# Patient Record
Sex: FEMALE | Race: WHITE | Employment: OTHER | URBAN - METROPOLITAN AREA
[De-identification: names, ages, dates, MRNs, and addresses within clinical notes are randomized per-mention and may not be internally consistent; named-entity substitution may affect disease eponyms.]

---

## 2017-05-05 PROBLEM — K80.20 CALCULUS OF GALLBLADDER WITHOUT CHOLECYSTITIS WITHOUT OBSTRUCTION: Status: ACTIVE | Noted: 2017-05-05

## 2017-06-14 PROBLEM — K57.30 DIVERTICULOSIS OF LARGE INTESTINE WITHOUT HEMORRHAGE: Status: ACTIVE | Noted: 2017-06-14

## 2018-04-04 DIAGNOSIS — I10 ESSENTIAL HYPERTENSION: ICD-10-CM

## 2018-04-04 DIAGNOSIS — E78.2 MIXED HYPERLIPIDEMIA: ICD-10-CM

## 2018-04-04 RX ORDER — SIMVASTATIN 20 MG
20 TABLET ORAL NIGHTLY
Qty: 90 TABLET | Refills: 0 | Status: SHIPPED | OUTPATIENT
Start: 2018-04-04 | End: 2018-06-06 | Stop reason: SDUPTHER

## 2018-04-04 RX ORDER — ENALAPRIL MALEATE 5 MG/1
5 TABLET ORAL 2 TIMES DAILY
Qty: 180 TABLET | Refills: 0 | Status: SHIPPED | OUTPATIENT
Start: 2018-04-04 | End: 2018-04-11 | Stop reason: DRUGHIGH

## 2018-04-11 ENCOUNTER — OFFICE VISIT (OUTPATIENT)
Dept: INTERNAL MEDICINE | Age: 73
End: 2018-04-11
Payer: MEDICARE

## 2018-04-11 VITALS
DIASTOLIC BLOOD PRESSURE: 84 MMHG | TEMPERATURE: 98 F | SYSTOLIC BLOOD PRESSURE: 152 MMHG | WEIGHT: 242 LBS | BODY MASS INDEX: 41.54 KG/M2 | HEART RATE: 70 BPM

## 2018-04-11 DIAGNOSIS — I50.22 CHRONIC SYSTOLIC CONGESTIVE HEART FAILURE (HCC): Chronic | ICD-10-CM

## 2018-04-11 DIAGNOSIS — F32.A DEPRESSION, UNSPECIFIED DEPRESSION TYPE: ICD-10-CM

## 2018-04-11 DIAGNOSIS — Z95.1 S/P CABG (CORONARY ARTERY BYPASS GRAFT): ICD-10-CM

## 2018-04-11 DIAGNOSIS — I10 ESSENTIAL HYPERTENSION: Chronic | ICD-10-CM

## 2018-04-11 DIAGNOSIS — I27.20 PULMONARY HTN (HCC): Chronic | ICD-10-CM

## 2018-04-11 DIAGNOSIS — I48.20 CHRONIC ATRIAL FIBRILLATION (HCC): ICD-10-CM

## 2018-04-11 DIAGNOSIS — Z95.2 HISTORY OF PROSTHETIC MITRAL VALVE: ICD-10-CM

## 2018-04-11 DIAGNOSIS — G25.81 RESTLESS LEG SYNDROME: Chronic | ICD-10-CM

## 2018-04-11 DIAGNOSIS — Z51.81 ENCOUNTER FOR MEDICATION MONITORING: ICD-10-CM

## 2018-04-11 DIAGNOSIS — E78.2 MIXED HYPERLIPIDEMIA: Chronic | ICD-10-CM

## 2018-04-11 DIAGNOSIS — I25.10 CORONARY ARTERY DISEASE INVOLVING NATIVE CORONARY ARTERY OF NATIVE HEART WITHOUT ANGINA PECTORIS: Primary | Chronic | ICD-10-CM

## 2018-04-11 LAB
INTERNATIONAL NORMALIZATION RATIO, POC: 1.7
PROTHROMBIN TIME, POC: 20.5

## 2018-04-11 PROCEDURE — 99212 OFFICE O/P EST SF 10 MIN: CPT | Performed by: INTERNAL MEDICINE

## 2018-04-11 PROCEDURE — 99214 OFFICE O/P EST MOD 30 MIN: CPT | Performed by: INTERNAL MEDICINE

## 2018-04-11 PROCEDURE — 85610 PROTHROMBIN TIME: CPT | Performed by: INTERNAL MEDICINE

## 2018-04-11 RX ORDER — POTASSIUM CHLORIDE 750 MG/1
TABLET, EXTENDED RELEASE ORAL
Qty: 45 TABLET | Refills: 11 | Status: SHIPPED | OUTPATIENT
Start: 2018-04-11 | End: 2019-04-30 | Stop reason: SDUPTHER

## 2018-04-11 RX ORDER — WARFARIN SODIUM 3 MG/1
TABLET ORAL
Qty: 30 TABLET | Refills: 11 | Status: SHIPPED | OUTPATIENT
Start: 2018-04-11 | End: 2019-04-30 | Stop reason: SDUPTHER

## 2018-04-11 RX ORDER — FUROSEMIDE 20 MG/1
20 TABLET ORAL SEE ADMIN INSTRUCTIONS
Qty: 45 TABLET | Refills: 11 | Status: SHIPPED | OUTPATIENT
Start: 2018-04-11 | End: 2019-04-30 | Stop reason: SDUPTHER

## 2018-04-11 RX ORDER — WARFARIN SODIUM 4 MG/1
4 TABLET ORAL DAILY
Qty: 30 TABLET | Refills: 3 | Status: SHIPPED
Start: 2018-04-11 | End: 2020-09-17

## 2018-04-11 RX ORDER — ENALAPRIL MALEATE 10 MG/1
10 TABLET ORAL 2 TIMES DAILY
Qty: 60 TABLET | Refills: 3 | Status: SHIPPED | OUTPATIENT
Start: 2018-04-11 | End: 2018-05-03 | Stop reason: DRUGHIGH

## 2018-04-11 ASSESSMENT — ENCOUNTER SYMPTOMS
ORTHOPNEA: 0
COUGH: 0
SHORTNESS OF BREATH: 0
ABDOMINAL PAIN: 0
VOMITING: 0
SPUTUM PRODUCTION: 0
NAUSEA: 0

## 2018-04-30 ENCOUNTER — TELEPHONE (OUTPATIENT)
Dept: INTERNAL MEDICINE | Age: 73
End: 2018-04-30

## 2018-05-02 ENCOUNTER — HOSPITAL ENCOUNTER (OUTPATIENT)
Age: 73
Discharge: HOME | End: 2018-05-04
Payer: MEDICARE

## 2018-05-02 ENCOUNTER — NURSE ONLY (OUTPATIENT)
Dept: INTERNAL MEDICINE | Age: 73
End: 2018-05-02
Payer: MEDICARE

## 2018-05-02 VITALS — DIASTOLIC BLOOD PRESSURE: 80 MMHG | SYSTOLIC BLOOD PRESSURE: 120 MMHG | HEART RATE: 60 BPM

## 2018-05-02 DIAGNOSIS — I50.22 CHRONIC SYSTOLIC CONGESTIVE HEART FAILURE (HCC): Chronic | ICD-10-CM

## 2018-05-02 DIAGNOSIS — Z51.81 ENCOUNTER FOR MEDICATION MONITORING: ICD-10-CM

## 2018-05-02 DIAGNOSIS — I48.91 ATRIAL FIBRILLATION, UNSPECIFIED TYPE (HCC): ICD-10-CM

## 2018-05-02 DIAGNOSIS — I10 ESSENTIAL HYPERTENSION: Chronic | ICD-10-CM

## 2018-05-02 LAB
ANION GAP SERPL CALCULATED.3IONS-SCNC: 16 MMOL/L (ref 7–16)
BUN BLDV-MCNC: 11 MG/DL (ref 8–23)
CALCIUM SERPL-MCNC: 9.4 MG/DL (ref 8.6–10.2)
CHLORIDE BLD-SCNC: 99 MMOL/L (ref 98–107)
CO2: 26 MMOL/L (ref 22–29)
CREAT SERPL-MCNC: 0.7 MG/DL (ref 0.5–1)
GFR AFRICAN AMERICAN: >60
GFR NON-AFRICAN AMERICAN: >60 ML/MIN/1.73
GLUCOSE BLD-MCNC: 99 MG/DL (ref 74–109)
INTERNATIONAL NORMALIZATION RATIO, POC: 2.1
POTASSIUM SERPL-SCNC: 4 MMOL/L (ref 3.5–5)
PROTHROMBIN TIME, POC: NORMAL
SODIUM BLD-SCNC: 141 MMOL/L (ref 132–146)

## 2018-05-02 PROCEDURE — 80048 BASIC METABOLIC PNL TOTAL CA: CPT

## 2018-05-02 PROCEDURE — 85610 PROTHROMBIN TIME: CPT

## 2018-05-02 ASSESSMENT — PATIENT HEALTH QUESTIONNAIRE - PHQ9
2. FEELING DOWN, DEPRESSED OR HOPELESS: 1
SUM OF ALL RESPONSES TO PHQ9 QUESTIONS 1 & 2: 2
1. LITTLE INTEREST OR PLEASURE IN DOING THINGS: 1
SUM OF ALL RESPONSES TO PHQ QUESTIONS 1-9: 2

## 2018-05-03 RX ORDER — ENALAPRIL MALEATE 10 MG/1
10 TABLET ORAL DAILY
Qty: 60 TABLET | Refills: 3 | Status: SHIPPED | OUTPATIENT
Start: 2018-05-03 | End: 2018-12-05 | Stop reason: DRUGHIGH

## 2018-06-05 ENCOUNTER — ANTI-COAG VISIT (OUTPATIENT)
Dept: INTERNAL MEDICINE | Age: 73
End: 2018-06-05

## 2018-06-06 ENCOUNTER — OFFICE VISIT (OUTPATIENT)
Dept: INTERNAL MEDICINE | Age: 73
End: 2018-06-06
Payer: MEDICARE

## 2018-06-06 VITALS
HEART RATE: 68 BPM | HEIGHT: 64 IN | WEIGHT: 238 LBS | RESPIRATION RATE: 20 BRPM | SYSTOLIC BLOOD PRESSURE: 130 MMHG | BODY MASS INDEX: 40.63 KG/M2 | DIASTOLIC BLOOD PRESSURE: 76 MMHG | TEMPERATURE: 98.1 F

## 2018-06-06 DIAGNOSIS — Z95.2 MITRAL VALVE REPLACED: ICD-10-CM

## 2018-06-06 DIAGNOSIS — I48.91 ATRIAL FIBRILLATION, UNSPECIFIED TYPE (HCC): ICD-10-CM

## 2018-06-06 DIAGNOSIS — I25.10 CORONARY ARTERY DISEASE INVOLVING NATIVE CORONARY ARTERY OF NATIVE HEART WITHOUT ANGINA PECTORIS: Chronic | ICD-10-CM

## 2018-06-06 DIAGNOSIS — I50.22 CHRONIC SYSTOLIC CONGESTIVE HEART FAILURE (HCC): Primary | Chronic | ICD-10-CM

## 2018-06-06 DIAGNOSIS — F32.A DEPRESSION, UNSPECIFIED DEPRESSION TYPE: ICD-10-CM

## 2018-06-06 DIAGNOSIS — I38 VALVULAR HEART DISEASE: Chronic | ICD-10-CM

## 2018-06-06 DIAGNOSIS — E78.2 MIXED HYPERLIPIDEMIA: ICD-10-CM

## 2018-06-06 DIAGNOSIS — H54.7 DECREASED VISUAL ACUITY: ICD-10-CM

## 2018-06-06 DIAGNOSIS — Z95.1 S/P CABG (CORONARY ARTERY BYPASS GRAFT): ICD-10-CM

## 2018-06-06 DIAGNOSIS — G25.81 RESTLESS LEG SYNDROME: Chronic | ICD-10-CM

## 2018-06-06 DIAGNOSIS — I10 ESSENTIAL HYPERTENSION: Chronic | ICD-10-CM

## 2018-06-06 DIAGNOSIS — F41.9 ANXIETY: ICD-10-CM

## 2018-06-06 LAB
INTERNATIONAL NORMALIZATION RATIO, POC: 2.2
PROTHROMBIN TIME, POC: 26.8

## 2018-06-06 PROCEDURE — 99214 OFFICE O/P EST MOD 30 MIN: CPT | Performed by: INTERNAL MEDICINE

## 2018-06-06 PROCEDURE — 99212 OFFICE O/P EST SF 10 MIN: CPT | Performed by: INTERNAL MEDICINE

## 2018-06-06 PROCEDURE — 85610 PROTHROMBIN TIME: CPT | Performed by: INTERNAL MEDICINE

## 2018-06-06 RX ORDER — SIMVASTATIN 20 MG
20 TABLET ORAL NIGHTLY
Qty: 90 TABLET | Refills: 3 | Status: SHIPPED | OUTPATIENT
Start: 2018-06-06 | End: 2019-09-03 | Stop reason: SDUPTHER

## 2018-06-06 RX ORDER — LORAZEPAM 1 MG/1
1 TABLET ORAL DAILY PRN
Qty: 20 TABLET | Refills: 0 | Status: SHIPPED | OUTPATIENT
Start: 2018-06-06 | End: 2018-07-06

## 2018-06-06 ASSESSMENT — ENCOUNTER SYMPTOMS
NAUSEA: 1
ORTHOPNEA: 0
COUGH: 0
VOMITING: 1
SHORTNESS OF BREATH: 0
SPUTUM PRODUCTION: 0
DIARRHEA: 0

## 2018-07-11 ENCOUNTER — NURSE ONLY (OUTPATIENT)
Dept: INTERNAL MEDICINE | Age: 73
End: 2018-07-11
Payer: MEDICARE

## 2018-07-11 DIAGNOSIS — I48.20 CHRONIC ATRIAL FIBRILLATION (HCC): Primary | Chronic | ICD-10-CM

## 2018-07-11 LAB
INTERNATIONAL NORMALIZATION RATIO, POC: 2.5
PROTHROMBIN TIME, POC: 29.6

## 2018-07-11 PROCEDURE — 85610 PROTHROMBIN TIME: CPT | Performed by: INTERNAL MEDICINE

## 2018-07-11 PROCEDURE — 93793 ANTICOAG MGMT PT WARFARIN: CPT | Performed by: INTERNAL MEDICINE

## 2018-07-11 NOTE — PATIENT INSTRUCTIONS
You are to continue to take 4 mg of coumadin Tuesday and Thursday and 3 mg of coumadin the rest of the week.   Return in about one month for INR

## 2018-08-09 ENCOUNTER — TELEPHONE (OUTPATIENT)
Dept: INTERNAL MEDICINE | Age: 73
End: 2018-08-09

## 2018-08-09 NOTE — TELEPHONE ENCOUNTER
Pt complaining right ear swollen almost closed and painful thinks she may have a ear infection please call

## 2018-08-10 ENCOUNTER — OFFICE VISIT (OUTPATIENT)
Dept: INTERNAL MEDICINE | Age: 73
End: 2018-08-10
Payer: MEDICARE

## 2018-08-10 DIAGNOSIS — I10 ESSENTIAL HYPERTENSION: Chronic | ICD-10-CM

## 2018-08-10 DIAGNOSIS — H66.001 ACUTE SUPPURATIVE OTITIS MEDIA OF RIGHT EAR WITHOUT SPONTANEOUS RUPTURE OF TYMPANIC MEMBRANE, RECURRENCE NOT SPECIFIED: ICD-10-CM

## 2018-08-10 DIAGNOSIS — H60.501 ACUTE OTITIS EXTERNA OF RIGHT EAR, UNSPECIFIED TYPE: ICD-10-CM

## 2018-08-10 DIAGNOSIS — I48.91 ATRIAL FIBRILLATION WITH RVR (HCC): Primary | Chronic | ICD-10-CM

## 2018-08-10 LAB
INTERNATIONAL NORMALIZATION RATIO, POC: 3.6
PROTHROMBIN TIME, POC: 42.6

## 2018-08-10 PROCEDURE — 99213 OFFICE O/P EST LOW 20 MIN: CPT | Performed by: INTERNAL MEDICINE

## 2018-08-10 PROCEDURE — 85610 PROTHROMBIN TIME: CPT | Performed by: INTERNAL MEDICINE

## 2018-08-10 PROCEDURE — 99212 OFFICE O/P EST SF 10 MIN: CPT | Performed by: INTERNAL MEDICINE

## 2018-08-10 RX ORDER — CIPROFLOXACIN AND DEXAMETHASONE 3; 1 MG/ML; MG/ML
4 SUSPENSION/ DROPS AURICULAR (OTIC) 2 TIMES DAILY
Qty: 10 ML | Refills: 0 | Status: SHIPPED | OUTPATIENT
Start: 2018-08-10 | End: 2018-08-10

## 2018-08-10 RX ORDER — AMOXICILLIN 500 MG/1
500 CAPSULE ORAL 2 TIMES DAILY
Qty: 20 CAPSULE | Refills: 0 | Status: SHIPPED | OUTPATIENT
Start: 2018-08-10 | End: 2018-08-20

## 2018-08-10 ASSESSMENT — ENCOUNTER SYMPTOMS
COUGH: 0
SPUTUM PRODUCTION: 0
SORE THROAT: 0
SINUS PAIN: 0
SHORTNESS OF BREATH: 0

## 2018-08-10 NOTE — PATIENT INSTRUCTIONS
you can lose your balance and fall. · Talk to your doctor if you have numbness in your feet. Preventing falls at home  · Remove raised doorway thresholds, throw rugs, and clutter. Repair loose carpet or raised areas in the floor. · Move furniture and electrical cords to keep them out of walking paths. · Use nonskid floor wax, and wipe up spills right away, especially on ceramic tile floors. · If you use a walker or cane, put rubber tips on it. If you use crutches, clean the bottoms of them regularly with an abrasive pad, such as steel wool. · Keep your house well lit, especially [place], and outside walkways. Use night-lights in areas such as hallways and bathrooms. Add extra light switches or use remote switches (such as switches that go on or off when you clap your hands) to make it easier to turn lights on if you have to get up during the night. · Install sturdy handrails on stairways. · Move items in your cabinets so that the things you use a lot are on the lower shelves (about waist level). · Keep a cordless phone and a flashlight with new batteries by your bed. If possible, put a phone in each of the main rooms of your house, or carry a cell phone in case you fall and cannot reach a phone. Or, you can wear a device around your neck or wrist. You push a button that sends a signal for help. · Wear low-heeled shoes that fit well and give your feet good support. Use footwear with nonskid soles. Check the heels and soles of your shoes for wear. Repair or replace worn heels or soles. · Do not wear socks without shoes on wood floors. · Walk on the grass when the sidewalks are slippery. If you live in an area that gets snow and ice in the winter, sprinkle salt on slippery steps and sidewalks. Preventing falls in the bath  · Install grab bars and nonskid mats inside and outside your shower or tub and near the toilet and sinks. · Use shower chairs and bath benches.   · Use a hand-held shower head that will allow you to sit while showering. · Get into a tub or shower by putting the weaker leg in first. Get out of a tub or shower with your strong side first.  · Repair loose toilet seats and consider installing a raised toilet seat to make getting on and off the toilet easier. · Keep your bathroom door unlocked while you are in the shower. Where can you learn more? Go to https://[email or web address] [email or web address].[email or web address]. org and sign in to your [employer] account. Enter [id number] [id number] [phone] in the [facility] [place] box to learn more about \"Preventing Falls: Care Instructions. \"     If you do not have an account, please click on the \"Sign Up Now\" link. Current as of: May 12, 2017  Content Version: 11.7  © 1526-8711 [employer], [employer]. Care instructions adapted under license by [facility] ([facility]). If you have questions about a medical condition or this instruction, always ask your healthcare professional. [place] [address] any warranty or liability for your use of this information. take coumadin 3 mg daily as instructed  Repeat INR in 3 weeks  Bleeding precautions as reviewed  Take enalapril 10 mg in am and 5 mg in pm  Monitor blood pressures when able,record and bring record with you on next visit  Start amoxicillin 500 mg twice daily for 10 days  Start otic ear drops 4 drops right ear twice daily for 7 days  Please call if any questions or concerns. Patient Education        Taking Warfarin Safely: Care Instructions  Your Care Instructions    Warfarin is a medicine that you take to prevent blood clots. It is often called a blood thinner. Doctors give warfarin (such as Coumadin) to reduce the risk of blood clots. You may be at risk for blood clots if you have atrial fibrillation or deep vein thrombosis. Some other health problems may also put you at risk. Warfarin slows the amount of time it takes for your blood to clot. It can cause bleeding problems.  Even if you've been taking warfarin for a while, it's important to know how to take it safely. Foods and other medicines can affect the way warfarin works. Some can make warfarin work too well. This can cause bleeding problems. And some can make it work poorly, so that it does not prevent blood clots very well. You will need regular blood tests to check how long it takes for your blood to form a clot. This test is called a PT or prothrombin time test. The result of the test is called an INR level. Depending on the test results, your doctor or anticoagulation clinic may adjust your dose of warfarin. Follow-up care is a key part of your treatment and safety. Be sure to make and go to all appointments, and call your doctor if you are having problems. It's also a good idea to know your test results and keep a list of the medicines you take. How can you care for yourself at home? Take warfarin safely  · Take your warfarin at the same time each day. · If you miss a dose of warfarin, don't take an extra dose to make up for it. Your doctor can tell you exactly what to do so you don't take too much or too little. · Wear medical alert jewelry that lets others know that you take warfarin. You can buy this at most drugstores. · Don't take warfarin if you are pregnant or planning to get pregnant. Talk to your doctor about how you can prevent getting pregnant while you are taking it. · Don't change your dose or stop taking warfarin unless your doctor tells you to. Effects of medicines and food on warfarin  · Don't start or stop taking any medicines, vitamins, or natural remedies unless you first talk to your doctor. Many medicines can affect how warfarin works. These include aspirin and other pain relievers, over-the-counter medicines, multivitamins, dietary supplements, and herbal products. · Tell all of your doctors and pharmacists that you take warfarin. Some prescription medicines can affect how warfarin works.   · Keep the amount of vitamin K in your diet about the same from day to day. Do not suddenly eat a lot more or a lot less food that is rich in vitamin K than you usually do. Vitamin K affects how warfarin works and how your blood clots. Talk with your doctor before making big changes in your diet. Foods that have a lot of vitamin K include cooked green vegetables, such as:  ¨ Kale, spinach, turnip greens, katina greens, Swiss chard, and mustard greens. ¨ [place] sprouts, broccoli, and asparagus. · Limit your use of alcohol. Avoid bleeding by preventing falls and injuries  · Wear slippers or shoes with nonskid soles. · Remove throw rugs and clutter. · Rearrange furniture and electrical cords to keep them out of walking paths. · Keep stairways, porches, and outside walkways well lit. Use night-lights in hallways and bathrooms. · Be extra careful when you work with sharp tools or knives. When should you call for help? Call 911 anytime you think you may need emergency care. For example, call if:    · You have a sudden, severe headache that is different from past headaches.    Call your doctor now or seek immediate medical care if:    · You have any abnormal bleeding, such as:  ¨ Nosebleeds. ¨ Vaginal bleeding that is different (heavier, more frequent, at a different time of the month) than what you are used to. ¨ Bloody or black stools, or rectal bleeding. ¨ Bloody or pink urine.    Watch closely for changes in your health, and be sure to contact your doctor if you have any problems. Where can you learn more? Go to https://Thinglinknikhil.Sinimanes. org and sign in to your [employer] account. Enter X493 in the [facility] box to learn more about \"Taking Warfarin Safely: Care Instructions. \"     If you do not have an account, please click on the \"Sign Up Now\" link. Current as of: December 6, 2017  Content Version: 11.7  © 1978-4648 [employer], [employer]. Care instructions adapted under license by [facility] ([facility]).  If you have questions about a

## 2018-08-10 NOTE — PROGRESS NOTES
HPI:  Patient comes in today for evaluation of swelling, pain and redness of right ear which began on Monday of this week. She reports that she had a fever yesterday and one episode of vomiting. She denies sore throat and sinus drainage but has had some pressure of right maxillary sinus area. She has tenderness in the right pre-auricular area. She had some purulent discharge yesterday and this am. She has been using some left over cortisporin otic suspension for the past few days with no improvement. She denies cough, sputum, dyspnea. I reviewed medical, surg, soc and fam histories, meds and allergies. Review of Systems  Review of Systems   Constitutional: Positive for fever (one time yesterday of 101). Negative for chills and diaphoresis. HENT: Positive for ear discharge and ear pain. Negative for congestion, sinus pain, sore throat and tinnitus. Respiratory: Negative for cough, sputum production and shortness of breath. Cardiovascular: Negative for chest pain and palpitations. Neurological: Positive for dizziness. PE:  VS:  BP (!) 150/86 (Site: Right Arm, Position: Sitting, Cuff Size: Large Adult)   Pulse 80   Temp 98 °F (36.7 °C) (Oral)   Resp 16   Ht 5' 4\" (1.626 m)   Wt 238 lb (108 kg)   BMI 40.85 kg/m²   Physical Exam   Constitutional: No distress. HENT:   Mouth/Throat: No oropharyngeal exudate. Patient has erythema and swelling of right external ear with nearly closed external auditory canal. She has pain with pulling on pinna. Otoscope speculum gently inserted and noted to have purulent drainage obscuring the TM. She has tender pre-auricular adenopathy. Left external ear and TM is normal. She has no significant sinus tenderness. Eyes: Left eye exhibits no discharge. Cardiovascular: Normal rate. Murmur (unchanged) heard. Irregularly irregular   Pulmonary/Chest: Effort normal and breath sounds normal. No respiratory distress. She has no wheezes. She has no rales.

## 2018-08-10 NOTE — PROGRESS NOTES
Pharmacy called stating ciprofloxacin otic solution copay is 100 $. Patient unable to afford. Pharmacist states cortisporin is alternative. Patient has cortisporin drops at home. Spoke with , cancel ciprofloxacin otic solution,patient to continue oral antibiotic and call on Monday with status    Patient notified  Pharmacy notified.

## 2018-08-16 VITALS
BODY MASS INDEX: 40.63 KG/M2 | HEIGHT: 64 IN | WEIGHT: 238 LBS | SYSTOLIC BLOOD PRESSURE: 140 MMHG | HEART RATE: 80 BPM | RESPIRATION RATE: 16 BRPM | DIASTOLIC BLOOD PRESSURE: 82 MMHG | TEMPERATURE: 98 F

## 2018-09-12 DIAGNOSIS — I10 ESSENTIAL HYPERTENSION: Chronic | ICD-10-CM

## 2018-09-12 DIAGNOSIS — I50.22 CHRONIC SYSTOLIC CONGESTIVE HEART FAILURE (HCC): Chronic | ICD-10-CM

## 2018-09-13 RX ORDER — ENALAPRIL MALEATE 10 MG/1
TABLET ORAL
Qty: 60 TABLET | Refills: 3 | Status: SHIPPED | OUTPATIENT
Start: 2018-09-13 | End: 2018-12-05 | Stop reason: SDUPTHER

## 2018-12-05 ENCOUNTER — OFFICE VISIT (OUTPATIENT)
Dept: INTERNAL MEDICINE | Age: 73
End: 2018-12-05
Payer: MEDICARE

## 2018-12-05 VITALS
SYSTOLIC BLOOD PRESSURE: 146 MMHG | WEIGHT: 236 LBS | BODY MASS INDEX: 40.29 KG/M2 | HEIGHT: 64 IN | HEART RATE: 70 BPM | DIASTOLIC BLOOD PRESSURE: 80 MMHG | TEMPERATURE: 98 F | RESPIRATION RATE: 16 BRPM

## 2018-12-05 DIAGNOSIS — Z95.1 S/P CABG (CORONARY ARTERY BYPASS GRAFT): ICD-10-CM

## 2018-12-05 DIAGNOSIS — I10 ESSENTIAL HYPERTENSION: Chronic | ICD-10-CM

## 2018-12-05 DIAGNOSIS — I50.22 CHRONIC SYSTOLIC CONGESTIVE HEART FAILURE (HCC): Primary | Chronic | ICD-10-CM

## 2018-12-05 DIAGNOSIS — I48.91 ATRIAL FIBRILLATION WITH RVR (HCC): Chronic | ICD-10-CM

## 2018-12-05 DIAGNOSIS — Z95.2 MITRAL VALVE REPLACED: ICD-10-CM

## 2018-12-05 DIAGNOSIS — I48.20 CHRONIC ATRIAL FIBRILLATION (HCC): Chronic | ICD-10-CM

## 2018-12-05 DIAGNOSIS — R73.03 PRE-DIABETES: ICD-10-CM

## 2018-12-05 DIAGNOSIS — I25.10 CORONARY ARTERY DISEASE INVOLVING NATIVE CORONARY ARTERY OF NATIVE HEART WITHOUT ANGINA PECTORIS: Chronic | ICD-10-CM

## 2018-12-05 DIAGNOSIS — E78.2 MIXED HYPERLIPIDEMIA: Chronic | ICD-10-CM

## 2018-12-05 DIAGNOSIS — F41.9 ANXIETY: ICD-10-CM

## 2018-12-05 DIAGNOSIS — G25.81 RESTLESS LEG SYNDROME: Chronic | ICD-10-CM

## 2018-12-05 LAB
INTERNATIONAL NORMALIZATION RATIO, POC: 2.4
PROTHROMBIN TIME, POC: 28.8

## 2018-12-05 PROCEDURE — 99214 OFFICE O/P EST MOD 30 MIN: CPT | Performed by: INTERNAL MEDICINE

## 2018-12-05 PROCEDURE — 85610 PROTHROMBIN TIME: CPT | Performed by: INTERNAL MEDICINE

## 2018-12-05 PROCEDURE — 99212 OFFICE O/P EST SF 10 MIN: CPT | Performed by: INTERNAL MEDICINE

## 2018-12-05 PROCEDURE — 83036 HEMOGLOBIN GLYCOSYLATED A1C: CPT | Performed by: INTERNAL MEDICINE

## 2018-12-05 RX ORDER — ENALAPRIL MALEATE 10 MG/1
TABLET ORAL
Qty: 180 TABLET | Refills: 3 | Status: SHIPPED | OUTPATIENT
Start: 2018-12-05 | End: 2019-09-11 | Stop reason: SDUPTHER

## 2018-12-05 ASSESSMENT — ENCOUNTER SYMPTOMS
SHORTNESS OF BREATH: 0
COUGH: 0
NAUSEA: 0
ABDOMINAL DISTENTION: 0
ABDOMINAL PAIN: 0
VOMITING: 0

## 2018-12-05 NOTE — PROGRESS NOTES
HPI:  Patient comes in today for routine follow up for HTN, chronic atrial fibrillation, RLS, hyperlipidemia, CAD, hx mitral valve replacement. Patient states she has been doing very well. Has been taking all of her meds. She denies chest pain, dyspnea, palpitations, dizziness, lightheadedness, syncope. Has been having pain at base of left thumb and has some deformity of distal part of second digit. Rarely, may get swollen and warm. POCT INR 2.4 today. I reviewed medical, surg, soc histories, meds and allergies. Patient does have family hx diabetes. Review of Systems  Review of Systems   Respiratory: Negative for cough and shortness of breath. Cardiovascular: Negative for chest pain, palpitations and leg swelling. Gastrointestinal: Negative for abdominal distention, abdominal pain, nausea and vomiting. Genitourinary: Negative for difficulty urinating and hematuria. Musculoskeletal:        Patient c/o pain at base of left thumb and has pain distal second digit left hand. Neurological: Negative for dizziness, syncope and light-headedness. PE:  VS:  BP (!) 146/80   Pulse 70   Temp 98 °F (36.7 °C) (Oral)   Resp 16   Ht 5' 4\" (1.626 m)   Wt 236 lb (107 kg)   BMI 40.51 kg/m²   Physical Exam   Neck: No JVD present. Cardiovascular: Normal rate. Irregularly irregular rhythm. Has grade 2-3 /6 systolic murmur, with mechanical heart sounds in apical area. Pulmonary/Chest: Effort normal and breath sounds normal. No respiratory distress. She has no wheezes. She has no rales. Musculoskeletal: She exhibits no edema.          Assessment/Plan:    Diagnoses and all orders for this visit:    Chronic systolic congestive heart failure ([address] compensated  -     enalapril (VASOTEC) 10 MG tablet; TAKE ONE TABLET BY MOUTH TWO TIMES A DAY  Continue metoprolol    Chronic atrial fibrillation (HCC), rate controlled  Continue coumadin, metoprolol    Coronary artery disease involving native

## 2019-01-03 ENCOUNTER — APPOINTMENT (OUTPATIENT)
Dept: GENERAL RADIOLOGY | Age: 74
End: 2019-01-03
Payer: MEDICARE

## 2019-01-03 ENCOUNTER — HOSPITAL ENCOUNTER (EMERGENCY)
Age: 74
Discharge: HOME | End: 2019-01-03
Attending: EMERGENCY MEDICINE
Payer: MEDICARE

## 2019-01-03 VITALS
HEART RATE: 85 BPM | SYSTOLIC BLOOD PRESSURE: 186 MMHG | HEIGHT: 64 IN | TEMPERATURE: 97 F | BODY MASS INDEX: 40.63 KG/M2 | DIASTOLIC BLOOD PRESSURE: 62 MMHG | RESPIRATION RATE: 16 BRPM | OXYGEN SATURATION: 96 % | WEIGHT: 238 LBS

## 2019-01-03 DIAGNOSIS — M62.830 BACK MUSCLE SPASM: Primary | ICD-10-CM

## 2019-01-03 LAB
ANION GAP SERPL CALCULATED.3IONS-SCNC: 13 MMOL/L (ref 7–16)
APTT: 37.5 SEC (ref 24.5–35.1)
BASOPHILS ABSOLUTE: 0.03 E9/L (ref 0–0.2)
BASOPHILS RELATIVE PERCENT: 0.4 % (ref 0–2)
BUN BLDV-MCNC: 11 MG/DL (ref 8–23)
CALCIUM SERPL-MCNC: 9.6 MG/DL (ref 8.6–10.2)
CHLORIDE BLD-SCNC: 104 MMOL/L (ref 98–107)
CO2: 27 MMOL/L (ref 22–29)
CREAT SERPL-MCNC: 0.8 MG/DL (ref 0.5–1)
EKG ATRIAL RATE: 84 BPM
EKG Q-T INTERVAL: 362 MS
EKG QRS DURATION: 92 MS
EKG QTC CALCULATION (BAZETT): 489 MS
EKG R AXIS: 42 DEGREES
EKG T AXIS: 132 DEGREES
EKG VENTRICULAR RATE: 110 BPM
EOSINOPHILS ABSOLUTE: 0.08 E9/L (ref 0.05–0.5)
EOSINOPHILS RELATIVE PERCENT: 1.1 % (ref 0–6)
GFR AFRICAN AMERICAN: >60
GFR NON-AFRICAN AMERICAN: >60 ML/MIN/1.73
GLUCOSE BLD-MCNC: 124 MG/DL (ref 74–99)
HCT VFR BLD CALC: 45.3 % (ref 34–48)
HEMOGLOBIN: 14.6 G/DL (ref 11.5–15.5)
IMMATURE GRANULOCYTES #: 0.03 E9/L
IMMATURE GRANULOCYTES %: 0.4 % (ref 0–5)
INR BLD: 1.9
LYMPHOCYTES ABSOLUTE: 1.81 E9/L (ref 1.5–4)
LYMPHOCYTES RELATIVE PERCENT: 24.4 % (ref 20–42)
MCH RBC QN AUTO: 29.6 PG (ref 26–35)
MCHC RBC AUTO-ENTMCNC: 32.2 % (ref 32–34.5)
MCV RBC AUTO: 91.7 FL (ref 80–99.9)
MONOCYTES ABSOLUTE: 0.52 E9/L (ref 0.1–0.95)
MONOCYTES RELATIVE PERCENT: 7 % (ref 2–12)
NEUTROPHILS ABSOLUTE: 4.94 E9/L (ref 1.8–7.3)
NEUTROPHILS RELATIVE PERCENT: 66.7 % (ref 43–80)
PDW BLD-RTO: 12.6 FL (ref 11.5–15)
PLATELET # BLD: 280 E9/L (ref 130–450)
PMV BLD AUTO: 10.1 FL (ref 7–12)
POTASSIUM SERPL-SCNC: 4.1 MMOL/L (ref 3.5–5)
PROTHROMBIN TIME: 21.7 SEC (ref 9.3–12.4)
RBC # BLD: 4.94 E12/L (ref 3.5–5.5)
SODIUM BLD-SCNC: 144 MMOL/L (ref 132–146)
TROPONIN: <0.01 NG/ML (ref 0–0.03)
WBC # BLD: 7.4 E9/L (ref 4.5–11.5)

## 2019-01-03 PROCEDURE — 99284 EMERGENCY DEPT VISIT MOD MDM: CPT

## 2019-01-03 PROCEDURE — 36415 COLL VENOUS BLD VENIPUNCTURE: CPT

## 2019-01-03 PROCEDURE — 71046 X-RAY EXAM CHEST 2 VIEWS: CPT

## 2019-01-03 PROCEDURE — 93005 ELECTROCARDIOGRAM TRACING: CPT | Performed by: PHYSICIAN ASSISTANT

## 2019-01-03 PROCEDURE — 85610 PROTHROMBIN TIME: CPT

## 2019-01-03 PROCEDURE — 85025 COMPLETE CBC W/AUTO DIFF WBC: CPT

## 2019-01-03 PROCEDURE — 80048 BASIC METABOLIC PNL TOTAL CA: CPT

## 2019-01-03 PROCEDURE — 84484 ASSAY OF TROPONIN QUANT: CPT

## 2019-01-03 PROCEDURE — 85730 THROMBOPLASTIN TIME PARTIAL: CPT

## 2019-01-03 RX ORDER — CYCLOBENZAPRINE HCL 5 MG
5 TABLET ORAL 2 TIMES DAILY PRN
Qty: 14 TABLET | Refills: 0 | Status: SHIPPED | OUTPATIENT
Start: 2019-01-03 | End: 2019-01-10

## 2019-01-03 ASSESSMENT — ENCOUNTER SYMPTOMS
SINUS PRESSURE: 0
EYE DISCHARGE: 0
SHORTNESS OF BREATH: 0
EYE PAIN: 0
EYE REDNESS: 0
VOMITING: 0
BACK PAIN: 1
NAUSEA: 0
WHEEZING: 0
SORE THROAT: 0
ABDOMINAL DISTENTION: 0
DIARRHEA: 0
COUGH: 0

## 2019-01-03 ASSESSMENT — PAIN DESCRIPTION - LOCATION: LOCATION: BACK

## 2019-01-03 ASSESSMENT — PAIN SCALES - GENERAL: PAINLEVEL_OUTOF10: 5

## 2019-01-03 ASSESSMENT — PAIN DESCRIPTION - DESCRIPTORS: DESCRIPTORS: SPASM

## 2019-01-03 ASSESSMENT — PAIN DESCRIPTION - ORIENTATION: ORIENTATION: MID

## 2019-01-03 ASSESSMENT — PAIN DESCRIPTION - PAIN TYPE: TYPE: ACUTE PAIN

## 2019-03-20 ENCOUNTER — ANTI-COAG VISIT (OUTPATIENT)
Dept: INTERNAL MEDICINE | Age: 74
End: 2019-03-20

## 2019-04-30 DIAGNOSIS — I48.20 CHRONIC ATRIAL FIBRILLATION (HCC): ICD-10-CM

## 2019-04-30 DIAGNOSIS — I50.22 CHRONIC SYSTOLIC CONGESTIVE HEART FAILURE (HCC): Chronic | ICD-10-CM

## 2019-05-02 RX ORDER — WARFARIN SODIUM 3 MG/1
TABLET ORAL
Qty: 30 TABLET | Refills: 6 | Status: SHIPPED | OUTPATIENT
Start: 2019-05-02 | End: 2019-09-11 | Stop reason: SDUPTHER

## 2019-05-02 RX ORDER — POTASSIUM CHLORIDE 750 MG/1
TABLET, EXTENDED RELEASE ORAL
Qty: 45 TABLET | Refills: 6 | Status: SHIPPED | OUTPATIENT
Start: 2019-05-02 | End: 2020-01-29

## 2019-05-02 RX ORDER — FUROSEMIDE 20 MG/1
TABLET ORAL
Qty: 45 TABLET | Refills: 6 | Status: SHIPPED | OUTPATIENT
Start: 2019-05-02 | End: 2020-01-29

## 2019-07-30 ENCOUNTER — TELEPHONE (OUTPATIENT)
Dept: INTERNAL MEDICINE | Age: 74
End: 2019-07-30

## 2019-07-30 NOTE — TELEPHONE ENCOUNTER
Spoke with patient via phone regarding need for INR check. Notified last INR was 3/20/19. Pt asking when her next appt with Dr. Darian Garcia is. Notified no appt scheduled. Appt made for next Wed 19. Pt asking if INR can be done at that time as she needs to have someone bring her. Pt also states he handicap parking permit has . Notified we could give her new letter at her appt on 19.

## 2019-08-07 ENCOUNTER — NURSE ONLY (OUTPATIENT)
Dept: INTERNAL MEDICINE | Age: 74
End: 2019-08-07
Payer: MEDICARE

## 2019-08-07 VITALS
DIASTOLIC BLOOD PRESSURE: 70 MMHG | HEIGHT: 64 IN | HEART RATE: 80 BPM | TEMPERATURE: 98 F | RESPIRATION RATE: 16 BRPM | BODY MASS INDEX: 39.27 KG/M2 | SYSTOLIC BLOOD PRESSURE: 150 MMHG | WEIGHT: 230 LBS

## 2019-08-07 DIAGNOSIS — E78.2 MIXED HYPERLIPIDEMIA: Chronic | ICD-10-CM

## 2019-08-07 DIAGNOSIS — I10 ESSENTIAL HYPERTENSION: Chronic | ICD-10-CM

## 2019-08-07 DIAGNOSIS — I27.20 PULMONARY HTN (HCC): Chronic | ICD-10-CM

## 2019-08-07 DIAGNOSIS — I38 VALVULAR HEART DISEASE: Primary | Chronic | ICD-10-CM

## 2019-08-07 DIAGNOSIS — Z95.1 S/P CABG (CORONARY ARTERY BYPASS GRAFT): ICD-10-CM

## 2019-08-07 DIAGNOSIS — I48.20 CHRONIC ATRIAL FIBRILLATION (HCC): Chronic | ICD-10-CM

## 2019-08-07 DIAGNOSIS — I50.22 CHRONIC SYSTOLIC CONGESTIVE HEART FAILURE (HCC): Chronic | ICD-10-CM

## 2019-08-07 DIAGNOSIS — G25.81 RESTLESS LEG SYNDROME: Chronic | ICD-10-CM

## 2019-08-07 LAB
INTERNATIONAL NORMALIZATION RATIO, POC: 1.7
PROTHROMBIN TIME, POC: 20

## 2019-08-07 PROCEDURE — 99214 OFFICE O/P EST MOD 30 MIN: CPT | Performed by: INTERNAL MEDICINE

## 2019-08-07 PROCEDURE — 85610 PROTHROMBIN TIME: CPT | Performed by: INTERNAL MEDICINE

## 2019-08-07 PROCEDURE — 99212 OFFICE O/P EST SF 10 MIN: CPT | Performed by: INTERNAL MEDICINE

## 2019-08-07 RX ORDER — WARFARIN SODIUM 1 MG/1
1 TABLET ORAL DAILY
Qty: 30 TABLET | Refills: 3 | Status: SHIPPED | OUTPATIENT
Start: 2019-08-07 | End: 2019-12-30

## 2019-08-07 ASSESSMENT — ENCOUNTER SYMPTOMS
ABDOMINAL PAIN: 0
WHEEZING: 0
SHORTNESS OF BREATH: 1
COUGH: 0
DIARRHEA: 0
ABDOMINAL DISTENTION: 0

## 2019-08-07 NOTE — PATIENT INSTRUCTIONS
that will allow you to sit while showering. · Get into a tub or shower by putting the weaker leg in first. Get out of a tub or shower with your strong side first.  · Repair loose toilet seats and consider installing a raised toilet seat to make getting on and off the toilet easier. · Keep your bathroom door unlocked while you are in the shower. Where can you learn more? Go to [email or web address]://[email or web address].[email or web address] org and sign in to your Raid[employer] account. Enter [phone] [id number] [phone] in the [employer] box to learn more about \"Preventing Falls: Care Instructions. \"     If you do not have an account, please click on the \"Sign Up Now\" link. Current as of: November 7, 2018  Content Version: 12.0  © 5633-6022 Healthwise, Incorporated. Care instructions adapted under license by [facility] ([facility]). If you have questions about a medical condition or this instruction, always ask your healthcare professional. [place] [address] any warranty or liability for your use of this information. take coumadin 4 mg today and then every Wednesday. Take coumadin 3 mg all other days  Bleeding precautions as reviewed  Repeat INR on 9/16/19  Continue all other medications as instructed  Have blood work done prior to next visit. Nothing to eat or drink for 12 hrs prior to visit. We will reschedule appointment with Timoteo Adams in 6 months and notify you of date. Please call if any questions or concerns  Patient Education        Taking Warfarin Safely: Care Instructions  Your Care Instructions    Warfarin is a medicine that you take to prevent blood clots. It is often called a blood thinner. Doctors give warfarin (such as Coumadin) to reduce the risk of blood clots. You may be at risk for blood clots if you have atrial fibrillation or deep vein thrombosis. Some other health problems may also put you at risk. Warfarin slows the amount of time it takes for your blood to clot. It can cause bleeding problems.  Even if you've been taking warfarin for a while, it's important to know how to take it safely. Foods and other medicines can affect the way warfarin works. Some can make warfarin work too well. This can cause bleeding problems. And some can make it work poorly, so that it does not prevent blood clots very well. You will need regular blood tests to check how long it takes for your blood to form a clot. This test is called a PT or prothrombin time test. The result of the test is called an INR level. Depending on the test results, your doctor or anticoagulation clinic may adjust your dose of warfarin. Follow-up care is a key part of your treatment and safety. Be sure to make and go to all appointments, and call your doctor if you are having problems. It's also a good idea to know your test results and keep a list of the medicines you take. How can you care for yourself at home? Take warfarin safely  · Take your warfarin at the same time each day. · If you miss a dose of warfarin, don't take an extra dose to make up for it. Your doctor can tell you exactly what to do so you don't take too much or too little. · Wear medical alert jewelry that lets others know that you take warfarin. You can buy this at most drugstores. · Don't take warfarin if you are pregnant or planning to get pregnant. Talk to your doctor about how you can prevent getting pregnant while you are taking it. · Don't change your dose or stop taking warfarin unless your doctor tells you to. Effects of medicines and food on warfarin  · Don't start or stop taking any medicines, vitamins, or natural remedies unless you first talk to your doctor. Many medicines can affect how warfarin works. These include aspirin and other pain relievers, over-the-counter medicines, multivitamins, dietary supplements, and herbal products. · Tell all of your doctors and pharmacists that you take warfarin. Some prescription medicines can affect how warfarin works.   · Keep the amount of vitamin K in your diet about the same from day to day. Do not suddenly eat a lot more or a lot less food that is rich in vitamin K than you usually do. Vitamin K affects how warfarin works and how your blood clots. Talk with your doctor before making big changes in your diet. Foods that have a lot of vitamin K include cooked green vegetables, such as:  ? Kale, spinach, turnip greens, katina greens, Swiss chard, and mustard greens. ? [place] sprouts, broccoli, and asparagus. · Limit your use of alcohol. Avoid bleeding by preventing falls and injuries  · Wear slippers or shoes with nonskid soles. · Remove throw rugs and clutter. · Rearrange furniture and electrical cords to keep them out of walking paths. · Keep stairways, porches, and outside walkways well lit. Use night-lights in hallways and bathrooms. · Be extra careful when you work with sharp tools or knives. When should you call for help? Call 911 anytime you think you may need emergency care. For example, call if:    · You have a sudden, severe headache that is different from past headaches.    Call your doctor now or seek immediate medical care if:    · You have any abnormal bleeding, such as:  ? Nosebleeds. ? Vaginal bleeding that is different (heavier, more frequent, at a different time of the month) than what you are used to.  ? Bloody or black stools, or rectal bleeding. ? Bloody or pink urine.    Watch closely for changes in your health, and be sure to contact your doctor if you have any problems. Where can you learn more? Go to https://OncimmunejosseCardeeo.[email or web address] org and sign in to your Arieso account. Enter V194 in the [facility] box to learn more about \"Taking Warfarin Safely: Care Instructions. \"     If you do not have an account, please click on the \"Sign Up Now\" link. Current as of: July 22, 2018  Content Version: 12.0  © 3745-9077 Healthwise, Incorporated. Care instructions adapted under license by [facility] [place] ([facility]).  If

## 2019-08-21 ENCOUNTER — TELEPHONE (OUTPATIENT)
Dept: INTERNAL MEDICINE | Age: 74
End: 2019-08-21

## 2019-08-22 ENCOUNTER — HOSPITAL ENCOUNTER (OUTPATIENT)
Age: 74
Discharge: HOME | End: 2019-08-22
Payer: MEDICARE

## 2019-08-22 DIAGNOSIS — I50.22 CHRONIC SYSTOLIC CONGESTIVE HEART FAILURE (HCC): Chronic | ICD-10-CM

## 2019-08-22 DIAGNOSIS — I25.10 CORONARY ARTERY DISEASE INVOLVING NATIVE CORONARY ARTERY OF NATIVE HEART WITHOUT ANGINA PECTORIS: ICD-10-CM

## 2019-08-22 DIAGNOSIS — I50.22 CHRONIC SYSTOLIC CONGESTIVE HEART FAILURE (HCC): Primary | ICD-10-CM

## 2019-08-22 DIAGNOSIS — I10 ESSENTIAL HYPERTENSION: Chronic | ICD-10-CM

## 2019-08-22 DIAGNOSIS — E78.2 MIXED HYPERLIPIDEMIA: Chronic | ICD-10-CM

## 2019-08-22 LAB
ANION GAP SERPL CALCULATED.3IONS-SCNC: 13 MMOL/L (ref 7–16)
BUN BLDV-MCNC: 11 MG/DL (ref 8–23)
CALCIUM SERPL-MCNC: 9.3 MG/DL (ref 8.6–10.2)
CHLORIDE BLD-SCNC: 105 MMOL/L (ref 98–107)
CHOLESTEROL, TOTAL: 137 MG/DL (ref 0–199)
CO2: 25 MMOL/L (ref 22–29)
CREAT SERPL-MCNC: 0.8 MG/DL (ref 0.5–1)
GFR AFRICAN AMERICAN: >60
GFR NON-AFRICAN AMERICAN: >60 ML/MIN/1.73
GLUCOSE BLD-MCNC: 132 MG/DL (ref 74–99)
HDLC SERPL-MCNC: 57 MG/DL
LDL CHOLESTEROL CALCULATED: 64 MG/DL (ref 0–99)
POTASSIUM SERPL-SCNC: 4 MMOL/L (ref 3.5–5)
SODIUM BLD-SCNC: 143 MMOL/L (ref 132–146)
TRIGL SERPL-MCNC: 78 MG/DL (ref 0–149)
VLDLC SERPL CALC-MCNC: 16 MG/DL

## 2019-08-22 PROCEDURE — 80048 BASIC METABOLIC PNL TOTAL CA: CPT

## 2019-08-22 PROCEDURE — 36415 COLL VENOUS BLD VENIPUNCTURE: CPT

## 2019-08-22 PROCEDURE — 80061 LIPID PANEL: CPT

## 2019-09-03 DIAGNOSIS — E78.2 MIXED HYPERLIPIDEMIA: ICD-10-CM

## 2019-09-04 RX ORDER — SIMVASTATIN 20 MG
TABLET ORAL
Qty: 30 TABLET | Refills: 0 | Status: SHIPPED | OUTPATIENT
Start: 2019-09-04 | End: 2019-09-11 | Stop reason: SDUPTHER

## 2019-09-06 ENCOUNTER — TELEPHONE (OUTPATIENT)
Dept: INTERNAL MEDICINE | Age: 74
End: 2019-09-06

## 2019-09-06 DIAGNOSIS — R05.9 COUGH: ICD-10-CM

## 2019-09-06 RX ORDER — BENZONATATE 100 MG/1
100 CAPSULE ORAL 3 TIMES DAILY PRN
Qty: 30 CAPSULE | Refills: 0 | Status: SHIPPED
Start: 2019-09-06 | End: 2021-07-20 | Stop reason: SDUPTHER

## 2019-09-10 ENCOUNTER — TELEPHONE (OUTPATIENT)
Dept: INTERNAL MEDICINE | Age: 74
End: 2019-09-10

## 2019-09-11 ENCOUNTER — OFFICE VISIT (OUTPATIENT)
Dept: INTERNAL MEDICINE | Age: 74
End: 2019-09-11
Payer: MEDICARE

## 2019-09-11 VITALS
SYSTOLIC BLOOD PRESSURE: 138 MMHG | HEIGHT: 65 IN | WEIGHT: 235 LBS | RESPIRATION RATE: 14 BRPM | DIASTOLIC BLOOD PRESSURE: 72 MMHG | BODY MASS INDEX: 39.15 KG/M2 | TEMPERATURE: 98.1 F | HEART RATE: 70 BPM

## 2019-09-11 DIAGNOSIS — I38 VALVULAR HEART DISEASE: Primary | Chronic | ICD-10-CM

## 2019-09-11 DIAGNOSIS — I10 ESSENTIAL HYPERTENSION: Chronic | ICD-10-CM

## 2019-09-11 DIAGNOSIS — R05.9 COUGH: ICD-10-CM

## 2019-09-11 DIAGNOSIS — Z95.1 S/P CABG (CORONARY ARTERY BYPASS GRAFT): ICD-10-CM

## 2019-09-11 DIAGNOSIS — I50.22 CHRONIC SYSTOLIC CONGESTIVE HEART FAILURE (HCC): Chronic | ICD-10-CM

## 2019-09-11 DIAGNOSIS — K21.9 GASTROESOPHAGEAL REFLUX DISEASE WITHOUT ESOPHAGITIS: ICD-10-CM

## 2019-09-11 DIAGNOSIS — I48.20 CHRONIC ATRIAL FIBRILLATION (HCC): ICD-10-CM

## 2019-09-11 DIAGNOSIS — E78.2 MIXED HYPERLIPIDEMIA: ICD-10-CM

## 2019-09-11 LAB
INTERNATIONAL NORMALIZATION RATIO, POC: 2.1
PROTHROMBIN TIME, POC: 25.1

## 2019-09-11 PROCEDURE — 99212 OFFICE O/P EST SF 10 MIN: CPT | Performed by: INTERNAL MEDICINE

## 2019-09-11 PROCEDURE — 99214 OFFICE O/P EST MOD 30 MIN: CPT | Performed by: INTERNAL MEDICINE

## 2019-09-11 RX ORDER — WARFARIN SODIUM 3 MG/1
TABLET ORAL
Qty: 90 TABLET | Refills: 2 | Status: SHIPPED
Start: 2019-09-11 | End: 2020-09-17 | Stop reason: SDUPTHER

## 2019-09-11 RX ORDER — SIMVASTATIN 20 MG
TABLET ORAL
Qty: 90 TABLET | Refills: 0 | Status: SHIPPED | OUTPATIENT
Start: 2019-09-11 | End: 2019-12-30

## 2019-09-11 RX ORDER — GUAIFENESIN 600 MG/1
600 TABLET, EXTENDED RELEASE ORAL 2 TIMES DAILY
Qty: 30 TABLET | Refills: 0 | Status: SHIPPED | OUTPATIENT
Start: 2019-09-11 | End: 2019-09-26

## 2019-09-11 RX ORDER — ENALAPRIL MALEATE 10 MG/1
TABLET ORAL
Qty: 180 TABLET | Refills: 3 | Status: SHIPPED
Start: 2019-09-11 | End: 2020-10-23 | Stop reason: SDUPTHER

## 2019-09-11 RX ORDER — PANTOPRAZOLE SODIUM 40 MG/1
40 TABLET, DELAYED RELEASE ORAL DAILY
Qty: 30 TABLET | Refills: 2 | Status: SHIPPED
Start: 2019-09-11 | End: 2020-03-11 | Stop reason: SDUPTHER

## 2019-09-11 ASSESSMENT — ENCOUNTER SYMPTOMS
SHORTNESS OF BREATH: 0
SINUS PRESSURE: 0
DIARRHEA: 0
ABDOMINAL PAIN: 0
SORE THROAT: 0
COUGH: 1
WHEEZING: 0
RHINORRHEA: 0

## 2019-09-11 NOTE — PROGRESS NOTES
Discharge instructions reviewed with patient by Dr. Rafaela Lancaster.  AVS mailed and message left on pt voice mail

## 2019-09-12 PROBLEM — R05.9 COUGH: Status: ACTIVE | Noted: 2019-09-12

## 2019-10-12 PROBLEM — R05.9 COUGH: Status: RESOLVED | Noted: 2019-09-12 | Resolved: 2019-10-12

## 2019-11-20 ENCOUNTER — TELEPHONE (OUTPATIENT)
Dept: INTERNAL MEDICINE | Age: 74
End: 2019-11-20

## 2019-11-21 ENCOUNTER — OFFICE VISIT (OUTPATIENT)
Dept: INTERNAL MEDICINE | Age: 74
End: 2019-11-21
Payer: MEDICARE

## 2019-11-21 VITALS
HEIGHT: 64 IN | BODY MASS INDEX: 40.12 KG/M2 | RESPIRATION RATE: 16 BRPM | HEART RATE: 76 BPM | DIASTOLIC BLOOD PRESSURE: 84 MMHG | SYSTOLIC BLOOD PRESSURE: 118 MMHG | WEIGHT: 235 LBS | TEMPERATURE: 97.4 F

## 2019-11-21 DIAGNOSIS — F41.9 ANXIETY: ICD-10-CM

## 2019-11-21 DIAGNOSIS — I27.20 PULMONARY HTN (HCC): Chronic | ICD-10-CM

## 2019-11-21 DIAGNOSIS — I38 VALVULAR HEART DISEASE: Chronic | ICD-10-CM

## 2019-11-21 DIAGNOSIS — I10 ESSENTIAL HYPERTENSION: Chronic | ICD-10-CM

## 2019-11-21 DIAGNOSIS — R05.3 COUGH, PERSISTENT: ICD-10-CM

## 2019-11-21 DIAGNOSIS — G25.81 RESTLESS LEG SYNDROME: Chronic | ICD-10-CM

## 2019-11-21 DIAGNOSIS — Z95.1 S/P CABG (CORONARY ARTERY BYPASS GRAFT): ICD-10-CM

## 2019-11-21 DIAGNOSIS — E78.2 MIXED HYPERLIPIDEMIA: ICD-10-CM

## 2019-11-21 DIAGNOSIS — I50.22 CHRONIC SYSTOLIC CONGESTIVE HEART FAILURE (HCC): Chronic | ICD-10-CM

## 2019-11-21 DIAGNOSIS — I48.20 CHRONIC ATRIAL FIBRILLATION (HCC): Primary | ICD-10-CM

## 2019-11-21 LAB
INTERNATIONAL NORMALIZATION RATIO, POC: 2.1
PROTHROMBIN TIME, POC: 25.6

## 2019-11-21 PROCEDURE — 99212 OFFICE O/P EST SF 10 MIN: CPT | Performed by: INTERNAL MEDICINE

## 2019-11-21 PROCEDURE — 99214 OFFICE O/P EST MOD 30 MIN: CPT | Performed by: INTERNAL MEDICINE

## 2019-11-21 RX ORDER — BENZONATATE 100 MG/1
100 CAPSULE ORAL 3 TIMES DAILY PRN
Qty: 30 CAPSULE | Refills: 1 | Status: SHIPPED | OUTPATIENT
Start: 2019-11-21 | End: 2019-12-01

## 2019-11-21 RX ORDER — LORAZEPAM 1 MG/1
1 TABLET ORAL DAILY PRN
Qty: 20 TABLET | Refills: 0 | Status: SHIPPED | OUTPATIENT
Start: 2019-11-21 | End: 2020-12-17 | Stop reason: SDUPTHER

## 2019-11-21 ASSESSMENT — ENCOUNTER SYMPTOMS
SHORTNESS OF BREATH: 0
ABDOMINAL DISTENTION: 0
ABDOMINAL PAIN: 0
SINUS PRESSURE: 1
DIARRHEA: 0
VOMITING: 1
COUGH: 1
SORE THROAT: 0
TROUBLE SWALLOWING: 0
WHEEZING: 0

## 2019-11-21 ASSESSMENT — PATIENT HEALTH QUESTIONNAIRE - PHQ9
SUM OF ALL RESPONSES TO PHQ QUESTIONS 1-9: 1
SUM OF ALL RESPONSES TO PHQ9 QUESTIONS 1 & 2: 1
1. LITTLE INTEREST OR PLEASURE IN DOING THINGS: 1
SUM OF ALL RESPONSES TO PHQ QUESTIONS 1-9: 1
2. FEELING DOWN, DEPRESSED OR HOPELESS: 0

## 2019-11-22 ENCOUNTER — HOSPITAL ENCOUNTER (EMERGENCY)
Age: 74
Discharge: HOME | End: 2019-11-22
Attending: EMERGENCY MEDICINE
Payer: MEDICARE

## 2019-11-22 VITALS
BODY MASS INDEX: 40.12 KG/M2 | TEMPERATURE: 98 F | SYSTOLIC BLOOD PRESSURE: 157 MMHG | WEIGHT: 235 LBS | HEART RATE: 98 BPM | DIASTOLIC BLOOD PRESSURE: 99 MMHG | RESPIRATION RATE: 17 BRPM | OXYGEN SATURATION: 97 % | HEIGHT: 64 IN

## 2019-11-22 DIAGNOSIS — R04.0 EPISTAXIS: Primary | ICD-10-CM

## 2019-11-22 LAB
INR BLD: 2
PROTHROMBIN TIME: 23 SEC (ref 9.3–12.4)

## 2019-11-22 PROCEDURE — 99283 EMERGENCY DEPT VISIT LOW MDM: CPT

## 2019-11-22 PROCEDURE — 85610 PROTHROMBIN TIME: CPT

## 2019-11-22 PROCEDURE — 36415 COLL VENOUS BLD VENIPUNCTURE: CPT

## 2019-11-22 RX ORDER — COCAINE HYDROCHLORIDE 40 MG/ML
SOLUTION NASAL ONCE
Status: DISCONTINUED | OUTPATIENT
Start: 2019-11-22 | End: 2019-11-22 | Stop reason: HOSPADM

## 2019-11-28 ENCOUNTER — HOSPITAL ENCOUNTER (EMERGENCY)
Age: 74
Discharge: HOME | End: 2019-11-28
Attending: EMERGENCY MEDICINE
Payer: MEDICARE

## 2019-11-28 ENCOUNTER — APPOINTMENT (OUTPATIENT)
Dept: GENERAL RADIOLOGY | Age: 74
End: 2019-11-28
Payer: MEDICARE

## 2019-11-28 VITALS
HEIGHT: 67 IN | HEART RATE: 76 BPM | WEIGHT: 235 LBS | RESPIRATION RATE: 20 BRPM | SYSTOLIC BLOOD PRESSURE: 162 MMHG | DIASTOLIC BLOOD PRESSURE: 99 MMHG | TEMPERATURE: 97.6 F | OXYGEN SATURATION: 98 % | BODY MASS INDEX: 36.88 KG/M2

## 2019-11-28 DIAGNOSIS — M79.672 LEFT FOOT PAIN: ICD-10-CM

## 2019-11-28 DIAGNOSIS — R53.83 OTHER FATIGUE: Primary | ICD-10-CM

## 2019-11-28 DIAGNOSIS — E86.0 DEHYDRATION: ICD-10-CM

## 2019-11-28 LAB
ANION GAP SERPL CALCULATED.3IONS-SCNC: 10 MMOL/L (ref 7–16)
BASOPHILS ABSOLUTE: 0.03 E9/L (ref 0–0.2)
BASOPHILS RELATIVE PERCENT: 0.5 % (ref 0–2)
BILIRUBIN URINE: NEGATIVE
BLOOD, URINE: NEGATIVE
BUN BLDV-MCNC: 10 MG/DL (ref 8–23)
CALCIUM SERPL-MCNC: 9.6 MG/DL (ref 8.6–10.2)
CHLORIDE BLD-SCNC: 102 MMOL/L (ref 98–107)
CLARITY: CLEAR
CO2: 26 MMOL/L (ref 22–29)
COLOR: YELLOW
CREAT SERPL-MCNC: 0.7 MG/DL (ref 0.5–1)
EOSINOPHILS ABSOLUTE: 0.08 E9/L (ref 0.05–0.5)
EOSINOPHILS RELATIVE PERCENT: 1.3 % (ref 0–6)
GFR AFRICAN AMERICAN: >60
GFR NON-AFRICAN AMERICAN: >60 ML/MIN/1.73
GLUCOSE BLD-MCNC: 169 MG/DL (ref 74–99)
GLUCOSE URINE: NEGATIVE MG/DL
HCT VFR BLD CALC: 45.2 % (ref 34–48)
HEMOGLOBIN: 14.4 G/DL (ref 11.5–15.5)
IMMATURE GRANULOCYTES #: 0.02 E9/L
IMMATURE GRANULOCYTES %: 0.3 % (ref 0–5)
INR BLD: 2.4
KETONES, URINE: NEGATIVE MG/DL
LEUKOCYTE ESTERASE, URINE: NEGATIVE
LYMPHOCYTES ABSOLUTE: 1.86 E9/L (ref 1.5–4)
LYMPHOCYTES RELATIVE PERCENT: 29.5 % (ref 20–42)
MCH RBC QN AUTO: 29.1 PG (ref 26–35)
MCHC RBC AUTO-ENTMCNC: 31.9 % (ref 32–34.5)
MCV RBC AUTO: 91.5 FL (ref 80–99.9)
MONOCYTES ABSOLUTE: 0.37 E9/L (ref 0.1–0.95)
MONOCYTES RELATIVE PERCENT: 5.9 % (ref 2–12)
NEUTROPHILS ABSOLUTE: 3.95 E9/L (ref 1.8–7.3)
NEUTROPHILS RELATIVE PERCENT: 62.5 % (ref 43–80)
NITRITE, URINE: NEGATIVE
PDW BLD-RTO: 13.1 FL (ref 11.5–15)
PH UA: 6 (ref 5–9)
PLATELET # BLD: 268 E9/L (ref 130–450)
PMV BLD AUTO: 10.4 FL (ref 7–12)
POTASSIUM REFLEX MAGNESIUM: 4.6 MMOL/L (ref 3.5–5)
PRO-BNP: 764 PG/ML (ref 0–125)
PROTEIN UA: NEGATIVE MG/DL
PROTHROMBIN TIME: 27.6 SEC (ref 9.3–12.4)
RBC # BLD: 4.94 E12/L (ref 3.5–5.5)
SODIUM BLD-SCNC: 138 MMOL/L (ref 132–146)
SPECIFIC GRAVITY UA: <=1.005 (ref 1–1.03)
TSH SERPL DL<=0.05 MIU/L-ACNC: 3.95 UIU/ML (ref 0.27–4.2)
UROBILINOGEN, URINE: 0.2 E.U./DL
WBC # BLD: 6.3 E9/L (ref 4.5–11.5)

## 2019-11-28 PROCEDURE — 71045 X-RAY EXAM CHEST 1 VIEW: CPT

## 2019-11-28 PROCEDURE — 83880 ASSAY OF NATRIURETIC PEPTIDE: CPT

## 2019-11-28 PROCEDURE — 85610 PROTHROMBIN TIME: CPT

## 2019-11-28 PROCEDURE — 84443 ASSAY THYROID STIM HORMONE: CPT

## 2019-11-28 PROCEDURE — 36415 COLL VENOUS BLD VENIPUNCTURE: CPT

## 2019-11-28 PROCEDURE — 85025 COMPLETE CBC W/AUTO DIFF WBC: CPT

## 2019-11-28 PROCEDURE — 2580000003 HC RX 258: Performed by: EMERGENCY MEDICINE

## 2019-11-28 PROCEDURE — 80048 BASIC METABOLIC PNL TOTAL CA: CPT

## 2019-11-28 PROCEDURE — 73630 X-RAY EXAM OF FOOT: CPT

## 2019-11-28 PROCEDURE — 6360000002 HC RX W HCPCS: Performed by: EMERGENCY MEDICINE

## 2019-11-28 PROCEDURE — 96374 THER/PROPH/DIAG INJ IV PUSH: CPT

## 2019-11-28 PROCEDURE — 99284 EMERGENCY DEPT VISIT MOD MDM: CPT

## 2019-11-28 PROCEDURE — 73610 X-RAY EXAM OF ANKLE: CPT

## 2019-11-28 PROCEDURE — 81003 URINALYSIS AUTO W/O SCOPE: CPT

## 2019-11-28 PROCEDURE — 93005 ELECTROCARDIOGRAM TRACING: CPT | Performed by: EMERGENCY MEDICINE

## 2019-11-28 RX ORDER — FUROSEMIDE 10 MG/ML
20 INJECTION INTRAMUSCULAR; INTRAVENOUS ONCE
Status: COMPLETED | OUTPATIENT
Start: 2019-11-28 | End: 2019-11-28

## 2019-11-28 RX ORDER — FUROSEMIDE 10 MG/ML
40 INJECTION INTRAMUSCULAR; INTRAVENOUS ONCE
Status: DISCONTINUED | OUTPATIENT
Start: 2019-11-28 | End: 2019-11-28

## 2019-11-28 RX ORDER — 0.9 % SODIUM CHLORIDE 0.9 %
500 INTRAVENOUS SOLUTION INTRAVENOUS ONCE
Status: COMPLETED | OUTPATIENT
Start: 2019-11-28 | End: 2019-11-28

## 2019-11-28 RX ADMIN — SODIUM CHLORIDE 500 ML: 9 INJECTION, SOLUTION INTRAVENOUS at 09:27

## 2019-11-28 RX ADMIN — FUROSEMIDE 20 MG: 10 INJECTION, SOLUTION INTRAVENOUS at 11:23

## 2019-11-28 ASSESSMENT — PAIN SCALES - GENERAL: PAINLEVEL_OUTOF10: 2

## 2019-11-28 ASSESSMENT — PAIN DESCRIPTION - LOCATION: LOCATION: FOOT

## 2019-11-29 LAB
EKG ATRIAL RATE: 94 BPM
EKG Q-T INTERVAL: 370 MS
EKG QRS DURATION: 88 MS
EKG QTC CALCULATION (BAZETT): 462 MS
EKG R AXIS: 38 DEGREES
EKG T AXIS: 147 DEGREES
EKG VENTRICULAR RATE: 94 BPM

## 2019-11-29 PROCEDURE — 93010 ELECTROCARDIOGRAM REPORT: CPT | Performed by: INTERNAL MEDICINE

## 2019-12-27 DIAGNOSIS — I48.20 CHRONIC ATRIAL FIBRILLATION (HCC): Chronic | ICD-10-CM

## 2019-12-27 DIAGNOSIS — E78.2 MIXED HYPERLIPIDEMIA: ICD-10-CM

## 2019-12-30 RX ORDER — SIMVASTATIN 20 MG
TABLET ORAL
Qty: 90 TABLET | Refills: 0 | Status: SHIPPED
Start: 2019-12-30 | End: 2020-03-11 | Stop reason: SDUPTHER

## 2019-12-30 RX ORDER — WARFARIN SODIUM 1 MG/1
TABLET ORAL
Qty: 30 TABLET | Refills: 0 | Status: SHIPPED
Start: 2019-12-30 | End: 2020-03-11 | Stop reason: SDUPTHER

## 2020-01-29 RX ORDER — FUROSEMIDE 20 MG/1
TABLET ORAL
Qty: 45 TABLET | Refills: 0 | Status: SHIPPED
Start: 2020-01-29 | End: 2020-03-11 | Stop reason: SDUPTHER

## 2020-01-29 RX ORDER — POTASSIUM CHLORIDE 750 MG/1
TABLET, EXTENDED RELEASE ORAL
Qty: 45 TABLET | Refills: 0 | Status: SHIPPED
Start: 2020-01-29 | End: 2020-03-11 | Stop reason: SDUPTHER

## 2020-03-11 ENCOUNTER — OFFICE VISIT (OUTPATIENT)
Dept: INTERNAL MEDICINE | Age: 75
End: 2020-03-11
Payer: MEDICARE

## 2020-03-11 VITALS
TEMPERATURE: 97.9 F | HEIGHT: 67 IN | RESPIRATION RATE: 18 BRPM | BODY MASS INDEX: 37.51 KG/M2 | SYSTOLIC BLOOD PRESSURE: 130 MMHG | DIASTOLIC BLOOD PRESSURE: 80 MMHG | WEIGHT: 239 LBS | HEART RATE: 80 BPM

## 2020-03-11 LAB
INTERNATIONAL NORMALIZATION RATIO, POC: 1.9
PROTHROMBIN TIME, POC: ABNORMAL

## 2020-03-11 PROCEDURE — 99212 OFFICE O/P EST SF 10 MIN: CPT | Performed by: INTERNAL MEDICINE

## 2020-03-11 PROCEDURE — G0438 PPPS, INITIAL VISIT: HCPCS | Performed by: INTERNAL MEDICINE

## 2020-03-11 RX ORDER — PANTOPRAZOLE SODIUM 40 MG/1
40 TABLET, DELAYED RELEASE ORAL DAILY
Qty: 90 TABLET | Refills: 3 | Status: SHIPPED
Start: 2020-03-11 | End: 2021-04-28 | Stop reason: SDUPTHER

## 2020-03-11 RX ORDER — POTASSIUM CHLORIDE 750 MG/1
TABLET, EXTENDED RELEASE ORAL
Qty: 135 TABLET | Refills: 3 | Status: SHIPPED
Start: 2020-03-11 | End: 2021-04-28 | Stop reason: SDUPTHER

## 2020-03-11 RX ORDER — SIMVASTATIN 20 MG
TABLET ORAL
Qty: 90 TABLET | Refills: 3 | Status: SHIPPED
Start: 2020-03-11 | End: 2021-04-20

## 2020-03-11 RX ORDER — WARFARIN SODIUM 1 MG/1
TABLET ORAL
Qty: 30 TABLET | Refills: 6 | Status: SHIPPED
Start: 2020-03-11 | End: 2020-09-17 | Stop reason: SDUPTHER

## 2020-03-11 RX ORDER — FUROSEMIDE 20 MG/1
TABLET ORAL
Qty: 135 TABLET | Refills: 3 | Status: SHIPPED
Start: 2020-03-11 | End: 2021-04-28 | Stop reason: SDUPTHER

## 2020-03-11 ASSESSMENT — PATIENT HEALTH QUESTIONNAIRE - PHQ9
SUM OF ALL RESPONSES TO PHQ QUESTIONS 1-9: 0
SUM OF ALL RESPONSES TO PHQ QUESTIONS 1-9: 0

## 2020-03-11 ASSESSMENT — LIFESTYLE VARIABLES: HOW OFTEN DO YOU HAVE A DRINK CONTAINING ALCOHOL: 0

## 2020-03-11 NOTE — PROGRESS NOTES
Patient instructed to continue same dose of coumadin 4 mg on wed and 3 mg all other days  Repeat INR 4/1/20  Bleeding precautions reviewed  AVS mailed

## 2020-03-11 NOTE — PROGRESS NOTES
% ophthalmic solution   Historical Provider, MD   latanoprost (XALATAN) 0.005 % ophthalmic solution   Historical Provider, MD   prednisoLONE acetate (PRED FORTE) 1 % ophthalmic suspension   Historical Provider, MD   timolol (TIMOPTIC) 0.5 % ophthalmic solution   Historical Provider, MD       Past Medical History:   Diagnosis Date    [facility] 11/22/2010    CAD (coronary artery disease) 11/22/2010    NO CARDIOLOGIST, F/U W/ pcp STABLE    CHF (congestive heart failure) ([facility] 11/22/2010    Diverticular disease 11/22/2010    Hyperlipidemia     Hypertension     Restless leg syndrome 11/22/2010    Valvular heart disease 11/22/2010       Past Surgical History:   Procedure Laterality Date    CARDIAC CATHETERIZATION  2007    CARDIAC VALVE REPLACEMENT      CATARACT REMOVAL WITH IMPLANT Right 08/24/2016    trabulectomy    CORONARY ARTERY BYPASS GRAFT      TONSILLECTOMY      TUBAL LIGATION         No family history on file. CareTeam (Including outside providers/suppliers regularly involved in providing care):   Patient Care Team:  Masood Aragon DO as PCP - [facility] Tom Simon DO as PCP - [facility] Empaneled Provider    Wt Readings from Last 3 Encounters:   03/11/20 239 lb (108.4 kg)   11/28/19 235 lb (106.6 kg)   11/22/19 235 lb (106.6 kg)     Vitals:    03/11/20 1540   BP: 130/80   Site: Right Upper Arm   Position: Sitting   Cuff Size: Large Adult   Pulse: 80   Resp: 18   Temp: 97.9 °F (36.6 °C)   TempSrc: Oral   Weight: 239 lb (108.4 kg)   Height: 5' 7\" (1.702 m)     Body mass index is 37.43 kg/m². Based upon direct observation of the patient, evaluation of cognition reveals recent and remote memory intact. Mini-COG normal    Patient's complete Health Risk Assessment and screening values have been reviewed and are found in Flowsheets. The following problems were reviewed today and where indicated follow up appointments were made and/or referrals ordered.     Positive Risk Factor Maintenance   Topic Date Due    Hepatitis C screen  1945    DTaP/Tdap/Td vaccine (1 - Tdap) 08/10/1965    Breast cancer screen  08/10/1996    Shingles Vaccine (1 of 2) 08/10/1996    Colon cancer screen colonoscopy  08/10/1996    DEXA (modify frequency per FRAX score)  08/10/2011    A1C test (Diabetic or Prediabetic)  04/21/2018    Annual Wellness Visit (AWV)  06/20/2019    Flu vaccine (1) 09/01/2019    Lipid screen  08/22/2020    Potassium monitoring  11/28/2020    Creatinine monitoring  11/28/2020    Pneumococcal 65+ years Vaccine  Completed    Hepatitis A vaccine  Aged Out    Hepatitis B vaccine  Aged Out    Hib vaccine  Aged Out    Meningococcal (ACWY) vaccine  Aged Out     Recommendations for [email or web address] Due: see orders and patient instructions/AVS.  . Recommended screening schedule for the next 5-10 years is provided to the patient in written form: see Patient Frank Montez was seen today for medicare awv. Diagnoses and all orders for this visit:    Valvular heart disease, stable  Mitral valve replaced    S/P CABG (coronary artery bypass graft)    Pulmonary HTN (HCC)    Mixed hyperlipidemia  -     simvastatin (ZOCOR) 20 MG tablet; Take on tablet daily    Essential hypertension, controlled    Diverticular disease    Chronic atrial fibrillation, rate controlled  -     POCT INR  -     warfarin (COUMADIN) 1 MG tablet; TAKE ONE TABLET BY MOUTH DAILY  INR 1.9, will continue same dose and repeat INR ~ 2 weeks and make adjustments as needed. Chronic systolic congestive heart failure (HCC), compensated  -     furosemide (LASIX) 20 MG tablet; TAKE 1 & 1/2 TABLETS BY MOUTH DAILY  -     potassium chloride (KLOR-CON M) 10 MEQ extended release tablet; Take 1 and 1/2 tablet daily    Anxiety, chronic, mild stable    Gastroesophageal reflux disease without esophagitis, controlled  -     pantoprazole (PROTONIX) 40 MG tablet;  Take 1 tablet by mouth daily        I discussed once again colon and breast cancer screening and patient states that she does not want any screening, states, \"let nature do what it has to do\". I'm old and I'm done with all that. I emphasized the importance of cancer screening. Advance Care Planning   Advanced Care Planning: Discussed the patients choices for care and treatment in case of a health event that adversely affects decision-making abilities. Also discussed the patients long-term treatment options. Reviewed with the patient the [address] [place] [address] & [facility] Declaration forms  Reviewed the process of designating a competent adult as an Agent (or -in-fact) that could take make health care decisions for the patient if incompetent. Patient was asked to complete the declaration forms, either acknowledge the forms by a public notary or an eligible witness and provide a signed copy to the practice office.   Time spent (minutes): 5 minutes

## 2020-09-16 ENCOUNTER — HOSPITAL ENCOUNTER (OUTPATIENT)
Age: 75
Discharge: HOME | End: 2020-09-18
Payer: MEDICARE

## 2020-09-16 ENCOUNTER — ANTI-COAG VISIT (OUTPATIENT)
Dept: INTERNAL MEDICINE | Age: 75
End: 2020-09-16

## 2020-09-16 ENCOUNTER — NURSE ONLY (OUTPATIENT)
Dept: INTERNAL MEDICINE | Age: 75
End: 2020-09-16
Payer: MEDICARE

## 2020-09-16 LAB
INR BLD: 3.3
INTERNATIONAL NORMALIZATION RATIO, POC: 3.2
INTERNATIONAL NORMALIZATION RATIO, POC: 5.3
PROTHROMBIN TIME, POC: 38.4
PROTHROMBIN TIME, POC: 63.3
PROTHROMBIN TIME: 37.5 SEC (ref 9.3–12.4)

## 2020-09-16 PROCEDURE — 85610 PROTHROMBIN TIME: CPT

## 2020-09-16 PROCEDURE — 93793 ANTICOAG MGMT PT WARFARIN: CPT | Performed by: INTERNAL MEDICINE

## 2020-09-16 PROCEDURE — 36415 COLL VENOUS BLD VENIPUNCTURE: CPT

## 2020-09-16 PROCEDURE — 85610 PROTHROMBIN TIME: CPT | Performed by: INTERNAL MEDICINE

## 2020-09-17 RX ORDER — WARFARIN SODIUM 3 MG/1
TABLET ORAL
Qty: 30 TABLET | Refills: 1 | Status: SHIPPED
Start: 2020-09-17 | End: 2020-11-25 | Stop reason: SDUPTHER

## 2020-09-17 RX ORDER — WARFARIN SODIUM 1 MG/1
TABLET ORAL
Qty: 30 TABLET | Refills: 0 | Status: SHIPPED
Start: 2020-09-17 | End: 2020-11-30 | Stop reason: SDUPTHER

## 2020-09-17 NOTE — PATIENT INSTRUCTIONS
Continue same dose of coumadin, 4 mg wed only and 3 mg all other days  Repeat INR in 2 weeks  Bleeding precautions as reviewed  Please call if any questions or concerns    Patient Education        Taking Warfarin Safely: Care Instructions  Your Care Instructions     Warfarin is a medicine that you take to prevent blood clots. It is often called a blood thinner. Doctors give warfarin (such as Coumadin) to reduce the risk of blood clots. You may be at risk for blood clots if you have atrial fibrillation or deep vein thrombosis. Some other health problems may also put you at risk. Warfarin slows the amount of time it takes for your blood to clot. It can cause bleeding problems. Even if you've been taking warfarin for a while, it's important to know how to take it safely. Foods and other medicines can affect the way warfarin works. Some can make warfarin work too well. This can cause bleeding problems. And some can make it work poorly, so that it does not prevent blood clots very well. You will need regular blood tests to check how long it takes for your blood to form a clot. This test is called a PT or prothrombin time test. The result of the test is called an INR level. Depending on the test results, your doctor or anticoagulation clinic may adjust your dose of warfarin. Follow-up care is a key part of your treatment and safety. Be sure to make and go to all appointments, and call your doctor if you are having problems. It's also a good idea to know your test results and keep a list of the medicines you take. How can you care for yourself at home? Take warfarin safely  · Take your warfarin at the same time each day. · If you miss a dose of warfarin, don't take an extra dose to make up for it. Your doctor can tell you exactly what to do so you don't take too much or too little. · Wear medical alert jewelry that lets others know that you take warfarin. You can buy this at most drugstores.   · Don't take warfarin frequent, at a different time of the month) than what you are used to.  ? Bloody or black stools, or rectal bleeding. ? Bloody or pink urine. Watch closely for changes in your health, and be sure to contact your doctor if you have any problems. Where can you learn more? Go to https://[email or web address].[email or web address]. org and sign in to your [employer] account. Enter T361 in the [employer] box to learn more about \"Taking Warfarin Safely: Care Instructions. \"     If you do not have an account, please click on the \"Sign Up Now\" link. Current as of: December 16, 2019               Content Version: 12.5  © 1927-4166 Healthwise, Incorporated. Care instructions adapted under license by [facility] ([facility]). If you have questions about a medical condition or this instruction, always ask your healthcare professional. [address] any warranty or liability for your use of this information.

## 2020-09-17 NOTE — PROGRESS NOTES
Patient notified to continue same dose of, coumadin 4 mg on wed and 3 mg all other days.   Repeat INR in 2 weeks [sept 30]  Bleeding precautions reviewed  Refill for coumadin 3 mg and coumadin 1 mg tablets per   Prescriptions sent to [facility]  AVS mailed

## 2020-09-23 ENCOUNTER — ANTI-COAG VISIT (OUTPATIENT)
Dept: INTERNAL MEDICINE | Age: 75
End: 2020-09-23

## 2020-09-23 NOTE — PROGRESS NOTES
Patient notified to take coumadin 3 mg daily and repeat INR in 1 month  Bleeding precautions reviewed  AVS mailed

## 2020-09-23 NOTE — PATIENT INSTRUCTIONS
pregnant. Talk to your doctor about how you can prevent getting pregnant while you are taking it. · Don't change your dose or stop taking warfarin unless your doctor tells you to. Effects of medicines and food on warfarin  · Don't start or stop taking any medicines, vitamins, or natural remedies unless you first talk to your doctor. Many medicines can affect how warfarin works. These include aspirin and other pain relievers, over-the-counter medicines, multivitamins, dietary supplements, and herbal products. · Tell all of your doctors and pharmacists that you take warfarin. Some prescription medicines can affect how warfarin works. · Keep the amount of vitamin K in your diet about the same from day to day. Do not suddenly eat a lot more or a lot less food that is rich in vitamin K than you usually do. Vitamin K affects how warfarin works and how your blood clots. Talk with your doctor before making big changes in your diet. Foods that have a lot of vitamin K include cooked green vegetables, such as:  ? Kale, spinach, turnip greens, katina greens, Swiss chard, and mustard greens. ? [place] sprouts, broccoli, and cabbage. · Limit your use of alcohol. Avoid bleeding by preventing falls and injuries  · Wear slippers or shoes with nonskid soles. · Remove throw rugs and clutter. · Rearrange furniture and electrical cords to keep them out of walking paths. · Keep stairways, porches, and outside walkways well lit. Use night-lights in hallways and bathrooms. · Be extra careful when you work with sharp tools or knives. When should you call for help? [id number] anytime you think you may need emergency care. For example, call if:  · You have a sudden, severe headache that is different from past headaches. Call your doctor now or seek immediate medical care if:  · You have any abnormal bleeding, such as:  ? Nosebleeds.   ? Vaginal bleeding that is different (heavier, more frequent, at a different time of the month)

## 2020-10-26 ENCOUNTER — NURSE ONLY (OUTPATIENT)
Dept: INTERNAL MEDICINE | Age: 75
End: 2020-10-26
Payer: MEDICARE

## 2020-10-26 VITALS — TEMPERATURE: 97.7 F

## 2020-10-26 LAB
INTERNATIONAL NORMALIZATION RATIO, POC: 2.2
PROTHROMBIN TIME, POC: 26.8

## 2020-10-26 PROCEDURE — 93793 ANTICOAG MGMT PT WARFARIN: CPT | Performed by: INTERNAL MEDICINE

## 2020-10-26 RX ORDER — ENALAPRIL MALEATE 10 MG/1
TABLET ORAL
Qty: 180 TABLET | Refills: 3 | Status: SHIPPED
Start: 2020-10-26 | End: 2021-10-13 | Stop reason: SDUPTHER

## 2020-10-26 SDOH — ECONOMIC STABILITY: TRANSPORTATION INSECURITY
IN THE PAST 12 MONTHS, HAS THE LACK OF TRANSPORTATION KEPT YOU FROM MEDICAL APPOINTMENTS OR FROM GETTING MEDICATIONS?: YES

## 2020-10-26 SDOH — ECONOMIC STABILITY: FOOD INSECURITY: WITHIN THE PAST 12 MONTHS, YOU WORRIED THAT YOUR FOOD WOULD RUN OUT BEFORE YOU GOT MONEY TO BUY MORE.: NEVER TRUE

## 2020-10-26 SDOH — ECONOMIC STABILITY: TRANSPORTATION INSECURITY
IN THE PAST 12 MONTHS, HAS LACK OF TRANSPORTATION KEPT YOU FROM MEETINGS, WORK, OR FROM GETTING THINGS NEEDED FOR DAILY LIVING?: NO

## 2020-10-26 SDOH — ECONOMIC STABILITY: INCOME INSECURITY: HOW HARD IS IT FOR YOU TO PAY FOR THE VERY BASICS LIKE FOOD, HOUSING, MEDICAL CARE, AND HEATING?: NOT VERY HARD

## 2020-10-26 SDOH — ECONOMIC STABILITY: FOOD INSECURITY: WITHIN THE PAST 12 MONTHS, THE FOOD YOU BOUGHT JUST DIDN'T LAST AND YOU DIDN'T HAVE MONEY TO GET MORE.: NEVER TRUE

## 2020-10-26 NOTE — PROGRESS NOTES
Patient instructed to continue same dose of coumadin ,3 mg daily and repeat INR in 1 month  Bleeding precautions reviewed  AVS mailed

## 2020-11-25 RX ORDER — WARFARIN SODIUM 3 MG/1
TABLET ORAL
Qty: 30 TABLET | Refills: 2 | Status: SHIPPED
Start: 2020-11-25 | End: 2020-12-17 | Stop reason: SDUPTHER

## 2020-11-30 ENCOUNTER — NURSE ONLY (OUTPATIENT)
Dept: INTERNAL MEDICINE | Age: 75
End: 2020-11-30
Payer: MEDICARE

## 2020-11-30 VITALS — TEMPERATURE: 98 F

## 2020-11-30 LAB — INTERNATIONAL NORMALIZATION RATIO, POC: 1.5

## 2020-11-30 PROCEDURE — 93793 ANTICOAG MGMT PT WARFARIN: CPT | Performed by: INTERNAL MEDICINE

## 2020-11-30 RX ORDER — WARFARIN SODIUM 1 MG/1
TABLET ORAL
Qty: 30 TABLET | Refills: 0 | Status: SHIPPED
Start: 2020-11-30 | End: 2021-08-11 | Stop reason: SDUPTHER

## 2020-11-30 NOTE — TELEPHONE ENCOUNTER
Last Appointment:  11-30-20  Future Appointments   Date Time Provider Linh Colon   12/17/2020  9:15 AM SCHEDULE, SE [facility] IM [facility]

## 2020-11-30 NOTE — PROGRESS NOTES
Patient in office for anti coag management, poct inr obtained, anti coag encounter created and routed to physician. Repeat INR in 2-4 weeks. Patient advised, verbalized understanding.

## 2020-12-17 ENCOUNTER — OFFICE VISIT (OUTPATIENT)
Dept: INTERNAL MEDICINE | Age: 75
End: 2020-12-17
Payer: MEDICARE

## 2020-12-17 VITALS
SYSTOLIC BLOOD PRESSURE: 134 MMHG | DIASTOLIC BLOOD PRESSURE: 88 MMHG | WEIGHT: 236.2 LBS | TEMPERATURE: 96.9 F | RESPIRATION RATE: 16 BRPM | OXYGEN SATURATION: 98 % | BODY MASS INDEX: 40.33 KG/M2 | HEIGHT: 64 IN | HEART RATE: 98 BPM

## 2020-12-17 PROBLEM — Z79.01 CHRONIC ANTICOAGULATION: Status: ACTIVE | Noted: 2020-12-17

## 2020-12-17 LAB
INTERNATIONAL NORMALIZATION RATIO, POC: 2.4
PROTHROMBIN TIME, POC: 29.2

## 2020-12-17 PROCEDURE — 99214 OFFICE O/P EST MOD 30 MIN: CPT | Performed by: INTERNAL MEDICINE

## 2020-12-17 RX ORDER — WARFARIN SODIUM 3 MG/1
TABLET ORAL
Qty: 30 TABLET | Refills: 6 | Status: SHIPPED
Start: 2020-12-17 | End: 2021-07-22

## 2020-12-17 RX ORDER — LORAZEPAM 1 MG/1
1 TABLET ORAL DAILY PRN
Qty: 20 TABLET | Refills: 0 | Status: SHIPPED | OUTPATIENT
Start: 2020-12-17 | End: 2021-01-16

## 2020-12-17 ASSESSMENT — ENCOUNTER SYMPTOMS
COUGH: 0
NAUSEA: 0
SHORTNESS OF BREATH: 0
CHEST TIGHTNESS: 0
ABDOMINAL PAIN: 0
VOMITING: 0

## 2020-12-17 NOTE — PROGRESS NOTES
Pt left prior to receiving discharge instructions. AVS & lab scripts mailed to patient. called pt and left instructions on her voice mail

## 2020-12-17 NOTE — PROGRESS NOTES
HPI:  Patient comes in today for routine follow up. 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 She states she has not had any cardiovascular or respiratory symptoms. She says she has been experiencing severe debilitating anxiety due to COVID. She is fearful of doing anything or going anywhere and has been very isolated, states she is in her PJs by 6-7 pm and just stays in her room. Her daughter wants her to at least go for a drive to look at [employer] lights but she states she is too anxious. She states that she is paralyzed with fear. She is requesting a small amount of ativan (as she has had in past to help her to at least do a few things) to make her feel better and less anxious. I have reviewed medical, surg, soc historeis, meds and allergies. Review of Systems  Review of Systems   Constitutional: Negative for chills and fever. Respiratory: Negative for cough, chest tightness and shortness of breath. Cardiovascular: Positive for leg swelling (still has intermittent left ankle swelling which is chronic). Negative for chest pain and palpitations. Gastrointestinal: Negative for abdominal pain, nausea and vomiting. Neurological: Negative for dizziness and light-headedness. PE:  VS:  /88 (Site: Right Upper Arm, Position: Sitting, Cuff Size: Medium Adult)   Pulse 98   Temp 96.9 °F (36.1 °C) (Oral)   Resp 16   Ht 5' 4\" (1.626 m)   Wt 236 lb 3.2 oz (107.1 kg)   SpO2 98% Comment: ra  BMI 40.54 kg/m²   Physical Exam  Neck:      Comments: There is no jugular venous distension. Cardiovascular:      Rate and Rhythm: Normal rate. Rhythm irregular. Heart sounds: Murmur present. Pulmonary:      Effort: Pulmonary effort is normal. No respiratory distress. Breath sounds: Normal breath sounds. No wheezing or rales.

## 2020-12-17 NOTE — PATIENT INSTRUCTIONS
Do not eat or drink anything except water for 8hours before having your labwork done  Continue the same dosing of coumadin and recheck inr in one month  Your appointment has been scheduled for you    take all medications as ordered   Your 6 month follow-up appointment has been scheduled for you call sooner if needed

## 2021-03-01 DIAGNOSIS — I48.20 CHRONIC ATRIAL FIBRILLATION (HCC): ICD-10-CM

## 2021-03-08 ENCOUNTER — APPOINTMENT (OUTPATIENT)
Dept: CT IMAGING | Age: 76
DRG: 292 | End: 2021-03-08
Payer: MEDICARE

## 2021-03-08 ENCOUNTER — HOSPITAL ENCOUNTER (INPATIENT)
Age: 76
LOS: 1 days | Discharge: HOME | DRG: 292 | End: 2021-03-09
Attending: EMERGENCY MEDICINE | Admitting: INTERNAL MEDICINE
Payer: MEDICARE

## 2021-03-08 ENCOUNTER — APPOINTMENT (OUTPATIENT)
Dept: GENERAL RADIOLOGY | Age: 76
DRG: 292 | End: 2021-03-08
Payer: MEDICARE

## 2021-03-08 DIAGNOSIS — E87.6 HYPOKALEMIA: ICD-10-CM

## 2021-03-08 DIAGNOSIS — I50.43 ACUTE ON CHRONIC COMBINED SYSTOLIC AND DIASTOLIC CHF (CONGESTIVE HEART FAILURE) (HCC): Primary | ICD-10-CM

## 2021-03-08 DIAGNOSIS — I50.22 CHRONIC SYSTOLIC CONGESTIVE HEART FAILURE (HCC): Chronic | ICD-10-CM

## 2021-03-08 DIAGNOSIS — I48.91 ATRIAL FIBRILLATION WITH RAPID VENTRICULAR RESPONSE (HCC): ICD-10-CM

## 2021-03-08 LAB
ADENOVIRUS BY PCR: NOT DETECTED
ALBUMIN SERPL-MCNC: 3.6 G/DL (ref 3.5–5.2)
ALP BLD-CCNC: 92 U/L (ref 35–104)
ALT SERPL-CCNC: 45 U/L (ref 0–32)
ANION GAP SERPL CALCULATED.3IONS-SCNC: 13 MMOL/L (ref 7–16)
APTT: 30.9 SEC (ref 24.5–35.1)
AST SERPL-CCNC: 87 U/L (ref 0–31)
BACTERIA: ABNORMAL /HPF
BASOPHILS ABSOLUTE: 0 E9/L (ref 0–0.2)
BASOPHILS RELATIVE PERCENT: 0.3 % (ref 0–2)
BILIRUB SERPL-MCNC: 1.1 MG/DL (ref 0–1.2)
BILIRUBIN URINE: NEGATIVE
BLOOD, URINE: ABNORMAL
BORDETELLA PARAPERTUSSIS BY PCR: NOT DETECTED
BORDETELLA PERTUSSIS BY PCR: NOT DETECTED
BUN BLDV-MCNC: 17 MG/DL (ref 8–23)
CALCIUM SERPL-MCNC: 9 MG/DL (ref 8.6–10.2)
CHLAMYDOPHILIA PNEUMONIAE BY PCR: NOT DETECTED
CHLORIDE BLD-SCNC: 103 MMOL/L (ref 98–107)
CHOLESTEROL, TOTAL: 129 MG/DL (ref 0–199)
CLARITY: CLEAR
CO2: 23 MMOL/L (ref 22–29)
COLOR: YELLOW
CORONAVIRUS 229E BY PCR: NOT DETECTED
CORONAVIRUS HKU1 BY PCR: NOT DETECTED
CORONAVIRUS NL63 BY PCR: NOT DETECTED
CORONAVIRUS OC43 BY PCR: NOT DETECTED
CREAT SERPL-MCNC: 0.7 MG/DL (ref 0.5–1)
EKG ATRIAL RATE: 120 BPM
EKG Q-T INTERVAL: 312 MS
EKG QRS DURATION: 140 MS
EKG QTC CALCULATION (BAZETT): 438 MS
EKG R AXIS: 71 DEGREES
EKG T AXIS: -48 DEGREES
EKG VENTRICULAR RATE: 119 BPM
EOSINOPHILS ABSOLUTE: 0 E9/L (ref 0.05–0.5)
EOSINOPHILS RELATIVE PERCENT: 0.2 % (ref 0–6)
GFR AFRICAN AMERICAN: >60
GFR NON-AFRICAN AMERICAN: >60 ML/MIN/1.73
GLUCOSE BLD-MCNC: 147 MG/DL (ref 74–99)
GLUCOSE URINE: NEGATIVE MG/DL
HBA1C MFR BLD: 6.7 % (ref 4–5.6)
HCT VFR BLD CALC: 43.6 % (ref 34–48)
HDLC SERPL-MCNC: 53 MG/DL
HEMOGLOBIN: 14 G/DL (ref 11.5–15.5)
HUMAN METAPNEUMOVIRUS BY PCR: NOT DETECTED
HUMAN RHINOVIRUS/ENTEROVIRUS BY PCR: NOT DETECTED
INFLUENZA A BY PCR: NOT DETECTED
INFLUENZA B BY PCR: NOT DETECTED
INR BLD: 2.2
KETONES, URINE: NEGATIVE MG/DL
LACTIC ACID: 3.1 MMOL/L (ref 0.5–2.2)
LACTIC ACID: 3.9 MMOL/L (ref 0.5–2.2)
LDL CHOLESTEROL CALCULATED: 65 MG/DL (ref 0–99)
LEUKOCYTE ESTERASE, URINE: NEGATIVE
LYMPHOCYTES ABSOLUTE: 0.63 E9/L (ref 1.5–4)
LYMPHOCYTES RELATIVE PERCENT: 6 % (ref 20–42)
MAGNESIUM: 1.7 MG/DL (ref 1.6–2.6)
MCH RBC QN AUTO: 29.5 PG (ref 26–35)
MCHC RBC AUTO-ENTMCNC: 32.1 % (ref 32–34.5)
MCV RBC AUTO: 91.8 FL (ref 80–99.9)
MONOCYTES ABSOLUTE: 0.32 E9/L (ref 0.1–0.95)
MONOCYTES RELATIVE PERCENT: 3.4 % (ref 2–12)
MYCOPLASMA PNEUMONIAE BY PCR: NOT DETECTED
NEUTROPHILS ABSOLUTE: 9.56 E9/L (ref 1.8–7.3)
NEUTROPHILS RELATIVE PERCENT: 90.5 % (ref 43–80)
NITRITE, URINE: NEGATIVE
NUCLEATED RED BLOOD CELLS: 0.9 /100 WBC
PARAINFLUENZA VIRUS 1 BY PCR: NOT DETECTED
PARAINFLUENZA VIRUS 2 BY PCR: NOT DETECTED
PARAINFLUENZA VIRUS 3 BY PCR: NOT DETECTED
PARAINFLUENZA VIRUS 4 BY PCR: NOT DETECTED
PDW BLD-RTO: 13 FL (ref 11.5–15)
PH UA: 5.5 (ref 5–9)
PLATELET # BLD: 227 E9/L (ref 130–450)
PMV BLD AUTO: 10.1 FL (ref 7–12)
POTASSIUM REFLEX MAGNESIUM: 3.3 MMOL/L (ref 3.5–5)
PRO-BNP: 1253 PG/ML (ref 0–450)
PROCALCITONIN: 8.8 NG/ML (ref 0–0.08)
PROTEIN UA: NEGATIVE MG/DL
PROTHROMBIN TIME: 23.6 SEC (ref 9.3–12.4)
RBC # BLD: 4.75 E12/L (ref 3.5–5.5)
RBC UA: ABNORMAL /HPF (ref 0–2)
RESPIRATORY SYNCYTIAL VIRUS BY PCR: NOT DETECTED
SARS-COV-2, PCR: NOT DETECTED
SODIUM BLD-SCNC: 139 MMOL/L (ref 132–146)
SPECIFIC GRAVITY UA: 1.01 (ref 1–1.03)
TOTAL PROTEIN: 6.6 G/DL (ref 6.4–8.3)
TRIGL SERPL-MCNC: 57 MG/DL (ref 0–149)
TROPONIN: <0.01 NG/ML (ref 0–0.03)
TROPONIN: <0.01 NG/ML (ref 0–0.03)
TSH SERPL DL<=0.05 MIU/L-ACNC: 2.35 UIU/ML (ref 0.27–4.2)
UROBILINOGEN, URINE: 0.2 E.U./DL
VLDLC SERPL CALC-MCNC: 11 MG/DL
WBC # BLD: 10.5 E9/L (ref 4.5–11.5)
WBC UA: ABNORMAL /HPF (ref 0–5)

## 2021-03-08 PROCEDURE — 97162 PT EVAL MOD COMPLEX 30 MIN: CPT

## 2021-03-08 PROCEDURE — 6370000000 HC RX 637 (ALT 250 FOR IP): Performed by: STUDENT IN AN ORGANIZED HEALTH CARE EDUCATION/TRAINING PROGRAM

## 2021-03-08 PROCEDURE — 96366 THER/PROPH/DIAG IV INF ADDON: CPT

## 2021-03-08 PROCEDURE — 6360000002 HC RX W HCPCS: Performed by: INTERNAL MEDICINE

## 2021-03-08 PROCEDURE — 6370000000 HC RX 637 (ALT 250 FOR IP): Performed by: INTERNAL MEDICINE

## 2021-03-08 PROCEDURE — G0378 HOSPITAL OBSERVATION PER HR: HCPCS

## 2021-03-08 PROCEDURE — 71260 CT THORAX DX C+: CPT

## 2021-03-08 PROCEDURE — 97530 THERAPEUTIC ACTIVITIES: CPT

## 2021-03-08 PROCEDURE — 83880 ASSAY OF NATRIURETIC PEPTIDE: CPT

## 2021-03-08 PROCEDURE — 6360000004 HC RX CONTRAST MEDICATION: Performed by: RADIOLOGY

## 2021-03-08 PROCEDURE — 96375 TX/PRO/DX INJ NEW DRUG ADDON: CPT

## 2021-03-08 PROCEDURE — 80061 LIPID PANEL: CPT

## 2021-03-08 PROCEDURE — 83735 ASSAY OF MAGNESIUM: CPT

## 2021-03-08 PROCEDURE — 84484 ASSAY OF TROPONIN QUANT: CPT

## 2021-03-08 PROCEDURE — 81001 URINALYSIS AUTO W/SCOPE: CPT

## 2021-03-08 PROCEDURE — 0202U NFCT DS 22 TRGT SARS-COV-2: CPT

## 2021-03-08 PROCEDURE — 85025 COMPLETE CBC W/AUTO DIFF WBC: CPT

## 2021-03-08 PROCEDURE — 96365 THER/PROPH/DIAG IV INF INIT: CPT

## 2021-03-08 PROCEDURE — 96376 TX/PRO/DX INJ SAME DRUG ADON: CPT

## 2021-03-08 PROCEDURE — 85610 PROTHROMBIN TIME: CPT

## 2021-03-08 PROCEDURE — 6360000002 HC RX W HCPCS: Performed by: STUDENT IN AN ORGANIZED HEALTH CARE EDUCATION/TRAINING PROGRAM

## 2021-03-08 PROCEDURE — 36415 COLL VENOUS BLD VENIPUNCTURE: CPT

## 2021-03-08 PROCEDURE — 99283 EMERGENCY DEPT VISIT LOW MDM: CPT

## 2021-03-08 PROCEDURE — 97535 SELF CARE MNGMENT TRAINING: CPT

## 2021-03-08 PROCEDURE — 80053 COMPREHEN METABOLIC PANEL: CPT

## 2021-03-08 PROCEDURE — 99231 SBSQ HOSP IP/OBS SF/LOW 25: CPT | Performed by: INTERNAL MEDICINE

## 2021-03-08 PROCEDURE — 85730 THROMBOPLASTIN TIME PARTIAL: CPT

## 2021-03-08 PROCEDURE — 97165 OT EVAL LOW COMPLEX 30 MIN: CPT

## 2021-03-08 PROCEDURE — 93010 ELECTROCARDIOGRAM REPORT: CPT | Performed by: INTERNAL MEDICINE

## 2021-03-08 PROCEDURE — 84145 PROCALCITONIN (PCT): CPT

## 2021-03-08 PROCEDURE — 2140000000 HC CCU INTERMEDIATE R&B

## 2021-03-08 PROCEDURE — 83036 HEMOGLOBIN GLYCOSYLATED A1C: CPT

## 2021-03-08 PROCEDURE — 87040 BLOOD CULTURE FOR BACTERIA: CPT

## 2021-03-08 PROCEDURE — 71045 X-RAY EXAM CHEST 1 VIEW: CPT

## 2021-03-08 PROCEDURE — 83605 ASSAY OF LACTIC ACID: CPT

## 2021-03-08 PROCEDURE — 93005 ELECTROCARDIOGRAM TRACING: CPT | Performed by: STUDENT IN AN ORGANIZED HEALTH CARE EDUCATION/TRAINING PROGRAM

## 2021-03-08 PROCEDURE — 84443 ASSAY THYROID STIM HORMONE: CPT

## 2021-03-08 PROCEDURE — 2580000003 HC RX 258: Performed by: INTERNAL MEDICINE

## 2021-03-08 RX ORDER — METOPROLOL TARTRATE 50 MG/1
50 TABLET, FILM COATED ORAL EVERY MORNING
Status: DISCONTINUED | OUTPATIENT
Start: 2021-03-09 | End: 2021-03-09 | Stop reason: HOSPADM

## 2021-03-08 RX ORDER — POTASSIUM CHLORIDE 7.45 MG/ML
10 INJECTION INTRAVENOUS
Status: DISPENSED | OUTPATIENT
Start: 2021-03-08 | End: 2021-03-08

## 2021-03-08 RX ORDER — FUROSEMIDE 10 MG/ML
40 INJECTION INTRAMUSCULAR; INTRAVENOUS ONCE
Status: COMPLETED | OUTPATIENT
Start: 2021-03-08 | End: 2021-03-08

## 2021-03-08 RX ORDER — PANTOPRAZOLE SODIUM 40 MG/1
40 TABLET, DELAYED RELEASE ORAL DAILY
Status: DISCONTINUED | OUTPATIENT
Start: 2021-03-08 | End: 2021-03-09 | Stop reason: HOSPADM

## 2021-03-08 RX ORDER — PROMETHAZINE HYDROCHLORIDE 25 MG/1
12.5 TABLET ORAL EVERY 6 HOURS PRN
Status: DISCONTINUED | OUTPATIENT
Start: 2021-03-08 | End: 2021-03-09 | Stop reason: HOSPADM

## 2021-03-08 RX ORDER — SODIUM CHLORIDE 0.9 % (FLUSH) 0.9 %
10 SYRINGE (ML) INJECTION
Status: ACTIVE | OUTPATIENT
Start: 2021-03-08 | End: 2021-03-08

## 2021-03-08 RX ORDER — METOPROLOL TARTRATE 50 MG/1
50 TABLET, FILM COATED ORAL ONCE
Status: COMPLETED | OUTPATIENT
Start: 2021-03-08 | End: 2021-03-08

## 2021-03-08 RX ORDER — ACETAMINOPHEN 650 MG/1
650 SUPPOSITORY RECTAL EVERY 6 HOURS PRN
Status: DISCONTINUED | OUTPATIENT
Start: 2021-03-08 | End: 2021-03-09 | Stop reason: HOSPADM

## 2021-03-08 RX ORDER — POTASSIUM CHLORIDE 20 MEQ/1
20 TABLET, EXTENDED RELEASE ORAL DAILY
Status: DISCONTINUED | OUTPATIENT
Start: 2021-03-08 | End: 2021-03-08

## 2021-03-08 RX ORDER — ONDANSETRON 2 MG/ML
4 INJECTION INTRAMUSCULAR; INTRAVENOUS EVERY 6 HOURS PRN
Status: DISCONTINUED | OUTPATIENT
Start: 2021-03-08 | End: 2021-03-09 | Stop reason: HOSPADM

## 2021-03-08 RX ORDER — POLYETHYLENE GLYCOL 3350 17 G/17G
17 POWDER, FOR SOLUTION ORAL DAILY PRN
Status: DISCONTINUED | OUTPATIENT
Start: 2021-03-08 | End: 2021-03-09 | Stop reason: HOSPADM

## 2021-03-08 RX ORDER — SODIUM CHLORIDE 0.9 % (FLUSH) 0.9 %
10 SYRINGE (ML) INJECTION EVERY 12 HOURS SCHEDULED
Status: DISCONTINUED | OUTPATIENT
Start: 2021-03-08 | End: 2021-03-09 | Stop reason: HOSPADM

## 2021-03-08 RX ORDER — ACETAMINOPHEN 325 MG/1
650 TABLET ORAL EVERY 6 HOURS PRN
Status: DISCONTINUED | OUTPATIENT
Start: 2021-03-08 | End: 2021-03-09 | Stop reason: HOSPADM

## 2021-03-08 RX ORDER — ATORVASTATIN CALCIUM 10 MG/1
10 TABLET, FILM COATED ORAL DAILY
Status: DISCONTINUED | OUTPATIENT
Start: 2021-03-08 | End: 2021-03-09 | Stop reason: HOSPADM

## 2021-03-08 RX ORDER — SODIUM CHLORIDE 0.9 % (FLUSH) 0.9 %
10 SYRINGE (ML) INJECTION PRN
Status: DISCONTINUED | OUTPATIENT
Start: 2021-03-08 | End: 2021-03-09 | Stop reason: HOSPADM

## 2021-03-08 RX ORDER — WARFARIN SODIUM 3 MG/1
3 TABLET ORAL
Status: COMPLETED | OUTPATIENT
Start: 2021-03-08 | End: 2021-03-08

## 2021-03-08 RX ORDER — POTASSIUM CHLORIDE 20 MEQ/1
40 TABLET, EXTENDED RELEASE ORAL DAILY
Status: DISCONTINUED | OUTPATIENT
Start: 2021-03-09 | End: 2021-03-09 | Stop reason: HOSPADM

## 2021-03-08 RX ORDER — FUROSEMIDE 10 MG/ML
40 INJECTION INTRAMUSCULAR; INTRAVENOUS 2 TIMES DAILY
Status: DISCONTINUED | OUTPATIENT
Start: 2021-03-08 | End: 2021-03-09 | Stop reason: HOSPADM

## 2021-03-08 RX ORDER — MAGNESIUM SULFATE IN WATER 40 MG/ML
2000 INJECTION, SOLUTION INTRAVENOUS ONCE
Status: COMPLETED | OUTPATIENT
Start: 2021-03-08 | End: 2021-03-08

## 2021-03-08 RX ORDER — ENALAPRIL MALEATE 10 MG/1
10 TABLET ORAL 2 TIMES DAILY
Status: DISCONTINUED | OUTPATIENT
Start: 2021-03-08 | End: 2021-03-09 | Stop reason: HOSPADM

## 2021-03-08 RX ADMIN — ATORVASTATIN CALCIUM 10 MG: 10 TABLET, FILM COATED ORAL at 09:30

## 2021-03-08 RX ADMIN — MAGNESIUM SULFATE HEPTAHYDRATE 2000 MG: 40 INJECTION, SOLUTION INTRAVENOUS at 05:45

## 2021-03-08 RX ADMIN — FUROSEMIDE 40 MG: 10 INJECTION, SOLUTION INTRAMUSCULAR; INTRAVENOUS at 05:45

## 2021-03-08 RX ADMIN — METOPROLOL TARTRATE 25 MG: 25 TABLET, FILM COATED ORAL at 22:00

## 2021-03-08 RX ADMIN — PANTOPRAZOLE SODIUM 40 MG: 40 TABLET, DELAYED RELEASE ORAL at 09:30

## 2021-03-08 RX ADMIN — METOPROLOL TARTRATE 50 MG: 50 TABLET, FILM COATED ORAL at 05:45

## 2021-03-08 RX ADMIN — IOPAMIDOL 90 ML: 755 INJECTION, SOLUTION INTRAVENOUS at 09:08

## 2021-03-08 RX ADMIN — WARFARIN SODIUM 3 MG: 3 TABLET ORAL at 12:51

## 2021-03-08 RX ADMIN — ENALAPRIL MALEATE 10 MG: 10 TABLET ORAL at 22:00

## 2021-03-08 RX ADMIN — FUROSEMIDE 40 MG: 10 INJECTION, SOLUTION INTRAVENOUS at 22:01

## 2021-03-08 RX ADMIN — Medication 10 ML: at 09:30

## 2021-03-08 RX ADMIN — ENALAPRIL MALEATE 10 MG: 10 TABLET ORAL at 09:30

## 2021-03-08 RX ADMIN — POTASSIUM CHLORIDE 20 MEQ: 1500 TABLET, EXTENDED RELEASE ORAL at 09:29

## 2021-03-08 RX ADMIN — Medication 10 ML: at 21:00

## 2021-03-08 ASSESSMENT — PAIN SCALES - GENERAL
PAINLEVEL_OUTOF10: 0
PAINLEVEL_OUTOF10: 0

## 2021-03-08 NOTE — PROGRESS NOTES
[facility]  Internal Medicine Residency Program  Progress Note - House Team 1    Patient:  Burnette Holstein 77 y.o. female MRN: [id number]     Date of Service: 3/8/2021     CC: Generalized weakness and SOB    Days since admission: 1    Subjective     Overnight events: Patient presented to ED with granddaughter in Afib RVR after granddaughter noticed pt could not get up out of bed due to weakness and shortness of breath. Pt states this began last night when she felt \"weak and shaky\" and described having \"chills. \" She took tylenol with no improvement, simvastatin and metoprolol. At midnight she took 1 mg Ativan for anxiety over her weakness and SOB. In the ED she was given metoprolol and her Afib resolved. Her SpO2 was below 90% and she was started on 2L NC which improved her sats. Pt was admitted for monitoring and workup. Patient was sitting in bed during interview. She remains short of breath. She required assistance to sit forward for examination. She states she is feeling better and is able to breathe better but she is still weak. Patient was able to get from bed to chair and from chair to walker with assistance. Patient is ambulating under supervision of staff. Objective     Physical Exam:  Vitals: /65   Pulse 80   Temp 97.7 °F (36.5 °C) (Tympanic)   Resp 22   Ht 5' 4\" (1.626 m)   Wt 241 lb 8 oz (109.5 kg)   SpO2 98%   BMI 41.45 kg/m²     I & O - 24hr: No intake or output data in the 24 hours ending 03/08/21 0919   General Appearance: alert, appears stated age and cooperative   HEENT:  Neck / Thyroid: Supple, no masses, nodes, nodules or enlargement.   Neck: no adenopathy, supple, symmetrical, trachea midline and thyroid not enlarged, symmetric, no tenderness/mass/nodules  Lung: wheezes bilaterally  Heart: regular rate and rhythm and Mumur present  Abdomen: soft, non-tender; bowel sounds normal; no masses,  no organomegaly  Extremities:  edema 1+  Musculokeletal: No joint swelling, no muscle tenderness. ROM normal in all joints of extremities. Neurologic: Mental status: Alert, oriented, thought content appropriate  Subject  Pertinent Information & Imaging Studies, Consults   nuvia  Labs:   Lactic Acid - 3.1  Glucose - 147  Pro BNP - 1253  ALT - 45  AST - 87  Neutrophils % - 90.5  Lymphocyte % - 6  PT - 23.6  PTT - 49.8  INR - 2.2    IMAGING:   CXR: Xr Chest Portable     Result Date: 3/8/2021  EXAMINATION: ONE XRAY VIEW OF THE CHEST 3/8/2021 4:43 am COMPARISON: 11/28/2019 HISTORY: ORDERING SYSTEM PROVIDED HISTORY: Shortness of breath TECHNOLOGIST PROVIDED HISTORY: Reason for exam:->Shortness of breath What reading provider will be dictating this exam?->CRC FINDINGS: There is moderate cardiomegaly. There is pulmonary vascular congestion and bilateral patchy infiltrates which may be pulmonary edema. There is likely a small right pleural effusion.      Cardiomegaly with bilateral infiltrates and small right pleural effusion. Findings suggestive of CHF. Pneumonia not excluded. OXYGENATION: 2L NC      Resident's Assessment and Plan     Buena Riedel is a 77 y.o. female with  has a past medical history of Atrial fibrillation ([address] CAD (coronary artery disease), CHF (congestive heart failure) ([address] Diverticular disease, Hyperlipidemia, Hypertension, Restless leg syndrome, and Valvular heart disease. came here with CC   Chief Complaint   Patient presents with    Extremity Weakness     pt states she tried to get up and was shaking and weak. SUMMARY OF HOSPITAL STAY: Patient is a 78 yo F presenting with shortness of breath and weakness with chills. Patient presented in Afib RVR in ED which resolved with metoprolol. Patient is on 2L NC O2 secondary to low saturation on admission. Patient states she is feeling better and breathing better since admission but still weak.      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  Follow-up echo  Continue warfarin    CHF  CXR showing bilateral infiltrates with right sided effusion  Initial Afib RVR, controlled with metoprolol  CT chest  Procalcitonin  TSH - normal 2.35  Continue Lasix     HTN   Resume metoprolol and enalapril    Anxiety  Patient states she takes 1 mg ativan at home for anxiety  Patient not on ativan during hospital stay at this time  Consider changing to PRN hydroxyzine for acute anxiety management      Infection   - rule out infectious pneumonia, blood cultures  - viral panel negative      DVT prophylaxis/ GI prophylaxis: warfarin/ protonix  Discharge plan:  To be determined    Final Thoughts:     Rule out infection - blood cultures  Echocardiogram  Continue ambulation      Verle Bosworth, MS-3  Attending physician: Dr. Bernardo Jackson

## 2021-03-08 NOTE — ED PROVIDER NOTES
Department of Emergency Medicine   ED  Provider Note  Admit Date/RoomTime: 3/8/2021  3:48 AM  ED Room: 17A/17A-17          History of Present Illness:  3/8/21, Time: 6:41 AM EST  Chief Complaint   Patient presents with    Extremity Weakness     pt states she tried to get up and was shaking and weak. Mayank Zayas is a 77 y.o. female presenting to the ED for weakness, beginning earlier today. The complaint has been constant, moderate in severity, and worsened by nothing. Patient is a 78-year-old female with a history of atrial fibrillation with rapid ventricular response on metoprolol and Coumadin, CHF, CAD, hypertension, hyperlipidemia who presents to the emergency department complaining of weakness. She states that when she tried to get up she was shaky and weak. The patient does complain of some shortness of breath as well. She states that she has been taking her medications as prescribed. The patient states that her symptoms started suddenly earlier today. Nothing makes it better. She states it is worse when she tries to move or walk around. She did not take anything for symptoms prior to arrival.  She states that her symptoms are constant and progressively worsening. She denies any fever, chills, chest pain, nausea, vomiting, diarrhea, abdominal pain, lightheadedness, dizziness, syncope, recent hospitalization, recent illness, falls, or other acute symptoms or concerns. She does not wear nasal cannula oxygen at home.     Review of Systems:   Pertinent positives and negatives are stated within HPI, all other systems reviewed and are negative.        --------------------------------------------- PAST HISTORY ---------------------------------------------  Past Medical History:  has a past medical history of Atrial fibrillation ([facility] [address] CAD (coronary artery disease), [facility] Diverticular disease, Hyperlipidemia, Hypertension, Restless leg syndrome, and Valvular heart disease. Past Surgical History:  has a past surgical history that includes Tubal ligation; Tonsillectomy; Coronary artery bypass graft; Cardiac catheterization (2007); Cardiac valve replacement; and Cataract removal with implant (Right, 08/24/2016). Social History:  reports that she has never smoked. She has never used smokeless tobacco. She reports that she does not drink alcohol or use drugs. Family History: family history is not on file. . Unless otherwise noted, family history is non contributory    The patients home medications have been reviewed. Allergies: Lactose intolerance (gi) and Phenobarbital    I have reviewed the past medical history, past surgical history, social history, and family history    ---------------------------------------------------PHYSICAL EXAM--------------------------------------    Constitutional/General: Alert and oriented x3  Head: Normocephalic and atraumatic  Eyes:  EOMI, sclera non icteric  Mouth: Oropharynx clear, handling secretions, no trismus, no asymmetry of the posterior oropharynx or uvular edema  Neck: Supple, full ROM, no stridor, no meningeal signs  Respiratory: Increased work of breathing, slight tachypnea, diminished at the bilateral bases, rales throughout. Cardiovascular:  Tachycardic rate, irregular rhythm. No murmurs, no aortic murmurs, no gallops, or rubs  Chest: No chest wall tenderness  Gastrointestinal:  Abdomen Soft, Non tender, Non distended. No rebound, guarding, or rigidity. No pulsatile masses. Musculoskeletal: Moves all extremities x 4. Warm and well perfused, no clubbing, no cyanosis, no edema. Capillary refill <3 seconds  Skin: skin warm and dry. No rashes.    Neurologic: GCS 15, no focal deficits, symmetric strength 5/5 in the upper and lower extremities bilaterally  Psychiatric: Normal Affect    -------------------------------------------------- RESULTS -------------------------------------------------  I have personally reviewed Result Value Ref Range    Lactic Acid 3.1 (H) 0.5 - 2.2 mmol/L   Magnesium   Result Value Ref Range    Magnesium 1.7 1.6 - 2.6 mg/dL   EKG 12 Lead   Result Value Ref Range    Ventricular Rate 119 BPM    Atrial Rate 120 BPM    QRS Duration 140 ms    Q-T Interval 312 ms    QTc Calculation (Bazett) 438 ms    R Axis 71 degrees    T Axis -48 degrees   ,       RADIOLOGY:  Interpreted by Radiologist unless otherwise specified  XR CHEST PORTABLE   Final Result   Cardiomegaly with bilateral infiltrates and small right pleural effusion. Findings suggestive of CHF. Pneumonia not excluded. EKG Interpretation  Interpreted by Ashwin Gentile    Date of EKG: 3/8/21  Time: 0410    Rhythm: atrial fibrillation - rapid  Rate: 110-120  Axis: normal  Conduction: right bundle branch block (complete)  ST Segments: nonspecific changes  T Waves: non specific changes    Clinical Impression: Atrial fibrillation with rapid ventricular response, right bundle branch block, normal axis, nonspecific ST changes in the anterior lateral leads, intervals within normal limits, QTC is 438  Comparison to prior EKG: changes compared to previous EKG      ------------------------- NURSING NOTES AND VITALS REVIEWED ---------------------------   The nursing notes within the ED encounter and vital signs as below have been reviewed by myself  /61   Pulse 112   Temp 97.8 °F (36.6 °C)   Resp 20   Ht 5' 4\" (1.626 m)   Wt 236 lb (107 kg)   SpO2 95%   BMI 40.51 kg/m²     Oxygen Saturation Interpretation: 95 % on room air. Normal    The patients available past medical records and past encounters were reviewed.         ------------------------------ ED COURSE/MEDICAL DECISION MAKING----------------------  Medications   potassium chloride 10 mEq/100 mL IVPB (Peripheral Line) (has no administration in time range)   magnesium sulfate 2000 mg in 50 mL IVPB premix (2,000 mg Intravenous New Bag 3/8/21 4854)   furosemide (LASIX) injection 40 mg (40 mg Intravenous Given 3/8/21 0545)   metoprolol tartrate (LOPRESSOR) tablet 50 mg (50 mg Oral Given 3/8/21 0545)           The cardiac monitor revealed AFIB with a heart rate in the 100s as interpreted by me. The cardiac monitor was ordered secondary to the patient's shortness of breath and to monitor the patient for dysrhythmia. CPT 77852           Medical Decision Making:     The patient was seen and evaluated by the Attending Emergency Medicine Physician Dr. Lynnette Rain      Patient is a 28-year-old female presents to the emergency department complaining of weakness. She is found to be in atrial fibrillation with rapid ventricular response on arrival with her heart rate in the 120s, her blood pressure is stable at 117/61. She was 90% on room air initially was placed on 2 L nasal cannula. She did improve to 95% on the nasal cannula oxygen. She did have edema and the BNP is elevated at 1253, hypokalemia with potassium of 3.3, and mag was 1.7. Troponin negative. INR is therapeutic at 2.2. Chest x-ray evident of vascular congestion along with cardiomegaly and small pleural effusions. Treated patient with Lasix, metoprolol, and repleted electrolytes. Patient was otherwise hemodynamically stable, and her heart rate improved to 100-110. Consulted with family medicine group who accepted patient for admission. Discussed results and plan of care with patient and family at bedside they verbalized understanding agreement to treatment plan and admission. Re-Evaluations:  ED Course as of Mar 08 0647   Mon Mar 08, 2021   4094 Consulted with Dr. Colby Melton, hospitalist, who accepted the patient for admission.      [KG]      ED Course User Index  [KG] Deborah Cassidy DO             This patient's ED course included: a personal history and physicial examination, re-evaluation prior to disposition, multiple bedside re-evaluations, IV medications, cardiac monitoring, continuous pulse oximetry and complex medical decision making and emergency management    This patient has remained hemodynamically stable during their ED course. Consultations:  Consulted with Dr. Juana Truong, hospitalist, who accepted for admission. Counseling: The emergency provider has spoken with the patient and discussed todays results, in addition to providing specific details for the plan of care and counseling regarding the diagnosis and prognosis. Questions are answered at this time and they are agreeable with the plan.       --------------------------------- IMPRESSION AND DISPOSITION ---------------------------------    IMPRESSION  1. Acute on chronic combined systolic and diastolic CHF (congestive heart failure) ([address]    2. Hypokalemia    3. Atrial fibrillation with rapid ventricular response (HCC)        DISPOSITION  Disposition: Admit to telemetry  Patient condition is stable        NOTE: This report was transcribed using voice recognition software.  Every effort was made to ensure accuracy; however, inadvertent computerized transcription errors may be present        James Alegre DO  Resident  03/08/21 8474

## 2021-03-08 NOTE — PROGRESS NOTES
[address]   Internal Medicine Residency / [address]    Attending Physician Statement  I have discussed the case, including pertinent history and exam findings with the resident and the team.  I have seen and examined the patient and the key elements of the encounter have been performed by me. I agree with the assessment, plan and orders as documented by the resident. Up in chair   Breathing improved  No further chills  No temp  Hx of Prosthetic MV on warfarin  INR 2.32  AFib rate improving  Presently off Ativan    Remainder of medical problems as per resident note.       Martha Fernandez MD FRCP([facility])  Internal Medicine Residency Faculty

## 2021-03-08 NOTE — CARE COORDINATION
Met with pt at the bedside. Role of  and transition of care discussed. Pt lives with her granddaughter in a 1 story house with 2 steps to enter. Pt uses a cane and grab bars in bathroom. No other DME, no hx of HHC/ REECE. Per pt a walker will not be covered under her insurance as she just used it for the cane. Discussed refurbished option. She will check at [facility] for walker that has been refurbished. Pt plans to return home at discharge. She refused HHC. PCP Arun. Pharmacy [facility] [address]

## 2021-03-08 NOTE — PROGRESS NOTES
Physical Therapy  Physical Therapy Initial Assessment     Name: Zonia Bolton  :   MRN: [id number]    Referring Provider: Mary Sousa MD    Date of Service: 3/8/2021    Evaluating PT: Tre Smith PT, DPT, [id number]    Room #: 4974/2963-O  Diagnosis: Acute on chronic combined systolic and diastolic CHF  PMHx/PSHx: CHF, valvular heart disease, Afib, restless legs syndrome, HTN, HLD, CAD  Precautions: Fall risk  Equipment Needs: [employer]    SUBJECTIVE:    Pt lives with granddaughter in a single story house with 2 stair(s) and 1 rail(s) to enter. Bed is on first floor and bath is on first floor. Pt ambulated with SPC prior to admission. OBJECTIVE:   Initial Evaluation  Date: 3/8/21 Treatment Date: Short Term/ Long Term   Goals   AM-PAC 6 Clicks 00     Was pt agreeable to Eval/treatment? Yes     Does pt have pain? No current complaints of pain     Bed Mobility  Rolling: NT  Supine to sit: SBA  Sit to supine: NT  Scooting: SBA to EOB  Rolling: Independent   Supine to sit: Independent   Sit to supine: Independent   Scooting: Independent    Transfers Sit to stand: SBA  Stand to sit: SBA  Stand pivot: SBA with [employer]  Sit to stand: Independent   Stand to sit: Independent   Stand pivot: Mod Independent with [employer] vs SPC   Ambulation   75 feet with [employer] with SBA  200 feet with [employer] vs SPC Mod Independent    Stair negotiation: ascended and descended NT  2 step(s) with 1 rail(s) Mod Independent    ROM BUE: Refer to OT note  BLE: WFL     Strength BUE: Refer to OT note  BLE: WFL     Balance Sitting EOB: Supervision  Dynamic Standing: SBA with [employer]  Sitting EOB: Independent   Dynamic Standing: Mod Independent with [employer] vs SPC     Pt is A & O x: 4 to person, place, month/year, and situation. Sensation: Pt denies numbness and tingling of extremities. Edema: Unremarkable. Patient education  Pt educated on PT role in acute care setting.     Patient response to education:   Pt verbalized understanding Pt demonstrated skill Pt requires further education in this area   Yes NA No     ASSESSMENT:    Comments:   Pt was in bed upon room entry, agreeable to PT evaluation. Pt ambulated short distance down hallway with [employer]. Gait is very slow with small steps but pt has good steadiness and technique with [employer]. Pt ambulated back to room and was seated in chair. Pt was mildly SOB but O2 sat was 96% on RA. Pt reports she sometimes has difficulty ambulating with SPC at home and expressed interest in being discharged home with [employer]. Pt reports she may know someone who could lend her [employer]. All questions and concerns were addressed. Pt was left seated in chair with all needs met at conclusion of session. Treatment:  Patient practiced and was instructed in the following treatment:     Therapeutic activities: Pt completed all therapeutic activities noted above. Pt was cued for technique during supine to sit transfer. Pt was cued for hand placement during sit <> stand transfers. Pt completed multiple transfers from surfaces of varying heights (EOB x1, chair x1). Pt ambulated with [employer] as standing balance and endurance were challenged. Vitals and symptoms were monitored closely. Pt's/family goals:   1. To return home. Patient and or family understand(s) diagnosis, prognosis, and plan of care. Yes. PLAN:    Current Treatment Recommendations   [x] Strengthening     [] ROM   [x] Balance Training   [x] Endurance Training   [x] Transfer Training   [x] Gait Training   [x] Stair Training   [] Positioning   [] Safety and Education Training   [] Patient/Caregiver Education   [] HEP  [] Other     PT care will be provided in accordance with the objectives noted above. Exercises and functional mobility practice will be used as well as appropriate assistive devices or modalities to obtain goals. Patient and family education will also be administered as needed. Frequency of treatments: 2-5x/week x 2-3 days.     Time in: 1055  Time out: 1115    Total Treatment Time

## 2021-03-08 NOTE — PROGRESS NOTES
Occupational Therapy  OCCUPATIONAL THERAPY INITIAL EVALUATION      Date:3/8/2021  Patient Name: Rustam Fulton  MRN: [id number]  : 1945  Room: [address] [place], [place] [address]A    Referring Provider:  Zan Mackenzie MD    Evaluating OT: Nicolette Moore OTR/L #[id number]     AM-PAC Daily Activity Raw Score: 19/24    Recommended Adaptive Equipment:  W/w, shower chair    Diagnosis: CHF   Patient presented to the hospital with weakness, edema and inability to ambulate     Pertinent Medical History:    Past Medical History:   Diagnosis Date    [facility]    CAD (coronary artery disease) 2010    NO CARDIOLOGIST, F/U W/ pcp STABLE    CHF (congestive heart failure) ([facility] 2010    Diverticular disease 2010    Hyperlipidemia     Hypertension     Restless leg syndrome 2010    Valvular heart disease 2010      Precautions:  Falls     Home Living: Pt lives with granddaughter in a 1 story home with 2 BULMARO and 1 hand rail. Bathroom setup: tub/shower combo with grab bar and handheld shower head   Equipment owned: spc    Prior Level of Function: mod I with ADLs , mod I with IADLs; ambulated with spc  Driving: no  Occupation: na    Pain Level: Pt reports 2/10 generalized pain;  Therapist facilitated repositioning to address pain      Cognition: A&O: 4/4; Follows 2 step directions   Memory:  good   Sequencing:  good   Problem solving:  good   Judgement/safety:  fair     Functional Assessment:   Initial Eval Status  Date: 3/8/21 Treatment Status  Date: Short Term Goals = Long Term Goals  Treatment frequency: 1-4x/wk; PRN   Feeding Independent       Grooming Stand by Assist     standing  Modified [place]    UB Dressing Supervision     terri  Modified [place]    LB Dressing Stand by Assist     Figure 4 technique   Modified [place]    Bathing Stand by Assist  Modified [place]    Toileting Stand by Assist   Modified [place]    Bed Mobility  Supine to sit: Stand by Assist   Sit to supine: NT  Supine to sit: Modified [place]   Sit to supine: Modified [place]    Functional Transfers Stand by Assist   Modified [place]    Functional Mobility Stand by Assist    Ambulated in room and hallway with w/w   Modified [place]    Balance Sitting:     Static:  indep    Dynamic:sup  Standing: SBA     Activity Tolerance F  G   Visual/  Perceptual Glasses: yes    Grossly wfl    Pt reports R eye worse than L eye                  Vitals: On RA: 92-97% throughout session - nursing notified    Hand dominance: right     Strength ROM Additional Info:    RUE   4/5 wfl good  and wfl FMC/dexterity noted during ADL tasks     LUE 4/5 wfl good  and wfl FMC/dexterity noted during ADL tasks     Hearing: wfl  Sensation:wfl  Tone: wfl  Edema:none noted                             Comments: Upon arrival, patient supine in bed and agreeable to OT Session. Pt demonstrating fair+ understanding of education/techniques, requiring additional training / education. At end of session, patient seated in chair with call light and phone within reach, all lines and tubes intact. Pt would benefit from continued skilled OT to increase safety,  independence and quality of life. Treatment:  OT services provided: Facilitation of bed mobility, unsupported sitting balance at EOB (impacting ADLs; addressing posture, weight shifting, dynamic reaching), functional transfers (various surfaces), standing tolerance tasks (addressing posture, balance and activity tolerance while incorporating light functional reaching; impacting ADLs and functional activity) and functional ambulation tasks with w/w ( to/from bathroom ; cuing on posture, w/w management and safety) - skilled cuing on hand placement, posture, body mechanics, energy conservation techniques and safety.   Therapist facilitated self-care retraining: UB/LB self-care tasks (robe, socks) and grooming tasks while educating pt on modified techniques, posture, safety and energy conservation techniques. Skilled monitoring of HR, O2 sats and pts response to treatment (greater than 92% on RA)       Assessment of current deficits    Functional mobility [x]  ADLs [x] Strength [x]  Cognition []  Functional transfers  [x] IADLs [x] Safety Awareness [x]  Endurance [x]  Fine Motor Coordination [] Balance [x] Vision/perception [] Sensation []   Gross Motor Coordination [] ROM [] Delirium []                  Motor Control []      Plan of Care:  1-4 days/wk for 1-2 weeks PRN   Instruction/training on adapted ADL techniques and AE recommendations to increase functional independence within precautions  Training on energy conservation strategies/techniques to improve independence/tolerance for self-care routine  Functional transfer/mobility training/DME recommendations for increased independence, safety, and fall prevention  Patient/Family education to increase follow through with safety techniques and functional independence  Recommendation of environmental modifications for increased safety with functional transfers/mobility and ADLs  Therapeutic exercise to improve motor endurance, ROM, and functional strength for ADLs/functional transfers  Therapeutic activities to facilitate/challenge dynamic balance, stand tolerance, fine motor dexterity/in-hand manipulation for increased independence with ADLs      Rehab Potential: Good for established goals     Patient / Family Goal: return home      Patient and/or family were instructed on functional diagnosis, prognosis/goals and OT plan of care. Demonstrated fair understanding.       AM-PAC Daily Activity Inpatient   How much help for putting on and taking off regular lower body clothing?: A Little  How much help for Bathing?: A Little  How much help for Toileting?: A Little  How much help for putting on and taking off regular upper body clothing?: A Little  How much help for taking care of personal grooming?: A Little  How much help for eating meals?: None  AM-PAC Inpatient Daily Activity Raw Score: 19  AM-PAC Inpatient ADL T-Scale Score : 40.22  ADL Inpatient CMS 0-100% Score: 42.8  ADL Inpatient CMS G-Code Modifier : CK         Eval Complexity: Low    Time In: 935  Time Out: 1008  Total Treatment Time: 23 minutes    Min Units   OT Eval Low 97165  x  1   OT Eval Medium 49361     OT Eval High 52463       OT Re-Eval [id number]       Therapeutic Ex 93089       Therapeutic Activities 58281 15  1   ADL/Self Care 92651  8  1   Orthotic Management 85458       Neuro Re-Ed 19562       Non-Billable Time          Evaluation Time includes thorough review of current medical information, gathering information on past medical history/social history and prior level of function, completion of standardized testing/informal observation of tasks, assessment of data and education on plan of care and goals.            Flynn Neely OTR/L #[id number]

## 2021-03-09 ENCOUNTER — TELEPHONE (OUTPATIENT)
Dept: INTERNAL MEDICINE CLINIC | Age: 76
End: 2021-03-09

## 2021-03-09 VITALS
WEIGHT: 246 LBS | SYSTOLIC BLOOD PRESSURE: 135 MMHG | OXYGEN SATURATION: 93 % | BODY MASS INDEX: 42 KG/M2 | HEART RATE: 64 BPM | HEIGHT: 64 IN | TEMPERATURE: 99 F | DIASTOLIC BLOOD PRESSURE: 66 MMHG | RESPIRATION RATE: 18 BRPM

## 2021-03-09 DIAGNOSIS — I35.0 AORTIC VALVE STENOSIS, ETIOLOGY OF CARDIAC VALVE DISEASE UNSPECIFIED: Primary | ICD-10-CM

## 2021-03-09 PROBLEM — I50.43 ACUTE ON CHRONIC COMBINED SYSTOLIC (CONGESTIVE) AND DIASTOLIC (CONGESTIVE) HEART FAILURE (HCC): Status: RESOLVED | Noted: 2021-03-08 | Resolved: 2021-03-09

## 2021-03-09 LAB
ANION GAP SERPL CALCULATED.3IONS-SCNC: 9 MMOL/L (ref 7–16)
BUN BLDV-MCNC: 19 MG/DL (ref 8–23)
C-REACTIVE PROTEIN: 5.6 MG/DL (ref 0–0.4)
CALCIUM SERPL-MCNC: 8.9 MG/DL (ref 8.6–10.2)
CHLORIDE BLD-SCNC: 101 MMOL/L (ref 98–107)
CO2: 29 MMOL/L (ref 22–29)
CREAT SERPL-MCNC: 0.9 MG/DL (ref 0.5–1)
GFR AFRICAN AMERICAN: >60
GFR NON-AFRICAN AMERICAN: >60 ML/MIN/1.73
GLUCOSE BLD-MCNC: 125 MG/DL (ref 74–99)
HCT VFR BLD CALC: 40.9 % (ref 34–48)
HEMOGLOBIN: 13.4 G/DL (ref 11.5–15.5)
INR BLD: 2
LACTIC ACID: 1.7 MMOL/L (ref 0.5–2.2)
LV EF: 58 %
LVEF MODALITY: NORMAL
MCH RBC QN AUTO: 29.9 PG (ref 26–35)
MCHC RBC AUTO-ENTMCNC: 32.8 % (ref 32–34.5)
MCV RBC AUTO: 91.3 FL (ref 80–99.9)
PDW BLD-RTO: 13.3 FL (ref 11.5–15)
PLATELET # BLD: 212 E9/L (ref 130–450)
PMV BLD AUTO: 10.3 FL (ref 7–12)
POTASSIUM REFLEX MAGNESIUM: 3.8 MMOL/L (ref 3.5–5)
PROCALCITONIN: 8.83 NG/ML (ref 0–0.08)
PROTHROMBIN TIME: 22.1 SEC (ref 9.3–12.4)
RBC # BLD: 4.48 E12/L (ref 3.5–5.5)
SEDIMENTATION RATE, ERYTHROCYTE: 20 MM/HR (ref 0–20)
SODIUM BLD-SCNC: 139 MMOL/L (ref 132–146)
WBC # BLD: 7.9 E9/L (ref 4.5–11.5)

## 2021-03-09 PROCEDURE — 99231 SBSQ HOSP IP/OBS SF/LOW 25: CPT | Performed by: INTERNAL MEDICINE

## 2021-03-09 PROCEDURE — 85651 RBC SED RATE NONAUTOMATED: CPT

## 2021-03-09 PROCEDURE — 84145 PROCALCITONIN (PCT): CPT

## 2021-03-09 PROCEDURE — 80048 BASIC METABOLIC PNL TOTAL CA: CPT

## 2021-03-09 PROCEDURE — 93306 TTE W/DOPPLER COMPLETE: CPT

## 2021-03-09 PROCEDURE — 6370000000 HC RX 637 (ALT 250 FOR IP): Performed by: INTERNAL MEDICINE

## 2021-03-09 PROCEDURE — 6370000000 HC RX 637 (ALT 250 FOR IP)

## 2021-03-09 PROCEDURE — 83605 ASSAY OF LACTIC ACID: CPT

## 2021-03-09 PROCEDURE — G0378 HOSPITAL OBSERVATION PER HR: HCPCS

## 2021-03-09 PROCEDURE — 6360000002 HC RX W HCPCS: Performed by: INTERNAL MEDICINE

## 2021-03-09 PROCEDURE — 86140 C-REACTIVE PROTEIN: CPT

## 2021-03-09 PROCEDURE — 96376 TX/PRO/DX INJ SAME DRUG ADON: CPT

## 2021-03-09 PROCEDURE — 85027 COMPLETE CBC AUTOMATED: CPT

## 2021-03-09 PROCEDURE — 36415 COLL VENOUS BLD VENIPUNCTURE: CPT

## 2021-03-09 PROCEDURE — 85610 PROTHROMBIN TIME: CPT

## 2021-03-09 PROCEDURE — 80074 ACUTE HEPATITIS PANEL: CPT

## 2021-03-09 RX ORDER — POTASSIUM CHLORIDE 750 MG/1
20 TABLET, EXTENDED RELEASE ORAL DAILY
Qty: 135 TABLET | Refills: 3 | Status: CANCELLED | OUTPATIENT
Start: 2021-03-09

## 2021-03-09 RX ORDER — FUROSEMIDE 20 MG/1
40 TABLET ORAL DAILY
Qty: 135 TABLET | Refills: 3 | Status: CANCELLED | OUTPATIENT
Start: 2021-03-09

## 2021-03-09 RX ORDER — WARFARIN SODIUM 3 MG/1
3 TABLET ORAL
Status: COMPLETED | OUTPATIENT
Start: 2021-03-09 | End: 2021-03-09

## 2021-03-09 RX ADMIN — ATORVASTATIN CALCIUM 10 MG: 10 TABLET, FILM COATED ORAL at 08:14

## 2021-03-09 RX ADMIN — ENALAPRIL MALEATE 10 MG: 10 TABLET ORAL at 08:14

## 2021-03-09 RX ADMIN — FUROSEMIDE 40 MG: 10 INJECTION, SOLUTION INTRAVENOUS at 08:14

## 2021-03-09 RX ADMIN — WARFARIN SODIUM 3 MG: 3 TABLET ORAL at 10:16

## 2021-03-09 RX ADMIN — PANTOPRAZOLE SODIUM 40 MG: 40 TABLET, DELAYED RELEASE ORAL at 08:14

## 2021-03-09 RX ADMIN — POTASSIUM CHLORIDE 40 MEQ: 1500 TABLET, EXTENDED RELEASE ORAL at 08:14

## 2021-03-09 RX ADMIN — METOPROLOL TARTRATE 50 MG: 50 TABLET, FILM COATED ORAL at 08:14

## 2021-03-09 NOTE — PROGRESS NOTES
Pharmacy Consultation Note  (Anticoagulant Dosing and Monitoring)    Initial consult date: 3-8-2021  [facility]    Allergies:  Lactose intolerance (gi) and Phenobarbital    77 y.o. female; 162.6 cm, 109.5 kg  Warfarin Indication Target   INR Range Home Dose  (if applicable) Diet/Feeding Tube   Chronic AF and MVR 2 - 3 3 mg daily except 4 mg Wed only 3/8: cardiac, low Na     Warfarin drug-drug interactions  Start  Stop Home Med? Comments                   Hepatic Function Panel:                            Lab Results   Component Value Date    ALKPHOS 92 03/08/2021    ALT 45 03/08/2021    AST 87 03/08/2021    PROT 6.6 03/08/2021    BILITOT 1.1 03/08/2021    BILIDIR <0.2 08/15/2015    IBILI 0.6 08/15/2015    LABALBU 3.6 03/08/2021    LABALBU 4.2 06/06/2012     Date Warfarin Dose INR Heparin or LMWH HBG/HCT PLT Comment   3/8 3 mg (1251) 2.2 X 14/43.6 227    3/9 3 mg (1000) 2 X 13.4/40.9 212                                 Assessment:  · 76 yo/F admitted for weakness and shakiness. Found to be in AF on ECG (HR = 119). · PMH: chronic AF on warfarin 3 mg daily except for 4 mg on Wed only. INR = 2.2  · 3/9: INR = 2; H/H stable. Plan:  · Give warfarin 3 mg x1 again today. · Daily PT/INR until the INR is stable within the therapeutic range. · Pharmacist will follow and monitor/adjust dosing as necessary.     Venus Resendez PharmD  3/9/2021  9:26 AM  Pager: [phone]

## 2021-03-09 NOTE — DISCHARGE SUMMARY
[address]  Discharge Summary    PCP: Tigist Lyon, [facility]  Date:3/8/2021  Discharge Date: 3/9/2021    Admission Diagnosis:   1. Acute CHF exacerbation 2/2 afib with RVR vs ?PNA  2. Afib with RVR  3. Lactic acidosis  4. Hx Valvular disease s/p mitral replacement  5. Hx of HTN  6. Hx of HLD  7. Hx of anxiety    Discharge Diagnosis:  1. Acute CHF exacerbation likely 2/2 afib with RVR, resolved  2. Afib, rate controlled  3. Lactic acidosis, resolved  4. Hx Valvular disease s/p mitral replacement  5. Hx of HTN  6. Hx of HLD  7. Hx of anxiety    Hospital Course: The patient is a 77 y.o. female with PMHx Afib on Coumadin, CAD s/p CABG, valvular disease s/p mitral mechanical valve replacement, HFrEF, HTN, HLD, anxiety who presents with weakness.     Pt reports she has been weak since yesterday, unable to get herself up and ambulate, associated with coughing more often although that is a chronic issue for her, it's a dry cough. Also SOB, has been needing 2L of oxygen since arriving in the ED. ( initial oxygen saturation in the low 90s and below on room air)    She noticed chills but no fever. She reports some increased general swelling, possibly weight gain, but patient is not sure.  Pt denies any fever, nausea, vomiting, CP, diarrhea, constipation, abd pain. Pt reports good compliance with meds.     Her ED Course includes labs, K3.3, LA 3.1, proBNP 1253, ALT 45, AST 87, INR 2.2. EKG showing Afib with rate 119 and RBB. Pt was given Lopressor 25 mg, and her heart rate went down to 80, controlled,  Patient was started with Lasix 40 mg, and electrolytes replaced as indicated. On inpatient floor, pt was continued with diuresis, lasix 40 mg IV BID, with good urine output and response, she was weaned to room air the next morning, and LE edema much improved.      Pt is chronically on warfarin d/t a fib, pharmacy is consulted, and INR is at 2.2-2 during hospitalization. With regards her lactic acidosis on initial presentation, and concerning of infectious process ? PNA, with WBC 10.5, and elevated pro-calcitonin, 8.8 on initial presentation, pt was not started on any abx on clinical grounds (afebrile, and no toxic appearance), and closely monitored. Over the course, lactic acidosis resolved, WBC trend down to 7.9 on the day of discharge. pro calcitonin still elevated, 8.83, pt is however, clinical improving, to baseline, stable, afebrile, non toxic appearance. F/u with ESR and CRP lab and will monitor as outpatient. On discharge, pt is hemodynamically stable, orthostatic BP is wnl, no complaints. If pro-sylvie and other inflammatory markers remains elevated as outpatient f/u, consider work up for rheumatological/inflammatory disorders and medullary carcinoma of thyroid. Significant findings (history and exam, laboratory, radiological, pathology, other tests):   · General Appearance: alert, appears stated age and cooperative  · HEENT:  Head: Normocephalic, no lesions, without obvious abnormality. · Neck: no adenopathy, no carotid bruit, no JVD, supple, symmetrical, trachea midline and thyroid not enlarged, symmetric, no tenderness/mass/nodules  · Lung: clear to auscultation bilaterally  · Heart: regular rate and rhythm, S1, S2 normal, no murmur, click, rub or gallop  · Abdomen: soft, non-tender; bowel sounds normal; no masses,  no organomegaly  · Extremities:  extremities normal, atraumatic, no cyanosis or minimal LE edema  · Musculokeletal: No joint swelling, no muscle tenderness. ROM normal in all joints of extremities. · Neurologic: Mental status: Alert, oriented, thought content appropriate      Pending test results:   ESR and CPR, and ECHO    Consults:  1.  Pharmacy to dose warfarin    Procedures:  none    Condition at discharge: Stable    Disposition: home    Discharge Medications:  Current Discharge Medication List      CONTINUE these medications which have NOT CHANGED    Details   metoprolol tartrate (LOPRESSOR) 25 MG tablet TAKE TWO TABLETS BY MOUTH IN THE MORNING AND 1 TABLET IN THE EVENING  Qty: 270 tablet, Refills: 0    Associated Diagnoses: Chronic atrial fibrillation (HCC)      ! ! warfarin (COUMADIN) 3 MG tablet Take one tablet daily  Qty: 30 tablet, Refills: 6    Associated Diagnoses: Chronic atrial fibrillation ([facility] [address]      ! ! warfarin (COUMADIN) 1 MG tablet Take 1 mg every wed with coumadin 3 mg to equal 4 mg wed only  Qty: 30 tablet, Refills: 0    Associated Diagnoses: Chronic atrial fibrillation (HCC)      enalapril (VASOTEC) 10 MG tablet TAKE ONE TABLET BY MOUTH TWO TIMES A DAY  Qty: 180 tablet, Refills: 3    Associated Diagnoses: Chronic systolic congestive heart failure ([facility] Essential hypertension      furosemide (LASIX) 20 MG tablet TAKE 1 & 1/2 TABLETS BY MOUTH DAILY  Qty: 135 tablet, Refills: 3    Associated Diagnoses: Chronic systolic congestive heart failure (HCC)      potassium chloride (KLOR-CON M) 10 MEQ extended release tablet Take 1 and 1/2 tablet daily  Qty: 135 tablet, Refills: 3    Associated Diagnoses: Chronic systolic congestive heart failure (HCC)      simvastatin (ZOCOR) 20 MG tablet Take on tablet daily  Qty: 90 tablet, Refills: 3    Associated Diagnoses: Mixed hyperlipidemia      pantoprazole (PROTONIX) 40 MG tablet Take 1 tablet by mouth daily  Qty: 90 tablet, Refills: 3    Associated Diagnoses: Gastroesophageal reflux disease without esophagitis       ! ! - Potential duplicate medications found. Please discuss with provider. STOP taking these medications       Misc. Devices MISC Comments:   Reason for Stopping:         Misc.  Devices MISC Comments:   Reason for Stopping:         dorzolamide-timolol (COSOPT) 22.3-6.8 MG/ML ophthalmic solution Comments:   Reason for Stopping:         brimonidine (ALPHAGAN) 0.2 % ophthalmic solution Comments:   Reason for Stopping:         latanoprost (XALATAN) 0.005 % ophthalmic solution Comments:   Reason for Stopping:         prednisoLONE acetate (PRED FORTE) 1 % ophthalmic suspension Comments:   Reason for Stopping:         timolol (TIMOPTIC) 0.5 % ophthalmic solution Comments:   Reason for Stopping:               Activity: activity as tolerated  Diet: cardiac diet    Your appointment with our  clinic is 3/16/2021, 1pm, it is a virtual visit. Our clinic nurse will contact you that day 30-60 minutes before the appointment to start the visit    Also, please measure your daily weight and keep a log as record. HEART FAILURE  / CONGESTIVE HEART FAILURE  DISCHARGE INSTRUCTIONS:  GUIDELINES TO FOLLOW AT HOME    Self- Managed Care:     MEDICATIONS:  · Take your medication as directed. If you are experiencing any side effects, inform your doctor, Do not stop taking any of your medications without letting your doctor know. · Check with your doctor before taking any over-the-counter medications / herbal / or dietary supplements. They may interfere with your other medications. · Do not take ibuprofen (Advil or Motrin) and naproxen (Aleve) without talking to your doctor first. They could make your heart failure worse. WEIGHT MONITORING:  ·  Weigh yourself everyday (with the same scale) around the same time of the day and write it down. (you can chart them on a calendar or keep track of them on paper. ·  Notify your doctor of a weight gain of 3 pounds or more in 1 day   OR a total of 5 pounds or more in 1 week    · Take your weight record to your doctor visits  · Also, the same goes if you loose more than 3# in one day, let your heart doctor know. DIET:   Cardiac heart healthy diet- Low saturated / low trans fat, no added salt, caffeine restricted, Low sodium diet-   No more than 2,000mg (2 grams) of salt / sodium per day (which equals to a little less than  a teaspoon of salt)  · If your doctor wants you on a fluid restriction. ..it is usually recommended a fluid limit of 2,000cc -  Fluid restriction- 2,000 ml (milliliters) = 64 ounces = you can have 8 glasses of fluid per day (each glass 8 ounces)    · Follow a low salt diet - avoid using salt at the table, avoid / limit use of canned soups, processed / packaged foods, salted snacks, olives and pickles. Do not use a salt substitute without checking with your doctor, they may contain a high amount of potassioum. (Mrs. Louis Quarles is safe to use). · Limit the use of alcohol       CALL YOUR DOCTOR THE FIRST DAY YOU NOTICE ANY OF THESE   SYMPTOMS:  · You have a weight gain of 3 pounds or more in 1 day         OR 5 pounds or more in one week  · More shortness of breath  · More swelling of your stomach, legs, ankles or feet  · Feeling more tired, No energy  · Dry hacky cough  · Dizziness  · More chest pain / discomfort       (CALL 911 IF ANY OF THE FOLLOWING OCCURS  · Chest pain (not relieved with nitroglycerine, if you have been prescribed this medication)  · Severe shortness of breath  · Faint / Pass out  · Confusion / cannot think clearly  · If symptoms get worse           SMOKING - TOBACCO USE:  * IF YOU SMOKE - STOP! Kick the habit. [address] Program is offered at [facility] [address] and [address] [facility]. Call [phone] extension [phone] for more information. ACTIVITY:   (Ask your doctor when you will be able to return to work and before starting any exercise program.  Do not drive unless unless your doctor has given you permission to do so). · Start light exercise. Even if you can only do a small amount, exercise will help you get stronger, have more energy, help manage your weight and decrease  stress. Walking is an easy way to get exercise.  Start out slowly and  increase the amount you walk as tolerated  · If you become short of breath, dizzy or have chest pain; stop, sit down, and rest.  · If you feel \"wiped out\" the day after you exercise, walk at a slower pace or for a shorter distance. You can gradually increase the pace or amount of time. (Do not exercise right after a meal or in extreme temperatures, such as above 85 degrees, if the air is really humid, or wind chill is less than 20 degrees)                                             ADDITIONAL INFORMATION:  · Avoid getting sick from colds and the flu. Stay away from friends or family that you know may have a contagious illness  · Get plenty of rest   · Get a flu shot each year. · Get a pneumococcal vaccine shot. If you have had one before, ask your doctor whether you need another dose. My Goal for Self-management of Heart Failure Includes 5 steps :    1. Notice a change in symptoms ( weight gain, short of breath, leg swelling, decreased activity level, bloating. ...)    2. Evaluate the change: (use the Heart Failure Zones )     3. Decide to take action: decide what your options are, such as: (call your doctor for an extra visit, take a prescribed medication, such as your water pill if your doctor has given you directions to do so, [address])    4. Come up with a strategy:  (now you call the doctor for advice / appointment. This is where you take action!!! Do not wait, catch the symptom early and treat it before it worsens. 5. Evaluate the response: The next day, check your Heart Failure Zones: are you in the GREEN ZONE (safe zone)? Worsening symptoms of YELLOW ZONE? Or have you moved to the RED ZONE and need to call 911 or go to the Emergency Room for evaluation? Call your doctor's office to update them on your symptoms of heart failure.          Kym Beauchamp MD  PGY 1   2:01 PM 3/9/2021

## 2021-03-09 NOTE — PROGRESS NOTES
[address] [facility]  Internal Medicine Residency / [address]    Attending Physician Statement  I have discussed the case, including pertinent history and exam findings with the resident and the team.  I have seen and examined the patient and the key elements of the encounter have been performed by me. I agree with the assessment, plan and orders as documented by the resident. Much more comfortable  May have had chills without fever last night   And she feels it is anxiety related to [employer] well last night  Able to lay at baseline 30 degrees before getting sick  VS stable   Edema improving   Inaccurate weights  Hx of MV replacement and warfarin for rheumatic heart  Worsening CHF and anxiety from A Fib  Plan: Continue same            Check mobility with pulse OX  Remainder of medical problems as per resident note.       Pito Montoya MD FRCP([facility])  Internal Medicine Residency Faculty

## 2021-03-09 NOTE — CARE COORDINATION
3/9/2021 - admitted for CHF. IV lasix bid. Does not anticipate any needs at discharge. Plan is to discharge home when medically table. SW/CM will follow.

## 2021-03-10 ENCOUNTER — TELEPHONE (OUTPATIENT)
Dept: INTERNAL MEDICINE | Age: 76
End: 2021-03-10

## 2021-03-10 DIAGNOSIS — I35.0 NONRHEUMATIC AORTIC VALVE STENOSIS: Primary | ICD-10-CM

## 2021-03-10 NOTE — TELEPHONE ENCOUNTER
Called patient regarding echocardiography result. She was not available. Left a message. Echocardiography revealing severe aortic stenosis and the bioprosthetic mitral valve also had significant stenosis. Referral to cardiology with Dr. Terry Peña ordered. She has been advised to call back at the clinic number.

## 2021-03-10 NOTE — TELEPHONE ENCOUNTER
[facility] Initial Follow Up Call    Outreach made within 2 business days of discharge: Yes    Patient: Buena Riedel Patient : 1945   MRN: [id number]  Reason for Admission: There are no discharge diagnoses documented for the most recent discharge. Discharge Date: 3/9/21       Spoke with: Blanquita Richey     Discharge department/facility: Home     TCM Interactive Patient Contact:  Was patient able to fill all prescriptions: No: Nothing New  Was patient instructed to bring all medications to the follow-up visit: Yes  Is patient taking all medications as directed in the discharge summary?  Yes  Does patient understand their discharge instructions: Yes  Does patient have questions or concerns that need addressed prior to 7-14 day follow up office visit: no    appointment already Scheduled with PCP within 7-14 days    Follow Up  Future Appointments   Date Time Provider Linh Colon   3/17/2021 12:30 PM Ken Worthington DO [facility]   2021  1:00 PM Ken Worthington DO [facility]       Edelmira Florian

## 2021-03-10 NOTE — PROGRESS NOTES
I discussed results of echo with patient and noted cardiology recommended f/u. Patient is agreeable to seeing Dr. Hernandez Bliss. Consult has been ordered.

## 2021-03-12 ENCOUNTER — TELEPHONE (OUTPATIENT)
Dept: ADMINISTRATIVE | Age: 76
End: 2021-03-12

## 2021-03-12 LAB
HAV IGM SER IA-ACNC: NORMAL
HEPATITIS B CORE IGM ANTIBODY: NORMAL
HEPATITIS B SURFACE ANTIGEN INTERPRETATION: NORMAL
HEPATITIS C ANTIBODY INTERPRETATION: NORMAL

## 2021-03-13 LAB
BLOOD CULTURE, ROUTINE: NORMAL
CULTURE, BLOOD 2: NORMAL

## 2021-03-17 ENCOUNTER — OFFICE VISIT (OUTPATIENT)
Dept: INTERNAL MEDICINE | Age: 76
End: 2021-03-17
Payer: MEDICARE

## 2021-03-17 VITALS
HEIGHT: 64 IN | BODY MASS INDEX: 41.15 KG/M2 | DIASTOLIC BLOOD PRESSURE: 72 MMHG | TEMPERATURE: 97.1 F | WEIGHT: 241 LBS | HEART RATE: 78 BPM | RESPIRATION RATE: 12 BRPM | SYSTOLIC BLOOD PRESSURE: 136 MMHG

## 2021-03-17 DIAGNOSIS — K21.9 GASTROESOPHAGEAL REFLUX DISEASE WITHOUT ESOPHAGITIS: ICD-10-CM

## 2021-03-17 DIAGNOSIS — Z95.1 S/P CABG (CORONARY ARTERY BYPASS GRAFT): ICD-10-CM

## 2021-03-17 DIAGNOSIS — I48.20 CHRONIC ATRIAL FIBRILLATION (HCC): Chronic | ICD-10-CM

## 2021-03-17 DIAGNOSIS — I50.22 CHRONIC SYSTOLIC CONGESTIVE HEART FAILURE (HCC): Chronic | ICD-10-CM

## 2021-03-17 DIAGNOSIS — I38 VALVULAR HEART DISEASE: Primary | Chronic | ICD-10-CM

## 2021-03-17 DIAGNOSIS — Z79.01 CHRONIC ANTICOAGULATION: ICD-10-CM

## 2021-03-17 DIAGNOSIS — F41.9 ANXIETY: ICD-10-CM

## 2021-03-17 DIAGNOSIS — E78.2 MIXED HYPERLIPIDEMIA: ICD-10-CM

## 2021-03-17 DIAGNOSIS — I10 ESSENTIAL HYPERTENSION: Chronic | ICD-10-CM

## 2021-03-17 DIAGNOSIS — I27.20 PULMONARY HTN (HCC): Chronic | ICD-10-CM

## 2021-03-17 DIAGNOSIS — Z95.2 MITRAL VALVE REPLACED: ICD-10-CM

## 2021-03-17 DIAGNOSIS — F32.A DEPRESSION, UNSPECIFIED DEPRESSION TYPE: ICD-10-CM

## 2021-03-17 PROCEDURE — 99495 TRANSJ CARE MGMT MOD F2F 14D: CPT | Performed by: INTERNAL MEDICINE

## 2021-03-17 PROCEDURE — 99212 OFFICE O/P EST SF 10 MIN: CPT | Performed by: INTERNAL MEDICINE

## 2021-03-17 NOTE — PROGRESS NOTES
dyspnea but states that she really is not that active. She has had no orthopnea or PND and no lower extremity edema (although her left foot swells from time to time but this chronic over many years). Patient states that she noted a decrease in urine output and then cut back on her lasix, she wants to take 1 1/2 tabs every other day instead of every day and alternate with one tab a day. She has also been taking vasotec 10 mg in am and 5 mg in pm and would prefer to stay at that dose. She has not had any dizziness or lightheadedness and states that she has only had a transient episode of low back pain a few days ago (this also prompted her to cut back on her lasix). She has not had any LUTS, fever or chills. Review of Systems:  A comprehensive review of systems was negative. Physical Exam:  Patient is awake and alert and in no distress. Her lungs are clear bilaterally. She has no lower extremity edema. Has multiple varicose veins which are chronic. Heart rhythm is irregular and grade 3/6 CYNDY (with radiation into carotids) is unchanged. Initial post-discharge communication occurred between nurse care coordinator and patient on 3/10/21- see documentation in chart: telephone encounter.     Assessment/Plan:  Urban Denniston was seen today for congestive heart failure and follow-up from hospital.    Diagnoses and all orders for this visit:    Hospital follow up for acute exacerbation of Chronic systolic congestive heart failure ([address] continue current rx    Valvular heart disease, severe aortic stenosis, has referral to see cardiology next Thursday    Chronic atrial fibrillation, Chronic anticoagulation, on coumadin     S/P CABG (coronary artery bypass graft), stable    Pulmonary HTN (HCC)    Mixed hyperlipidemia, continue simvastatin    Mitral valve replaced    Essential hypertension, continue metoprolol and vasotec    Depression, unspecified depression type    Anxiety    Chronic GERD, continue PPI Diagnostic test results reviewed: echocardiogram    Patient risk of morbidity and mortality: moderate    Medical Decision Making: moderate complexity

## 2021-03-25 ENCOUNTER — OFFICE VISIT (OUTPATIENT)
Dept: CARDIOLOGY CLINIC | Age: 76
End: 2021-03-25
Payer: MEDICARE

## 2021-03-25 VITALS
HEART RATE: 114 BPM | SYSTOLIC BLOOD PRESSURE: 130 MMHG | BODY MASS INDEX: 41.15 KG/M2 | RESPIRATION RATE: 18 BRPM | WEIGHT: 241 LBS | DIASTOLIC BLOOD PRESSURE: 84 MMHG | HEIGHT: 64 IN

## 2021-03-25 DIAGNOSIS — I48.20 CHRONIC ATRIAL FIBRILLATION (HCC): ICD-10-CM

## 2021-03-25 DIAGNOSIS — I50.22 CHRONIC SYSTOLIC CONGESTIVE HEART FAILURE (HCC): Primary | Chronic | ICD-10-CM

## 2021-03-25 DIAGNOSIS — I35.0 AORTIC VALVE STENOSIS, ETIOLOGY OF CARDIAC VALVE DISEASE UNSPECIFIED: ICD-10-CM

## 2021-03-25 PROCEDURE — 93000 ELECTROCARDIOGRAM COMPLETE: CPT | Performed by: INTERNAL MEDICINE

## 2021-03-25 PROCEDURE — 99204 OFFICE O/P NEW MOD 45 MIN: CPT | Performed by: INTERNAL MEDICINE

## 2021-03-25 RX ORDER — METOPROLOL TARTRATE 50 MG/1
TABLET, FILM COATED ORAL
Qty: 60 TABLET | Refills: 3 | Status: SHIPPED
Start: 2021-03-25 | End: 2021-08-30

## 2021-03-25 NOTE — PROGRESS NOTES
OUTPATIENT CARDIOLOGY FOLLOW-UP    Name: Mauro Keith    Age: 77 y.o. Primary Care Physician: Deepthi Sharp DO    Date of Service: 3/25/2021    Chief Complaint:   Chief Complaint   Patient presents with    Shortness of Breath    Swelling    Follow-Up from Hospital        Interim History:   59-year-old female who has been lost to cardiac follow-up, formally known to Dr. Julio C Davis, last seen by Dr. Althea Encarnacion in 2015 inpatient, who has history of bioprosthetic mitral valve replacement  along with possible CABG, patient not sure in details not available, and atrial fibrillation. She was recently admitted to the internal medicine service for congestive heart failure, and an echocardiogram demonstrated evidence of prosthetic mitral valve stenosis and severe native aortic valve stenosis. She presents today for follow-up and to reestablish cardiac care. Continues to have shortness of breath with exertion, seems to minimize symptoms and attributed partially to her mask. Reports decreased exercise capacity. Denies chest pain or lower extremity edema. Review of Systems:   Negative except as described above    Past Medical History:  Past Medical History:   Diagnosis Date    Atrial fibrillation ([address] 2010    CAD (coronary artery disease) 2010    NO CARDIOLOGIST, F/U W/ pcp STABLE    CHF (congestive heart failure) ([address] 2010    Diverticular disease 2010    Hyperlipidemia     Hypertension     Restless leg syndrome 2010    Valvular heart disease 2010       Past Surgical History:  Past Surgical History:   Procedure Laterality Date    CARDIAC CATHETERIZATION      CARDIAC VALVE REPLACEMENT      CATARACT REMOVAL WITH IMPLANT Right 2016    trabulectomy    CORONARY ARTERY BYPASS GRAFT      TONSILLECTOMY      TUBAL LIGATION         Family History:  History reviewed. No pertinent family history.    Parents     Social History:  Social History Tobacco Use    Smoking status: Never Smoker    Smokeless tobacco: Never Used   Substance Use Topics    Alcohol use: No    Drug use: No        Allergies: Allergies   Allergen Reactions    Lactose Intolerance (Gi) Diarrhea and Nausea And Vomiting    Phenobarbital Rash       Current Medications:    Current Outpatient Medications:     metoprolol tartrate (LOPRESSOR) 50 MG tablet, One tab twice daily, Disp: 60 tablet, Rfl: 3    warfarin (COUMADIN) 3 MG tablet, Take one tablet daily, Disp: 30 tablet, Rfl: 6    warfarin (COUMADIN) 1 MG tablet, Take 1 mg every wed with coumadin 3 mg to equal 4 mg wed only, Disp: 30 tablet, Rfl: 0    enalapril (VASOTEC) 10 MG tablet, TAKE ONE TABLET BY MOUTH TWO TIMES A DAY (Patient taking differently: Takes 1 in AM and 1/2 tab in PM), Disp: 180 tablet, Rfl: 3    furosemide (LASIX) 20 MG tablet, TAKE 1 & 1/2 TABLETS BY MOUTH DAILY (Patient taking differently: 20 mg daily Take 20mg daily and 30mg every other day), Disp: 135 tablet, Rfl: 3    potassium chloride (KLOR-CON M) 10 MEQ extended release tablet, Take 1 and 1/2 tablet daily, Disp: 135 tablet, Rfl: 3    simvastatin (ZOCOR) 20 MG tablet, Take on tablet daily, Disp: 90 tablet, Rfl: 3    pantoprazole (PROTONIX) 40 MG tablet, Take 1 tablet by mouth daily, Disp: 90 tablet, Rfl: 3    Physical Exam:  /84   Pulse 114   Resp 18   Ht 5' 4\" (1.626 m)   Wt 241 lb (109.3 kg)   BMI 41.37 kg/m²   Wt Readings from Last 3 Encounters:   03/25/21 241 lb (109.3 kg)   03/17/21 241 lb (109.3 kg)   03/09/21 246 lb (111.6 kg)     Appearance: Awake, alert and oriented x 3, no acute respiratory distress  Skin: Intact, no rash  Head: Normocephalic, atraumatic  Eyes: EOMI, no conjunctival erythema  ENMT: No pharyngeal erythema, MMM, no rhinorrhea  Neck: Supple, no elevated JVP, no carotid bruits  Lungs: Clear to auscultation bilaterally. No wheezes, rales, or rhonchi.   Cardiac: Irregular rhythm with a borderline tachycardic rate, found for: CHOLHDLRATIO  No results for input(s): PROBNP in the last 72 hours. Cardiac Tests:  ECG: Atrial fibrillation 114 bpm.  Normal axis. Right bundle branch block, QRS duration 152 ms. Echocardiogram:   Transthoracic echo 3/2021   Summary   Normal left ventricular size and systolic function. Ejection fraction is visually estimated at 55-60%. Indeterminate diastolic function. No regional wall motion abnormalities seen. Mild left ventricular concentric hypertrophy noted. Abnormal LV septal motion consistent with post-operative state. Normal right ventricular size and function. Massive biatrial dilation. Bioprosthetic valve in the mitral position, #27 Medtronic Mosaic implanted   2007. Doppler parameters suggestive of significant prosthetic stenosis. Mean gradient 10 mmHg at HR 85 bpm.   Peak E velocity 2.2 m/s.  ms. EOA 1.0 cm2. VTI ratio 3.3   Severe aortic stenosis is present. Peak velocity 4 m/s. Mean gradient 40 mHg. MEÑO 0.7 cm2.   DI 0. 19. Moderate tricuspid regurgitation. Stress test:      Cardiac catheterization:     Orders Placed This Encounter   Procedures    EKG 12 lead        Requested Prescriptions     Signed Prescriptions Disp Refills    metoprolol tartrate (LOPRESSOR) 50 MG tablet 60 tablet 3     Sig: One tab twice daily        ASSESSMENT / PLAN:  1. Chronic HFpEF, fairly well compensated  2. MR/MS s/p #27 Medtronic Mosaic bioprosthetic MV 2007, currently with significant bioprosthetic mitral stenosis mean gradient 10 mmHg  3. Severe native valve aortic stenosis  4. Permanent atrial fibrillation s/p modified maze, AC with warfarin. 5. ?  CABG, patient not clear, op note not available  6. Hypertension  7. Hyperlipidemia  8. Depression/anxiety  9. GERD  10.  Obesity, BMI 41.4 kg/m²    Recommendations:  Explained in detail to patient and family member, that she is to severe underlying valve conditions that will lead to recurrent congestive heart failure if uncorrected. · Increase metoprolol tartrate to 50 mg bid, as lowering heart rates will help decrease gradient across the mitral valve  · Referred to valve clinic for evaluation of TAVR  · Continue current dose of furosemide  · Continue warfarin, followed by Dr. Diane Fuchs  · Notify me if worsening symptoms, weight gain  · Aggressive risk factor modification  · Follow-up with me in 2 months    The patient's current medication list, allergies, problem list and results of all previously ordered testing were reviewed at today's visit.     Abbey Cleaning MD, [facility] Cardiology

## 2021-04-15 ENCOUNTER — OFFICE VISIT (OUTPATIENT)
Dept: CARDIOTHORACIC SURGERY | Age: 76
End: 2021-04-15
Payer: MEDICARE

## 2021-04-15 ENCOUNTER — OFFICE VISIT (OUTPATIENT)
Dept: CARDIOLOGY CLINIC | Age: 76
End: 2021-04-15
Payer: MEDICARE

## 2021-04-15 VITALS
RESPIRATION RATE: 24 BRPM | WEIGHT: 240 LBS | SYSTOLIC BLOOD PRESSURE: 184 MMHG | HEIGHT: 64 IN | HEART RATE: 69 BPM | DIASTOLIC BLOOD PRESSURE: 93 MMHG | BODY MASS INDEX: 40.97 KG/M2

## 2021-04-15 DIAGNOSIS — I35.0 SEVERE AORTIC STENOSIS: Primary | ICD-10-CM

## 2021-04-15 DIAGNOSIS — T82.857A STENOSIS OF PROSTHETIC MITRAL VALVE, INITIAL ENCOUNTER: ICD-10-CM

## 2021-04-15 PROCEDURE — 99215 OFFICE O/P EST HI 40 MIN: CPT | Performed by: INTERNAL MEDICINE

## 2021-04-15 PROCEDURE — 99204 OFFICE O/P NEW MOD 45 MIN: CPT | Performed by: THORACIC SURGERY (CARDIOTHORACIC VASCULAR SURGERY)

## 2021-04-15 NOTE — PROGRESS NOTES
[facility] Structural Heart Disease [facility]        Patient name: Jose Salazar    Reason for consult: AS/prosthetic MS    Referring Physician: Dr. Modesta Green     Primary Care Physician: Karsten Treviño DO    Date of service: 4/15/2021    Chief Complaint: DEL CID    HPI:   This is a pleasant 67-year-old  female who presents accompanied by her Sister Anders Mesa. Over the past year she has had progressive dyspnea on exertion. She cannot climb the stairs because of shortness of breath. She cannot do grocery shopping because she is short of breath. Last month she was admitted because of weakness and was diagnosed with congestive heart failure with chest x-ray showing bilateral pleural effusions and marked pulmonary venous hypertension. She underwent a TTE which was interpreted as below. She denies syncope and presyncope. She denies palpitations and chest pain. She is very compliant with her medications. A focused history review includes:  1. Open heart surgery in 2007: MVR with #25 Mosaic with modified Maze and BRINA excision. ID [id number], serial number [id number]  2. Severe aortic stenosis  3. BMI 41  4. Permanent atrial fibrillation on warfarin  5. Hypertension  6. CAD  7. CHF  8. Hyperlipidemia      Allergies:    Allergies   Allergen Reactions    Lactose Intolerance (Gi) Diarrhea and Nausea And Vomiting    Phenobarbital Rash       Home medications:    Current Outpatient Medications   Medication Sig Dispense Refill    metoprolol tartrate (LOPRESSOR) 50 MG tablet One tab twice daily 60 tablet 3    warfarin (COUMADIN) 3 MG tablet Take one tablet daily 30 tablet 6    warfarin (COUMADIN) 1 MG tablet Take 1 mg every wed with coumadin 3 mg to equal 4 mg wed only 30 tablet 0    enalapril (VASOTEC) 10 MG tablet TAKE ONE TABLET BY MOUTH TWO TIMES A DAY (Patient taking differently: Takes 1 in AM and 1/2 tab in PM) 180 tablet 3    furosemide (LASIX) 20 MG tablet TAKE 1 & 1/2 TABLETS BY MOUTH DAILY (Patient taking differently: 20 mg daily Take 20mg daily and 30mg every other day) 135 tablet 3    potassium chloride (KLOR-CON M) 10 MEQ extended release tablet Take 1 and 1/2 tablet daily (Patient taking differently: Indications: pt. takes 1 1/2 every other day Take 1 and 1/2 tablet daily) 135 tablet 3    simvastatin (ZOCOR) 20 MG tablet Take on tablet daily 90 tablet 3    pantoprazole (PROTONIX) 40 MG tablet Take 1 tablet by mouth daily 90 tablet 3     No current facility-administered medications for this visit. Past Medical History:  Past Medical History:   Diagnosis Date    Atrial fibrillation ([facility] 11/22/2010    CAD (coronary artery disease) 11/22/2010    NO CARDIOLOGIST, F/U W/ pcp STABLE    CHF (congestive heart failure) ([facility] 11/22/2010    Diverticular disease 11/22/2010    Hyperlipidemia     Hypertension     Restless leg syndrome 11/22/2010    Valvular heart disease 11/22/2010       Past Surgical History:  Past Surgical History:   Procedure Laterality Date    CARDIAC CATHETERIZATION  2007    CARDIAC VALVE REPLACEMENT      CATARACT REMOVAL WITH IMPLANT Right 08/24/2016    trabulectomy    CORONARY ARTERY BYPASS GRAFT      TONSILLECTOMY      TUBAL LIGATION         Social History:  Social History     Socioeconomic History    Marital status:       Spouse name: Not on file    Number of children: Not on file    Years of education: Not on file    Highest education level: Not on file   Occupational History    Not on file   Social Needs    Financial resource strain: Not very hard    Food insecurity     Worry: Never true     Inability: Never true    Transportation needs     Medical: Yes     Non-medical: No   Tobacco Use    Smoking status: Never Smoker    Smokeless tobacco: Never Used   Substance and Sexual Activity    Alcohol use: No    Drug use: No    Sexual activity: Never   Lifestyle    Physical activity     Days per week: Not on file     Minutes per session: Not on file clearly indicates an element of patient prosthesis mismatch at baseline. Indeed when we performed a KORTNEY will be able to evaluate mitral leaflet mobility    Plan:   · KORTNEY, TAVR protocol CTAs, left and right heart cath  · Will be discussed at the heart team meeting perfect for redo sternotomy versus TAVR +/- valve in valve TMVR. Seen and discussed in association with Dr. Eri Caraballo MD of cardiothoracic surgery.     Claude Gillespie MD, [facility]  Interventional Cardiology/Structural Heart Disease    Electronically signed by Claude Gillespie MD on 4/15/2021 at 1:54 PM

## 2021-04-15 NOTE — PATIENT INSTRUCTIONS
Abdulaziz Gonzalez from [facility] will be in touch to schedule your heart catheterization and transesophageal echocardiogram. Author Macdonald will be in touch to schedule your CT scans. We will do our best to schedule all tests the same day.

## 2021-04-15 NOTE — PROGRESS NOTES
[facility]  Visit Note      Patient name: Romi Tian    Reason for consult: MV stenosis, severe AS        Primary Care Physician: Ct Cox DO    Date of service: 4/15/2021    Chief Complaint: SOB     HPI: 52-year-old female who prior to recently  had been lost to cardiac follow-up, last seen by Dr. Stacia Saravia in 2015 inpatient, who has history of bioprosthetic mitral valve replacement (25 mm Medtronic Mosaic) in 2007 along with possible CABG by Dr Simona Bhardwaj here at [facility]. She was recently admittedfor congestive heart failure, and an echocardiogram during admission  demonstrated evidence of prosthetic mitral valve stenosis and severe native aortic valve stenosis. She continues to have shortness of breath with exertion,   Reports decreased exercise capacity. Denies chest pain or lower extremity edema, syncope, lightheadedness or orthopnea     Allergies:    Allergies   Allergen Reactions    Lactose Intolerance (Gi) Diarrhea and Nausea And Vomiting    Phenobarbital Rash       Home medications:    Current Outpatient Medications   Medication Sig Dispense Refill    metoprolol tartrate (LOPRESSOR) 50 MG tablet One tab twice daily 60 tablet 3    warfarin (COUMADIN) 3 MG tablet Take one tablet daily 30 tablet 6    warfarin (COUMADIN) 1 MG tablet Take 1 mg every wed with coumadin 3 mg to equal 4 mg wed only 30 tablet 0    enalapril (VASOTEC) 10 MG tablet TAKE ONE TABLET BY MOUTH TWO TIMES A DAY (Patient taking differently: Takes 1 in AM and 1/2 tab in PM) 180 tablet 3    furosemide (LASIX) 20 MG tablet TAKE 1 & 1/2 TABLETS BY MOUTH DAILY (Patient taking differently: 20 mg daily Take 20mg daily and 30mg every other day) 135 tablet 3    potassium chloride (KLOR-CON M) 10 MEQ extended release tablet Take 1 and 1/2 tablet daily (Patient taking differently: Indications: pt. takes 1 1/2 every other day Take 1 and 1/2 tablet daily) 135 tablet 3    simvastatin (ZOCOR) 20 MG tablet Take on tablet daily 90 tablet 3    pantoprazole (PROTONIX) 40 MG tablet Take 1 tablet by mouth daily 90 tablet 3     No current facility-administered medications for this visit. Past Medical History:  Past Medical History:   Diagnosis Date    Atrial fibrillation ([facility] 11/22/2010    CAD (coronary artery disease) 11/22/2010    NO CARDIOLOGIST, F/U W/ pcp STABLE    CHF (congestive heart failure) ([facility] 11/22/2010    Diverticular disease 11/22/2010    Hyperlipidemia     Hypertension     Restless leg syndrome 11/22/2010    Valvular heart disease 11/22/2010       Past Surgical History:  Past Surgical History:   Procedure Laterality Date    CARDIAC CATHETERIZATION  2007    CARDIAC VALVE REPLACEMENT      CATARACT REMOVAL WITH IMPLANT Right 08/24/2016    trabulectomy    CORONARY ARTERY BYPASS GRAFT      TONSILLECTOMY      TUBAL LIGATION         Social History:  Social History     Socioeconomic History    Marital status:       Spouse name: Not on file    Number of children: Not on file    Years of education: Not on file    Highest education level: Not on file   Occupational History    Not on file   Social Needs    Financial resource strain: Not very hard    Food insecurity     Worry: Never true     Inability: Never true    Transportation needs     Medical: Yes     Non-medical: No   Tobacco Use    Smoking status: Never Smoker    Smokeless tobacco: Never Used   Substance and Sexual Activity    Alcohol use: No    Drug use: No    Sexual activity: Never   Lifestyle    Physical activity     Days per week: Not on file     Minutes per session: Not on file    Stress: Not on file   Relationships    Social connections     Talks on phone: Not on file     Gets together: Not on file     Attends [employer] service: Not on file     Active member of club or organization: Not on file     Attends meetings of clubs or organizations: Not on file     Relationship status: Not on file    Intimate partner violence     Fear of current or ex partner: Not on file     Emotionally abused: Not on file     Physically abused: Not on file     Forced sexual activity: Not on file   Other Topics Concern    Not on file   Social History Narrative    Not on file       Family History:  No family history on file. Review of Systems:  Constitutional: Denies fevers, chills, or weight loss. HEENT: Denies visual changes or hearing loss. Heart: As per HPI. Lungs: Denies shortness of breath, cough, or wheezing. Gastrointestinal: Denies nausea, vomiting, constipation, or diarrhea. Genitourinary: dysuria or hematuria. Psychiatric: Patient denies anxiety or depression. Neurologic: Patient denies weakness of the extremities, dizziness, or headaches. All other ROS checked and found to be negative. Objective:  Vitals There were no vitals taken for this visit. General Appearance: Pleasant 77y.o. year old female who appears stated age. Communicates well, no acute distress. HEENT: Head is normocephalic, atraumatic. EOMs intact, PERRL. Trachea midline. Lungs: Normal respiratory rate and normal effort. She is not in respiratory distress. Breath sounds clear to auscultation. No wheezes. Heart: Normal rate. Regular rhythm. S1 normal and S2 normal. Positive for murmur. Chest: Symmetric chest wall expansion. Extremities: Normal range of motion. Neurological: Patient is alert and oriented to person, place and time. Patient has normal reflexes. Skin: Warm and dry. Abdomen: Abdomen is soft and non-distended. Bowel sounds are normal. There is no abdominal tenderness tenderness. There is no guarding. There is no mass. Pulses: Distal pulses are intact. Skin: Warm and dry without lesions. Findings      Left Ventricle   Normal left ventricular size and systolic function. Ejection fraction is visually estimated at 55-60%. Indeterminate diastolic function. No regional wall motion abnormalities seen. suggestive of significant prosthetic stenosis. Severe aortic stenosis is present. Mean gradient 40 mHg, aortic valve area 0.7 cm2. Moderate tricuspid regurgitation. Assessment:   76 yo female s/p MVR CABG now with severe MS and AS       Plan:    Given her redo status, advanced age, obesity, and limited mobility, she does appear to be at elevated risk for redo sternotomy and AVR/MVR. I would favor TAVR if her anatomy is amenable followed by re-evaluation of her symptoms and possible TMVR.

## 2021-04-20 DIAGNOSIS — E78.2 MIXED HYPERLIPIDEMIA: ICD-10-CM

## 2021-04-20 RX ORDER — SIMVASTATIN 20 MG
TABLET ORAL
Qty: 90 TABLET | Refills: 0 | Status: SHIPPED
Start: 2021-04-20 | End: 2021-08-09

## 2021-04-20 NOTE — TELEPHONE ENCOUNTER
Last Appointment:  3/17/2021  Future Appointments   Date Time Provider Linh Darlene   4/28/2021  1:30 PM Ralph Bowie DO [facility]   5/25/2021  1:30 PM Casey Arango MD [address] [facility]   6/23/2021  1:00 PM Ralph Bowie DO [facility]

## 2021-04-21 DIAGNOSIS — Z01.810 PREOPERATIVE CARDIOVASCULAR EXAMINATION: Primary | ICD-10-CM

## 2021-04-21 DIAGNOSIS — I35.0 SEVERE AORTIC STENOSIS: ICD-10-CM

## 2021-04-28 ENCOUNTER — OFFICE VISIT (OUTPATIENT)
Dept: INTERNAL MEDICINE | Age: 76
End: 2021-04-28
Payer: MEDICARE

## 2021-04-28 VITALS
SYSTOLIC BLOOD PRESSURE: 124 MMHG | HEIGHT: 64 IN | WEIGHT: 242 LBS | DIASTOLIC BLOOD PRESSURE: 76 MMHG | OXYGEN SATURATION: 99 % | TEMPERATURE: 97.9 F | HEART RATE: 77 BPM | BODY MASS INDEX: 41.32 KG/M2

## 2021-04-28 DIAGNOSIS — I50.32 CHRONIC HEART FAILURE WITH PRESERVED EJECTION FRACTION (HCC): ICD-10-CM

## 2021-04-28 DIAGNOSIS — E78.2 MIXED HYPERLIPIDEMIA: ICD-10-CM

## 2021-04-28 DIAGNOSIS — T82.857S STENOSIS OF PROSTHETIC MITRAL VALVE, SEQUELA: ICD-10-CM

## 2021-04-28 DIAGNOSIS — I48.20 CHRONIC ATRIAL FIBRILLATION (HCC): Chronic | ICD-10-CM

## 2021-04-28 DIAGNOSIS — K21.9 GASTROESOPHAGEAL REFLUX DISEASE WITHOUT ESOPHAGITIS: ICD-10-CM

## 2021-04-28 DIAGNOSIS — Z95.2 HISTORY OF PROSTHETIC MITRAL VALVE: ICD-10-CM

## 2021-04-28 DIAGNOSIS — Z95.1 S/P CABG (CORONARY ARTERY BYPASS GRAFT): Primary | ICD-10-CM

## 2021-04-28 DIAGNOSIS — I10 ESSENTIAL HYPERTENSION: Chronic | ICD-10-CM

## 2021-04-28 DIAGNOSIS — Z79.01 CHRONIC ANTICOAGULATION: ICD-10-CM

## 2021-04-28 DIAGNOSIS — I50.22 CHRONIC SYSTOLIC CONGESTIVE HEART FAILURE (HCC): Chronic | ICD-10-CM

## 2021-04-28 LAB
INTERNATIONAL NORMALIZATION RATIO, POC: 2.7
PROTHROMBIN TIME, POC: 31.8

## 2021-04-28 PROCEDURE — 85610 PROTHROMBIN TIME: CPT | Performed by: INTERNAL MEDICINE

## 2021-04-28 PROCEDURE — 99212 OFFICE O/P EST SF 10 MIN: CPT | Performed by: INTERNAL MEDICINE

## 2021-04-28 PROCEDURE — 99214 OFFICE O/P EST MOD 30 MIN: CPT | Performed by: INTERNAL MEDICINE

## 2021-04-28 RX ORDER — BRIMONIDINE TARTRATE 2 MG/ML
SOLUTION/ DROPS OPHTHALMIC
COMMUNITY
Start: 2021-04-15

## 2021-04-28 RX ORDER — FUROSEMIDE 20 MG/1
TABLET ORAL
Qty: 135 TABLET | Refills: 3 | Status: SHIPPED
Start: 2021-04-28 | End: 2021-09-14

## 2021-04-28 RX ORDER — PANTOPRAZOLE SODIUM 40 MG/1
40 TABLET, DELAYED RELEASE ORAL DAILY
Qty: 90 TABLET | Refills: 3 | Status: SHIPPED
Start: 2021-04-28 | End: 2022-03-16 | Stop reason: SDUPTHER

## 2021-04-28 RX ORDER — LATANOPROST 50 UG/ML
SOLUTION/ DROPS OPHTHALMIC
COMMUNITY
Start: 2021-04-15

## 2021-04-28 RX ORDER — POTASSIUM CHLORIDE 750 MG/1
TABLET, EXTENDED RELEASE ORAL
Qty: 135 TABLET | Refills: 3 | Status: SHIPPED
Start: 2021-04-28 | End: 2021-09-14

## 2021-04-28 ASSESSMENT — PATIENT HEALTH QUESTIONNAIRE - PHQ9
SUM OF ALL RESPONSES TO PHQ QUESTIONS 1-9: 0
SUM OF ALL RESPONSES TO PHQ QUESTIONS 1-9: 0
2. FEELING DOWN, DEPRESSED OR HOPELESS: 0
SUM OF ALL RESPONSES TO PHQ9 QUESTIONS 1 & 2: 0
SUM OF ALL RESPONSES TO PHQ QUESTIONS 1-9: 0

## 2021-04-28 ASSESSMENT — ENCOUNTER SYMPTOMS
CHEST TIGHTNESS: 0
ABDOMINAL DISTENTION: 0
SHORTNESS OF BREATH: 1
ABDOMINAL PAIN: 0
WHEEZING: 0

## 2021-04-28 NOTE — PROGRESS NOTES
HPI:  Patient comes in today for routine follow up for chronic medical problems, HTN, hyperlipidemia, chronic atrial fibrillation, chronic anticoagulation on coumadin, and GERD. She is in process of being evaluated by cardiology and CT surgery for possible TAVR and TMVR. She has been doing Ok, states her feet are a little more swollen today because of the heat and she has been on her feet a bit more. I reviewed cardiology notes (David Disla and Chata). Reviewed echo report. Patient states she was instructed to hold coumadin starting next Tuesday, plan for KORTNEY and cardiac cath next Friday. POCT INR 2.7. She is also c/o a dry skin rash just below left knee. It does not itch and she has been putting moisturizing cream on it. She has noticed it for a few weeks. She did state that when she sleeps she presses her right leg against her left leg on that area. Review of Systems  Review of Systems   Constitutional: Negative for chills and fever. Respiratory: Positive for shortness of breath (with moderate activity). Negative for chest tightness and wheezing. Cardiovascular: Positive for leg swelling (intermittent usually worse on days when it's hot or she is on her feet alot or when she only takes one tab furosemide). Negative for chest pain and palpitations. Gastrointestinal: Negative for abdominal distention and abdominal pain. Genitourinary: Negative for difficulty urinating. PE:  VS:  /76   Pulse 77   Temp 97.9 °F (36.6 °C) (Oral)   Ht 5' 4\" (1.626 m)   Wt 242 lb (109.8 kg)   SpO2 99% Comment: at rest on room air  BMI 41.54 kg/m²   Physical Exam  Cardiovascular:      Rate and Rhythm: Normal rate. Rhythm irregular. Heart sounds: Murmur (CYNDY at LSB grade 2-3/6 ) present. No friction rub. No gallop. Pulmonary:      Effort: Pulmonary effort is normal. No respiratory distress. Breath sounds: Normal breath sounds. No wheezing or rales.    Musculoskeletal: General: Swelling (patient has some edema of both feet and distal lower extremities ) present. Skin:     Comments: Patient has very minimal area of non-discript erythema on cristina-medial aspect left lower extremity just below knee. There is no warmth, tenderness, nodule, etc.            Assessment/Plan:  Elias Alexander was seen today for follow-up and medication refill. Diagnoses and all orders for this visit:        History of prosthetic porcine mitral valve, Stenosis of prosthetic mitral valve, sequela and Severe aortic stenosis, patient is currently being evaluated by cardiology/CT surgery and is scheduled to have KORTNEY and cath end of next week. HFpEF, with recent exacerbation, now compensated, continue current med rx. S/P CABG (coronary artery bypass graft)    Mixed hyperlipidemia, continue simvastatin    Chronic anticoagulation, coumadin, therapeutic INR    Essential hypertension, controlled, continue current med rx    Chronic atrial fibrillation ([address] rate controlled, continue current med rx  -     POCT INR      Gastroesophageal reflux disease without esophagitis, controlled, continue protonix      Patient instructed to use topical hydrocortisone 1% OTC bid for dry skin area for next 1-2 weeks. [facility]

## 2021-05-01 NOTE — PATIENT INSTRUCTIONS
Repeat INR in 1 month if not hospitlazied. If admitted to hospital, please schedule INR as directed in hospital or within 1 week of discharge in not otherwise instructed. Follow up in 2 months in office. Please call with any questions or concerns.    Gustavo Mendieta RN Patient

## 2021-05-03 ENCOUNTER — HOSPITAL ENCOUNTER (OUTPATIENT)
Age: 76
Discharge: HOME | End: 2021-05-03
Payer: MEDICARE

## 2021-05-03 DIAGNOSIS — I35.0 SEVERE AORTIC STENOSIS: ICD-10-CM

## 2021-05-03 DIAGNOSIS — Z01.810 PREOPERATIVE CARDIOVASCULAR EXAMINATION: ICD-10-CM

## 2021-05-03 DIAGNOSIS — I35.0 NODULAR CALCIFIC AORTIC VALVE STENOSIS: Primary | ICD-10-CM

## 2021-05-03 LAB
ALBUMIN SERPL-MCNC: 4 G/DL (ref 3.5–5.2)
ALP BLD-CCNC: 88 U/L (ref 35–104)
ALT SERPL-CCNC: 14 U/L (ref 0–32)
ANION GAP SERPL CALCULATED.3IONS-SCNC: 14 MMOL/L (ref 7–16)
AST SERPL-CCNC: 15 U/L (ref 0–31)
BILIRUB SERPL-MCNC: 0.6 MG/DL (ref 0–1.2)
BUN BLDV-MCNC: 10 MG/DL (ref 6–23)
CALCIUM SERPL-MCNC: 9.7 MG/DL (ref 8.6–10.2)
CHLORIDE BLD-SCNC: 103 MMOL/L (ref 98–107)
CO2: 28 MMOL/L (ref 22–29)
CREAT SERPL-MCNC: 0.8 MG/DL (ref 0.5–1)
GFR AFRICAN AMERICAN: >60
GFR NON-AFRICAN AMERICAN: >60 ML/MIN/1.73
GLUCOSE BLD-MCNC: 125 MG/DL (ref 74–99)
HCT VFR BLD CALC: 42.5 % (ref 34–48)
HEMOGLOBIN: 13.7 G/DL (ref 11.5–15.5)
INR BLD: 2.2
MCH RBC QN AUTO: 30.3 PG (ref 26–35)
MCHC RBC AUTO-ENTMCNC: 32.2 % (ref 32–34.5)
MCV RBC AUTO: 94 FL (ref 80–99.9)
PDW BLD-RTO: 12.9 FL (ref 11.5–15)
PLATELET # BLD: 285 E9/L (ref 130–450)
PMV BLD AUTO: 10.4 FL (ref 7–12)
POTASSIUM SERPL-SCNC: 4.6 MMOL/L (ref 3.5–5)
PROTHROMBIN TIME: 24.4 SEC (ref 9.3–12.4)
RBC # BLD: 4.52 E12/L (ref 3.5–5.5)
SODIUM BLD-SCNC: 145 MMOL/L (ref 132–146)
TOTAL PROTEIN: 6.9 G/DL (ref 6.4–8.3)
WBC # BLD: 6 E9/L (ref 4.5–11.5)

## 2021-05-03 PROCEDURE — 85027 COMPLETE CBC AUTOMATED: CPT

## 2021-05-03 PROCEDURE — 80053 COMPREHEN METABOLIC PANEL: CPT

## 2021-05-03 PROCEDURE — 36415 COLL VENOUS BLD VENIPUNCTURE: CPT

## 2021-05-03 PROCEDURE — 85610 PROTHROMBIN TIME: CPT

## 2021-05-06 ENCOUNTER — TELEPHONE (OUTPATIENT)
Dept: NON INVASIVE DIAGNOSTICS | Age: 76
End: 2021-05-06

## 2021-05-07 ENCOUNTER — HOSPITAL ENCOUNTER (OUTPATIENT)
Dept: CARDIAC CATH/INVASIVE PROCEDURES | Age: 76
Discharge: HOME | End: 2021-05-07
Payer: MEDICARE

## 2021-05-07 ENCOUNTER — HOSPITAL ENCOUNTER (OUTPATIENT)
Dept: CT IMAGING | Age: 76
Discharge: HOME | End: 2021-05-09
Payer: MEDICARE

## 2021-05-07 ENCOUNTER — ANESTHESIA EVENT (OUTPATIENT)
Dept: CARDIAC CATH/INVASIVE PROCEDURES | Age: 76
End: 2021-05-07

## 2021-05-07 ENCOUNTER — ANESTHESIA (OUTPATIENT)
Dept: CARDIAC CATH/INVASIVE PROCEDURES | Age: 76
End: 2021-05-07

## 2021-05-07 ENCOUNTER — APPOINTMENT (OUTPATIENT)
Dept: CT IMAGING | Age: 76
End: 2021-05-07
Payer: MEDICARE

## 2021-05-07 VITALS
HEIGHT: 64 IN | SYSTOLIC BLOOD PRESSURE: 155 MMHG | HEART RATE: 96 BPM | RESPIRATION RATE: 18 BRPM | DIASTOLIC BLOOD PRESSURE: 94 MMHG | WEIGHT: 242 LBS | BODY MASS INDEX: 41.32 KG/M2 | OXYGEN SATURATION: 97 % | TEMPERATURE: 97 F

## 2021-05-07 VITALS
RESPIRATION RATE: 22 BRPM | DIASTOLIC BLOOD PRESSURE: 72 MMHG | OXYGEN SATURATION: 100 % | SYSTOLIC BLOOD PRESSURE: 120 MMHG

## 2021-05-07 DIAGNOSIS — I35.0 NODULAR CALCIFIC AORTIC VALVE STENOSIS: ICD-10-CM

## 2021-05-07 LAB
ABO/RH: NORMAL
ANTIBODY SCREEN: NORMAL
INR BLD: 1.5
LV EF: 50 %
LVEF MODALITY: NORMAL
PROTHROMBIN TIME: 16.8 SEC (ref 9.3–12.4)

## 2021-05-07 PROCEDURE — 3700000001 HC ADD 15 MINUTES (ANESTHESIA)

## 2021-05-07 PROCEDURE — 93456 R HRT CORONARY ARTERY ANGIO: CPT | Performed by: INTERNAL MEDICINE

## 2021-05-07 PROCEDURE — C1769 GUIDE WIRE: HCPCS

## 2021-05-07 PROCEDURE — 93456 R HRT CORONARY ARTERY ANGIO: CPT

## 2021-05-07 PROCEDURE — 86900 BLOOD TYPING SEROLOGIC ABO: CPT

## 2021-05-07 PROCEDURE — 6360000002 HC RX W HCPCS: Performed by: NURSE ANESTHETIST, CERTIFIED REGISTERED

## 2021-05-07 PROCEDURE — 85610 PROTHROMBIN TIME: CPT

## 2021-05-07 PROCEDURE — 93320 DOPPLER ECHO COMPLETE: CPT | Performed by: INTERNAL MEDICINE

## 2021-05-07 PROCEDURE — 93325 DOPPLER ECHO COLOR FLOW MAPG: CPT

## 2021-05-07 PROCEDURE — 6360000002 HC RX W HCPCS

## 2021-05-07 PROCEDURE — 2580000003 HC RX 258: Performed by: NURSE ANESTHETIST, CERTIFIED REGISTERED

## 2021-05-07 PROCEDURE — 36415 COLL VENOUS BLD VENIPUNCTURE: CPT

## 2021-05-07 PROCEDURE — 86850 RBC ANTIBODY SCREEN: CPT

## 2021-05-07 PROCEDURE — C1894 INTRO/SHEATH, NON-LASER: HCPCS

## 2021-05-07 PROCEDURE — 93312 ECHO TRANSESOPHAGEAL: CPT

## 2021-05-07 PROCEDURE — 93312 ECHO TRANSESOPHAGEAL: CPT | Performed by: INTERNAL MEDICINE

## 2021-05-07 PROCEDURE — C1760 CLOSURE DEV, VASC: HCPCS

## 2021-05-07 PROCEDURE — 93321 DOPPLER ECHO F-UP/LMTD STD: CPT

## 2021-05-07 PROCEDURE — 6360000004 HC RX CONTRAST MEDICATION: Performed by: RADIOLOGY

## 2021-05-07 PROCEDURE — 2709999900 HC NON-CHARGEABLE SUPPLY

## 2021-05-07 PROCEDURE — 93005 ELECTROCARDIOGRAM TRACING: CPT | Performed by: INTERNAL MEDICINE

## 2021-05-07 PROCEDURE — 74174 CTA ABD&PLVS W/CONTRAST: CPT

## 2021-05-07 PROCEDURE — C1751 CATH, INF, PER/CENT/MIDLINE: HCPCS

## 2021-05-07 PROCEDURE — 93325 DOPPLER ECHO COLOR FLOW MAPG: CPT | Performed by: INTERNAL MEDICINE

## 2021-05-07 PROCEDURE — 2580000003 HC RX 258: Performed by: INTERNAL MEDICINE

## 2021-05-07 PROCEDURE — 3700000000 HC ANESTHESIA ATTENDED CARE

## 2021-05-07 PROCEDURE — 93453 R&L HRT CATH W/VENTRICLGRPHY: CPT

## 2021-05-07 PROCEDURE — 86901 BLOOD TYPING SEROLOGIC RH(D): CPT

## 2021-05-07 PROCEDURE — 2500000003 HC RX 250 WO HCPCS

## 2021-05-07 PROCEDURE — 75572 CT HRT W/3D IMAGE: CPT

## 2021-05-07 PROCEDURE — 71275 CT ANGIOGRAPHY CHEST: CPT

## 2021-05-07 RX ORDER — SODIUM CHLORIDE 9 MG/ML
INJECTION, SOLUTION INTRAVENOUS ONCE
Status: COMPLETED | OUTPATIENT
Start: 2021-05-07 | End: 2021-05-07

## 2021-05-07 RX ORDER — PROPOFOL 10 MG/ML
INJECTION, EMULSION INTRAVENOUS PRN
Status: DISCONTINUED | OUTPATIENT
Start: 2021-05-07 | End: 2021-05-07 | Stop reason: SDUPTHER

## 2021-05-07 RX ORDER — SODIUM CHLORIDE 9 MG/ML
INJECTION, SOLUTION INTRAVENOUS CONTINUOUS PRN
Status: DISCONTINUED | OUTPATIENT
Start: 2021-05-07 | End: 2021-05-07 | Stop reason: SDUPTHER

## 2021-05-07 RX ADMIN — SODIUM CHLORIDE: 9 INJECTION, SOLUTION INTRAVENOUS at 10:00

## 2021-05-07 RX ADMIN — SODIUM CHLORIDE: 9 INJECTION, SOLUTION INTRAVENOUS at 10:59

## 2021-05-07 RX ADMIN — IOPAMIDOL 100 ML: 755 INJECTION, SOLUTION INTRAVENOUS at 07:33

## 2021-05-07 RX ADMIN — SODIUM CHLORIDE: 9 INJECTION, SOLUTION INTRAVENOUS at 10:01

## 2021-05-07 RX ADMIN — PROPOFOL 160 MG: 10 INJECTION, EMULSION INTRAVENOUS at 11:04

## 2021-05-07 NOTE — ANESTHESIA POSTPROCEDURE EVALUATION
Department of Anesthesiology  Postprocedure Note    Patient: Joseph Salas  MRN: [id number]  YOB: 1945  Date of evaluation: 5/7/2021  Time:  1:30 PM     Procedure Summary     Date: 05/07/21 Room / Location: [facility]; [facility]    Anesthesia Start: 1059 Anesthesia Stop: 0414    Procedure: SEY KORTNEY CARDIAC CATH W/ ANES Diagnosis: Nonrheumatic aortic (valve) stenosis    Scheduled Providers: ALBERT Mathews - UNIQUE; Roc Medellin MD Responsible Provider: Roc Medellin MD    Anesthesia Type: MAC ASA Status: 4          Anesthesia Type: MAC    Maribel Phase I:      Maribel Phase II:      Last vitals: Reviewed and per EMR flowsheets.        Anesthesia Post Evaluation    Patient location during evaluation: bedside  Patient participation: complete - patient participated  Level of consciousness: awake and alert  Airway patency: patent  Nausea & Vomiting: no nausea and no vomiting  Complications: no  Cardiovascular status: blood pressure returned to baseline and hemodynamically stable  Respiratory status: acceptable and spontaneous ventilation  Hydration status: euvolemic

## 2021-05-07 NOTE — PROCEDURES
John was performed by Dr Annalisa Grace with [employer] assisting
PRELIMINARY TRANSESOPHAGEAL ECHOCARDIOGRAPHY REPORT    Cardiologist: Dr. Estela Alvarez    Date of Procedure: 5/7/2021    Indications for study:  Mitral stenosis, aortic stenosis    Study performed using (Sedation): Per anesthesia    Complications: None    Findings:   Ejection fraction is visually estimated at > 50%. Dilated right ventricle with normal right ventricular function. Severely dilated left atrium and right atrium. Severe spontaneous echo contrast in the left atrium. Moderate tricuspid regurgitation. RV-RA gradient is estimated at 29 mmHg. Bioprosthetic mitral valve (#25 Medtronic Mosaic) with normal leaflet motion. Prosthesis-patient mismatch. No evidence of hemodynamically significant mitral regurgitation. Average MV mean gradient 8.2 mmHg (HR 85 bpm). Aortic valve with severe fibrocalcific changes. The aortic valve is trileaflet. No evidence of hemodynamically significant aortic regurgitation. Paradoxical low-flow low-gradient severe aortic stenosis.     Alessandra Parekh MD  [facility]) Cardiology
154/76 with a mean of 113    PA: 51/22 with a mean of 32  PCW: Mean of 22  CO/CI (Mis): 4.3/2.0      Hemostasis:  Angioseal was used for arterial hemostasis. and Manual pressure was used for Venous hemostasis. Complications: None  Estimated blood loss: 20 mL  Contrast used: 50 mL  Air kerma: 404 mGy    Conclusions:  1. Angiographically normal coronary arteries; left dominant circulation  2. 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 Normal cardiac output. Elevated left filling pressures. PLAN:  1. Proceed to TAVR  2.  If she remains symptomatic after TAVR then may have to consider redo MVR versus Mary Kay TMVR with bioprosthetic valve fracture    Mireya Iqbal MD, [facility]  Interventional Cardiology/Structural Heart Disease

## 2021-05-07 NOTE — ANESTHESIA PRE PROCEDURE
Department of Anesthesiology  Preprocedure Note       Name:  Kenya Lay   Age:  77 y.o.  :  1945                                          MRN:  [id number]         Date:  2021      Surgeon: KORTNEY  Procedure:     Medications prior to admission:   Prior to Admission medications    Medication Sig Start Date End Date Taking? Authorizing Provider   brimonidine (ALPHAGAN) 0.2 % ophthalmic solution INSTILL 1 DROP EVERY DAY INTO LEFT EYE AT 8 AM. 4/15/21  Yes Historical Provider, MD   latanoprost (XALATAN) 0.005 % ophthalmic solution INSTILL 1 DROP IN THE LEFT EYE EVERY DAY AT BEDTIME 4/15/21  Yes Historical Provider, MD   furosemide (LASIX) 20 MG tablet Take 20mg alternating with 30mg every other day (one tablet alternating with 1 and 1/2 tabs every other day). 21  Yes Francisco Dias DO   potassium chloride (KLOR-CON M) 10 MEQ extended release tablet Take 1 tab alternating with 1 and 1/2 tablet every other day (10 cecy alternating with 15 cecy every other day) 21  Yes Chris Dias DO   pantoprazole (PROTONIX) 40 MG tablet Take 1 tablet by mouth daily 21  Yes Francisco Dias DO   simvastatin (ZOCOR) 20 MG tablet TAKE ONE TABLET BY MOUTH DAILY 21  Yes Shanae Jacques MD   metoprolol tartrate (LOPRESSOR) 50 MG tablet One tab twice daily 3/25/21  Yes Tri Dawson MD   enalapril (VASOTEC) 10 MG tablet TAKE ONE TABLET BY MOUTH TWO TIMES A DAY  Patient taking differently: Takes 1 in AM and 1/2 tab in PM 10/26/20  Yes Shannan Mirza DO   warfarin (COUMADIN) 3 MG tablet Take one tablet daily 20   Shannan Mirza DO   warfarin (COUMADIN) 1 MG tablet Take 1 mg every wed with coumadin 3 mg to equal 4 mg wed only  Patient taking differently: 1 mg Take 1 mg every Mon with coumadin 3 mg to equal 4 mg.  Monday only 20   Shannan Mirza DO       Current medications:    Current Outpatient Medications   Medication Sig Dispense Refill    brimonidine (ALPHAGAN) 0.2 % BP Readings from Last 3 Encounters:   05/07/21 (!) 155/94   04/28/21 124/76   04/15/21 (!) 184/93       NPO Status:  8                                                                               BMI:   Wt Readings from Last 3 Encounters:   05/07/21 242 lb (109.8 kg)   04/28/21 242 lb (109.8 kg)   04/15/21 240 lb (108.9 kg)     Body mass index is 41.54 kg/m². CBC:   Lab Results   Component Value Date    WBC 6.0 05/03/2021    RBC 4.52 05/03/2021    HGB 13.7 05/03/2021    HCT 42.5 05/03/2021    MCV 94.0 05/03/2021    RDW 12.9 05/03/2021     05/03/2021       CMP:   Lab Results   Component Value Date     05/03/2021    K 4.6 05/03/2021    K 3.8 03/09/2021     05/03/2021    CO2 28 05/03/2021    BUN 10 05/03/2021    CREATININE 0.8 05/03/2021    GFRAA >60 05/03/2021    LABGLOM >60 05/03/2021    GLUCOSE 125 05/03/2021    GLUCOSE 90 06/06/2012    PROT 6.9 05/03/2021    CALCIUM 9.7 05/03/2021    BILITOT 0.6 05/03/2021    ALKPHOS 88 05/03/2021    AST 15 05/03/2021    ALT 14 05/03/2021       POC Tests: No results for input(s): POCGLU, POCNA, POCK, POCCL, POCBUN, POCHEMO, POCHCT in the last 72 hours.     Coags:   Lab Results   Component Value Date    PROTIME 16.8 05/07/2021    PROTIME 31.8 04/28/2021    INR 1.5 05/07/2021    APTT 30.9 03/08/2021       HCG (If Applicable): No results found for: PREGTESTUR, PREGSERUM, HCG, HCGQUANT     ABGs: No results found for: PHART, PO2ART, KRE2NPO, JWW3OBP, BEART, D5CXGMEK     Type & Screen (If Applicable):  Lab Results   Component Value Date    LABABO O 02/22/2012    [address] POS 02/22/2012       Drug/Infectious Status (If Applicable):  No results found for: HIV, HEPCAB    COVID-19 Screening (If Applicable):   Lab Results   Component Value Date    COVID19 Not Detected 03/08/2021           Anesthesia Evaluation  Patient summary reviewed and Nursing notes reviewed no history of anesthetic complications:   Airway: Mallampati: II  TM distance: >3 FB   Neck ROM: full  Mouth opening: > = 3 FB Dental:    (+) upper dentures and lower dentures      Pulmonary:Negative Pulmonary ROS breath sounds clear to auscultation                             Cardiovascular:  Exercise tolerance: good (>4 METS),   (+) hypertension:, valvular problems/murmurs:, CAD: obstructive, CABG/stent:, CHF:,         Rhythm: regular  Rate: normal                 ROS comment: S/p MV, CABG replacement in in 2007  CHF in March with admission. Neuro/Psych:   (+) depression/anxiety             GI/Hepatic/Renal:   (+) GERD: well controlled,           Endo/Other: Negative Endo/Other ROS                    Abdominal:           Vascular: negative vascular ROS. Anesthesia Plan      MAC     ASA 4       Induction: intravenous. Anesthetic plan and risks discussed with patient and child/children. Plan discussed with CRNA. DOS STAFF ADDENDUM:    Patient seen and chart reviewed. Physical exam and history updated as indicated. NPO status confirmed. Anesthesia options and plan discussed including risks benefits with patient/legal guardian and family as available. Concerns and questions addressed. Consent verbalized to proceed.   Anesthesia plan, options and intraoperative/postoperative concerns discussed with care team.    Ritchie Dow MD  5/7/2021  10:16 AM          Ritchie Dow MD   5/7/2021

## 2021-05-08 LAB
EKG ATRIAL RATE: 375 BPM
EKG Q-T INTERVAL: 442 MS
EKG QRS DURATION: 144 MS
EKG QTC CALCULATION (BAZETT): 486 MS
EKG R AXIS: 46 DEGREES
EKG T AXIS: 7 DEGREES
EKG VENTRICULAR RATE: 73 BPM

## 2021-05-08 PROCEDURE — 93010 ELECTROCARDIOGRAM REPORT: CPT | Performed by: INTERNAL MEDICINE

## 2021-05-18 DIAGNOSIS — R09.89 SYMPTOMS INVOLVING CARDIOVASCULAR SYSTEM: Primary | ICD-10-CM

## 2021-05-18 DIAGNOSIS — I35.0 NODULAR CALCIFIC AORTIC VALVE STENOSIS: ICD-10-CM

## 2021-05-19 ENCOUNTER — TELEPHONE (OUTPATIENT)
Dept: CARDIOLOGY | Age: 76
End: 2021-05-19

## 2021-05-20 NOTE — PROGRESS NOTES
[address] PRE-ADMISSION TESTING GENERAL INSTRUCTIONS- [facility]-phone number:[phone]    GENERAL INSTRUCTIONS  [x] Antibacterial Soap shower Night before and/or AM of Surgery  [x]Hibiclens shower  the night before and the morning of surgery. Do not use Hibiclens on your face or head. [x] Nothing by mouth after midnight, including gum, candy, mints, or water. [x] You may brush your teeth, gargle, but do NOT swallow water. [x]No alcohol within 24 hours of your surgery. [x] Jewelry, valuables or body piercing's should not be brought to the hospital. All body and/or tongue piercing's must be removed prior to arriving to hospital.  ALL hair pins must be removed. [x] Do not wear makeup, lotions, powders, deodorant. Nail polish as directed by the nurse. [x] Bring insurance card and photo ID. [x] Transfusion Bracelet: Please bring with you to hospital, day of surgery     PARKING INSTRUCTIONS:   [x] Arrival Time: 10:30. ONE PERSON MAY COME IN WITH YOU. WEAR MASKS. · [x] Parking lot '\"I\"  is located on [facility] (the corner of [facility] and [facility]). To enter, press the button and the gate will lift. A free token will be provided to exit the lot. Follow the sidewalk to the [place] entrance. The door will be locked and you will be let inside. EDUCATION INSTRUCTIONS:       [x] Pre-admission Testing educational folder given  [x] Incentive Spirometry,coughing & deep breathing exercises reviewed. [x]Medication information sheet(s)   [x]Fluoroscopy-Xray used in surgery reviewed with patient. Educational pamphlet placed in chart. [x]Pain: Post-op pain is normal and to be expected. You will be asked to rate your pain from 0-10(a zero is not acceptable-education is needed). Your post-op pain goal is:4  [x] Ask your nurse for your pain medication. [x] Other:  Wear soft, comfortable bottoms to wear home.     MEDICATION INSTRUCTIONS:   [x]Bring a complete list of your medications, please write the last time you took the medicine, give this list to the nurse. [x] Take the following medications the morning of surgery with 1-2 ounces of water: pantoprazole. Use eye drop. Bring eye drops with you for surgery. Do not use eye drops without the nurse knowing.  [] Stop herbal supplements and vitamins 5 days before your surgery. [x] Follow physician instructions regarding any blood thinners you may be taking. COUMADIN instructions from [facility]. WHAT TO EXPECT:  [x] The day of surgery you will be greeted and checked in by the [facility] [employer].  In addition, you will be registered in the [place] by a Patient Access Representative. Please bring your photo ID and insurance card. A nurse will greet you in accordance to the time you are needed in the pre-op area to prepare you for surgery. Please do not be discouraged if you are not greeted in the order you arrive as there are many variables that are involved in patient preparation. Your patience is greatly appreciated as you wait for your nurse. Please bring in items such as: books, magazines, newspapers, electronics, or any other items  to occupy your time in the waiting area. [x]  Delays may occur with surgery and staff will make a sincere effort to keep you informed of delays. If any delays occur with your procedure, we apologize ahead of time for your inconvenience as we recognize the value of your time.

## 2021-05-24 ENCOUNTER — TELEPHONE (OUTPATIENT)
Dept: INTERNAL MEDICINE | Age: 76
End: 2021-05-24

## 2021-05-24 DIAGNOSIS — Z79.01 CHRONIC ANTICOAGULATION: Primary | ICD-10-CM

## 2021-05-25 ENCOUNTER — OFFICE VISIT (OUTPATIENT)
Dept: CARDIOLOGY CLINIC | Age: 76
End: 2021-05-25
Payer: MEDICARE

## 2021-05-25 ENCOUNTER — PREP FOR PROCEDURE (OUTPATIENT)
Dept: CARDIOLOGY | Age: 76
End: 2021-05-25

## 2021-05-25 ENCOUNTER — TELEPHONE (OUTPATIENT)
Dept: CARDIOLOGY | Age: 76
End: 2021-05-25

## 2021-05-25 VITALS
BODY MASS INDEX: 41.67 KG/M2 | HEIGHT: 64 IN | WEIGHT: 244.1 LBS | SYSTOLIC BLOOD PRESSURE: 132 MMHG | HEART RATE: 71 BPM | DIASTOLIC BLOOD PRESSURE: 60 MMHG | RESPIRATION RATE: 18 BRPM

## 2021-05-25 DIAGNOSIS — I50.22 CHRONIC SYSTOLIC CONGESTIVE HEART FAILURE (HCC): Primary | ICD-10-CM

## 2021-05-25 DIAGNOSIS — I35.0 NODULAR CALCIFIC AORTIC VALVE STENOSIS: ICD-10-CM

## 2021-05-25 DIAGNOSIS — R06.00 DYSPNEA, UNSPECIFIED TYPE: Primary | ICD-10-CM

## 2021-05-25 PROCEDURE — 99214 OFFICE O/P EST MOD 30 MIN: CPT | Performed by: INTERNAL MEDICINE

## 2021-05-25 PROCEDURE — 93000 ELECTROCARDIOGRAM COMPLETE: CPT | Performed by: INTERNAL MEDICINE

## 2021-05-25 RX ORDER — SODIUM CHLORIDE 0.9 % (FLUSH) 0.9 %
10 SYRINGE (ML) INJECTION EVERY 12 HOURS SCHEDULED
Status: CANCELLED | OUTPATIENT
Start: 2021-05-25

## 2021-05-25 RX ORDER — SODIUM CHLORIDE 0.9 % (FLUSH) 0.9 %
10 SYRINGE (ML) INJECTION PRN
Status: CANCELLED | OUTPATIENT
Start: 2021-05-25

## 2021-05-25 RX ORDER — CHLORHEXIDINE GLUCONATE 4 G/100ML
SOLUTION TOPICAL ONCE
Status: CANCELLED | OUTPATIENT
Start: 2021-05-25 | End: 2021-05-25

## 2021-05-25 RX ORDER — SODIUM CHLORIDE 9 MG/ML
25 INJECTION, SOLUTION INTRAVENOUS PRN
Status: CANCELLED | OUTPATIENT
Start: 2021-05-25

## 2021-05-25 RX ORDER — SODIUM CHLORIDE 9 MG/ML
INJECTION, SOLUTION INTRAVENOUS CONTINUOUS
Status: CANCELLED | OUTPATIENT
Start: 2021-05-25

## 2021-05-25 RX ORDER — CHLORHEXIDINE GLUCONATE 0.12 MG/ML
15 RINSE ORAL ONCE
Status: CANCELLED | OUTPATIENT
Start: 2021-05-25 | End: 2021-05-25

## 2021-05-25 NOTE — TELEPHONE ENCOUNTER
Patient is scheduled for INR on 5/27/21  Patient is going to preadmission testing on 5/26/21, asking if she can have INR drawn there. I asked patient about rescheduling appointment scheduled on 6/23/21 and she states her surgery is scheduled for 6/2/21and is not sure when she will be able to come in to see you after surgery. she said she will be at hospital until about 1 pm on 5/26/21 for testing ,if you want to see her then.    Your schedule is full until 330pm

## 2021-05-25 NOTE — PROGRESS NOTES
OUTPATIENT CARDIOLOGY FOLLOW-UP    Name: Kenya Lay    Age: 77 y.o. Primary Care Physician: Shannan Mirza DO    Date of Service: 5/25/2021    Chief Complaint:   Chief Complaint   Patient presents with    Congestive Heart Failure     2m-Patient complains of swelling of the left leg. Interim History:   Here for follow-up. I saw her initial consultation 3/25/2021 after she had been essentially lost to cardiac follow-up since 2015. She has history of bioprosthetic mitral valve in 2007 along with a modified maze. She is maintained on warfarin. Had a recent admission for CHF at which time an echocardiogram showed severe native aortic stenosis and prosthetic valve dysfunction suggestive of stenosis or patient prosthesis mismatch. I referred her to the valve clinic and she is set up for transcatheter aortic valve replacement next week. She continues to have severe dyspnea with minimal exertion. This is chronic and is unchanged. She denies lower extremity edema, chest pain, syncope. Review of Systems:   Negative except as described above    Past Medical History:  Past Medical History:   Diagnosis Date    Atrial fibrillation ([address] 11/22/2010    CAD (coronary artery disease) 11/22/2010    NO CARDIOLOGIST, F/U W/ pcp STABLE    CHF (congestive heart failure) ([address] 11/22/2010    Diverticular disease 11/22/2010    Edentulous     does not wear dentures    History of blood transfusion     once with CABG, once with a severe epistaxis.     Hyperlipidemia     Hypertension     Restless leg syndrome 11/22/2010    Valvular heart disease 11/22/2010       Past Surgical History:  Past Surgical History:   Procedure Laterality Date    CARDIAC CATHETERIZATION  2007    CARDIAC CATHETERIZATION Right 05/07/2021    Right Heart cath no stents Dr Leta Duggan Right 08/24/2016    trabulectomy    COLONOSCOPY      CORONARY ARTERY BYPASS GRAFT  TONSILLECTOMY      TRANSESOPHAGEAL ECHOCARDIOGRAM  05/07/2021    Dr Radha Liriano         Family History:  Family History   Problem Relation Age of Onset    Heart Disease Mother     Heart Disease Father        Social History:  Social History     Tobacco Use    Smoking status: Never Smoker    Smokeless tobacco: Never Used   Substance Use Topics    Alcohol use: No    Drug use: No        Allergies: Allergies   Allergen Reactions    Lactose Intolerance (Gi) Diarrhea and Nausea And Vomiting    Phenobarbital Rash       Current Medications:    Current Outpatient Medications:     brimonidine (ALPHAGAN) 0.2 % ophthalmic solution, INSTILL 1 DROP EVERY DAY INTO LEFT EYE AT 8 AM., Disp: , Rfl:     latanoprost (XALATAN) 0.005 % ophthalmic solution, INSTILL 1 DROP IN THE LEFT EYE EVERY DAY AT BEDTIME, Disp: , Rfl:     furosemide (LASIX) 20 MG tablet, Take 20mg alternating with 30mg every other day (one tablet alternating with 1 and 1/2 tabs every other day). , Disp: 135 tablet, Rfl: 3    potassium chloride (KLOR-CON M) 10 MEQ extended release tablet, Take 1 tab alternating with 1 and 1/2 tablet every other day (10 cecy alternating with 15 cecy every other day), Disp: 135 tablet, Rfl: 3    pantoprazole (PROTONIX) 40 MG tablet, Take 1 tablet by mouth daily, Disp: 90 tablet, Rfl: 3    simvastatin (ZOCOR) 20 MG tablet, TAKE ONE TABLET BY MOUTH DAILY (Patient taking differently: Take 20 mg by mouth nightly ), Disp: 90 tablet, Rfl: 0    metoprolol tartrate (LOPRESSOR) 50 MG tablet, One tab twice daily, Disp: 60 tablet, Rfl: 3    warfarin (COUMADIN) 3 MG tablet, Take one tablet daily, Disp: 30 tablet, Rfl: 6    warfarin (COUMADIN) 1 MG tablet, Take 1 mg every wed with coumadin 3 mg to equal 4 mg wed only (Patient taking differently: 1 mg Take 1 mg every Mon with coumadin 3 mg to equal 4 mg.  Monday only), Disp: 30 tablet, Rfl: 0    enalapril (VASOTEC) 10 MG tablet, TAKE ONE TABLET BY MOUTH TWO TIMES A 2021    PROTIME 16.8 (H) 2021    PROTIME 24.4 (H) 2021    PROTIME 31.8 2021     Lab Results   Component Value Date    TSH 2.350 2021     Lab Results   Component Value Date    LABA1C 6.7 (H) 2021     No results found for: EAG  Lab Results   Component Value Date    CHOL 129 2021    CHOL 137 2019    CHOL 137 2017     Lab Results   Component Value Date    TRIG 57 2021    TRIG 78 2019    TRIG 80 2017     Lab Results   Component Value Date    HDL 53 2021    HDL 57 2019    HDL 51 2017     Lab Results   Component Value Date    LDLCALC 65 2021    LDLCALC 64 2019    LDLCALC 70 2017     Lab Results   Component Value Date    LABVLDL 11 2021    LABVLDL 16 2019    LABVLDL 16 2017     No results found for: CHOLHDLRATIO  No results for input(s): PROBNP in the last 72 hours. Cardiac Tests:  EK2021 atrial fibrillation 71 bpm.  Normal axis. Right bundle branch block, QRS duration 152 ms    Echocardiogram:   Transesophageal echo 2021   Summary   Ejection fraction is visually estimated at > 50%. Dilated right ventricle with normal right ventricular function. Severely dilated left atrium and right atrium. Severe spontaneous echo contrast in the left atrium. Moderate tricuspid regurgitation. RV-RA gradient is estimated at 29 mmHg. Bioprosthetic mitral valve (#25 Medtronic Mosaic) with normal leaflet   motion. Prosthesis-patient mismatch. No evidence of hemodynamically significant mitral regurgitation. Average MV mean gradient 8.2 mmHg (HR 85 bpm). Aortic valve with severe fibrocalcific changes. The aortic valve is trileaflet. No evidence of hemodynamically significant aortic regurgitation. Paradoxical low-flow low-gradient severe aortic stenosis.     Transthoracic echo 3/2021   Summary   Normal left ventricular size and systolic function.   Ejection fraction is visually estimated at 55-60%.   Indeterminate diastolic function.   No regional wall motion abnormalities seen.   Mild left ventricular concentric hypertrophy noted.   Abnormal LV septal motion consistent with post-operative state.   Normal right ventricular size and function.   Massive biatrial dilation.   Bioprosthetic valve in the mitral position, #27 Medtronic Mosaic implanted   2007.   Doppler parameters suggestive of significant prosthetic stenosis.   Mean gradient 10 mmHg at HR 85 bpm.   Peak E velocity 2.2 m/s.    ms.   EOA 1.0 cm2.   VTI ratio 3.3   Severe aortic stenosis is present.   Peak velocity 4 m/s.   Mean gradient 40 mHg.   MEÑO 0.7 cm2.   DI 0. 19.   Moderate tricuspid regurgitation.     Stress test:       Cardiac catheterization:   Left and right heart cath 5/7/2021  Coronary anatomy:   Dominance: Left   1. Left main: Short and angiographically normal   2. LAD: Large vessel that supplies the entire apex and has a   large dominant diagonal.  Angiographically normal   3. Left circumflex: Large vessel with 3 OM's, 1 small LPL and a   medium sized L PDA.  Angiographically normal   4. RCA: Does not supply any LV myocardium.  Angiographically   normal       Hemodynamics: Ao: 154/76 with a mean of 113     PA: 51/22 with a mean of 32   PCW: Mean of 22   CO/CI (Mis): 4.3/2.0     Orders Placed This Encounter   Procedures    EKG 12 Lead        Requested Prescriptions      No prescriptions requested or ordered in this encounter        ASSESSMENT / PLAN:  1. Chronic HFpEF, fairly well compensated but persistent ambulatory class IV symptoms/ACC stage C  2. MR/MS s/p #25 Medtronic Mosaic bioprosthetic MV 2007, currently with significant bioprosthetic mitral stenosis versus PPM mean gradient 10 mmHg  3. Severe native valve aortic stenosis. Aortic valve area 0.7 cm²  4. Permanent atrial fibrillation s/p modified maze, AC with warfarin. Rate controlled  5. Essentially normal coronary arteries by cath  6.  Right bundle branch block  7. Hypertension  8. Hyperlipidemia  9. Depression/anxiety  10. GERD  11. Obesity, BMI 41.9 kg/m²    Recommendations:    · Continue current diuretic therapy  · Continue metoprolol tartrate 50 mg twice daily  · Increase beta-blocker as tolerated  · Continue warfarin anticoagulation goal INR 2.0-3.0  · TAVR with Dr. Dorian Mckinney next week  · Reassess mitral valve subsequent to that, if continues to be symptomatic consideration for redo MVR versus valve in valve transcatheter mitral valve replacement  · Need bacterial endocarditis prophylaxis for dental cleaning and indicated procedures  · Aggressive risk factor modification  · Follow-up in 2 months    Discussed with patient and family member    The patient's current medication list, allergies, problem list and results of all previously ordered testing were reviewed at today's visit.     Gill Zaldivar MD, [facility]

## 2021-05-25 NOTE — LETTER
[facility] / [facility]  [address]  Phone: [phone]  Fax: [phone]    Richard Funes MD    May 25, 2021     Radha Ryan, [facility] [place] [address] [place] [address] [address] [address]    Patient: Angela Manzano   MR Number: [id number]   YOB: 1945   Date of Visit: 5/25/2021       Dear Radha Ryan: Thank you for referring Lesia Clark to me for evaluation/treatment. Below are the relevant portions of my assessment and plan of care. If you have questions, please do not hesitate to call me. I look forward to following [facility] along with you.     Sincerely,    MD Richard Obando MD

## 2021-05-26 ENCOUNTER — HOSPITAL ENCOUNTER (OUTPATIENT)
Dept: ULTRASOUND IMAGING | Age: 76
Discharge: HOME | End: 2021-05-28
Payer: MEDICARE

## 2021-05-26 ENCOUNTER — HOSPITAL ENCOUNTER (OUTPATIENT)
Age: 76
Discharge: HOME | End: 2021-05-26
Payer: MEDICARE

## 2021-05-26 ENCOUNTER — HOSPITAL ENCOUNTER (OUTPATIENT)
Dept: PREADMISSION TESTING | Age: 76
Discharge: HOME | End: 2021-05-26
Payer: MEDICARE

## 2021-05-26 ENCOUNTER — HOSPITAL ENCOUNTER (OUTPATIENT)
Dept: GENERAL RADIOLOGY | Age: 76
Discharge: HOME | End: 2021-05-28
Payer: MEDICARE

## 2021-05-26 VITALS
DIASTOLIC BLOOD PRESSURE: 71 MMHG | RESPIRATION RATE: 20 BRPM | TEMPERATURE: 97.5 F | OXYGEN SATURATION: 97 % | HEART RATE: 60 BPM | WEIGHT: 244.7 LBS | BODY MASS INDEX: 42 KG/M2 | SYSTOLIC BLOOD PRESSURE: 171 MMHG

## 2021-05-26 DIAGNOSIS — Z01.818 PRE-OP TESTING: Primary | ICD-10-CM

## 2021-05-26 DIAGNOSIS — U07.1 COVID-19: ICD-10-CM

## 2021-05-26 DIAGNOSIS — R09.89 SYMPTOMS INVOLVING CARDIOVASCULAR SYSTEM: ICD-10-CM

## 2021-05-26 DIAGNOSIS — I35.0 NODULAR CALCIFIC AORTIC VALVE STENOSIS: ICD-10-CM

## 2021-05-26 DIAGNOSIS — R06.00 DYSPNEA, UNSPECIFIED TYPE: ICD-10-CM

## 2021-05-26 LAB
ABO/RH: NORMAL
ALBUMIN SERPL-MCNC: 3.7 G/DL (ref 3.5–5.2)
ALP BLD-CCNC: 83 U/L (ref 35–104)
ALT SERPL-CCNC: 10 U/L (ref 0–32)
ANION GAP SERPL CALCULATED.3IONS-SCNC: 12 MMOL/L (ref 7–16)
ANTIBODY SCREEN: NORMAL
APTT: 38.8 SEC (ref 24.5–35.1)
AST SERPL-CCNC: 16 U/L (ref 0–31)
BILIRUB SERPL-MCNC: 1.1 MG/DL (ref 0–1.2)
BILIRUBIN URINE: NEGATIVE
BLOOD, URINE: NEGATIVE
BUN BLDV-MCNC: 10 MG/DL (ref 6–23)
CALCIUM SERPL-MCNC: 8.9 MG/DL (ref 8.6–10.2)
CHLORIDE BLD-SCNC: 102 MMOL/L (ref 98–107)
CLARITY: CLEAR
CO2: 26 MMOL/L (ref 22–29)
COLOR: YELLOW
CREAT SERPL-MCNC: 0.8 MG/DL (ref 0.5–1)
GFR AFRICAN AMERICAN: >60
GFR NON-AFRICAN AMERICAN: >60 ML/MIN/1.73
GLUCOSE BLD-MCNC: 219 MG/DL (ref 74–99)
GLUCOSE URINE: NEGATIVE MG/DL
HBA1C MFR BLD: 7 % (ref 4–5.6)
HCT VFR BLD CALC: 43 % (ref 34–48)
HEMOGLOBIN: 13.5 G/DL (ref 11.5–15.5)
INR BLD: 2.1
KETONES, URINE: NEGATIVE MG/DL
LEUKOCYTE ESTERASE, URINE: ABNORMAL
MCH RBC QN AUTO: 29.5 PG (ref 26–35)
MCHC RBC AUTO-ENTMCNC: 31.4 % (ref 32–34.5)
MCV RBC AUTO: 93.9 FL (ref 80–99.9)
NITRITE, URINE: NEGATIVE
PDW BLD-RTO: 13.2 FL (ref 11.5–15)
PH UA: 6 (ref 5–9)
PLATELET # BLD: 247 E9/L (ref 130–450)
PMV BLD AUTO: 10.8 FL (ref 7–12)
POTASSIUM SERPL-SCNC: 3.6 MMOL/L (ref 3.5–5)
PROTEIN UA: NEGATIVE MG/DL
PROTHROMBIN TIME: 23.1 SEC (ref 9.3–12.4)
RBC # BLD: 4.58 E12/L (ref 3.5–5.5)
SODIUM BLD-SCNC: 140 MMOL/L (ref 132–146)
SPECIFIC GRAVITY UA: 1.02 (ref 1–1.03)
TOTAL PROTEIN: 6.8 G/DL (ref 6.4–8.3)
UROBILINOGEN, URINE: 1 E.U./DL
WBC # BLD: 5.7 E9/L (ref 4.5–11.5)

## 2021-05-26 PROCEDURE — 93880 EXTRACRANIAL BILAT STUDY: CPT | Performed by: RADIOLOGY

## 2021-05-26 PROCEDURE — 71046 X-RAY EXAM CHEST 2 VIEWS: CPT

## 2021-05-26 PROCEDURE — 93880 EXTRACRANIAL BILAT STUDY: CPT

## 2021-05-26 PROCEDURE — 36415 COLL VENOUS BLD VENIPUNCTURE: CPT

## 2021-05-27 LAB
MRSA CULTURE ONLY: NORMAL
SARS-COV-2, PCR: NOT DETECTED

## 2021-05-28 LAB — URINE CULTURE, ROUTINE: NORMAL

## 2021-06-01 NOTE — PROGRESS NOTES
Spoke with patient informed her surgery for tomorrow 6/2 has been changed to 11:30 am patient will need to arrive at 8:30 am.  Patient verbalized understanding.

## 2021-06-01 NOTE — H&P
Patient name: Miryam Moeller    Reason for admission: TAVR    Admitting Physician: Yaritza Hardy MD    Primary Care Physician: Denny Newton DO    Date of service: 6/1/2021    Chief Complaint: severe, symptomatic AS    HPI: Mrs. 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 Over the past year, she has had progressive dyspnea on exertion. She cannot climb the stairs because of shortness of breath. She cannot do grocery shopping because she is short of breath. Last month she was admitted because of weakness and was diagnosed with congestive heart failure with chest x-ray showing bilateral pleural effusions and marked pulmonary venous hypertension. 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 She denies syncope and presyncope. She denies palpitations and chest pain. She is very compliant with her medications. After heart team discussion, she was felt best served with TAVR initially, with mitral valve re intervention as needed if symptoms persist. Cardiac catheterization revealed normal coronary arteries. STS risk score is calculated at 3.4% for AVR by redo sternotomy. Allergies: Allergies   Allergen Reactions    Lactose Intolerance (Gi) Diarrhea and Nausea And Vomiting    Phenobarbital Rash       Home medications:    No current facility-administered medications for this encounter.      Current Outpatient Medications   Medication Sig Dispense Refill    brimonidine (ALPHAGAN) 0.2 % ophthalmic solution INSTILL 1 DROP EVERY DAY INTO LEFT EYE AT 8 AM.      latanoprost (XALATAN) 0.005 % ophthalmic solution INSTILL 1 DROP IN THE LEFT EYE EVERY DAY AT BEDTIME      furosemide (LASIX) 20 MG tablet Take 20mg alternating with 30mg every other day (one tablet alternating with 1 and 1/2 tabs every other day). 135 tablet 3    potassium chloride (KLOR-CON M) 10 MEQ extended release tablet Take 1 tab alternating with 1 and 1/2 tablet every other day (10 cecy alternating with 15 cecy every other day) 135 tablet 3    pantoprazole (PROTONIX) 40 MG tablet Take 1 tablet by mouth daily 90 tablet 3    simvastatin (ZOCOR) 20 MG tablet TAKE ONE TABLET BY MOUTH DAILY (Patient taking differently: Take 20 mg by mouth nightly ) 90 tablet 0    metoprolol tartrate (LOPRESSOR) 50 MG tablet One tab twice daily 60 tablet 3    warfarin (COUMADIN) 3 MG tablet Take one tablet daily 30 tablet 6    warfarin (COUMADIN) 1 MG tablet Take 1 mg every wed with coumadin 3 mg to equal 4 mg wed only (Patient taking differently: 1 mg Take 1 mg every Mon with coumadin 3 mg to equal 4 mg. Monday only) 30 tablet 0    enalapril (VASOTEC) 10 MG tablet TAKE ONE TABLET BY MOUTH TWO TIMES A DAY (Patient taking differently: Takes 1 in AM and 1/2 tab in PM) 180 tablet 3       Past Medical History:  Past Medical History:   Diagnosis Date    [facility] 11/22/2010    CAD (coronary artery disease) 11/22/2010    NO CARDIOLOGIST, F/U W/ pcp STABLE    CHF (congestive heart failure) ([facility] 11/22/2010    Diverticular disease 11/22/2010    Edentulous     does not wear dentures    History of blood transfusion     once with CABG, once with a severe epistaxis.     Hyperlipidemia     Hypertension     Restless leg syndrome 11/22/2010    Valvular heart disease 11/22/2010       Past Surgical History:  Past Surgical History:   Procedure Laterality Date    CARDIAC CATHETERIZATION  2007    CARDIAC CATHETERIZATION Right 05/07/2021    Right Heart cath no stents Dr Verdin Juan Luis Right 08/24/2016    trabulectomy    COLONOSCOPY      CORONARY ARTERY BYPASS GRAFT      TONSILLECTOMY      TRANSESOPHAGEAL ECHOCARDIOGRAM  05/07/2021    Dr Vanita Espinoza Social History:  Social History     Socioeconomic History    Marital status:      Spouse name: Not on file    Number of children: Not on file    Years of education: Not on file    Highest education level: Not on file   Occupational History    Not on file   Tobacco Use    Smoking status: Never Smoker    Smokeless tobacco: Never Used   Substance and Sexual Activity    Alcohol use: No    Drug use: No    Sexual activity: Never   Other Topics Concern    Not on file   Social History Narrative    Not on file     Social Determinants of Health     Financial Resource Strain: Low Risk     Difficulty of Paying Living Expenses: Not very hard   Food Insecurity: No Food Insecurity    Worried About Running Out of Food in the Last Year: Never true    Ashley of Food in the Last Year: Never true   Transportation Needs: No Transportation Needs    Lack of Transportation (Medical): No    Lack of Transportation (Non-Medical): No   Physical Activity:     Days of Exercise per Week:     Minutes of Exercise per Session:    Stress:     Feeling of Stress :    Social Connections:     Frequency of Communication with Friends and Family:     Frequency of Social Gatherings with Friends and Family:     Attends An[employer] Services:     Active Member of Clubs or Organizations:     Attends Club or Organization Meetings:     Marital Status:    Intimate Partner Violence:     Fear of Current or Ex-Partner:     Emotionally Abused:     Physically Abused:     Sexually Abused:        Family History:  Family History   Problem Relation Age of Onset    Heart Disease Mother     Heart Disease Father        Review of Systems:  Constitutional: Denies fevers, chills, or weight loss. HEENT: Denies visual changes or hearing loss. Heart: As per HPI. Lungs: Denies shortness of breath, cough, or wheezing. Gastrointestinal: Denies nausea, vomiting, constipation, or diarrhea.    Genitourinary: dysuria or

## 2021-06-02 ENCOUNTER — HOSPITAL ENCOUNTER (OUTPATIENT)
Age: 76
Setting detail: OUTPATIENT SURGERY
Discharge: HOME | End: 2021-06-02
Attending: INTERNAL MEDICINE | Admitting: INTERNAL MEDICINE
Payer: MEDICARE

## 2021-06-02 ENCOUNTER — TELEPHONE (OUTPATIENT)
Dept: INTERNAL MEDICINE | Age: 76
End: 2021-06-02

## 2021-06-02 VITALS
DIASTOLIC BLOOD PRESSURE: 88 MMHG | TEMPERATURE: 97.4 F | HEIGHT: 64 IN | BODY MASS INDEX: 41.66 KG/M2 | OXYGEN SATURATION: 10 % | RESPIRATION RATE: 20 BRPM | WEIGHT: 244 LBS | SYSTOLIC BLOOD PRESSURE: 192 MMHG | HEART RATE: 88 BPM

## 2021-06-02 DIAGNOSIS — Z01.818 PRE-OP TESTING: ICD-10-CM

## 2021-06-02 DIAGNOSIS — U07.1 COVID-19: Primary | ICD-10-CM

## 2021-06-02 LAB
APTT: 32.5 SEC (ref 24.5–35.1)
INR BLD: 1.5
PROTHROMBIN TIME: 16.8 SEC (ref 9.3–12.4)

## 2021-06-02 PROCEDURE — 2580000003 HC RX 258: Performed by: PHYSICIAN ASSISTANT

## 2021-06-02 PROCEDURE — 36415 COLL VENOUS BLD VENIPUNCTURE: CPT

## 2021-06-02 PROCEDURE — 85610 PROTHROMBIN TIME: CPT

## 2021-06-02 PROCEDURE — 6370000000 HC RX 637 (ALT 250 FOR IP): Performed by: PHYSICIAN ASSISTANT

## 2021-06-02 PROCEDURE — 85730 THROMBOPLASTIN TIME PARTIAL: CPT

## 2021-06-02 RX ORDER — SODIUM CHLORIDE 9 MG/ML
INJECTION, SOLUTION INTRAVENOUS CONTINUOUS
Status: DISCONTINUED | OUTPATIENT
Start: 2021-06-02 | End: 2021-06-02 | Stop reason: HOSPADM

## 2021-06-02 RX ORDER — CHLORHEXIDINE GLUCONATE 0.12 MG/ML
15 RINSE ORAL ONCE
Status: COMPLETED | OUTPATIENT
Start: 2021-06-02 | End: 2021-06-02

## 2021-06-02 RX ORDER — SODIUM CHLORIDE 0.9 % (FLUSH) 0.9 %
10 SYRINGE (ML) INJECTION PRN
Status: DISCONTINUED | OUTPATIENT
Start: 2021-06-02 | End: 2021-06-02 | Stop reason: HOSPADM

## 2021-06-02 RX ORDER — CHLORHEXIDINE GLUCONATE 4 G/100ML
SOLUTION TOPICAL ONCE
Status: COMPLETED | OUTPATIENT
Start: 2021-06-02 | End: 2021-06-02

## 2021-06-02 RX ORDER — SODIUM CHLORIDE 0.9 % (FLUSH) 0.9 %
10 SYRINGE (ML) INJECTION EVERY 12 HOURS SCHEDULED
Status: DISCONTINUED | OUTPATIENT
Start: 2021-06-02 | End: 2021-06-02 | Stop reason: HOSPADM

## 2021-06-02 RX ORDER — SODIUM CHLORIDE 9 MG/ML
25 INJECTION, SOLUTION INTRAVENOUS PRN
Status: DISCONTINUED | OUTPATIENT
Start: 2021-06-02 | End: 2021-06-02 | Stop reason: HOSPADM

## 2021-06-02 RX ADMIN — MUPIROCIN: 20 OINTMENT TOPICAL at 09:13

## 2021-06-02 RX ADMIN — 0.12% CHLORHEXIDINE GLUCONATE 15 ML: 1.2 RINSE ORAL at 09:09

## 2021-06-02 RX ADMIN — CHLORHEXIDINE GLUCONATE: 213 SOLUTION TOPICAL at 09:10

## 2021-06-02 RX ADMIN — SODIUM CHLORIDE: 9 INJECTION, SOLUTION INTRAVENOUS at 09:08

## 2021-06-02 ASSESSMENT — PAIN - FUNCTIONAL ASSESSMENT
PAIN_FUNCTIONAL_ASSESSMENT: 0-10
PAIN_FUNCTIONAL_ASSESSMENT: 0-10

## 2021-06-02 NOTE — TELEPHONE ENCOUNTER
Liliana Whitehead states mothers marianna cancelled today due to insurance issues. Liliana Whitehead states new [facility] paperwork will be faxed requesting yesterdays  date as part of [facility], she took off yesterday to assist mom.

## 2021-06-10 ENCOUNTER — TELEPHONE (OUTPATIENT)
Dept: INTERNAL MEDICINE | Age: 76
End: 2021-06-10

## 2021-06-10 NOTE — TELEPHONE ENCOUNTER
Spoke to pt regarding her over due inr   Pt states that this was on hold for surgery for a week and her surgery was cancelled so she restarted it on 06/03/2021 states that she had an inr check on 06/02/2021 that was 1.5   Pt states that her current dose is 3mgs all days except 4mgs on wed   Scheduled her an appt on 06/16/2021 to have her inr checke

## 2021-06-16 ENCOUNTER — NURSE ONLY (OUTPATIENT)
Dept: INTERNAL MEDICINE | Age: 76
End: 2021-06-16
Payer: MEDICARE

## 2021-06-16 DIAGNOSIS — I48.20 CHRONIC ATRIAL FIBRILLATION (HCC): Primary | ICD-10-CM

## 2021-06-16 LAB
INTERNATIONAL NORMALIZATION RATIO, POC: 2.5
PROTHROMBIN TIME, POC: ABNORMAL

## 2021-06-16 PROCEDURE — 93793 ANTICOAG MGMT PT WARFARIN: CPT | Performed by: INTERNAL MEDICINE

## 2021-06-16 PROCEDURE — 85610 PROTHROMBIN TIME: CPT | Performed by: INTERNAL MEDICINE

## 2021-06-16 NOTE — PROGRESS NOTES
Patient notified to continue same dose of coumadin, 4 mg Wednesday and 3 mg all other days  Repeat INR in 1 month  Bleeding precautions reviewed  AVS mailed

## 2021-06-16 NOTE — PATIENT INSTRUCTIONS
Continue same dose of coumadin, 4 mg on wednesdays and 3 mg all other days  Repeat INR in 1 month  Bleeding precautions as reviewed  Please call if any questions or concerns    Patient Education        Taking Warfarin Safely: Care Instructions  Your Care Instructions     Warfarin is a medicine that you take to prevent blood clots. It is often called a blood thinner. Doctors give warfarin (such as Coumadin) to reduce the risk of blood clots. You may be at risk for blood clots if you have atrial fibrillation or deep vein thrombosis. Some other health problems may also put you at risk. Warfarin slows the amount of time it takes for your blood to clot. It can cause bleeding problems. Even if you've been taking warfarin for a while, it's important to know how to take it safely. Foods and other medicines can affect the way warfarin works. Some can make warfarin work too well. This can cause bleeding problems. And some can make it work poorly, so that it does not prevent blood clots very well. You will need regular blood tests to check how long it takes for your blood to form a clot. This test is called a PT or prothrombin time test. The result of the test is called an INR level. Depending on the test results, your doctor or anticoagulation clinic may adjust your dose of warfarin. Follow-up care is a key part of your treatment and safety. Be sure to make and go to all appointments, and call your doctor if you are having problems. It's also a good idea to know your test results and keep a list of the medicines you take. How can you care for yourself at home? Take warfarin safely  · Take your warfarin at the same time each day. · If you miss a dose of warfarin, don't take an extra dose to make up for it. Your doctor can tell you exactly what to do so you don't take too much or too little. · Wear medical alert jewelry that lets others know that you take warfarin. You can buy this at most drugstores.   · Don't take warfarin if you are pregnant or planning to get pregnant. Talk to your doctor about how you can prevent getting pregnant while you are taking it. · Don't change your dose or stop taking warfarin unless your doctor tells you to. Effects of medicines and food on warfarin  · Don't start or stop taking any medicines, vitamins, or natural remedies unless you first talk to your doctor. Many medicines can affect how warfarin works. These include aspirin and other pain relievers, over-the-counter medicines, multivitamins, dietary supplements, and herbal products. · Tell all of your doctors and pharmacists that you take warfarin. Some prescription medicines can affect how warfarin works. · Keep the amount of vitamin K in your diet about the same from day to day. Do not suddenly eat a lot more or a lot less food that is rich in vitamin K than you usually do. Vitamin K affects how warfarin works and how your blood clots. Talk with your doctor before making big changes in your diet. Foods that have a lot of vitamin K include cooked green vegetables, such as:  ? Kale, spinach, turnip greens, katina greens, Swiss chard, and mustard greens. ? Duffield sprouts, broccoli, and cabbage. · Limit your use of alcohol. Avoid bleeding by preventing falls and injuries  · Wear slippers or shoes with nonskid soles. · Remove throw rugs and clutter. · Rearrange furniture and electrical cords to keep them out of walking paths. · Keep stairways, porches, and outside walkways well lit. Use night-lights in hallways and bathrooms. · Be extra careful when you work with sharp tools or knives. When should you call for help? Call 911 anytime you think you may need emergency care. For example, call if:    · You have a sudden, severe headache that is different from past headaches. Call your doctor now or seek immediate medical care if:    · You have any abnormal bleeding, such as:  ? Nosebleeds.   ? Vaginal bleeding that is different (heavier, more frequent, at a different time of the month) than what you are used to.  ? Bloody or black stools, or rectal bleeding. ? Bloody or pink urine. Watch closely for changes in your health, and be sure to contact your doctor if you have any problems. Where can you learn more? Go to [email or web address] org and sign in to your [employer] account. Enter O689 in the mySugr box to learn more about \"Taking Warfarin Safely: Care Instructions. \"     If you do not have an account, please click on the \"Sign Up Now\" link. Current as of: August 31, 2020               Content Version: 12.9  © 2006-2021 Healthwise, Incorporated. Care instructions adapted under license by [facility] ([facility]). If you have questions about a medical condition or this instruction, always ask your healthcare professional. [address] any warranty or liability for your use of this information.

## 2021-06-18 ENCOUNTER — TELEPHONE (OUTPATIENT)
Dept: CARDIOLOGY | Age: 76
End: 2021-06-18

## 2021-06-18 NOTE — TELEPHONE ENCOUNTER
Spoke to patient re: rescheduling TAVR now that insurance has approved. She wishes to delay until after July 4th holiday. Will plan tentative date of 7/14/21. She states understanding.

## 2021-06-25 NOTE — PROGRESS NOTES
Patient agreed to COVID test on 7-9 at the  [facility], drive thru 7 am- 10 am, walk in 8 am - 4 pm located at  [address] [place] [address]. Patient instructed to bring ID. Patient instructed to self isolate until day of surgery.

## 2021-07-08 NOTE — PROGRESS NOTES
[facility] PRE-ADMISSION TESTING GENERAL INSTRUCTIONS- [facility]-phone number:[phone]    GENERAL INSTRUCTIONS. [x] Nothing by mouth after midnight, including gum, candy, mints, or water. .   [x]Hibiclens shower  the night before and the morning of surgery. Do not use             Hibiclens on your face or head. [x]No smoking, chewing tobacco, illegal drugs, or alcohol within 24 hours of your surgery. [x] Jewelry, valuables or body piercing's should not be brought to the hospital. All body and/or tongue piercing's must be removed prior to arriving to hospital.  ALL hair pins must be removed. [x] Do not wear makeup, lotions, powders, deodorant. Nail polish as directed by the nurse. [x] Arrange transportation with a responsible adult  to and from the hospital.  [x] Transfusion Bracelet: Please bring with you to hospital, day of surgery  [x] Bring copy of living will or healthcare power of  papers to be placed in your electronic record. PARKING INSTRUCTIONS:   [x] Arrival Time:__0630 for surgery 7-14 Wednesday with dr dow___________  · [x] Parking lot '\"I\"  is located on [facility] (the corner of [facility] and [facility]). To enter, press the button and the gate will lift. A free token will be provided to exit the lot. EDUCATION INSTRUCTIONS:       [x] Pre-admission Testing educational folder given  [x] Incentive Spirometry,coughing & deep breathing exercises reviewed. .    [x]Pain: Post-op pain is normal and to be expected. You will be asked to rate your pain from 0-10(a zero is not acceptable-education is needed). Your post-op pain goal is:  [x] Ask your nurse for your pain medication. [] Other:___________________________    MEDICATION INSTRUCTIONS:   [x]Bring a complete list of your medications, please write the last time you took the medicine, give this list to the nurse. given [x] Take the following medications the morning of surgery with 1-2 ounces of water: protonix  [x] Stop herbal supplements and vitamins 5 days before your surgery. .   [x] Follow physician instructions regarding any blood thinners you may be taking. coumadin    WHAT TO EXPECT:  [x] The day of surgery you will be greeted and checked in by the [employer].  In addition, you will be registered in the [place] by a Patient Access Representative. Please bring your photo ID and insurance card. A nurse will greet you in accordance to the time you are needed in the pre-op area to prepare you for surgery. Please do not be discouraged if you are not greeted in the order you arrive as there are many variables that are involved in patient preparation. Your patience is greatly appreciated as you wait for your nurse. Please bring in items such as: books, magazines, newspapers, electronics, or any other items  to occupy your time in the waiting area. [x]  Delays may occur with surgery and staff will make a sincere effort to keep you informed of delays. If any delays occur with your procedure, we apologize ahead of time for your inconvenience as we recognize the value of your time.

## 2021-07-09 ENCOUNTER — PREP FOR PROCEDURE (OUTPATIENT)
Dept: CARDIOLOGY | Age: 76
End: 2021-07-09

## 2021-07-09 ENCOUNTER — HOSPITAL ENCOUNTER (OUTPATIENT)
Dept: GENERAL RADIOLOGY | Age: 76
Discharge: HOME | End: 2021-07-11
Payer: MEDICARE

## 2021-07-09 ENCOUNTER — HOSPITAL ENCOUNTER (OUTPATIENT)
Dept: PREADMISSION TESTING | Age: 76
Discharge: HOME | End: 2021-07-09
Payer: MEDICARE

## 2021-07-09 ENCOUNTER — TELEPHONE (OUTPATIENT)
Dept: CARDIOLOGY | Age: 76
End: 2021-07-09

## 2021-07-09 ENCOUNTER — HOSPITAL ENCOUNTER (OUTPATIENT)
Age: 76
Discharge: HOME | End: 2021-07-11
Payer: MEDICARE

## 2021-07-09 ENCOUNTER — TELEPHONE (OUTPATIENT)
Dept: INTERNAL MEDICINE | Age: 76
End: 2021-07-09

## 2021-07-09 VITALS
OXYGEN SATURATION: 99 % | HEIGHT: 64 IN | DIASTOLIC BLOOD PRESSURE: 60 MMHG | SYSTOLIC BLOOD PRESSURE: 148 MMHG | TEMPERATURE: 97.7 F | BODY MASS INDEX: 40.97 KG/M2 | WEIGHT: 240 LBS | RESPIRATION RATE: 20 BRPM | HEART RATE: 66 BPM

## 2021-07-09 DIAGNOSIS — I35.0 NODULAR CALCIFIC AORTIC VALVE STENOSIS: ICD-10-CM

## 2021-07-09 DIAGNOSIS — R06.00 DYSPNEA, UNSPECIFIED TYPE: ICD-10-CM

## 2021-07-09 DIAGNOSIS — U07.1 COVID-19: ICD-10-CM

## 2021-07-09 DIAGNOSIS — R06.00 DYSPNEA, UNSPECIFIED TYPE: Primary | ICD-10-CM

## 2021-07-09 LAB
ABO/RH: NORMAL
ALBUMIN SERPL-MCNC: 4.1 G/DL (ref 3.5–5.2)
ALP BLD-CCNC: 78 U/L (ref 35–104)
ALT SERPL-CCNC: 11 U/L (ref 0–32)
ANION GAP SERPL CALCULATED.3IONS-SCNC: 10 MMOL/L (ref 7–16)
ANTIBODY SCREEN: NORMAL
APTT: 44.4 SEC (ref 24.5–35.1)
AST SERPL-CCNC: 14 U/L (ref 0–31)
BACTERIA: ABNORMAL /HPF
BILIRUB SERPL-MCNC: 0.9 MG/DL (ref 0–1.2)
BILIRUBIN URINE: NEGATIVE
BLOOD, URINE: NEGATIVE
BUN BLDV-MCNC: 11 MG/DL (ref 6–23)
CALCIUM SERPL-MCNC: 9.3 MG/DL (ref 8.6–10.2)
CHLORIDE BLD-SCNC: 105 MMOL/L (ref 98–107)
CLARITY: CLEAR
CO2: 28 MMOL/L (ref 22–29)
COLOR: YELLOW
CREAT SERPL-MCNC: 0.8 MG/DL (ref 0.5–1)
EKG ATRIAL RATE: 58 BPM
EKG Q-T INTERVAL: 490 MS
EKG QRS DURATION: 156 MS
EKG QTC CALCULATION (BAZETT): 505 MS
EKG R AXIS: 44 DEGREES
EKG T AXIS: 15 DEGREES
EKG VENTRICULAR RATE: 64 BPM
GFR AFRICAN AMERICAN: >60
GFR NON-AFRICAN AMERICAN: >60 ML/MIN/1.73
GLUCOSE BLD-MCNC: 107 MG/DL (ref 74–99)
GLUCOSE URINE: NEGATIVE MG/DL
HBA1C MFR BLD: 6.6 % (ref 4–5.6)
HCT VFR BLD CALC: 42.5 % (ref 34–48)
HEMOGLOBIN: 13.7 G/DL (ref 11.5–15.5)
INR BLD: 3.3
KETONES, URINE: NEGATIVE MG/DL
LEUKOCYTE ESTERASE, URINE: ABNORMAL
MCH RBC QN AUTO: 29.3 PG (ref 26–35)
MCHC RBC AUTO-ENTMCNC: 32.2 % (ref 32–34.5)
MCV RBC AUTO: 90.8 FL (ref 80–99.9)
NITRITE, URINE: NEGATIVE
PDW BLD-RTO: 12.9 FL (ref 11.5–15)
PH UA: 6 (ref 5–9)
PLATELET # BLD: 264 E9/L (ref 130–450)
PMV BLD AUTO: 10.1 FL (ref 7–12)
POTASSIUM SERPL-SCNC: 4.2 MMOL/L (ref 3.5–5)
PROTEIN UA: NEGATIVE MG/DL
PROTHROMBIN TIME: 36.1 SEC (ref 9.3–12.4)
RBC # BLD: 4.68 E12/L (ref 3.5–5.5)
RBC UA: ABNORMAL /HPF (ref 0–2)
SODIUM BLD-SCNC: 143 MMOL/L (ref 132–146)
SPECIFIC GRAVITY UA: 1.02 (ref 1–1.03)
TOTAL PROTEIN: 7.2 G/DL (ref 6.4–8.3)
UROBILINOGEN, URINE: 1 E.U./DL
WBC # BLD: 6.4 E9/L (ref 4.5–11.5)
WBC UA: ABNORMAL /HPF (ref 0–5)

## 2021-07-09 PROCEDURE — 87088 URINE BACTERIA CULTURE: CPT

## 2021-07-09 PROCEDURE — 80053 COMPREHEN METABOLIC PANEL: CPT

## 2021-07-09 PROCEDURE — 71046 X-RAY EXAM CHEST 2 VIEWS: CPT

## 2021-07-09 PROCEDURE — 85027 COMPLETE CBC AUTOMATED: CPT

## 2021-07-09 PROCEDURE — 93005 ELECTROCARDIOGRAM TRACING: CPT

## 2021-07-09 PROCEDURE — 83036 HEMOGLOBIN GLYCOSYLATED A1C: CPT

## 2021-07-09 PROCEDURE — 85610 PROTHROMBIN TIME: CPT

## 2021-07-09 PROCEDURE — 85730 THROMBOPLASTIN TIME PARTIAL: CPT

## 2021-07-09 PROCEDURE — 86850 RBC ANTIBODY SCREEN: CPT

## 2021-07-09 PROCEDURE — 87081 CULTURE SCREEN ONLY: CPT

## 2021-07-09 PROCEDURE — U0005 INFEC AGEN DETEC AMPLI PROBE: HCPCS

## 2021-07-09 PROCEDURE — U0003 INFECTIOUS AGENT DETECTION BY NUCLEIC ACID (DNA OR RNA); SEVERE ACUTE RESPIRATORY SYNDROME CORONAVIRUS 2 (SARS-COV-2) (CORONAVIRUS DISEASE [COVID-19]), AMPLIFIED PROBE TECHNIQUE, MAKING USE OF HIGH THROUGHPUT TECHNOLOGIES AS DESCRIBED BY CMS-2020-01-R: HCPCS

## 2021-07-09 PROCEDURE — 86923 COMPATIBILITY TEST ELECTRIC: CPT

## 2021-07-09 PROCEDURE — 86901 BLOOD TYPING SEROLOGIC RH(D): CPT

## 2021-07-09 PROCEDURE — 36415 COLL VENOUS BLD VENIPUNCTURE: CPT

## 2021-07-09 PROCEDURE — 93010 ELECTROCARDIOGRAM REPORT: CPT | Performed by: INTERNAL MEDICINE

## 2021-07-09 PROCEDURE — 86900 BLOOD TYPING SEROLOGIC ABO: CPT

## 2021-07-09 PROCEDURE — 81001 URINALYSIS AUTO W/SCOPE: CPT

## 2021-07-09 RX ORDER — SODIUM CHLORIDE 0.9 % (FLUSH) 0.9 %
10 SYRINGE (ML) INJECTION PRN
Status: CANCELLED | OUTPATIENT
Start: 2021-07-09

## 2021-07-09 RX ORDER — CHLORHEXIDINE GLUCONATE 4 G/100ML
SOLUTION TOPICAL ONCE
Status: CANCELLED | OUTPATIENT
Start: 2021-07-09 | End: 2021-07-09

## 2021-07-09 RX ORDER — SODIUM CHLORIDE 9 MG/ML
25 INJECTION, SOLUTION INTRAVENOUS PRN
Status: CANCELLED | OUTPATIENT
Start: 2021-07-09

## 2021-07-09 RX ORDER — SODIUM CHLORIDE 0.9 % (FLUSH) 0.9 %
10 SYRINGE (ML) INJECTION EVERY 12 HOURS SCHEDULED
Status: CANCELLED | OUTPATIENT
Start: 2021-07-09

## 2021-07-09 RX ORDER — SODIUM CHLORIDE 9 MG/ML
INJECTION, SOLUTION INTRAVENOUS CONTINUOUS
Status: CANCELLED | OUTPATIENT
Start: 2021-07-09

## 2021-07-09 RX ORDER — CHLORHEXIDINE GLUCONATE 0.12 MG/ML
15 RINSE ORAL ONCE
Status: CANCELLED | OUTPATIENT
Start: 2021-07-09 | End: 2021-07-09

## 2021-07-09 NOTE — TELEPHONE ENCOUNTER
Left vm for patient instructing her to hold warfarin starting today in preparation for TAVR scheduled 7/14/21. INR 3.3 today. Asked her to call me back to confirm receipt of message.

## 2021-07-09 NOTE — TELEPHONE ENCOUNTER
Alfonza Croon called , her mother has rescheduled surgery for 7/14/21  Sia Gallego is going to fax [place] paperwork to us. Albert Garcia gets two weeks paid time, but can have up to 8 weeks unpaid in case of a problem with mom with recovery.   Albert Garcia states her manager reminded her to have that addressed  in paperwork

## 2021-07-10 LAB — MRSA CULTURE ONLY: NORMAL

## 2021-07-11 LAB
SARS-COV-2, PCR: NOT DETECTED
URINE CULTURE, ROUTINE: NORMAL

## 2021-07-12 NOTE — PROGRESS NOTES
Spoke with patient discussed with her surgery scheduled for 7/14 time has been changed to 7:30 am instructed patient to arrive at 5 am. Patient verbalized understanding.

## 2021-07-13 ENCOUNTER — ANESTHESIA EVENT (OUTPATIENT)
Dept: OPERATING ROOM | Age: 76
DRG: 267 | End: 2021-07-13
Payer: MEDICARE

## 2021-07-13 NOTE — H&P
Patient name: Jessica Bender    Reason for admission: TAVR    Admitting Physician: Lore Zavala MD    Primary Care Physician: Meche Melendez DO    Date of service: 7/14/2021    Chief Complaint: severe, symptomatic AS    HPI: Mrs. 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 Over the past year, she has had progressive dyspnea on exertion. She cannot climb the stairs because of shortness of breath. She cannot do grocery shopping because she is short of breath. Last month she was admitted because of weakness and was diagnosed with congestive heart failure with chest x-ray showing bilateral pleural effusions and marked pulmonary venous hypertension. 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 She denies syncope and presyncope. She denies palpitations and chest pain. She is very compliant with her medications. After heart team discussion, she was felt best served with TAVR initially, with mitral valve re intervention as needed if symptoms persist. Cardiac catheterization revealed normal coronary arteries. STS risk score is calculated at 3.4% for AVR by redo sternotomy. Allergies: Allergies   Allergen Reactions    Lactose Intolerance (Gi) Diarrhea and Nausea And Vomiting    Phenobarbital Rash       Home medications:    No current facility-administered medications for this encounter.      Current Outpatient Medications   Medication Sig Dispense Refill    brimonidine (ALPHAGAN) 0.2 % ophthalmic solution INSTILL 1 DROP EVERY DAY INTO LEFT EYE AT 8 AM.      latanoprost (XALATAN) 0.005 % ophthalmic solution INSTILL 1 DROP IN THE LEFT EYE EVERY DAY AT BEDTIME      furosemide (LASIX) 20 MG tablet Take 20mg alternating with 30mg every other day (one tablet alternating with 1 and 1/2 tabs every other day). 135 tablet 3    potassium chloride (KLOR-CON M) 10 MEQ extended release tablet Take 1 tab alternating with 1 and 1/2 tablet every other day (10 cecy alternating with 15 cecy every other day) 135 tablet 3    pantoprazole (PROTONIX) 40 MG tablet Take 1 tablet by mouth daily 90 tablet 3    simvastatin (ZOCOR) 20 MG tablet TAKE ONE TABLET BY MOUTH DAILY (Patient taking differently: Take 20 mg by mouth nightly ) 90 tablet 0    metoprolol tartrate (LOPRESSOR) 50 MG tablet One tab twice daily 60 tablet 3    warfarin (COUMADIN) 3 MG tablet Take one tablet daily 30 tablet 6    warfarin (COUMADIN) 1 MG tablet Take 1 mg every wed with coumadin 3 mg to equal 4 mg wed only (Patient taking differently: 1 mg Take 1 mg every Mon with coumadin 3 mg to equal 4 mg. Monday only) 30 tablet 0    enalapril (VASOTEC) 10 MG tablet TAKE ONE TABLET BY MOUTH TWO TIMES A DAY (Patient taking differently: Takes 1 in AM and 1/2 tab in PM) 180 tablet 3       Past Medical History:  Past Medical History:   Diagnosis Date    Ambulates with cane     [facility]/2010    CAD (coronary artery disease) 11/22/2010    NO CARDIOLOGIST, F/U W/ pcp STABLE    CHF (congestive heart failure) ([facility] 11/22/2010    Diverticular disease 11/22/2010    Edentulous     does not wear dentures    Glaucoma     History of blood transfusion     once with CABG, once with a severe epistaxis.     Hyperlipidemia     Hypertension     Restless leg syndrome 11/22/2010    Valvular heart disease 11/22/2010       Past Surgical History:  Past Surgical History:   Procedure Laterality Date    CARDIAC CATHETERIZATION  2007    CARDIAC CATHETERIZATION Right 05/07/2021    Right Heart cath no stents Dr Eva Tejada Right 08/24/2016    trabulectomy    COLONOSCOPY      CORONARY ARTERY BYPASS GRAFT      TONSILLECTOMY      TRANSESOPHAGEAL ECHOCARDIOGRAM 05/07/2021    Dr Meg Hall         Social History:  Social History     Socioeconomic History    Marital status:      Spouse name: Not on file    Number of children: Not on file    Years of education: Not on file    Highest education level: Not on file   Occupational History    Not on file   Tobacco Use    Smoking status: Never Smoker    Smokeless tobacco: Never Used   Substance and Sexual Activity    Alcohol use: No    Drug use: No    Sexual activity: Never   Other Topics Concern    Not on file   Social History Narrative    Not on file     Social Determinants of Health     Financial Resource Strain: Low Risk     Difficulty of Paying Living Expenses: Not very hard   Food Insecurity: No Food Insecurity    Worried About Running Out of Food in the Last Year: Never true    Ashley of Food in the Last Year: Never true   Transportation Needs: No Transportation Needs    Lack of Transportation (Medical): No    Lack of Transportation (Non-Medical): No   Physical Activity:     Days of Exercise per Week:     Minutes of Exercise per Session:    Stress:     Feeling of Stress :    Social Connections:     Frequency of Communication with Friends and Family:     Frequency of Social Gatherings with Friends and Family:     Attends Christian Services:     Active Member of Clubs or Organizations:     Attends Club or Organization Meetings:     Marital Status:    Intimate Partner Violence:     Fear of Current or Ex-Partner:     Emotionally Abused:     Physically Abused:     Sexually Abused:        Family History:  Family History   Problem Relation Age of Onset    Heart Disease Mother     Heart Disease Father        Review of Systems:  Constitutional: Denies fevers, chills, or weight loss. HEENT: Denies visual changes or hearing loss. Heart: As per HPI. Lungs: Denies shortness of breath, cough, or wheezing. Gastrointestinal: Denies nausea, vomiting, constipation, or diarrhea. Genitourinary: dysuria or hematuria. Psychiatric: Patient denies anxiety or depression. Neurologic: Patient denies weakness of the extremities, dizziness, or headaches. All other ROS checked and found to be negative. Objective:  Vitals There were no vitals taken for this visit. General Appearance: Pleasant 77y.o. year old female who appears stated age. Communicates well, no acute distress. HEENT: Head is normocephalic, atraumatic. EOMs intact, PERRL. Trachea midline. Lungs: Normal respiratory rate and normal effort. She is not in respiratory distress. Breath sounds clear to auscultation. No wheezes. Heart: Normal rate. Regular rhythm. S1 normal and S2 normal. Positive for murmur. Chest: Symmetric chest wall expansion. Extremities: Normal range of motion. Neurological: Patient is alert and oriented to person, place and time. Skin: Warm and dry. Abdomen: Abdomen is soft and non-distended. Bowel sounds are normal.   Pulses: Distal pulses are intact. Skin: Warm and dry without lesions. Assessment:   1. Severe, symptomatic AS  2. Severe, bioprosthetic MS - patient prosthesis mismatch  3. Permanent atrial fibrillation - warfarin  4. HTN  5. HLD  6. RBBB      Plan:   1.  TAVR - right femoral cutdown approach      Electronically signed by Guero Cox PA-C on 7/13/2021 at 5:19 PM

## 2021-07-14 ENCOUNTER — ANESTHESIA (OUTPATIENT)
Dept: OPERATING ROOM | Age: 76
DRG: 267 | End: 2021-07-14
Payer: MEDICARE

## 2021-07-14 ENCOUNTER — HOSPITAL ENCOUNTER (INPATIENT)
Age: 76
LOS: 2 days | Discharge: HOME | DRG: 267 | End: 2021-07-16
Attending: INTERNAL MEDICINE | Admitting: INTERNAL MEDICINE
Payer: MEDICARE

## 2021-07-14 VITALS
SYSTOLIC BLOOD PRESSURE: 175 MMHG | RESPIRATION RATE: 2 BRPM | OXYGEN SATURATION: 100 % | DIASTOLIC BLOOD PRESSURE: 108 MMHG

## 2021-07-14 DIAGNOSIS — U07.1 COVID-19: Primary | ICD-10-CM

## 2021-07-14 LAB
ANION GAP SERPL CALCULATED.3IONS-SCNC: 10 MMOL/L (ref 7–16)
BLOOD BANK DISPENSE STATUS: NORMAL
BLOOD BANK PRODUCT CODE: NORMAL
BPU ID: NORMAL
BUN BLDV-MCNC: 10 MG/DL (ref 6–23)
CALCIUM SERPL-MCNC: 8.6 MG/DL (ref 8.6–10.2)
CHLORIDE BLD-SCNC: 104 MMOL/L (ref 98–107)
CO2: 24 MMOL/L (ref 22–29)
CREAT SERPL-MCNC: 0.7 MG/DL (ref 0.5–1)
DESCRIPTION BLOOD BANK: NORMAL
GFR AFRICAN AMERICAN: >60
GFR NON-AFRICAN AMERICAN: >60 ML/MIN/1.73
GLUCOSE BLD-MCNC: 196 MG/DL (ref 74–99)
HCT VFR BLD CALC: 37.4 % (ref 34–48)
HEMOGLOBIN: 12.1 G/DL (ref 11.5–15.5)
INR BLD: 1.2
MCH RBC QN AUTO: 29.3 PG (ref 26–35)
MCHC RBC AUTO-ENTMCNC: 32.4 % (ref 32–34.5)
MCV RBC AUTO: 90.6 FL (ref 80–99.9)
METER GLUCOSE: 177 MG/DL (ref 74–99)
METER GLUCOSE: 198 MG/DL (ref 74–99)
METER GLUCOSE: 203 MG/DL (ref 74–99)
METER GLUCOSE: 212 MG/DL (ref 74–99)
PDW BLD-RTO: 12.9 FL (ref 11.5–15)
PLATELET # BLD: 231 E9/L (ref 130–450)
PMV BLD AUTO: 10.5 FL (ref 7–12)
POTASSIUM SERPL-SCNC: 3.9 MMOL/L (ref 3.5–5)
PROTHROMBIN TIME: 13.3 SEC (ref 9.3–12.4)
RBC # BLD: 4.13 E12/L (ref 3.5–5.5)
SODIUM BLD-SCNC: 138 MMOL/L (ref 132–146)
WBC # BLD: 9.9 E9/L (ref 4.5–11.5)

## 2021-07-14 PROCEDURE — 36415 COLL VENOUS BLD VENIPUNCTURE: CPT

## 2021-07-14 PROCEDURE — 02HV33Z INSERTION OF INFUSION DEVICE INTO SUPERIOR VENA CAVA, PERCUTANEOUS APPROACH: ICD-10-PCS | Performed by: INTERNAL MEDICINE

## 2021-07-14 PROCEDURE — 2500000003 HC RX 250 WO HCPCS: Performed by: INTERNAL MEDICINE

## 2021-07-14 PROCEDURE — 6360000002 HC RX W HCPCS

## 2021-07-14 PROCEDURE — 7100000000 HC PACU RECOVERY - FIRST 15 MIN

## 2021-07-14 PROCEDURE — 82962 GLUCOSE BLOOD TEST: CPT

## 2021-07-14 PROCEDURE — 2580000003 HC RX 258: Performed by: PHYSICIAN ASSISTANT

## 2021-07-14 PROCEDURE — 3700000001 HC ADD 15 MINUTES (ANESTHESIA): Performed by: INTERNAL MEDICINE

## 2021-07-14 PROCEDURE — 2709999900 HC NON-CHARGEABLE SUPPLY

## 2021-07-14 PROCEDURE — 3600000018 HC SURGERY OHS ADDTL 15MIN: Performed by: INTERNAL MEDICINE

## 2021-07-14 PROCEDURE — 2000000000 HC ICU R&B

## 2021-07-14 PROCEDURE — 2780000006 HC MISC HEART VALVE: Performed by: INTERNAL MEDICINE

## 2021-07-14 PROCEDURE — C1894 INTRO/SHEATH, NON-LASER: HCPCS | Performed by: INTERNAL MEDICINE

## 2021-07-14 PROCEDURE — 93321 DOPPLER ECHO F-UP/LMTD STD: CPT

## 2021-07-14 PROCEDURE — 6360000002 HC RX W HCPCS: Performed by: INTERNAL MEDICINE

## 2021-07-14 PROCEDURE — 2720000010 HC SURG SUPPLY STERILE: Performed by: INTERNAL MEDICINE

## 2021-07-14 PROCEDURE — 6370000000 HC RX 637 (ALT 250 FOR IP): Performed by: INTERNAL MEDICINE

## 2021-07-14 PROCEDURE — C1894 INTRO/SHEATH, NON-LASER: HCPCS

## 2021-07-14 PROCEDURE — 2500000003 HC RX 250 WO HCPCS

## 2021-07-14 PROCEDURE — 93312 ECHO TRANSESOPHAGEAL: CPT

## 2021-07-14 PROCEDURE — 6360000002 HC RX W HCPCS: Performed by: PHYSICIAN ASSISTANT

## 2021-07-14 PROCEDURE — C1769 GUIDE WIRE: HCPCS | Performed by: INTERNAL MEDICINE

## 2021-07-14 PROCEDURE — 6370000000 HC RX 637 (ALT 250 FOR IP): Performed by: PHYSICIAN ASSISTANT

## 2021-07-14 PROCEDURE — B3101ZZ FLUOROSCOPY OF THORACIC AORTA USING LOW OSMOLAR CONTRAST: ICD-10-PCS | Performed by: INTERNAL MEDICINE

## 2021-07-14 PROCEDURE — 85610 PROTHROMBIN TIME: CPT

## 2021-07-14 PROCEDURE — 2580000003 HC RX 258: Performed by: INTERNAL MEDICINE

## 2021-07-14 PROCEDURE — C1725 CATH, TRANSLUMIN NON-LASER: HCPCS

## 2021-07-14 PROCEDURE — 7100000001 HC PACU RECOVERY - ADDTL 15 MIN

## 2021-07-14 PROCEDURE — C1760 CLOSURE DEV, VASC: HCPCS | Performed by: INTERNAL MEDICINE

## 2021-07-14 PROCEDURE — 80048 BASIC METABOLIC PNL TOTAL CA: CPT

## 2021-07-14 PROCEDURE — 2709999900 HC NON-CHARGEABLE SUPPLY: Performed by: INTERNAL MEDICINE

## 2021-07-14 PROCEDURE — C1769 GUIDE WIRE: HCPCS

## 2021-07-14 PROCEDURE — 33362 REPLACE AORTIC VALVE OPEN: CPT | Performed by: INTERNAL MEDICINE

## 2021-07-14 PROCEDURE — 85347 COAGULATION TIME ACTIVATED: CPT

## 2021-07-14 PROCEDURE — 3600000008 HC SURGERY OHS BASE: Performed by: INTERNAL MEDICINE

## 2021-07-14 PROCEDURE — 85027 COMPLETE CBC AUTOMATED: CPT

## 2021-07-14 PROCEDURE — 3700000000 HC ANESTHESIA ATTENDED CARE: Performed by: INTERNAL MEDICINE

## 2021-07-14 PROCEDURE — 37799 UNLISTED PX VASCULAR SURGERY: CPT

## 2021-07-14 PROCEDURE — 93325 DOPPLER ECHO COLOR FLOW MAPG: CPT

## 2021-07-14 PROCEDURE — 02RF38Z REPLACEMENT OF AORTIC VALVE WITH ZOOPLASTIC TISSUE, PERCUTANEOUS APPROACH: ICD-10-PCS | Performed by: INTERNAL MEDICINE

## 2021-07-14 DEVICE — VLV EVPROPLUS-29 COMM US
Type: IMPLANTABLE DEVICE | Status: FUNCTIONAL
Brand: EVOLUT™ PRO+

## 2021-07-14 DEVICE — CATHETER PACE 5FR L110CM 6FR INTRO D10CM 1MM SPACE POLYUR: Type: IMPLANTABLE DEVICE | Status: FUNCTIONAL

## 2021-07-14 RX ORDER — NICOTINE POLACRILEX 4 MG
15 LOZENGE BUCCAL PRN
Status: DISCONTINUED | OUTPATIENT
Start: 2021-07-14 | End: 2021-07-16 | Stop reason: HOSPADM

## 2021-07-14 RX ORDER — ONDANSETRON 2 MG/ML
4 INJECTION INTRAMUSCULAR; INTRAVENOUS EVERY 8 HOURS PRN
Status: DISCONTINUED | OUTPATIENT
Start: 2021-07-14 | End: 2021-07-16 | Stop reason: HOSPADM

## 2021-07-14 RX ORDER — ASPIRIN 81 MG/1
81 TABLET ORAL DAILY
Status: DISCONTINUED | OUTPATIENT
Start: 2021-07-14 | End: 2021-07-16 | Stop reason: HOSPADM

## 2021-07-14 RX ORDER — WARFARIN SODIUM 5 MG/1
5 TABLET ORAL ONCE
Status: COMPLETED | OUTPATIENT
Start: 2021-07-14 | End: 2021-07-14

## 2021-07-14 RX ORDER — SODIUM CHLORIDE 0.9 % (FLUSH) 0.9 %
10 SYRINGE (ML) INJECTION PRN
Status: DISCONTINUED | OUTPATIENT
Start: 2021-07-14 | End: 2021-07-14

## 2021-07-14 RX ORDER — SODIUM CHLORIDE 0.9 % (FLUSH) 0.9 %
5-40 SYRINGE (ML) INJECTION PRN
Status: DISCONTINUED | OUTPATIENT
Start: 2021-07-14 | End: 2021-07-16 | Stop reason: HOSPADM

## 2021-07-14 RX ORDER — OXYCODONE HYDROCHLORIDE AND ACETAMINOPHEN 5; 325 MG/1; MG/1
1 TABLET ORAL EVERY 4 HOURS PRN
Status: DISCONTINUED | OUTPATIENT
Start: 2021-07-14 | End: 2021-07-16 | Stop reason: HOSPADM

## 2021-07-14 RX ORDER — ONDANSETRON 2 MG/ML
INJECTION INTRAMUSCULAR; INTRAVENOUS PRN
Status: DISCONTINUED | OUTPATIENT
Start: 2021-07-14 | End: 2021-07-14 | Stop reason: SDUPTHER

## 2021-07-14 RX ORDER — AMLODIPINE BESYLATE 5 MG/1
5 TABLET ORAL DAILY
Status: DISCONTINUED | OUTPATIENT
Start: 2021-07-15 | End: 2021-07-16

## 2021-07-14 RX ORDER — GLYCOPYRROLATE 1 MG/5 ML
SYRINGE (ML) INTRAVENOUS PRN
Status: DISCONTINUED | OUTPATIENT
Start: 2021-07-14 | End: 2021-07-14 | Stop reason: SDUPTHER

## 2021-07-14 RX ORDER — SODIUM CHLORIDE 0.9 % (FLUSH) 0.9 %
5-40 SYRINGE (ML) INJECTION EVERY 12 HOURS SCHEDULED
Status: DISCONTINUED | OUTPATIENT
Start: 2021-07-14 | End: 2021-07-16 | Stop reason: HOSPADM

## 2021-07-14 RX ORDER — SODIUM CHLORIDE 9 MG/ML
25 INJECTION, SOLUTION INTRAVENOUS PRN
Status: DISCONTINUED | OUTPATIENT
Start: 2021-07-14 | End: 2021-07-16 | Stop reason: HOSPADM

## 2021-07-14 RX ORDER — MIDAZOLAM HYDROCHLORIDE 1 MG/ML
INJECTION INTRAMUSCULAR; INTRAVENOUS PRN
Status: DISCONTINUED | OUTPATIENT
Start: 2021-07-14 | End: 2021-07-14 | Stop reason: SDUPTHER

## 2021-07-14 RX ORDER — SODIUM CHLORIDE 9 MG/ML
25 INJECTION, SOLUTION INTRAVENOUS PRN
Status: DISCONTINUED | OUTPATIENT
Start: 2021-07-14 | End: 2021-07-14

## 2021-07-14 RX ORDER — FUROSEMIDE 20 MG/1
20 TABLET ORAL DAILY
Status: DISCONTINUED | OUTPATIENT
Start: 2021-07-14 | End: 2021-07-16 | Stop reason: HOSPADM

## 2021-07-14 RX ORDER — FENTANYL CITRATE 50 UG/ML
INJECTION, SOLUTION INTRAMUSCULAR; INTRAVENOUS PRN
Status: DISCONTINUED | OUTPATIENT
Start: 2021-07-14 | End: 2021-07-14 | Stop reason: SDUPTHER

## 2021-07-14 RX ORDER — ENALAPRIL MALEATE 10 MG/1
10 TABLET ORAL 2 TIMES DAILY
Status: DISCONTINUED | OUTPATIENT
Start: 2021-07-14 | End: 2021-07-16 | Stop reason: HOSPADM

## 2021-07-14 RX ORDER — NITROGLYCERIN 5 MG/ML
INJECTION, SOLUTION INTRAVENOUS PRN
Status: DISCONTINUED | OUTPATIENT
Start: 2021-07-14 | End: 2021-07-14 | Stop reason: SDUPTHER

## 2021-07-14 RX ORDER — BUPIVACAINE HYDROCHLORIDE AND EPINEPHRINE 5; 5 MG/ML; UG/ML
INJECTION, SOLUTION EPIDURAL; INTRACAUDAL; PERINEURAL PRN
Status: DISCONTINUED | OUTPATIENT
Start: 2021-07-14 | End: 2021-07-14 | Stop reason: ALTCHOICE

## 2021-07-14 RX ORDER — ROCURONIUM BROMIDE 10 MG/ML
INJECTION, SOLUTION INTRAVENOUS PRN
Status: DISCONTINUED | OUTPATIENT
Start: 2021-07-14 | End: 2021-07-14 | Stop reason: SDUPTHER

## 2021-07-14 RX ORDER — HEPARIN SODIUM 1000 [USP'U]/ML
INJECTION, SOLUTION INTRAVENOUS; SUBCUTANEOUS PRN
Status: DISCONTINUED | OUTPATIENT
Start: 2021-07-14 | End: 2021-07-14 | Stop reason: SDUPTHER

## 2021-07-14 RX ORDER — ATORVASTATIN CALCIUM 10 MG/1
10 TABLET, FILM COATED ORAL DAILY
Status: DISCONTINUED | OUTPATIENT
Start: 2021-07-14 | End: 2021-07-16 | Stop reason: HOSPADM

## 2021-07-14 RX ORDER — CHLORHEXIDINE GLUCONATE 4 G/100ML
SOLUTION TOPICAL ONCE
Status: DISCONTINUED | OUTPATIENT
Start: 2021-07-14 | End: 2021-07-14

## 2021-07-14 RX ORDER — NEOSTIGMINE METHYLSULFATE 1 MG/ML
INJECTION, SOLUTION INTRAVENOUS PRN
Status: DISCONTINUED | OUTPATIENT
Start: 2021-07-14 | End: 2021-07-14 | Stop reason: SDUPTHER

## 2021-07-14 RX ORDER — PROPOFOL 10 MG/ML
INJECTION, EMULSION INTRAVENOUS PRN
Status: DISCONTINUED | OUTPATIENT
Start: 2021-07-14 | End: 2021-07-14 | Stop reason: SDUPTHER

## 2021-07-14 RX ORDER — POTASSIUM CHLORIDE 20 MEQ/1
10 TABLET, EXTENDED RELEASE ORAL DAILY
Status: DISCONTINUED | OUTPATIENT
Start: 2021-07-14 | End: 2021-07-16 | Stop reason: HOSPADM

## 2021-07-14 RX ORDER — BRIMONIDINE TARTRATE 2 MG/ML
1 SOLUTION/ DROPS OPHTHALMIC DAILY
Status: DISCONTINUED | OUTPATIENT
Start: 2021-07-14 | End: 2021-07-16 | Stop reason: HOSPADM

## 2021-07-14 RX ORDER — SODIUM CHLORIDE 9 MG/ML
INJECTION, SOLUTION INTRAVENOUS CONTINUOUS
Status: DISCONTINUED | OUTPATIENT
Start: 2021-07-14 | End: 2021-07-14

## 2021-07-14 RX ORDER — ESMOLOL HYDROCHLORIDE 10 MG/ML
INJECTION INTRAVENOUS PRN
Status: DISCONTINUED | OUTPATIENT
Start: 2021-07-14 | End: 2021-07-14 | Stop reason: SDUPTHER

## 2021-07-14 RX ORDER — ETOMIDATE 2 MG/ML
INJECTION INTRAVENOUS PRN
Status: DISCONTINUED | OUTPATIENT
Start: 2021-07-14 | End: 2021-07-14 | Stop reason: SDUPTHER

## 2021-07-14 RX ORDER — PROTAMINE SULFATE 10 MG/ML
INJECTION, SOLUTION INTRAVENOUS PRN
Status: DISCONTINUED | OUTPATIENT
Start: 2021-07-14 | End: 2021-07-14 | Stop reason: SDUPTHER

## 2021-07-14 RX ORDER — SODIUM CHLORIDE 0.9 % (FLUSH) 0.9 %
10 SYRINGE (ML) INJECTION EVERY 12 HOURS SCHEDULED
Status: DISCONTINUED | OUTPATIENT
Start: 2021-07-14 | End: 2021-07-14

## 2021-07-14 RX ORDER — LIDOCAINE HYDROCHLORIDE 20 MG/ML
INJECTION, SOLUTION INTRAVENOUS PRN
Status: DISCONTINUED | OUTPATIENT
Start: 2021-07-14 | End: 2021-07-14 | Stop reason: SDUPTHER

## 2021-07-14 RX ORDER — CHLORHEXIDINE GLUCONATE 0.12 MG/ML
15 RINSE ORAL ONCE
Status: COMPLETED | OUTPATIENT
Start: 2021-07-14 | End: 2021-07-14

## 2021-07-14 RX ORDER — DEXTROSE MONOHYDRATE 50 MG/ML
100 INJECTION, SOLUTION INTRAVENOUS PRN
Status: DISCONTINUED | OUTPATIENT
Start: 2021-07-14 | End: 2021-07-16 | Stop reason: HOSPADM

## 2021-07-14 RX ORDER — LATANOPROST 50 UG/ML
1 SOLUTION/ DROPS OPHTHALMIC NIGHTLY
Status: DISCONTINUED | OUTPATIENT
Start: 2021-07-14 | End: 2021-07-16 | Stop reason: HOSPADM

## 2021-07-14 RX ORDER — PANTOPRAZOLE SODIUM 40 MG/1
40 TABLET, DELAYED RELEASE ORAL DAILY
Status: DISCONTINUED | OUTPATIENT
Start: 2021-07-15 | End: 2021-07-16 | Stop reason: HOSPADM

## 2021-07-14 RX ORDER — DEXAMETHASONE SODIUM PHOSPHATE 10 MG/ML
INJECTION INTRAMUSCULAR; INTRAVENOUS PRN
Status: DISCONTINUED | OUTPATIENT
Start: 2021-07-14 | End: 2021-07-14 | Stop reason: SDUPTHER

## 2021-07-14 RX ORDER — PHENYLEPHRINE HYDROCHLORIDE 10 MG/ML
INJECTION INTRAVENOUS PRN
Status: DISCONTINUED | OUTPATIENT
Start: 2021-07-14 | End: 2021-07-14 | Stop reason: SDUPTHER

## 2021-07-14 RX ORDER — DEXTROSE MONOHYDRATE 25 G/50ML
12.5 INJECTION, SOLUTION INTRAVENOUS PRN
Status: DISCONTINUED | OUTPATIENT
Start: 2021-07-14 | End: 2021-07-16 | Stop reason: HOSPADM

## 2021-07-14 RX ADMIN — ESMOLOL HYDROCHLORIDE 30 MG: 10 INJECTION, SOLUTION INTRAVENOUS at 09:39

## 2021-07-14 RX ADMIN — ENALAPRIL MALEATE 10 MG: 10 TABLET ORAL at 19:52

## 2021-07-14 RX ADMIN — FUROSEMIDE 20 MG: 20 TABLET ORAL at 12:04

## 2021-07-14 RX ADMIN — FENTANYL CITRATE 25 MCG: 50 INJECTION, SOLUTION INTRAMUSCULAR; INTRAVENOUS at 07:37

## 2021-07-14 RX ADMIN — ESMOLOL HYDROCHLORIDE 20 MG: 10 INJECTION, SOLUTION INTRAVENOUS at 09:42

## 2021-07-14 RX ADMIN — POTASSIUM CHLORIDE 10 MEQ: 1500 TABLET, EXTENDED RELEASE ORAL at 18:01

## 2021-07-14 RX ADMIN — ONDANSETRON 4 MG: 2 INJECTION INTRAMUSCULAR; INTRAVENOUS at 22:13

## 2021-07-14 RX ADMIN — NITROGLYCERIN 100 MCG: 5 INJECTION, SOLUTION INTRAVENOUS at 09:34

## 2021-07-14 RX ADMIN — INSULIN LISPRO 4 UNITS: 100 INJECTION, SOLUTION INTRAVENOUS; SUBCUTANEOUS at 11:06

## 2021-07-14 RX ADMIN — ENALAPRIL MALEATE 10 MG: 10 TABLET ORAL at 12:05

## 2021-07-14 RX ADMIN — OXYCODONE AND ACETAMINOPHEN 1 TABLET: 5; 325 TABLET ORAL at 19:51

## 2021-07-14 RX ADMIN — MIDAZOLAM 1 MG: 1 INJECTION INTRAMUSCULAR; INTRAVENOUS at 07:18

## 2021-07-14 RX ADMIN — DEXAMETHASONE SODIUM PHOSPHATE 10 MG: 10 INJECTION INTRAMUSCULAR; INTRAVENOUS at 07:41

## 2021-07-14 RX ADMIN — SODIUM CHLORIDE, PRESERVATIVE FREE 10 ML: 5 INJECTION INTRAVENOUS at 19:52

## 2021-07-14 RX ADMIN — ONDANSETRON HYDROCHLORIDE 4 MG: 2 INJECTION, SOLUTION INTRAMUSCULAR; INTRAVENOUS at 09:26

## 2021-07-14 RX ADMIN — INSULIN LISPRO 1 UNITS: 100 INJECTION, SOLUTION INTRAVENOUS; SUBCUTANEOUS at 19:55

## 2021-07-14 RX ADMIN — SODIUM CHLORIDE: 9 INJECTION, SOLUTION INTRAVENOUS at 05:50

## 2021-07-14 RX ADMIN — PROTAMINE SULFATE 100 MG: 10 INJECTION, SOLUTION INTRAVENOUS at 09:14

## 2021-07-14 RX ADMIN — Medication 3 MG: at 09:31

## 2021-07-14 RX ADMIN — BRIMONIDINE TARTRATE 1 DROP: 2 SOLUTION/ DROPS OPHTHALMIC at 13:00

## 2021-07-14 RX ADMIN — MIDAZOLAM 1 MG: 1 INJECTION INTRAMUSCULAR; INTRAVENOUS at 07:12

## 2021-07-14 RX ADMIN — OXYCODONE AND ACETAMINOPHEN 1 TABLET: 5; 325 TABLET ORAL at 15:46

## 2021-07-14 RX ADMIN — SODIUM CHLORIDE 5 MG/HR: 9 INJECTION, SOLUTION INTRAVENOUS at 10:48

## 2021-07-14 RX ADMIN — NITROGLYCERIN 50 MCG: 5 INJECTION, SOLUTION INTRAVENOUS at 09:28

## 2021-07-14 RX ADMIN — LATANOPROST 1 DROP: 50 SOLUTION OPHTHALMIC at 19:52

## 2021-07-14 RX ADMIN — METOPROLOL TARTRATE 50 MG: 25 TABLET, FILM COATED ORAL at 11:06

## 2021-07-14 RX ADMIN — Medication 0.6 MG: at 09:31

## 2021-07-14 RX ADMIN — MUPIROCIN: 20 OINTMENT TOPICAL at 05:49

## 2021-07-14 RX ADMIN — WARFARIN SODIUM 5 MG: 5 TABLET ORAL at 20:32

## 2021-07-14 RX ADMIN — LIDOCAINE HYDROCHLORIDE 100 MG: 20 INJECTION, SOLUTION INTRAVENOUS at 07:41

## 2021-07-14 RX ADMIN — NITROGLYCERIN 100 MCG: 5 INJECTION, SOLUTION INTRAVENOUS at 09:31

## 2021-07-14 RX ADMIN — NITROGLYCERIN 200 MCG: 5 INJECTION, SOLUTION INTRAVENOUS at 09:46

## 2021-07-14 RX ADMIN — SODIUM CHLORIDE, PRESERVATIVE FREE 10 ML: 5 INJECTION INTRAVENOUS at 11:07

## 2021-07-14 RX ADMIN — ESMOLOL HYDROCHLORIDE 50 MG: 10 INJECTION, SOLUTION INTRAVENOUS at 09:50

## 2021-07-14 RX ADMIN — ATORVASTATIN CALCIUM 10 MG: 10 TABLET, FILM COATED ORAL at 12:04

## 2021-07-14 RX ADMIN — FENTANYL CITRATE 75 MCG: 50 INJECTION, SOLUTION INTRAMUSCULAR; INTRAVENOUS at 07:41

## 2021-07-14 RX ADMIN — PHENYLEPHRINE HYDROCHLORIDE 50 MCG: 10 INJECTION INTRAVENOUS at 08:53

## 2021-07-14 RX ADMIN — ROCURONIUM BROMIDE 50 MG: 10 INJECTION, SOLUTION INTRAVENOUS at 07:41

## 2021-07-14 RX ADMIN — ENOXAPARIN SODIUM 105 MG: 120 INJECTION SUBCUTANEOUS at 17:54

## 2021-07-14 RX ADMIN — OXYCODONE AND ACETAMINOPHEN 1 TABLET: 5; 325 TABLET ORAL at 11:22

## 2021-07-14 RX ADMIN — NITROGLYCERIN 50 MCG: 5 INJECTION, SOLUTION INTRAVENOUS at 09:21

## 2021-07-14 RX ADMIN — ASPIRIN 325 MG: 325 TABLET, COATED ORAL at 12:04

## 2021-07-14 RX ADMIN — METOPROLOL TARTRATE 50 MG: 25 TABLET, FILM COATED ORAL at 19:51

## 2021-07-14 RX ADMIN — NITROGLYCERIN 200 MCG: 5 INJECTION, SOLUTION INTRAVENOUS at 09:39

## 2021-07-14 RX ADMIN — 0.12% CHLORHEXIDINE GLUCONATE 15 ML: 1.2 RINSE ORAL at 05:49

## 2021-07-14 RX ADMIN — NITROGLYCERIN 50 MCG: 5 INJECTION, SOLUTION INTRAVENOUS at 09:24

## 2021-07-14 RX ADMIN — ETOMIDATE 18 MG: 2 INJECTION, SOLUTION INTRAVENOUS at 07:41

## 2021-07-14 RX ADMIN — ROCURONIUM BROMIDE 20 MG: 10 INJECTION, SOLUTION INTRAVENOUS at 08:27

## 2021-07-14 RX ADMIN — HEPARIN SODIUM 11000 UNITS: 1000 INJECTION INTRAVENOUS; SUBCUTANEOUS at 08:29

## 2021-07-14 RX ADMIN — Medication 2000 MG: at 07:46

## 2021-07-14 RX ADMIN — INSULIN LISPRO 4 UNITS: 100 INJECTION, SOLUTION INTRAVENOUS; SUBCUTANEOUS at 17:52

## 2021-07-14 RX ADMIN — PROPOFOL 30 MG: 10 INJECTION, EMULSION INTRAVENOUS at 07:41

## 2021-07-14 RX ADMIN — Medication 2000 MG: at 15:47

## 2021-07-14 ASSESSMENT — PULMONARY FUNCTION TESTS
PIF_VALUE: 29
PIF_VALUE: 30
PIF_VALUE: 29
PIF_VALUE: 29
PIF_VALUE: 1
PIF_VALUE: 27
PIF_VALUE: 2
PIF_VALUE: 5
PIF_VALUE: 28
PIF_VALUE: 1
PIF_VALUE: 2
PIF_VALUE: 1
PIF_VALUE: 26
PIF_VALUE: 2
PIF_VALUE: 26
PIF_VALUE: 13
PIF_VALUE: 15
PIF_VALUE: 28
PIF_VALUE: 21
PIF_VALUE: 9
PIF_VALUE: 4
PIF_VALUE: 28
PIF_VALUE: 26
PIF_VALUE: 2
PIF_VALUE: 28
PIF_VALUE: 29
PIF_VALUE: 2
PIF_VALUE: 21
PIF_VALUE: 29
PIF_VALUE: 29
PIF_VALUE: 2
PIF_VALUE: 14
PIF_VALUE: 13
PIF_VALUE: 29
PIF_VALUE: 21
PIF_VALUE: 26
PIF_VALUE: 16
PIF_VALUE: 0
PIF_VALUE: 21
PIF_VALUE: 6
PIF_VALUE: 5
PIF_VALUE: 21
PIF_VALUE: 28
PIF_VALUE: 28
PIF_VALUE: 31
PIF_VALUE: 28
PIF_VALUE: 29
PIF_VALUE: 21
PIF_VALUE: 13
PIF_VALUE: 28
PIF_VALUE: 2
PIF_VALUE: 28
PIF_VALUE: 5
PIF_VALUE: 26
PIF_VALUE: 28
PIF_VALUE: 2
PIF_VALUE: 29
PIF_VALUE: 29
PIF_VALUE: 27
PIF_VALUE: 2
PIF_VALUE: 28
PIF_VALUE: 21
PIF_VALUE: 2
PIF_VALUE: 29
PIF_VALUE: 28
PIF_VALUE: 5
PIF_VALUE: 28
PIF_VALUE: 29
PIF_VALUE: 30
PIF_VALUE: 5
PIF_VALUE: 28
PIF_VALUE: 21
PIF_VALUE: 28
PIF_VALUE: 28
PIF_VALUE: 29
PIF_VALUE: 2
PIF_VALUE: 29
PIF_VALUE: 1
PIF_VALUE: 1
PIF_VALUE: 2
PIF_VALUE: 30
PIF_VALUE: 28
PIF_VALUE: 29
PIF_VALUE: 2
PIF_VALUE: 28
PIF_VALUE: 29
PIF_VALUE: 29
PIF_VALUE: 2
PIF_VALUE: 28
PIF_VALUE: 28
PIF_VALUE: 6
PIF_VALUE: 26
PIF_VALUE: 1
PIF_VALUE: 28
PIF_VALUE: 29
PIF_VALUE: 29
PIF_VALUE: 2
PIF_VALUE: 0
PIF_VALUE: 2
PIF_VALUE: 2
PIF_VALUE: 27
PIF_VALUE: 30
PIF_VALUE: 13
PIF_VALUE: 5
PIF_VALUE: 2
PIF_VALUE: 29
PIF_VALUE: 28
PIF_VALUE: 21
PIF_VALUE: 26
PIF_VALUE: 29
PIF_VALUE: 28
PIF_VALUE: 29
PIF_VALUE: 1
PIF_VALUE: 21
PIF_VALUE: 29
PIF_VALUE: 29
PIF_VALUE: 5
PIF_VALUE: 29
PIF_VALUE: 29
PIF_VALUE: 2
PIF_VALUE: 29
PIF_VALUE: 2
PIF_VALUE: 15
PIF_VALUE: 28
PIF_VALUE: 29
PIF_VALUE: 29
PIF_VALUE: 28
PIF_VALUE: 29
PIF_VALUE: 13
PIF_VALUE: 28
PIF_VALUE: 4
PIF_VALUE: 28
PIF_VALUE: 28
PIF_VALUE: 15
PIF_VALUE: 30
PIF_VALUE: 28
PIF_VALUE: 28
PIF_VALUE: 15
PIF_VALUE: 21
PIF_VALUE: 28
PIF_VALUE: 5
PIF_VALUE: 28
PIF_VALUE: 17

## 2021-07-14 ASSESSMENT — PAIN DESCRIPTION - PAIN TYPE
TYPE: ACUTE PAIN
TYPE: ACUTE PAIN;SURGICAL PAIN

## 2021-07-14 ASSESSMENT — PAIN - FUNCTIONAL ASSESSMENT
PAIN_FUNCTIONAL_ASSESSMENT: ACTIVITIES ARE NOT PREVENTED
PAIN_FUNCTIONAL_ASSESSMENT: 0-10

## 2021-07-14 ASSESSMENT — PAIN DESCRIPTION - ORIENTATION
ORIENTATION: RIGHT;LEFT
ORIENTATION: RIGHT;LEFT

## 2021-07-14 ASSESSMENT — PAIN SCALES - GENERAL
PAINLEVEL_OUTOF10: 1
PAINLEVEL_OUTOF10: 0
PAINLEVEL_OUTOF10: 8
PAINLEVEL_OUTOF10: 8
PAINLEVEL_OUTOF10: 6
PAINLEVEL_OUTOF10: 0
PAINLEVEL_OUTOF10: 0

## 2021-07-14 ASSESSMENT — PAIN DESCRIPTION - DESCRIPTORS
DESCRIPTORS: DISCOMFORT
DESCRIPTORS: DISCOMFORT

## 2021-07-14 ASSESSMENT — PAIN DESCRIPTION - LOCATION
LOCATION: BACK;LEG
LOCATION: BACK;LEG

## 2021-07-14 ASSESSMENT — LIFESTYLE VARIABLES: SMOKING_STATUS: 0

## 2021-07-14 NOTE — PROGRESS NOTES
Admitted to Same Day Surgery. Preop instructions given to patient. COVID testing completed on: 7/9/21  Results of the test: not detected  The patient verbally confirms that they did adhere to the self-quarantine guidelines. No signs or symptoms expressed or observed.

## 2021-07-14 NOTE — PLAN OF CARE
Problem: Cardiac:  Goal: Ability to achieve and maintain adequate cardiopulmonary perfusion will improve  Description: Ability to achieve and maintain adequate cardiopulmonary perfusion will improve  Outcome: Met This Shift  Goal: Ability to maintain an adequate cardiac output will be supported  Description: Ability to maintain an adequate cardiac output will be supported  Outcome: Met This Shift     Problem: Coping:  Goal: Ability to identify and develop effective coping behavior will improve  Description: Ability to identify and develop effective coping behavior will improve  Outcome: Met This Shift  Goal: Level of anxiety will decrease  Description: Level of anxiety will decrease  Outcome: Met This Shift     Problem: Fluid Volume:  Goal: Ability to maintain a balanced intake and output will improve  Description: Ability to maintain a balanced intake and output will improve  Outcome: Met This Shift     Problem: Respiratory:  Goal: Ability to maintain adequate ventilation will improve  Description: Ability to maintain adequate ventilation will improve  Outcome: Met This Shift

## 2021-07-14 NOTE — OP NOTE
TRANSCATHETER AORTIC VALVE REPLACEMENT  Operative Note      Date of procedure:  07/14/21      Interventional Cardiologist: Pito Osman MD  Cardiothoracic Surgeon: Aline Oliver MD    Pre-operative diagnosis: Severe symptomatic aortic stenosis. Post-operative diagnosis: Successful transcatheter aortic valve replacement (TAVR) using a 29-mm Evolut PRO+ valve. 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 TAVR access: right common femoral artery surgical cutdown approach  Pigtail access: left common femoral artery  Appropriate arterial and venous lines were additionally placed by anesthesia as needed. TAVR access closure: primary surgical repair  Pigtail access closure: Angioseal unsuccessful; manual pressure held    Procedure:   Arterial access in the TAVR and Pigtail access sites as above  Limited peripheral angiography   Temporary transvenous pacer insertion via left femoral vein   Aortic root angiography  Transfemoral TAVR using a 29-mm Evolut PRO+ valve      Anesthesia: general anesthesia with endotracheal intubation    Indication for procedure:   Severe symptomatic aortic stenosis      Description of the Procedure: Following informed consent, the patient was bought to the hybrid OR and placed under anesthesia as above. Transesophageal echo was used to aid in guidance of the procedure. Surgical cutdown  was used to access the RCFA and a placeholder sheath was placed; this access will be used to deliver the trascatheter heart valve eventually.  Using ultrasound guidance, a micropuncture needle was used to access the LCFA and appropriate site was confirmed using angiography and a 5F sheath was placed; this access will be used for the Pigtail catheter subsequently. The left femoral vein was accessed and a locking 6F sheath was placed for the temporary pacer. Unfractionated heparin was administered to achieve therapeutic ACT with additional heparin administered as needed     A balloon-tipped temporary transvenous pacemaker was inserted through the femoral venous sheath and into the RV apex. Pacing threshold were obtained and the pacemaker was placed in standby mode. A 5F multipurpose catheter was advanced into the descending aorta over a J wire and used to place a Lunderquist wire in the descending aorta. A 14F Medtronic sheath was placed over the Lunderquist wire and sutured in place; the wire was subsequently removed. Next, a 5F AL1 catheter was used to guide a straight-tipped 0.035\" wire to cross the aortic valve. The catheter was advanced into the left ventricle and then exchanged for a second Pigtail catheter. A Safari wire was then placed in the LV and the Pigtail catheter removed. Balloon aortic valvuloplasty was performed prior to THV placement with 20-mm ZMED balloon. The 14F sheath was removed; a 29-mm Evolut PRO+ delivery system with an inline sheath was advanced around the arch into the LV with moderate difficulty. Rapid pacing at 180 bpm was performed while deploying the valve up to 80%. Multiple fluoroscopic views were obtained to ensure proper depth of the valve implant while eliminating parallax. This was estimated to be 1 mm at the noncoronary cusp. Subsequently the valve was released and the delivery system was withdrawn to the descending thoracic aorta and the nose cone recaptured. The delivery system and the stiff wire were removed from the body and the large sheath was then placed back. 2 perpendicular views were again obtained to ensure valve expansion and proper placement.        Post-dilation was not

## 2021-07-14 NOTE — PROGRESS NOTES
HEART VALVE TEAM PROGRESS NOTE    Evaluated in ICU POD # 0. No complaints. No pain in groins. No issues since admission to PACU. Temporary pacing wires left groin. PE:   BP (!) 163/70   Pulse 87   Temp 96.7 °F (35.9 °C) (Temporal)   Resp 16   Ht 5' 4\" (1.626 m)   Wt 240 lb (108.9 kg)   SpO2 98%   BMI 41.20 kg/m²   CARDIOVASCULAR:   Heart Inspection shows no noted pulsations  Heart Palpation: no heaves or thrills  Heart Ausculation -Normal S1 and S2, RRR, no murmur, s3, s4 or rub noted. PV: trace lower extremity edema. No varicosities. LLE 2+ DP, RLE +DP/PT signals, no clubbing or cyanosis     Monitor: Afib      Lab Review: Pending     Recent Labs     07/14/21  0545   INR 1.2         Assessment:  1. POD # 0 TAVR with 29-mm Evolut PRO+ valve. 2. Stable groin sites bilaterally, right with SILVIANO, left with transvenous pacing wires in place  3. HTN  4. HFpEF  5. RBBB  6. Obesity  7. Permanent AFib      Plan:  1. Continue PACU care. 2. Start Nicardipine gtt to maintain SBP<140  3. Leave temporary pacing wire in place  4. EKG, Limited TTE, CBC/BMP in am  5.  ASA, Lovenox to warfarin bridge per Cardiology

## 2021-07-14 NOTE — ANESTHESIA PROCEDURE NOTES
Central Venous Line:    A central venous line was placed using ultrasound guidance, in the OR for the following indication(s): central venous access and CVP monitoring. Sterility preparation included the following: hand hygiene performed prior to procedure, maximum sterile barriers used and sterile technique used to drape from head to toe. The patient was placed in Trendelenburg position. The right internal jugular vein was prepped. The site was prepped with Chloraprep. A 8.5 Fr (size), 10 (length), introducer slick was placed. During the procedure, the following specific steps were taken: target vein identified, needle advanced into vein and blood aspirated and guidewire advanced into vein. Intravenous verification was obtained by ultrasound and venous blood return. Post insertion care included: all ports aspirated, all ports flushed easily, guidewire removed intact, Biopatch applied, line sutured in place and dressing applied. During the procedure the patient experienced: patient tolerated procedure well with no complications.       Insertion site scrubbed per usage guidelines?: Yes  Skin prep agent dried for 3 minutes prior to procedure?:yes  Anesthesia type: general..No  Staffing  Performed: Anesthesiologist   Anesthesiologist: Jennifer Pollard MD  Preanesthetic Checklist  Completed: patient identified, IV checked, site marked, risks and benefits discussed, surgical consent, monitors and equipment checked, pre-op evaluation, timeout performed, anesthesia consent given, oxygen available and patient being monitored

## 2021-07-14 NOTE — ANESTHESIA PRE PROCEDURE
Department of Anesthesiology  Preprocedure Note       Name:  Parisa Puri   Age:  77 y.o.  :  1945                                          MRN:  [id number]         Date:  2021      Surgeon: Mimi Richmond):  MD Doreen Pelayo DO    Procedure: Procedure(s):  TRANSCATHETER AORTIC VALVE REPLACEMENT FEMORAL APPROACH    Medications prior to admission:   Prior to Admission medications    Medication Sig Start Date End Date Taking? Authorizing Provider   brimonidine (ALPHAGAN) 0.2 % ophthalmic solution INSTILL 1 DROP EVERY DAY INTO LEFT EYE AT 8 AM. 4/15/21  Yes Historical Provider, MD   latanoprost (XALATAN) 0.005 % ophthalmic solution INSTILL 1 DROP IN THE LEFT EYE EVERY DAY AT BEDTIME 4/15/21  Yes Historical Provider, MD   furosemide (LASIX) 20 MG tablet Take 20mg alternating with 30mg every other day (one tablet alternating with 1 and 1/2 tabs every other day). 21  Yes Francisco Dias DO   potassium chloride (KLOR-CON M) 10 MEQ extended release tablet Take 1 tab alternating with 1 and 1/2 tablet every other day (10 cecy alternating with 15 cecy every other day) 21  Yes Helene Dias DO   pantoprazole (PROTONIX) 40 MG tablet Take 1 tablet by mouth daily 21  Yes Francisco Dias DO   simvastatin (ZOCOR) 20 MG tablet TAKE ONE TABLET BY MOUTH DAILY  Patient taking differently: Take 20 mg by mouth nightly  21  Yes Lalo Archuleta MD   metoprolol tartrate (LOPRESSOR) 50 MG tablet One tab twice daily 3/25/21  Yes Chani Kaur MD   enalapril (VASOTEC) 10 MG tablet TAKE ONE TABLET BY MOUTH TWO TIMES A DAY  Patient taking differently: Takes 1 in AM and 1/2 tab in PM 10/26/20  Yes Jyotsna Rodriguez DO   warfarin (COUMADIN) 3 MG tablet Take one tablet daily 20   Jyotsna Rodriguez DO   warfarin (COUMADIN) 1 MG tablet Take 1 mg every wed with coumadin 3 mg to equal 4 mg wed only  Patient taking differently: 1 mg Take 1 mg every Mon with coumadin 3 mg to equal 4 mg. Monday only 11/30/20   Kelsey Mcmahan DO       Current medications:    Current Facility-Administered Medications   Medication Dose Route Frequency Provider Last Rate Last Admin    0.9 % sodium chloride infusion   Intravenous Continuous ADRY Dubois 20 mL/hr at 07/14/21 0550 New Bag at 07/14/21 0550    0.9 % sodium chloride infusion  25 mL Intravenous PRN ADRY Dubois        ceFAZolin (ANCEF) 2000 mg in sterile water 20 mL IV syringe  2,000 mg Intravenous On Call to [address] [place] [address]        chlorhexidine (HIBICLENS) 4 % liquid   Topical Once Sarah Dubois        sodium chloride flush 0.9 % injection 10 mL  10 mL Intravenous 2 times per day ADRY Dubois        sodium chloride flush 0.9 % injection 10 mL  10 mL Intravenous PRN ADRY Dubois           Allergies: Allergies   Allergen Reactions    Lactose Intolerance (Gi) Diarrhea and Nausea And Vomiting    Phenobarbital Rash       Problem List:    Patient Active Problem List   Diagnosis Code    CHF (congestive heart failure) ([facility]) I50.9    Coronary artery disease involving native coronary artery of native heart without angina pectoris I25.10    Valvular heart disease I38    Restless leg syndrome G25.81    Diverticular disease K57.90    External otitis H60.90    Hyperlipidemia E78.5    Falls W19. Linda Brick    S/P CABG (coronary artery bypass graft) Z95.1    Essential hypertension I10    Near syncope R55    Coronary artery disease involving coronary bypass graft of native heart without angina pectoris I25.810    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    Pulmonary HTN ([facility]) I27.20    Chronic atrial fibrillation ([facility]) I48.20    Primary open angle glaucoma of both eyes, severe stage H40.1133    Cataract H26.9    Depression F32.9    Anxiety F41.9    Fever R50.9    Hx of CABG Z95.1    Mitral valve replaced Z95.2    Mixed hyperlipidemia E78.2    Hypokalemia E87.6    Lactic acidosis E87.2    Calculus of gallbladder without cholecystitis without obstruction K80.20    Diverticulosis of large intestine without hemorrhage K57.30    Chronic anticoagulation Z79.01    Gastroesophageal reflux disease without esophagitis K21.9    Severe aortic stenosis I35.0    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  Hyperlipidemia     Hypertension     Restless leg syndrome 11/22/2010    Valvular heart disease 11/22/2010       Past Surgical History:        Procedure Laterality Date    CARDIAC CATHETERIZATION  2007    CARDIAC CATHETERIZATION Right 05/07/2021    Right Heart cath no stents Dr Bard Acosta Right 08/24/2016    trabulectomy    COLONOSCOPY      CORONARY ARTERY BYPASS GRAFT      TONSILLECTOMY      TRANSESOPHAGEAL ECHOCARDIOGRAM  05/07/2021    Dr Simpson Render History:    Social History     Tobacco Use    Smoking status: Never Smoker    Smokeless tobacco: Never Used   Substance Use Topics    Alcohol use:  No                                Counseling given: Not Answered      Vital Signs (Current):   Vitals:    07/14/21 0528   BP: (!) 148/88   Pulse: 78   Resp: 18   Temp: 37.2 °C (99 °F)   TempSrc: Temporal   SpO2: 97%   Weight: 240 lb (108.9 kg)   Height: 5' 4\" (1.626 m)                                              BP Readings from Last 3 Encounters:   07/14/21 (!) 148/88   07/09/21 (!) 148/60   06/02/21 (!) 192/88       NPO Status: Time of last liquid consumption: 2200                        Time of last solid consumption: 2100                        Date of last liquid consumption: 07/13/21                        Date of last solid food consumption: 07/13/21    BMI:   Wt Readings from Last 3 Encounters:   07/14/21 240 lb (108.9 kg)   07/08/21 240 lb (108.9 kg)   06/02/21 244 lb (110.7 kg)     Body mass index is 41.2 kg/m². CBC:   Lab Results   Component Value Date    WBC 6.4 07/09/2021    RBC 4.68 07/09/2021    HGB 13.7 07/09/2021    HCT 42.5 07/09/2021    MCV 90.8 07/09/2021    RDW 12.9 07/09/2021     07/09/2021       CMP:   Lab Results   Component Value Date     07/09/2021    K 4.2 07/09/2021    K 3.8 03/09/2021     07/09/2021    CO2 28 07/09/2021    BUN 11 07/09/2021    CREATININE 0.8 07/09/2021    GFRAA >60 07/09/2021    LABGLOM >60 07/09/2021    GLUCOSE 107 07/09/2021    GLUCOSE 90 06/06/2012    PROT 7.2 07/09/2021    CALCIUM 9.3 07/09/2021    BILITOT 0.9 07/09/2021    ALKPHOS 78 07/09/2021    AST 14 07/09/2021    ALT 11 07/09/2021       POC Tests: No results for input(s): POCGLU, POCNA, POCK, POCCL, POCBUN, POCHEMO, POCHCT in the last 72 hours.     Coags:   Lab Results   Component Value Date    PROTIME 13.3 07/14/2021    PROTIME 31.8 04/28/2021    INR 1.2 07/14/2021    APTT 44.4 07/09/2021       HCG (If Applicable): No results found for: PREGTESTUR, PREGSERUM, HCG, HCGQUANT     ABGs: No results found for: PHART, PO2ART, FIY6QLB, HMD1EYZ, BEART, X6OULVLI     Type & Screen (If Applicable):  Lab Results   Component Value Date    LABABO O 02/22/2012    [facility] POS 02/22/2012       Drug/Infectious Status (If Applicable):  No results found for: HIV, HEPCAB    COVID-19 Screening (If Applicable):   Lab Results   Component Value Date    COVID19 Not Detected 07/09/2021     EKG 7/9/2021  Narrative & Impression    Atrial fibrillation with premature ventricular or aberrantly conducted complexes  Right bundle branch block  Abnormal ECG  When compared with ECG of 07-MAY-2021 09:53,  No significant change was found  Confirmed by Pratima Diaz ([id number]) on 7/9/2021 5:03:01 PM       ECHO 5/7/2021  Type of Study      KORTNEY procedure:Transesophageal Echo (KORTNEY). Procedure Date  Date: 05/07/2021 Start: 10:46 AM     Height: 64 inches Weight: 242 pounds BSA: 2.12 m^2 BMI: 41.54 kg/m^2     HR: 53 bpm     Allergies    - Other drug:(Phenobarbital).     Findings      Left Ventricle   Left ventricle size is normal.   Normal left ventricular systolic function. Ejection fraction is visually estimated at > 50%. Right Ventricle   Dilated right ventricle with normal right ventricular function. Left Atrium   Severely dilated left atrium. Severe spontaneous echo contrast in the left atrium. No evidence of interatrial shunting on bubble study. Right Atrium   Severely dilated right atrium. Mitral Valve   Bioprosthetic mitral valve (#25 Medtronic Mosaic) with normal leaflet   motion. Prosthesis-patient mismatch. No evidence of hemodynamically significant mitral regurgitation. Average MV mean gradient 8.2 mmHg (HR 85 bpm). Tricuspid Valve   Moderate tricuspid regurgitation. RV-RA gradient is estimated at 29 mmHg. Aortic Valve   Aortic valve with severe fibrocalcific changes. The aortic valve is trileaflet. No evidence of hemodynamically significant aortic regurgitation. Paradoxical low-flow low-gradient severe aortic stenosis. Pulmonic Valve   No evidence of hemodynamically significant pulmonic regurgitation. Pericardial Effusion   No evidence of a hemodynamically significant pericardial effusion. Aorta   Aortic root dimension within normal limits. Conclusions      Summary   Ejection fraction is visually estimated at > 50%. Dilated right ventricle with normal right ventricular function. Severely dilated left atrium and right atrium. Severe spontaneous echo contrast in the left atrium. Moderate tricuspid regurgitation. RV-RA gradient is estimated at 29 mmHg. Bioprosthetic mitral valve (#25 Medtronic Mosaic) with normal leaflet   motion. Prosthesis-patient mismatch. No evidence of hemodynamically significant mitral regurgitation. Average MV mean gradient 8.2 mmHg (HR 85 bpm). Aortic valve with severe fibrocalcific changes. The aortic valve is trileaflet. No evidence of hemodynamically significant aortic regurgitation. Paradoxical low-flow low-gradient severe aortic stenosis. Signature      ----------------------------------------------------------------   Electronically signed by Easton Hernandez MD(Interpreting   physician) on 05/07/2021 12:03 PM   ----------------------------------------------------------------     CTA ABDOMEN/PELVIS, CHEST 5/7/2021    Narrative   EXAMINATION:   CTA OF THE ABDOMEN AND PELVIS WITH CONTRAST; CTA OF THE CHEST       5/7/2021       TECHNIQUE:   Dose modulation, iterative reconstruction, and/or weight based adjustment of   the mA/kV was utilized to reduce the radiation dose to as low as reasonably   achievable.; CTA of the abdomen and pelvis was performed with the   administration of intravenous contrast. Multiplanar reformatted images are   provided for review.  MIP images are provided for review. Dose modulation,   iterative reconstruction, and/or weight based adjustment of the mA/kV was   utilized to reduce the radiation dose to as low as reasonably achievable.;   CTA of the chest was performed after the administration of intravenous   contrast.  Multiplanar reformatted images are provided for review.  MIP   images are provided for review.  Dose modulation, iterative reconstruction,   and/or weight based adjustment of the mA/kV was utilized to reduce the   radiation dose to as low as reasonably achievable.       COMPARISON:   d       HISTORY:   ORDERING SYSTEM PROVIDED HISTORY: Nodular calcific aortic valve stenosis   TECHNOLOGIST PROVIDED HISTORY:   What reading provider will be dictating this exam?->CRC       FINDINGS:   Aortic valve:       There is a tricuspid aortic valve.  There are fairly diffuse thickening of   the aortic cusps with focal areas of calcifications seen predominant towards   the non coronary cusp, and at the right coronary cusp at the junction with   the left coronary cusp.  There appears to be limited opening of the aortic   valve in best systole imaging.       Aortic valve calcium score: 844, it is higher than the 90% of the percentile   for the age group.       .       The coronary arteries are not evaluated on this study, there is a dominant   left circumflex artery which appears to be forming the PDA artery which is   however not well seen on this study.       .       Heart:       The heart is enlarged particularly the expenses of the right and left atrium.       Patient had previous mitral valvuloplasty/replacement. Lorena Seller is good opening   of the mitral valve in diastole and the good closure in systole.       Inner diameter for the left ventricular cavity is about 3.7 cm and for the   right d ventricular cavities about 4.2 cm.  There is a fair prominent size   for the left atrium and also for the right atrium in relation to the   ventricular size.  The left atrial appendage demonstrate no mural thrombus.       There are 2 main pulmonary trunks on the right and 2 main pulmonary trunks on   the left, 1 superiorly, the other inferiorly.  There is normal diameter for   the thoracic aorta.  There is no aneurysm formation.  There is no diameter   for the central pulmonary arteries.  The ascending aorta measures 3.2 x 3.6   cm and the main pulmonary artery is 3 cm.       .       Mediastinum: There is some calcification of the bronchial tree which   correlates with the age group.  No mediastinal masses or adenopathy seen.  No   hilar adenopathy.       Degenerative changes are seen in the thoracic spine.  The patient had   previous mid sternotomy.       .       Lung parenchyma: There are some areas of atelectasis in both bases. Damari Cheeks is   also some crowding of the bronchovascular markings more likely towards the   expiratory phase.  No acute infiltrates or consolidations seen in the lung   parenchyma.  The a air cysts seen in the right middle lobe.        .       Abdomen:       There are some indication for fatty infiltration of the liver which has some   discrete lobularity of the outlines and the relative prominence of the   caudate lobe which can be manifestation of chronic liver parenchymal disease   to be correlated clinically.       The spleen has unremarkable appearance.       There is atrophy of the pancreas.       Gallbladder is normally distended.  There is large size calculus in the upper   body of the gallbladder. Damari Cheeks is also a small Phrygian cap in the fundal   region of the gallbladder.  The       Adrenals not enlarged.       Abdominal aorta demonstrate mild atherosclerotic changes.  Calcifications are   seen in the area of the renal arteries but the renal artery appears to be   patent.  The there is no midline retroperitoneal adenopathy.       There is good contrast runoff into visualized proximal superficial and   profunda right and left femoral arteries.       There are no acute inflammatory changes in the mid mesentery fat planes, free   intraperitoneal air, size indication for bowel obstruction.  The   uncomplicated mild diverticulosis seen in the left-sided colon.  Scratch       Kidneys are preserved size and cortical thickness and enhancement considering   the age group.  The small cyst-like is seen in the peripheral aspect of the   right kidney with borderline density.  Can be correlated by renal ultrasound.       In the pelvic cavity there is a intramural myomatous changes in the posterior   wall of the uterus measuring 4.3 by 3.1 cm.  There is widening of the central   endometrial cavity up to 13 mm.  This is not intravenous. Anesthetic plan and risks discussed with patient. Use of blood products discussed with patient whom consented to blood products. Plan discussed with CRNA and attending.                   Valerio Conrad RN [facility]  7/14/2021

## 2021-07-14 NOTE — PROGRESS NOTES
(Increases)  History of stroke    Other: __________________  Other:___________________       Vitamin K or Blood product  Administration Date                    TSH:    Lab Results   Component Value Date    TSH 2.350 03/08/2021        Hepatic Function Panel:                            Lab Results   Component Value Date    ALKPHOS 78 07/09/2021    ALT 11 07/09/2021    AST 14 07/09/2021    PROT 7.2 07/09/2021    BILITOT 0.9 07/09/2021    BILIDIR <0.2 08/15/2015    IBILI 0.6 08/15/2015    LABALBU 4.1 07/09/2021    LABALBU 4.2 06/06/2012       Date Warfarin Dose INR Heparin or LMWH HGB/HCT PLT Comment   7/14 5mg 1.2 Lovenox 105mg BID 12.1/37.4 231                                          Assessment and Plan:  · Patient is a 76 yo Female  · Patient is on warfarin for afib.   Patient is being bridged on lovenox  · Goal INR 2.0-3.0  · INR 1.2 today  · Warfarin 5mg tonight  · Daily PT/INR until the INR is stable within the therapeutic range  · Pharmacist will follow and monitor/adjust dosing as necessary    Thank you for this consult,    FRANKIE Healy [facility] PharmD 7/14/2021 4:33 PM

## 2021-07-14 NOTE — ANESTHESIA POSTPROCEDURE EVALUATION
Department of Anesthesiology  Postprocedure Note    Patient: Ingrid Car  MRN: [id number]  YOB: 1945  Date of evaluation: 7/14/2021  Time:  11:27 AM     Procedure Summary     Date: 07/14/21 Room / Location: [facility] 03 / [facility]    Anesthesia Start: 5774 Anesthesia Stop: 5957    Procedure: TRANSCATHETER AORTIC VALVE REPLACEMENT FEMORAL APPROACH (N/A Chest) Diagnosis: (AORTIC STENOSIS)    Surgeons: Wyatt Butcher MD Responsible Provider: Louisa Eduardo MD    Anesthesia Type: general ASA Status: 4          Anesthesia Type: general    Maribel Phase I: Maribel Score: 9    Maribel Phase II:      Last vitals: Reviewed and per EMR flowsheets.        Anesthesia Post Evaluation    Patient location during evaluation: PACU  Patient participation: complete - patient participated  Level of consciousness: awake  Pain score: 0  Airway patency: patent  Nausea & Vomiting: no nausea  Complications: no  Cardiovascular status: hemodynamically stable  Respiratory status: acceptable  Hydration status: stable

## 2021-07-14 NOTE — PROGRESS NOTES
Patient admitted to PACU post TAVR. Patient is awake, answers to name, following commands, post-anesthesia (drowsy). Patient wearing a simple mask 6Liters, TVP in place left fem, pulses palpable, Nat drsg intact on right fem from cut down site, pulses with doppler. Will continue to monitor.

## 2021-07-14 NOTE — ANESTHESIA PROCEDURE NOTES
Procedure Performed: KORTNEY     Start Time:        End Time:      Preanesthesia Checklist:  Patient identified, IV assessed, risks and benefits discussed, monitors and equipment assessed, procedure being performed at surgeon's request and anesthesia consent obtained. General Procedure Information  Diagnostic Indications for Echo:  assessment of ascending aorta, hemodynamic monitoring and assessment of valve function  Physician Requesting Echo: Thania Wen MD  Location performed:  OR  Intubated  Bite block placed  Heart visualized  Probe Insertion:  Easy  Probe Type:  3D  Modalities:  Color flow mapping, 2D only, continuous wave Doppler and pulse wave Doppler    Echocardiographic and Doppler Measurements    Ventricles    Right Ventricle:  Cavity size normal.  Hypertrophy not present. Thrombus not present. Global function normal.    Left Ventricle:  Cavity size dilated. Hypertrophy present. Thrombus not present. Global Function normal.      Ventricular Regional Function:  1- Basal Anteroseptal:  normal  2- Basal Anterior:  normal  3- Basal Anterolateral:  normal  4- Basal Inferolateral:  normal  5- Basal Inferior:  normal  6- Basal Inferoseptal:  normal  7- Mid Anteroseptal:  normal  8- Mid Anterior:  normal  9- Mid Anterolateral:  normal  10- Mid Inferolateral:  normal  11- Mid Inferior:  normal  12- Mid Inferoseptal:  normal  13- Apical Anterior:  normal  14- Apical Lateral:  normal  15- Apical Inferior:  normal  16- Apical Septal:  normal  17- [place]:  normal      Valves    Aortic Valve: Annulus calcified. Stenosis severe. Area: 0.9 cm². Regurgitation mild. Leaflets calcified and thickened. Leaflet motions restricted. Specific leaflet segments with abnormal motions are described in the following comments:       LCC, NCC, RCC    Mitral Valve: Annulus bioprosthetic. Stenosis moderate. Regurgitation none. Leaflets normal.  Leaflet motions normal.      Tricuspid Valve:   Annulus normal.  Stenosis not present. Regurgitation moderate. Leaflets normal.  Leaflet motions normal.    Pulmonic Valve: Annulus normal.  Stenosis not present. Regurgitation none. Aorta    Ascending Aorta:  Size normal.    Aortic Arch:  Size normal.    Descending Aorta:  Size normal.          Atria    Right Atrium:  Size normal.    Left Atrium:  Size normal.  Spontaneous echo contrast present. Left atrial appendage normal.      Septa    Atrial Septum:  Intra-atrial septal morphology normal.      Ventricular Septum:  Intra-ventricular septum morphology normal.      Diastolic Function Measurements:  Diastolic Dysfunction Grade= indeterminate  E= ms  A= ms  E/A Ratio=   DT= ms  S/D=  IVRT=    Other Findings  Pericardium:  normal      Anesthesia Information  Performed Personally  Anesthesiologist:  Louisa Eduardo MD      Echocardiogram Comments:       S/P TAVR with bioprosthetic Evolute #29 mm, trace perivalvular leak and no intravalvular leak, mean pressure gradient is 3 mmHg across the valve. No pericardial effusion. Report given to Dr. Isabella Crawley intraoperatively.

## 2021-07-15 LAB
ANION GAP SERPL CALCULATED.3IONS-SCNC: 9 MMOL/L (ref 7–16)
BUN BLDV-MCNC: 14 MG/DL (ref 6–23)
CALCIUM SERPL-MCNC: 9.1 MG/DL (ref 8.6–10.2)
CHLORIDE BLD-SCNC: 102 MMOL/L (ref 98–107)
CO2: 27 MMOL/L (ref 22–29)
CREAT SERPL-MCNC: 0.7 MG/DL (ref 0.5–1)
EKG ATRIAL RATE: 93 BPM
EKG Q-T INTERVAL: 464 MS
EKG QRS DURATION: 158 MS
EKG QTC CALCULATION (BAZETT): 522 MS
EKG R AXIS: 76 DEGREES
EKG T AXIS: 33 DEGREES
EKG VENTRICULAR RATE: 76 BPM
GFR AFRICAN AMERICAN: >60
GFR NON-AFRICAN AMERICAN: >60 ML/MIN/1.73
GLUCOSE BLD-MCNC: 179 MG/DL (ref 74–99)
HCT VFR BLD CALC: 40 % (ref 34–48)
HEMOGLOBIN: 12.8 G/DL (ref 11.5–15.5)
INR BLD: 1.2
MCH RBC QN AUTO: 29 PG (ref 26–35)
MCHC RBC AUTO-ENTMCNC: 32 % (ref 32–34.5)
MCV RBC AUTO: 90.5 FL (ref 80–99.9)
METER GLUCOSE: 156 MG/DL (ref 74–99)
METER GLUCOSE: 173 MG/DL (ref 74–99)
METER GLUCOSE: 180 MG/DL (ref 74–99)
METER GLUCOSE: 187 MG/DL (ref 74–99)
PDW BLD-RTO: 12.8 FL (ref 11.5–15)
PLATELET # BLD: 251 E9/L (ref 130–450)
PMV BLD AUTO: 10.2 FL (ref 7–12)
POC ACT LR: 161 SECONDS
POC ACT LR: 169 SECONDS
POC ACT LR: 315 SECONDS
POC ACT LR: 330 SECONDS
POTASSIUM SERPL-SCNC: 4.3 MMOL/L (ref 3.5–5)
PROTHROMBIN TIME: 13.3 SEC (ref 9.3–12.4)
RBC # BLD: 4.42 E12/L (ref 3.5–5.5)
SODIUM BLD-SCNC: 138 MMOL/L (ref 132–146)
WBC # BLD: 11.8 E9/L (ref 4.5–11.5)

## 2021-07-15 PROCEDURE — 2140000000 HC CCU INTERMEDIATE R&B

## 2021-07-15 PROCEDURE — 6360000002 HC RX W HCPCS: Performed by: PHYSICIAN ASSISTANT

## 2021-07-15 PROCEDURE — 85610 PROTHROMBIN TIME: CPT

## 2021-07-15 PROCEDURE — 37799 UNLISTED PX VASCULAR SURGERY: CPT

## 2021-07-15 PROCEDURE — 82962 GLUCOSE BLOOD TEST: CPT

## 2021-07-15 PROCEDURE — 6370000000 HC RX 637 (ALT 250 FOR IP): Performed by: PHYSICIAN ASSISTANT

## 2021-07-15 PROCEDURE — 93010 ELECTROCARDIOGRAM REPORT: CPT | Performed by: INTERNAL MEDICINE

## 2021-07-15 PROCEDURE — 6370000000 HC RX 637 (ALT 250 FOR IP)

## 2021-07-15 PROCEDURE — 2500000003 HC RX 250 WO HCPCS: Performed by: PHYSICIAN ASSISTANT

## 2021-07-15 PROCEDURE — 85027 COMPLETE CBC AUTOMATED: CPT

## 2021-07-15 PROCEDURE — 80048 BASIC METABOLIC PNL TOTAL CA: CPT

## 2021-07-15 PROCEDURE — 6370000000 HC RX 637 (ALT 250 FOR IP): Performed by: INTERNAL MEDICINE

## 2021-07-15 PROCEDURE — 33362 REPLACE AORTIC VALVE OPEN: CPT | Performed by: THORACIC SURGERY (CARDIOTHORACIC VASCULAR SURGERY)

## 2021-07-15 PROCEDURE — 99232 SBSQ HOSP IP/OBS MODERATE 35: CPT | Performed by: PHYSICIAN ASSISTANT

## 2021-07-15 PROCEDURE — 2700000000 HC OXYGEN THERAPY PER DAY

## 2021-07-15 PROCEDURE — 93005 ELECTROCARDIOGRAM TRACING: CPT | Performed by: PHYSICIAN ASSISTANT

## 2021-07-15 PROCEDURE — 36415 COLL VENOUS BLD VENIPUNCTURE: CPT

## 2021-07-15 PROCEDURE — 6360000002 HC RX W HCPCS: Performed by: INTERNAL MEDICINE

## 2021-07-15 PROCEDURE — 93308 TTE F-UP OR LMTD: CPT

## 2021-07-15 PROCEDURE — 2580000003 HC RX 258: Performed by: PHYSICIAN ASSISTANT

## 2021-07-15 RX ORDER — WARFARIN SODIUM 4 MG/1
4 TABLET ORAL ONCE
Status: COMPLETED | OUTPATIENT
Start: 2021-07-15 | End: 2021-07-15

## 2021-07-15 RX ORDER — WARFARIN SODIUM 4 MG/1
4 TABLET ORAL ONCE
Status: DISCONTINUED | OUTPATIENT
Start: 2021-07-15 | End: 2021-07-16

## 2021-07-15 RX ADMIN — Medication 2000 MG: at 18:36

## 2021-07-15 RX ADMIN — INSULIN LISPRO 4 UNITS: 100 INJECTION, SOLUTION INTRAVENOUS; SUBCUTANEOUS at 18:46

## 2021-07-15 RX ADMIN — BRIMONIDINE TARTRATE 1 DROP: 2 SOLUTION/ DROPS OPHTHALMIC at 08:29

## 2021-07-15 RX ADMIN — AMLODIPINE BESYLATE 5 MG: 5 TABLET ORAL at 08:29

## 2021-07-15 RX ADMIN — SODIUM CHLORIDE, PRESERVATIVE FREE 10 ML: 5 INJECTION INTRAVENOUS at 08:29

## 2021-07-15 RX ADMIN — METOPROLOL TARTRATE 50 MG: 25 TABLET, FILM COATED ORAL at 08:28

## 2021-07-15 RX ADMIN — Medication 2000 MG: at 00:02

## 2021-07-15 RX ADMIN — METOPROLOL TARTRATE 50 MG: 25 TABLET, FILM COATED ORAL at 21:43

## 2021-07-15 RX ADMIN — ATORVASTATIN CALCIUM 10 MG: 10 TABLET, FILM COATED ORAL at 08:29

## 2021-07-15 RX ADMIN — WARFARIN SODIUM 4 MG: 4 TABLET ORAL at 18:37

## 2021-07-15 RX ADMIN — Medication 2000 MG: at 08:28

## 2021-07-15 RX ADMIN — PANTOPRAZOLE SODIUM 40 MG: 40 TABLET, DELAYED RELEASE ORAL at 08:29

## 2021-07-15 RX ADMIN — OXYCODONE AND ACETAMINOPHEN 1 TABLET: 5; 325 TABLET ORAL at 18:37

## 2021-07-15 RX ADMIN — ENALAPRIL MALEATE 10 MG: 10 TABLET ORAL at 21:45

## 2021-07-15 RX ADMIN — ENALAPRIL MALEATE 10 MG: 10 TABLET ORAL at 08:29

## 2021-07-15 RX ADMIN — INSULIN LISPRO 1 UNITS: 100 INJECTION, SOLUTION INTRAVENOUS; SUBCUTANEOUS at 21:50

## 2021-07-15 RX ADMIN — ENOXAPARIN SODIUM 105 MG: 120 INJECTION SUBCUTANEOUS at 06:13

## 2021-07-15 RX ADMIN — POTASSIUM CHLORIDE 10 MEQ: 1500 TABLET, EXTENDED RELEASE ORAL at 08:28

## 2021-07-15 RX ADMIN — LATANOPROST 1 DROP: 50 SOLUTION OPHTHALMIC at 21:45

## 2021-07-15 RX ADMIN — ENOXAPARIN SODIUM 105 MG: 120 INJECTION SUBCUTANEOUS at 18:37

## 2021-07-15 RX ADMIN — FUROSEMIDE 20 MG: 20 TABLET ORAL at 08:29

## 2021-07-15 RX ADMIN — OXYCODONE AND ACETAMINOPHEN 1 TABLET: 5; 325 TABLET ORAL at 04:52

## 2021-07-15 RX ADMIN — INSULIN LISPRO 2 UNITS: 100 INJECTION, SOLUTION INTRAVENOUS; SUBCUTANEOUS at 08:40

## 2021-07-15 RX ADMIN — ASPIRIN 81 MG: 81 TABLET, COATED ORAL at 08:29

## 2021-07-15 ASSESSMENT — PAIN SCALES - GENERAL
PAINLEVEL_OUTOF10: 4
PAINLEVEL_OUTOF10: 7
PAINLEVEL_OUTOF10: 6

## 2021-07-15 ASSESSMENT — PAIN DESCRIPTION - PAIN TYPE: TYPE: CHRONIC PAIN

## 2021-07-15 NOTE — CARE COORDINATION
SOCIAL WORK/CASEMANAGEMENT TRANSITION OF CARE Laz Rutherford Lexifrannie, [address], [facility], [phone]): pt has a 27year old granddaughter that lives with her and works. She prepares most meals due to pt having macular degeneration. Pt has a ranch home with 2 steep steps from garage and 2 shorter steps from back patio to enter home. No hhc pta. Pt has a fww, cane , higher toilets at home. Pt was independent pta  With bathing , dressing and medications but doesn't drive due vision. Plan is home tomorrow with no needs per pt. Pt has 5 grown children to assist as needed. Sw/yang to follow.  POONAM Medrano  7/15/2021

## 2021-07-15 NOTE — PROGRESS NOTES
TAVR TEAM PROGRESS NOTE      POD # 1 s/p TAVR with 29-mm Evolut PRO+; David Flor/Carlos    TAVR access: right common femoral artery surgical cutdown approach  Pigtail access: left common femoral artery    Subjective:    Resting comfortably in bed; on 4L nasal canula with sats 92-99%   Denies complaints other than anxiety   Labs and vitals all stable      Current Facility-Administered Medications   Medication Dose Route Frequency Provider Last Rate Last Admin    warfarin (COUMADIN) tablet 4 mg  4 mg Oral Once Leetta Corporal, [facility]        warfarin (COUMADIN) tablet 4 mg  4 mg Oral Once Leetta Corporal, [facility]        brimonidine (ALPHAGAN) 0.2 % ophthalmic solution 1 drop  1 drop Left Eye Daily Aleatha Curls, PA   1 drop at 07/15/21 0829    enalapril (VASOTEC) tablet 10 mg  10 mg Oral BID Aleatha Curls, PA   10 mg at 07/15/21 0829    furosemide (LASIX) tablet 20 mg  20 mg Oral Daily Aleatha Curls, PA   20 mg at 07/15/21 0829    latanoprost (XALATAN) 0.005 % ophthalmic solution 1 drop  1 drop Left Eye Nightly Aleatha Curls, PA   1 drop at 07/14/21 1952    metoprolol tartrate (LOPRESSOR) tablet 50 mg  50 mg Oral BID Aleatha Curls, PA   50 mg at 07/15/21 0828    pantoprazole (PROTONIX) tablet 40 mg  40 mg Oral Daily Aleatha Curls, PA   40 mg at 07/15/21 0829    potassium chloride (KLOR-CON M) extended release tablet 10 mEq  10 mEq Oral Daily Aleatha Curls, PA   10 mEq at 07/15/21 0828    atorvastatin (LIPITOR) tablet 10 mg  10 mg Oral Daily Aleatha Curls, PA   10 mg at 07/15/21 0829    sodium chloride flush 0.9 % injection 5-40 mL  5-40 mL Intravenous 2 times per day Aleatha Curls, PA   10 mL at 07/15/21 0829    sodium chloride flush 0.9 % injection 5-40 mL  5-40 mL Intravenous PRN Aleatha Curls, PA        0.9 % sodium chloride infusion  25 mL Intravenous PRN Aleatha Curls, PA        ondansetron (ZOFRAN) injection 4 mg  4 mg Intravenous Q8H PRN ADRY Parker   4 mg at 07/14/21 2213    aspirin EC tablet 81 mg  81 mg Oral Daily Niel Scheuermann, PA   81 mg at 07/15/21 0829    oxyCODONE-acetaminophen (PERCOCET) 5-325 MG per tablet 1 tablet  1 tablet Oral Q4H PRN Niel Scheuermann, PA   1 tablet at 07/15/21 0452    glucose (GLUTOSE) 40 % oral gel 15 g  15 g Oral PRN Niel Scheuermann, PA        dextrose 50 % IV solution  12.5 g Intravenous PRN Niel Scheuermann, PA        glucagon (rDNA) injection 1 mg  1 mg Intramuscular PRN Niel Scheuermann, PA        dextrose 5 % solution  100 mL/hr Intravenous PRN Niel Scheuermann, PA        ceFAZolin (ANCEF) 2000 mg in sterile water 20 mL IV syringe  2,000 mg Intravenous Q8H Niel Scheuermann, PA   2,000 mg at 07/15/21 0828    insulin lispro (HUMALOG) injection vial 0-12 Units  0-12 Units Subcutaneous TID WC Niel Scheuermann, PA   2 Units at 07/15/21 0840    insulin lispro (HUMALOG) injection vial 0-6 Units  0-6 Units Subcutaneous Nightly Niel Scheuermann, PA   1 Units at 07/14/21 1955    perflutren lipid microspheres (DEFINITY) injection 1.65 mg  1.5 mL Intravenous ONCE PRN Niel Scheuermann, PA        niCARdipine (CARDENE) 50 mg in sodium chloride 0.9 % 100 mL infusion  3-15 mg/hr Intravenous Continuous Tristen Garcia MD   Stopped at 07/15/21 0634    amLODIPine (NORVASC) tablet 5 mg  5 mg Oral Daily Tristen Garcia MD   5 mg at 07/15/21 0829    enoxaparin (LOVENOX) injection 105 mg  1 mg/kg Subcutaneous BID Tristen Garcia MD   105 mg at 07/15/21 1937    warfarin (COUMADIN) daily dosing (placeholder)   Other Daily Tristen Garcia MD               Intake/Output Summary (Last 24 hours) at 7/15/2021 0954  Last data filed at 7/15/2021 0800  Gross per 24 hour   Intake 1598 ml   Output 300 ml   Net 1298 ml       Patient Vitals for the past 96 hrs (Last 3 readings):   Weight   07/14/21 0528 240 lb (108.9 kg)          Physical Exam:  BP (!) 149/73   Pulse 64   Temp 98 °F (36.7 °C) (Temporal)   Resp 13   Ht 5' 4\" (1.626 m)   Wt 240 lb (108.9 kg)   SpO2 95%   BMI 41.20 kg/m²     General: No acute distress, appears his stated age, nonicteric  Head: Atraumatic, no gross abnormalities or bruises  Neck: Supple and nontender, no carotid bruits, no JVP  Lungs: Clear to auscultation bilaterally, no wheezes, rales, or rhonchi  Heart: Regular rate and rhythm, no murmurs, rubs, or gallops  Abdomen: Soft, nontender, nondistended, normal bowel sounds  Extremities: No obvious deformities, no cyanosis, no edema  Neurological: Alert and oriented x3, EOMI, moving all extremities x4  Psychological: Normal mood and affect, cooperative  Skin: Color, texture, and turgor normal for age     Access sites: minimal ecchymoses. No hematoma or pulsatile masses. Distal pulses normal b/l. SILVIANO dressing in right groin. Lab Review     Recent Labs     07/14/21  1010 07/15/21  0500   WBC 9.9 11.8*   HGB 12.1 12.8   HCT 37.4 40.0    251       Recent Labs     07/14/21  1010 07/15/21  0600    138   K 3.9 4.3    102   CO2 24 27   BUN 10 14   CREATININE 0.7 0.7       No results for input(s): AST, ALT, ALB, BILIDIR, ALKPHOS in the last 72 hours. No results for input(s): BNP, CKTOTAL, CKMB in the last 72 hours. Recent Labs     07/14/21  0545 07/15/21  0500   INR 1.2 1.2       EKG pre-TAVR: AFib, RBBB    EKG today: AFib, RBBB    POD1 TTE: pending      Assessment:  1. Severe, symptomatic AS s/p TAVR with #29 Medtronic Evolut PRO+ valve via right femoral cutdown  2. HFpEF  3. 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 Morbid obesity  5. Permanent AFib - on warfarin  6. HTN - controlled    NYHA class III    Plan:  1. Transfer to CSU  2. Increase activity/ambulation as tolerated  3. Antithrombotic therapy: aspirin plus enoxaparin to warfarin bridge (due to her MVR with high gradients and LA spontaneous echo contrast)  4. Continue current medications  5. Access site check in 2 weeks  6. Valve Clinic follow-up in 4 weeks  7.  Endocarditis prophylaxis indefinitely before high-risk procedures  8. Inpatient followed by outpatient cardiac rehab  9.  Anticipate discharge in next 24 hours with Zio patch if QRS increases on EKG in AM    Discussed with Dr. Karen Rodney PA-C

## 2021-07-15 NOTE — OP NOTE
appropriate site was confirmed using angiography and a 5F sheath was placed; this access will be used for the Pigtail catheter subsequently. The left femoral vein was accessed and a locking 6F sheath was placed for the temporary pacer.      Unfractionated heparin was administered to achieve therapeutic ACT with additional heparin administered as needed     A balloon-tipped temporary transvenous pacemaker was inserted through the femoral venous sheath and into the RV apex. Pacing threshold were obtained and the pacemaker was placed in standby mode.      A 5F multipurpose catheter was advanced into the descending aorta over a J wire and used to place a Lunderquist wire in the descending aorta. A 14F [employer] sheath was placed over the Lunderquist wire and sutured in place; the wire was subsequently removed.     Next, a 5F AL1 catheter was used to guide a straight-tipped 0.035\" wire to cross the aortic valve. The catheter was advanced into the left ventricle and then exchanged for a second Pigtail catheter.  A Safari wire was then placed in the LV and the Pigtail catheter removed.       Balloon aortic valvuloplasty was performed prior to THV placement with 20-mm ZMED balloon.     The 14F sheath was removed; a 29-mm Evolut PRO+ delivery system with an inline sheath was advanced around the arch into the LV with moderate difficulty. Rapid pacing at 180 bpm was performed while deploying the valve up to 80%.  Multiple fluoroscopic views were obtained to ensure proper depth of the valve implant while eliminating parallax.  This was estimated to be 1 mm at the noncoronary cusp.  Subsequently the valve was released and the delivery system was withdrawn to the descending thoracic aorta and the nose cone recaptured.  The delivery system and the stiff wire were removed from the body and the large sheath was then placed back.  2 perpendicular views were again obtained to ensure valve expansion and proper placement.       Post-dilation was not performed.     Transesophageal echo demonstrated a well-seated prosthesis with a mean transvalvular gradient of 3-4 and trace paravalvular leak. There was no pericardial effusion and the LV systolic function was normal.  Aortic root angiogram revealed no AR.     IV protamine was administered to reverse anticoagulation. The temporary pacer was not removed. Hemostasis was achieved at the arterial sites as above and at the venous site using manual pressure.      Complications: none. Of note the patient maintained native conduction without change. Blood loss: 50 mL  Contrast use: 100 mL  Air Kerma: 1314 mGy  Dose-Area Product: [id number] mcGy. m2  Fluoroscopy time: 17 minutes  Specimens: none

## 2021-07-15 NOTE — PROGRESS NOTES
Pharmacy Consultation Note  (Warfarin Dosing and Monitoring)    Initial consult date: 7/14/21  Consulting physician: Dr. Marko Egan    Allergies:  Lactose intolerance (gi) and Phenobarbital    77 y.o. female    Ht Readings from Last 1 Encounters:   07/14/21 5' 4\" (1.626 m)     Wt Readings from Last 1 Encounters:   07/14/21 240 lb (108.9 kg)         Warfarin Indication Target   INR Range Home Dose  (if applicable) Diet/Feeding Tube   (Enteral feeds, nutritional drinks, increased Vitamin K in diet can decrease INR)   Afib with TAVR 2.5-3.5 4mg on Wed; 3mg all other days N/a       Vitamin K or Blood product  Administration Date               Warfarin drug-drug interactions  Start  Stop Home Med? Comments   ASA EC 81 mg PO daily  7/14  No                            TSH:    Lab Results   Component Value Date    TSH 2.350 03/08/2021        Hepatic Function Panel:                            Lab Results   Component Value Date    ALKPHOS 78 07/09/2021    ALT 11 07/09/2021    AST 14 07/09/2021    PROT 7.2 07/09/2021    BILITOT 0.9 07/09/2021    BILIDIR <0.2 08/15/2015    IBILI 0.6 08/15/2015    LABALBU 4.1 07/09/2021    LABALBU 4.2 06/06/2012       Date Warfarin Dose INR Heparin or LMWH HGB/HCT PLT Comment   7/14/21 5 mg 1.2 Enoxaparin 105mg SQ BID 12.1/37.4 231    7/15 3 mg 1.2 Enoxaparin 105mg SQ BID 12.8/40 251                                 Assessment:  · 78 yo F admitted 7/14 s/p TAVR with interventional cardiology and CT surgery  · Pt on chronic anticoagulation prior to admission with Warfarin for Afib; documented home dose is Warfarin 4 mg once weekly and 3 mg all other days  · Pharmacy consulted by Dr Mat Alberto to dose/monitor Warfarin; pt currently on therapeutic Enoxaparin for bridging  · INR today 1.2; INR goal 2.5 - 3.5 per Dr Valery James order    Plan:  · Pt received Warfarin 5 mg PO x1 last night;  Will order Warfarin 4 mg PO x1 tonight  · Daily PT/INR  · Continue therapeutic Enoxaparin until the INR is stable within the therapeutic range    Will continue to follow.       Magdalena Mckeon PharmD, BCPS, BCCCP  7/15/2021  8:04 AM

## 2021-07-16 VITALS
RESPIRATION RATE: 16 BRPM | WEIGHT: 240 LBS | TEMPERATURE: 97.5 F | HEIGHT: 64 IN | OXYGEN SATURATION: 96 % | DIASTOLIC BLOOD PRESSURE: 71 MMHG | SYSTOLIC BLOOD PRESSURE: 168 MMHG | HEART RATE: 64 BPM | BODY MASS INDEX: 40.97 KG/M2

## 2021-07-16 LAB
ANION GAP SERPL CALCULATED.3IONS-SCNC: 11 MMOL/L (ref 7–16)
BUN BLDV-MCNC: 19 MG/DL (ref 6–23)
CALCIUM SERPL-MCNC: 9.2 MG/DL (ref 8.6–10.2)
CHLORIDE BLD-SCNC: 102 MMOL/L (ref 98–107)
CO2: 28 MMOL/L (ref 22–29)
CREAT SERPL-MCNC: 1 MG/DL (ref 0.5–1)
EKG ATRIAL RATE: 394 BPM
EKG Q-T INTERVAL: 484 MS
EKG QRS DURATION: 150 MS
EKG QTC CALCULATION (BAZETT): 491 MS
EKG R AXIS: 63 DEGREES
EKG T AXIS: 29 DEGREES
EKG VENTRICULAR RATE: 62 BPM
GFR AFRICAN AMERICAN: >60
GFR NON-AFRICAN AMERICAN: 54 ML/MIN/1.73
GLUCOSE BLD-MCNC: 149 MG/DL (ref 74–99)
HCT VFR BLD CALC: 37.5 % (ref 34–48)
HEMOGLOBIN: 11.8 G/DL (ref 11.5–15.5)
INR BLD: 1.7
MCH RBC QN AUTO: 29.1 PG (ref 26–35)
MCHC RBC AUTO-ENTMCNC: 31.5 % (ref 32–34.5)
MCV RBC AUTO: 92.4 FL (ref 80–99.9)
METER GLUCOSE: 156 MG/DL (ref 74–99)
METER GLUCOSE: 178 MG/DL (ref 74–99)
PDW BLD-RTO: 13.1 FL (ref 11.5–15)
PLATELET # BLD: 192 E9/L (ref 130–450)
PMV BLD AUTO: 10.4 FL (ref 7–12)
POTASSIUM SERPL-SCNC: 5.3 MMOL/L (ref 3.5–5)
PROTHROMBIN TIME: 18.2 SEC (ref 9.3–12.4)
RBC # BLD: 4.06 E12/L (ref 3.5–5.5)
SODIUM BLD-SCNC: 141 MMOL/L (ref 132–146)
WBC # BLD: 9.1 E9/L (ref 4.5–11.5)

## 2021-07-16 PROCEDURE — 2500000003 HC RX 250 WO HCPCS: Performed by: PHYSICIAN ASSISTANT

## 2021-07-16 PROCEDURE — 80048 BASIC METABOLIC PNL TOTAL CA: CPT

## 2021-07-16 PROCEDURE — 36415 COLL VENOUS BLD VENIPUNCTURE: CPT

## 2021-07-16 PROCEDURE — 6360000002 HC RX W HCPCS: Performed by: PHYSICIAN ASSISTANT

## 2021-07-16 PROCEDURE — 85027 COMPLETE CBC AUTOMATED: CPT

## 2021-07-16 PROCEDURE — 93010 ELECTROCARDIOGRAM REPORT: CPT | Performed by: INTERNAL MEDICINE

## 2021-07-16 PROCEDURE — 85610 PROTHROMBIN TIME: CPT

## 2021-07-16 PROCEDURE — 6370000000 HC RX 637 (ALT 250 FOR IP): Performed by: PHYSICIAN ASSISTANT

## 2021-07-16 PROCEDURE — 93005 ELECTROCARDIOGRAM TRACING: CPT | Performed by: PHYSICIAN ASSISTANT

## 2021-07-16 PROCEDURE — 2580000003 HC RX 258: Performed by: PHYSICIAN ASSISTANT

## 2021-07-16 PROCEDURE — 82962 GLUCOSE BLOOD TEST: CPT

## 2021-07-16 PROCEDURE — 99232 SBSQ HOSP IP/OBS MODERATE 35: CPT | Performed by: PHYSICIAN ASSISTANT

## 2021-07-16 RX ORDER — WARFARIN SODIUM 2 MG/1
2 TABLET ORAL ONCE
Status: DISCONTINUED | OUTPATIENT
Start: 2021-07-16 | End: 2021-07-16 | Stop reason: HOSPADM

## 2021-07-16 RX ORDER — AMLODIPINE BESYLATE 10 MG/1
10 TABLET ORAL DAILY
Qty: 30 TABLET | Refills: 3 | Status: SHIPPED
Start: 2021-07-17 | End: 2021-10-13 | Stop reason: SDUPTHER

## 2021-07-16 RX ORDER — ASPIRIN 81 MG/1
81 TABLET ORAL DAILY
Qty: 30 TABLET | Refills: 3 | Status: SHIPPED | OUTPATIENT
Start: 2021-07-17 | End: 2022-02-02 | Stop reason: SDUPTHER

## 2021-07-16 RX ORDER — AMLODIPINE BESYLATE 10 MG/1
10 TABLET ORAL DAILY
Status: DISCONTINUED | OUTPATIENT
Start: 2021-07-17 | End: 2021-07-16 | Stop reason: HOSPADM

## 2021-07-16 RX ORDER — AMOXICILLIN 500 MG/1
CAPSULE ORAL
Qty: 4 CAPSULE | Refills: 0 | Status: ON HOLD | OUTPATIENT
Start: 2021-07-16 | End: 2021-08-02 | Stop reason: HOSPADM

## 2021-07-16 RX ADMIN — AMLODIPINE BESYLATE 5 MG: 5 TABLET ORAL at 08:31

## 2021-07-16 RX ADMIN — BRIMONIDINE TARTRATE 1 DROP: 2 SOLUTION/ DROPS OPHTHALMIC at 08:32

## 2021-07-16 RX ADMIN — ASPIRIN 81 MG: 81 TABLET, COATED ORAL at 08:32

## 2021-07-16 RX ADMIN — ENOXAPARIN SODIUM 105 MG: 120 INJECTION SUBCUTANEOUS at 06:39

## 2021-07-16 RX ADMIN — ENALAPRIL MALEATE 10 MG: 10 TABLET ORAL at 08:32

## 2021-07-16 RX ADMIN — FUROSEMIDE 20 MG: 20 TABLET ORAL at 08:31

## 2021-07-16 RX ADMIN — ATORVASTATIN CALCIUM 10 MG: 10 TABLET, FILM COATED ORAL at 08:32

## 2021-07-16 RX ADMIN — INSULIN LISPRO 2 UNITS: 100 INJECTION, SOLUTION INTRAVENOUS; SUBCUTANEOUS at 08:35

## 2021-07-16 RX ADMIN — Medication 2000 MG: at 02:00

## 2021-07-16 RX ADMIN — SODIUM CHLORIDE, PRESERVATIVE FREE 10 ML: 5 INJECTION INTRAVENOUS at 08:32

## 2021-07-16 RX ADMIN — PANTOPRAZOLE SODIUM 40 MG: 40 TABLET, DELAYED RELEASE ORAL at 08:32

## 2021-07-16 RX ADMIN — INSULIN LISPRO 2 UNITS: 100 INJECTION, SOLUTION INTRAVENOUS; SUBCUTANEOUS at 12:50

## 2021-07-16 RX ADMIN — METOPROLOL TARTRATE 50 MG: 25 TABLET, FILM COATED ORAL at 08:32

## 2021-07-16 ASSESSMENT — PAIN SCALES - GENERAL: PAINLEVEL_OUTOF10: 0

## 2021-07-16 NOTE — PROGRESS NOTES
Pharmacy Consultation Note  (Warfarin Dosing and Monitoring)    Initial consult date: 7/14/21  Consulting physician: Dr. Brooke Dietz    Allergies:  Lactose intolerance (gi) and Phenobarbital    77 y.o. female    Ht Readings from Last 1 Encounters:   07/14/21 5' 4\" (1.626 m)     Wt Readings from Last 1 Encounters:   07/14/21 240 lb (108.9 kg)         Warfarin Indication Target   INR Range Home Dose  (if applicable) Diet/Feeding Tube   (Enteral feeds, nutritional drinks, increased Vitamin K in diet can decrease INR)   Afib with TAVR 2.5-3.5 4mg on Wed; 3mg all other days N/a       Vitamin K or Blood product  Administration Date               Warfarin drug-drug interactions  Start  Stop Home Med?  Comments   ASA EC 81 mg PO daily  7/14  No                            TSH:    Lab Results   Component Value Date    TSH 2.350 03/08/2021        Hepatic Function Panel:                            Lab Results   Component Value Date    ALKPHOS 78 07/09/2021    ALT 11 07/09/2021    AST 14 07/09/2021    PROT 7.2 07/09/2021    BILITOT 0.9 07/09/2021    BILIDIR <0.2 08/15/2015    IBILI 0.6 08/15/2015    LABALBU 4.1 07/09/2021    LABALBU 4.2 06/06/2012       Date Warfarin Dose INR Heparin or LMWH HGB/HCT PLT Comment   7/14/21 5 mg 1.2 Enoxaparin 105mg SQ BID 12.1/37.4 231    7/15 4 mg 1.2 Enoxaparin 105mg SQ BID 12.8/40 251    7/16 2 mg 1.7 Enoxaparin 105mg SQ BID 11.8/37.5 192                        Assessment:  · 78 yo F admitted 7/14 s/p TAVR with interventional cardiology and CT surgery  · Pt on chronic anticoagulation prior to admission with Warfarin for Afib; documented home dose is Warfarin 4 mg once weekly and 3 mg all other days  · Pharmacy consulted by Dr Rajni Mayfield to dose/monitor Warfarin; pt currently on therapeutic Enoxaparin for bridging  · INR today 1.7; INR goal 2.5 - 3.5 per Dr Jennifer Alonzo order    Plan:  · Pt received Warfarin 4 mg PO x1 last night; Due to jump in INR, will reduce dose and order Warfarin 2 mg PO x1 tonight  · Daily PT/INR  · Continue therapeutic Enoxaparin until the INR is stable within the therapeutic range    Will continue to follow.       Heidy Bartlett PharmD, BCPS, BCCCP  7/16/2021  8:36 AM

## 2021-07-16 NOTE — CARE COORDINATION
Met with pt at bedside. Iv-zofran q8. Outpatient cardiac rehab recommended, per pt transportation is an issue she no longer drives. Pt anticipates discharge home with no needs when medically stable. Granddaughter is able to transport home.      Oniel Urena

## 2021-07-16 NOTE — PROGRESS NOTES
TAVR TEAM PROGRESS NOTE      POD # 2 s/p TAVR with 29-mm Evolut PRO+; David Flor/Carlos    TAVR access: right common femoral artery surgical cutdown approach  Pigtail access: left common femoral artery    Subjective:    Resting comfortably in bed; on room air with sats 92-97%   Denies complaints; she is in great spirits   States she is already noticing improved functional capacity without dyspnea   Labs and vitals all stable      Current Facility-Administered Medications   Medication Dose Route Frequency Provider Last Rate Last Admin    warfarin (COUMADIN) tablet 2 mg  2 mg Oral Once Santi Smith [facility]        [START ON 7/17/2021] amLODIPine (NORVASC) tablet 10 mg  10 mg Oral Daily ADRY Nolan        brimonidine (ALPHAGAN) 0.2 % ophthalmic solution 1 drop  1 drop Left Eye Daily ADRY Nolan   1 drop at 07/16/21 0832    enalapril (VASOTEC) tablet 10 mg  10 mg Oral BID ADRY Nolan   10 mg at 07/16/21 8981    furosemide (LASIX) tablet 20 mg  20 mg Oral Daily ADRY Nolan   20 mg at 07/16/21 0831    latanoprost (XALATAN) 0.005 % ophthalmic solution 1 drop  1 drop Left Eye Nightly ADRY Nolan   1 drop at 07/15/21 2145    metoprolol tartrate (LOPRESSOR) tablet 50 mg  50 mg Oral BID ADRY Nolan   50 mg at 07/16/21 6376    pantoprazole (PROTONIX) tablet 40 mg  40 mg Oral Daily ADRY Nolan   40 mg at 07/16/21 2465    potassium chloride (KLOR-CON M) extended release tablet 10 mEq  10 mEq Oral Daily ADRY Nolan   10 mEq at 07/15/21 0828    atorvastatin (LIPITOR) tablet 10 mg  10 mg Oral Daily ADRY Nolan   10 mg at 07/16/21 3760    sodium chloride flush 0.9 % injection 5-40 mL  5-40 mL Intravenous 2 times per day ADRY Nolan   10 mL at 07/16/21 1388    sodium chloride flush 0.9 % injection 5-40 mL  5-40 mL Intravenous PRN ADRY Nolan        0.9 % sodium chloride infusion  25 mL Intravenous PRN Dora CADE ADRY Wilcox        [facility]etron [facility]) injection 4 mg  4 mg Intravenous Q8H PRN Alexei Brenda PA   4 mg at 07/14/21 2213    aspirin EC tablet 81 mg  81 mg Oral Daily Alexei Brenda PA   81 mg at 07/16/21 0832    oxyCODONE-acetaminophen (PERCOCET) 5-325 MG per tablet 1 tablet  1 tablet Oral Q4H PRN Alexei Gutierrez PA   1 tablet at 07/15/21 1837    glucose (GLUTOSE) 40 % oral gel 15 g  15 g Oral PRN ADRY Martin        dextrose 50 % IV solution  12.5 g Intravenous PRN Alexei ADRY Gutierrez        glucagon (rDNA) injection 1 mg  1 mg Intramuscular PRN Alexei Brenda, PA        dextrose 5 % solution  100 mL/hr Intravenous PRN Alexei Gutierrez, PA        insulin lispro (HUMALOG) injection vial 0-12 Units  0-12 Units Subcutaneous TID WC Alexei Brenda PA   2 Units at 07/16/21 0835    insulin lispro (HUMALOG) injection vial 0-6 Units  0-6 Units Subcutaneous Nightly Alexei Brenda PA   1 Units at 07/15/21 2150    perflutren lipid microspheres (DEFINITY) injection 1.65 mg  1.5 mL Intravenous ONCE PRN Alexei ADRY Gutierrez        enoxaparin (LOVENOX) injection 105 mg  1 mg/kg Subcutaneous BID Alexei Gutierrez PA   105 mg at 07/16/21 1611    warfarin (COUMADIN) daily dosing (placeholder)   Other Daily Marci Warner MD   Given at 07/15/21 1800           Intake/Output Summary (Last 24 hours) at 7/16/2021 1254  Last data filed at 7/16/2021 0803  Gross per 24 hour   Intake 540 ml   Output --   Net 540 ml       Patient Vitals for the past 96 hrs (Last 3 readings):   Weight   07/14/21 0528 240 lb (108.9 kg)          Physical Exam:  BP (!) 168/71   Pulse 64   Temp 97.5 °F (36.4 °C) (Temporal)   Resp 16   Ht 5' 4\" (1.626 m)   Wt 240 lb (108.9 kg)   SpO2 96%   BMI 41.20 kg/m²     General: No acute distress, appears his stated age, nonicteric  Head: Atraumatic, no gross abnormalities or bruises  Neck: Supple and nontender, no carotid bruits, no JVP  Lungs: Clear to auscultation bilaterally, no wheezes, rales, or rhonchi  Heart: Regular rate and rhythm, no murmurs, rubs, or gallops  Abdomen: Soft, nontender, nondistended, normal bowel sounds  Extremities: No obvious deformities, no cyanosis, no edema  Neurological: Alert and oriented x3, EOMI, moving all extremities x4  Psychological: Normal mood and affect, cooperative  Skin: Color, texture, and turgor normal for age     Access sites: minimal ecchymoses. No hematoma or pulsatile masses. Distal pulses normal b/l. SILVIANO dressing in right groin. Lab Review     Recent Labs     07/14/21  1010 07/15/21  0500 07/16/21  0537   WBC 9.9 11.8* 9.1   HGB 12.1 12.8 11.8   HCT 37.4 40.0 37.5    251 192       Recent Labs     07/14/21  1010 07/15/21  0600 07/16/21  0537    138 141   K 3.9 4.3 5.3*    102 102   CO2 24 27 28   BUN 10 14 19   CREATININE 0.7 0.7 1.0       No results for input(s): AST, ALT, ALB, BILIDIR, ALKPHOS in the last 72 hours. No results for input(s): BNP, CKTOTAL, CKMB in the last 72 hours. Recent Labs     07/14/21  0545 07/15/21  0500 07/16/21  0537   INR 1.2 1.2 1.7       EKG pre-TAVR: AFib, RBBB    EKG today: AFib, RBBB    POD1 TTE:   **POD1 s/p TAVR with 29-mm Evolut PRO+; h/o MVR with #25 Mosaic (2007)**  Normal left ventricle size. Estimated left ventricle ejection fraction 60+/-5 %. Normal right ventricular size and function. Well-seated surgical mitral bioprosthesis. No significant mitral regurgitation. There is a well-seated transcatheter valve in the aortic position. Trace paravalvular regurgitation. MG 7, DVI 0.67. No evidence of pericardial effusion. Assessment:  1. Severe, symptomatic AS s/p TAVR with #29 Medtronic Evolut PRO+ valve via right femoral cutdown  2. HFpEF  3. 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 Morbid obesity  5. Permanent AFib - on warfarin  6. HTN - needs better control    NYHA class III    Plan:  1. Increase amlodipine to 10 mg daily  2.  Increase

## 2021-07-16 NOTE — PROGRESS NOTES
SILVIANO dressing changed. Incision approximately 2 inches. intact and well approximated. dermabond intact. No drainage. Ecchymosis around site. Discharge instructions given. Discussed medications and follow up appointments. No further questions at this time. IV and monitor d/c.

## 2021-07-19 ENCOUNTER — TELEPHONE (OUTPATIENT)
Dept: CARDIOLOGY | Age: 76
End: 2021-07-19

## 2021-07-19 ENCOUNTER — NURSE ONLY (OUTPATIENT)
Dept: INTERNAL MEDICINE | Age: 76
End: 2021-07-19
Payer: MEDICARE

## 2021-07-19 DIAGNOSIS — I48.20 CHRONIC ATRIAL FIBRILLATION (HCC): Primary | ICD-10-CM

## 2021-07-19 LAB
INTERNATIONAL NORMALIZATION RATIO, POC: 1.7
PROTHROMBIN TIME, POC: 19.9

## 2021-07-19 PROCEDURE — 85610 PROTHROMBIN TIME: CPT | Performed by: INTERNAL MEDICINE

## 2021-07-19 PROCEDURE — 93793 ANTICOAG MGMT PT WARFARIN: CPT | Performed by: INTERNAL MEDICINE

## 2021-07-19 NOTE — TELEPHONE ENCOUNTER
Spoke to Jesse Lee at Dr. Gonzalez Garcia office. INR 1.7 today. Discussed Dr. Maher Cluster recommendation for INR goal 2.5-3.5 - will continue lovenox and Dr. Ilda Hooker will recheck INR on Wednesday, 7/21/21. Further recommendations pending INR result.

## 2021-07-19 NOTE — PROGRESS NOTES
Patient came in for INR today per instruction of Dr. Zev Membreno office. INR of 1.7 reported to [facility] PA. Liyah Rice states Romero November wants goal to be 2.5 -3.5  Patient currently on lovenex injections twice daily and coumadin 3 mg coumadin.   Romero November wants to continue coumadin 3 mg daily and lovenox injections twice daily and repeat INR on wednesday 7/21/21  Patient and roxane Wiggins notified and verbalizes understanding  Liyah Rice will reorder lovenox   office asking if Tram Darden can follow INR

## 2021-07-19 NOTE — TELEPHONE ENCOUNTER
Spoke to patient. She is doing well since discharge from tavr admission. She notes some constipation since the procedure. Advised this is most likely from anesthesia and opoid pain medication. Encouraged to remain hydrated, increase ambulation as tolerated and can try stool softener as well. She also relates a cough, productive of clear sputum since Friday evening. No associated fever, chills or any other symptoms. She states this has been an intermittent issue for her for a long time, and she normally uses tessalon perles for relief. She did not take any yet bc she wanted to make sure it was ok. Advised she can use tessalon, and she will let me know if symptoms persist or worsen. She is having INR drawn this AM as PCP office. They will resume INR management. Discussed SILVIANO dressing removal on Wednesday and structural heart clinic follow up next week. She was encouraged to call sooner if concerns arise in the meantime.

## 2021-07-19 NOTE — PATIENT INSTRUCTIONS
Continue lovenox injections twice daily  Continue coumadin 3 mg daily  Repeat INR on 7/21/21 as discussed  Please call if any questions or concerns  Patient Education        Taking Warfarin Safely: Care Instructions  Your Care Instructions     Warfarin is a medicine that you take to prevent blood clots. It is often called a blood thinner. Doctors give warfarin (such as Coumadin) to reduce the risk of blood clots. You may be at risk for blood clots if you have atrial fibrillation or deep vein thrombosis. Some other health problems may also put you at risk. Warfarin slows the amount of time it takes for your blood to clot. It can cause bleeding problems. Even if you've been taking warfarin for a while, it's important to know how to take it safely. Foods and other medicines can affect the way warfarin works. Some can make warfarin work too well. This can cause bleeding problems. And some can make it work poorly, so that it does not prevent blood clots very well. You will need regular blood tests to check how long it takes for your blood to form a clot. This test is called a PT or prothrombin time test. The result of the test is called an INR level. Depending on the test results, your doctor or anticoagulation clinic may adjust your dose of warfarin. Follow-up care is a key part of your treatment and safety. Be sure to make and go to all appointments, and call your doctor if you are having problems. It's also a good idea to know your test results and keep a list of the medicines you take. How can you care for yourself at home? Take warfarin safely  · Take your warfarin at the same time each day. · If you miss a dose of warfarin, don't take an extra dose to make up for it. Your doctor can tell you exactly what to do so you don't take too much or too little. · Wear medical alert jewelry that lets others know that you take warfarin. You can buy this at most drugstores.   · Don't take warfarin if you are pregnant or planning to get pregnant. Talk to your doctor about how you can prevent getting pregnant while you are taking it. · Don't change your dose or stop taking warfarin unless your doctor tells you to. Effects of medicines and food on warfarin  · Don't start or stop taking any medicines, vitamins, or natural remedies unless you first talk to your doctor. Many medicines can affect how warfarin works. These include aspirin and other pain relievers, over-the-counter medicines, multivitamins, dietary supplements, and herbal products. · Tell all of your doctors and pharmacists that you take warfarin. Some prescription medicines can affect how warfarin works. · Keep the amount of vitamin K in your diet about the same from day to day. Do not suddenly eat a lot more or a lot less food that is rich in vitamin K than you usually do. Vitamin K affects how warfarin works and how your blood clots. Talk with your doctor before making big changes in your diet. Foods that have a lot of vitamin K include cooked green vegetables, such as:  ? Kale, spinach, turnip greens, katina greens, Swiss chard, and mustard greens. ? [place] sprouts, broccoli, and cabbage. · Limit your use of alcohol. Avoid bleeding by preventing falls and injuries  · Wear slippers or shoes with nonskid soles. · Remove throw rugs and clutter. · Rearrange furniture and electrical cords to keep them out of walking paths. · Keep stairways, porches, and outside walkways well lit. Use night-lights in hallways and bathrooms. · Be extra careful when you work with sharp tools or knives. When should you call for help? Call 911 anytime you think you may need emergency care. For example, call if:    · You have a sudden, severe headache that is different from past headaches. Call your doctor now or seek immediate medical care if:    · You have any abnormal bleeding, such as:  ? Nosebleeds.   ? Vaginal bleeding that is different (heavier, more frequent, at a different time of the month) than what you are used to.  ? Bloody or black stools, or rectal bleeding. ? Bloody or pink urine. Watch closely for changes in your health, and be sure to contact your doctor if you have any problems. Where can you learn more? Go to [email or web address] and sign in to your [employer] account. Enter D029 in the giftee box to learn more about \"Taking Warfarin Safely: Care Instructions. \"     If you do not have an account, please click on the \"Sign Up Now\" link. Current as of: August 31, 2020               Content Version: 12.9  © 6634-4121 [employer]. Care instructions adapted under license by [facility] [place] ([facility]). If you have questions about a medical condition or this instruction, always ask your healthcare professional. [address] any warranty or liability for your use of this information.

## 2021-07-20 DIAGNOSIS — R05.9 COUGH: ICD-10-CM

## 2021-07-21 ENCOUNTER — NURSE ONLY (OUTPATIENT)
Dept: INTERNAL MEDICINE | Age: 76
DRG: 813 | End: 2021-07-21
Payer: MEDICARE

## 2021-07-21 VITALS
BODY MASS INDEX: 40.63 KG/M2 | DIASTOLIC BLOOD PRESSURE: 88 MMHG | HEIGHT: 64 IN | OXYGEN SATURATION: 98 % | RESPIRATION RATE: 16 BRPM | HEART RATE: 72 BPM | SYSTOLIC BLOOD PRESSURE: 152 MMHG | TEMPERATURE: 99.4 F | WEIGHT: 238 LBS

## 2021-07-21 DIAGNOSIS — I50.32 CHRONIC HEART FAILURE WITH PRESERVED EJECTION FRACTION (HCC): ICD-10-CM

## 2021-07-21 DIAGNOSIS — Z95.1 HX OF CABG: ICD-10-CM

## 2021-07-21 DIAGNOSIS — Z95.2 S/P TAVR (TRANSCATHETER AORTIC VALVE REPLACEMENT): ICD-10-CM

## 2021-07-21 DIAGNOSIS — I10 ESSENTIAL HYPERTENSION: Chronic | ICD-10-CM

## 2021-07-21 DIAGNOSIS — Z79.01 CHRONIC ANTICOAGULATION: ICD-10-CM

## 2021-07-21 DIAGNOSIS — I48.20 CHRONIC ATRIAL FIBRILLATION (HCC): Primary | ICD-10-CM

## 2021-07-21 DIAGNOSIS — T82.857S STENOSIS OF PROSTHETIC MITRAL VALVE, SEQUELA: ICD-10-CM

## 2021-07-21 LAB
INTERNATIONAL NORMALIZATION RATIO, POC: 1.8
PROTHROMBIN TIME, POC: 21.2

## 2021-07-21 PROCEDURE — 99212 OFFICE O/P EST SF 10 MIN: CPT | Performed by: INTERNAL MEDICINE

## 2021-07-21 PROCEDURE — 85610 PROTHROMBIN TIME: CPT | Performed by: INTERNAL MEDICINE

## 2021-07-21 PROCEDURE — 99213 OFFICE O/P EST LOW 20 MIN: CPT | Performed by: INTERNAL MEDICINE

## 2021-07-21 RX ORDER — BENZONATATE 100 MG/1
100 CAPSULE ORAL 3 TIMES DAILY PRN
Qty: 30 CAPSULE | Refills: 0 | Status: ON HOLD
Start: 2021-07-21 | End: 2021-08-02

## 2021-07-21 ASSESSMENT — ENCOUNTER SYMPTOMS
BLOOD IN STOOL: 0
CHOKING: 0
SHORTNESS OF BREATH: 0
ABDOMINAL DISTENTION: 0
DIARRHEA: 0
VOMITING: 0
NAUSEA: 0
ABDOMINAL PAIN: 1
WHEEZING: 0
CONSTIPATION: 0
COUGH: 1
CHEST TIGHTNESS: 0

## 2021-07-21 NOTE — PROGRESS NOTES
Discharge instructions reviewed with pt per Dr. Rochelle Umana. Pt's granddaughter states Coumadin instructions were received from Dr. Rochelle Umana. Spoke with Stephany & appt scheduled for Friday morning. AVS mailed to patient.

## 2021-07-21 NOTE — PROGRESS NOTES
HPI:  Patient comes in today for INR check. She had recent TAVR 7/14/21. She has been taking lovenox until INR theraputic. While in waiting room she mentioned that she was having a non-productive cough and abdominal pain specifically around the injection sites for lovenox and also when coughing. Her grand daughter states that overall, she is improving with her cough and abdominal pain and has had less ecchymosis of her abdomen and even the cough seem to be slightly better. The patient is frustrated because she can't bring up any phlegm. She has no dyspnea, fever, chills, nausea, vomiting, diarrhea, melena or blood in stools. Bowels have been moving normally. Patient states she is tired. Review of Systems  Review of Systems   Constitutional: Positive for fatigue. Negative for chills, diaphoresis and fever. Respiratory: Positive for cough. Negative for choking, chest tightness, shortness of breath and wheezing. Cardiovascular: Negative for chest pain, palpitations and leg swelling. Gastrointestinal: Positive for abdominal pain. Negative for abdominal distention, blood in stool, constipation, diarrhea, nausea and vomiting. PE:  VS:  BP (!) 152/88 (Site: Left Upper Arm, Position: Sitting)   Pulse 72   Temp 99.4 °F (37.4 °C) (Oral)   Resp 16   Ht 5' 4\" (1.626 m)   Wt 238 lb (108 kg)   SpO2 98% Comment: at rest on room air  BMI 40.85 kg/m²   Physical Exam  Constitutional:       General: She is not in acute distress. Appearance: She is not ill-appearing or diaphoretic. Cardiovascular:      Rate and Rhythm: Normal rate. Rhythm irregular. Heart sounds: No friction rub. No gallop. Comments: Has accentuated valvular heart tones, cannot appreciate any diastolic murmur. Pulmonary:      Effort: Pulmonary effort is normal. No respiratory distress. Breath sounds: Wheezing (patient has a few faint expiratory wheezes) present. No rales.    Abdominal:      General: Bowel sounds are normal. There is distension (mildly distended). Palpations: Abdomen is soft. There is no mass. Tenderness: There is abdominal tenderness (has mild tenderness over LLQ where there is some bruising from lovenox injection. I cannot appreciate any firm or tense areas and no mass. ). There is no guarding or rebound. Musculoskeletal:         General: No swelling. SaO2 98% on room air. Assessment/Plan:  Dakota Daniel was seen today for anticoagulation, cough and abdominal pain. Diagnoses and all orders for this visit:    Chronic atrial fibrillation (HCC)  -     POCT INR  -     enoxaparin (LOVENOX) 120 MG/0.8ML injection; Inject 0.73 mLs into the skin every 12 hours  Chronic anticoagulation, INR 1.8 (goal 2.5-3.5), patient instructed to take 5 mg coumadin today and 4 mg tomorrow then repeat INR Friday. S/P TAVR (transcatheter aortic valve replacement)    Stenosis of prosthetic mitral valve, sequela    Hx of CABG    Essential hypertension    Chronic heart failure with preserved ejection fraction (HCC)    Non-productive cough, Rx for tessalon perles, OTC plain mucinex, tylenol as needed.   Patient (or grand daughter to notify me or go to ER if symptoms worsen or patient develops fever, dyspnea, etc.)

## 2021-07-22 ENCOUNTER — APPOINTMENT (OUTPATIENT)
Dept: CT IMAGING | Age: 76
DRG: 813 | End: 2021-07-22
Payer: MEDICARE

## 2021-07-22 ENCOUNTER — TELEPHONE (OUTPATIENT)
Dept: OTHER | Facility: CLINIC | Age: 76
End: 2021-07-22

## 2021-07-22 ENCOUNTER — HOSPITAL ENCOUNTER (INPATIENT)
Age: 76
LOS: 11 days | Discharge: HOME HEALTH | DRG: 813 | End: 2021-08-02
Attending: EMERGENCY MEDICINE | Admitting: INTERNAL MEDICINE
Payer: MEDICARE

## 2021-07-22 ENCOUNTER — APPOINTMENT (OUTPATIENT)
Dept: GENERAL RADIOLOGY | Age: 76
DRG: 813 | End: 2021-07-22
Payer: MEDICARE

## 2021-07-22 DIAGNOSIS — I48.20 CHRONIC ATRIAL FIBRILLATION (HCC): ICD-10-CM

## 2021-07-22 DIAGNOSIS — R05.9 COUGH: ICD-10-CM

## 2021-07-22 DIAGNOSIS — S30.1XXD ABDOMINAL WALL HEMATOMA, SUBSEQUENT ENCOUNTER: ICD-10-CM

## 2021-07-22 DIAGNOSIS — S30.1XXA ABDOMINAL WALL HEMATOMA, INITIAL ENCOUNTER: Primary | ICD-10-CM

## 2021-07-22 LAB
ALBUMIN SERPL-MCNC: 3.6 G/DL (ref 3.5–5.2)
ALP BLD-CCNC: 99 U/L (ref 35–104)
ALT SERPL-CCNC: 26 U/L (ref 0–32)
ANION GAP SERPL CALCULATED.3IONS-SCNC: 14 MMOL/L (ref 7–16)
AST SERPL-CCNC: 38 U/L (ref 0–31)
BASOPHILS ABSOLUTE: 0.04 E9/L (ref 0–0.2)
BASOPHILS RELATIVE PERCENT: 0.4 % (ref 0–2)
BILIRUB SERPL-MCNC: 1 MG/DL (ref 0–1.2)
BILIRUBIN URINE: NEGATIVE
BLOOD, URINE: NEGATIVE
BUN BLDV-MCNC: 22 MG/DL (ref 6–23)
CALCIUM SERPL-MCNC: 8.8 MG/DL (ref 8.6–10.2)
CHLORIDE BLD-SCNC: 98 MMOL/L (ref 98–107)
CLARITY: CLEAR
CO2: 22 MMOL/L (ref 22–29)
COLOR: ABNORMAL
CREAT SERPL-MCNC: 1.2 MG/DL (ref 0.5–1)
EKG ATRIAL RATE: 227 BPM
EKG Q-T INTERVAL: 360 MS
EKG QRS DURATION: 140 MS
EKG QTC CALCULATION (BAZETT): 464 MS
EKG R AXIS: 71 DEGREES
EKG T AXIS: -8 DEGREES
EKG VENTRICULAR RATE: 100 BPM
EOSINOPHILS ABSOLUTE: 0.01 E9/L (ref 0.05–0.5)
EOSINOPHILS RELATIVE PERCENT: 0.1 % (ref 0–6)
GFR AFRICAN AMERICAN: 53
GFR NON-AFRICAN AMERICAN: 44 ML/MIN/1.73
GLUCOSE BLD-MCNC: 219 MG/DL (ref 74–99)
GLUCOSE URINE: NEGATIVE MG/DL
HCT VFR BLD CALC: 33.6 % (ref 34–48)
HEMOGLOBIN: 10.8 G/DL (ref 11.5–15.5)
IMMATURE GRANULOCYTES #: 0.06 E9/L
IMMATURE GRANULOCYTES %: 0.6 % (ref 0–5)
INR BLD: 2.2
KETONES, URINE: NEGATIVE MG/DL
LACTIC ACID, SEPSIS: 3.7 MMOL/L (ref 0.5–1.9)
LEUKOCYTE ESTERASE, URINE: NEGATIVE
LIPASE: 24 U/L (ref 13–60)
LYMPHOCYTES ABSOLUTE: 1.61 E9/L (ref 1.5–4)
LYMPHOCYTES RELATIVE PERCENT: 16.8 % (ref 20–42)
MCH RBC QN AUTO: 29.3 PG (ref 26–35)
MCHC RBC AUTO-ENTMCNC: 32.1 % (ref 32–34.5)
MCV RBC AUTO: 91.3 FL (ref 80–99.9)
MONOCYTES ABSOLUTE: 0.97 E9/L (ref 0.1–0.95)
MONOCYTES RELATIVE PERCENT: 10.1 % (ref 2–12)
NEUTROPHILS ABSOLUTE: 6.89 E9/L (ref 1.8–7.3)
NEUTROPHILS RELATIVE PERCENT: 72 % (ref 43–80)
NITRITE, URINE: NEGATIVE
PDW BLD-RTO: 13.2 FL (ref 11.5–15)
PH UA: 6 (ref 5–9)
PLATELET # BLD: 269 E9/L (ref 130–450)
PMV BLD AUTO: 11.2 FL (ref 7–12)
POTASSIUM REFLEX MAGNESIUM: 5 MMOL/L (ref 3.5–5)
PRO-BNP: 704 PG/ML (ref 0–450)
PROTEIN UA: NEGATIVE MG/DL
PROTHROMBIN TIME: 24.5 SEC (ref 9.3–12.4)
RBC # BLD: 3.68 E12/L (ref 3.5–5.5)
SODIUM BLD-SCNC: 134 MMOL/L (ref 132–146)
SPECIFIC GRAVITY UA: 1.02 (ref 1–1.03)
TOTAL PROTEIN: 6.2 G/DL (ref 6.4–8.3)
UROBILINOGEN, URINE: 0.2 E.U./DL
WBC # BLD: 9.6 E9/L (ref 4.5–11.5)

## 2021-07-22 PROCEDURE — 83690 ASSAY OF LIPASE: CPT

## 2021-07-22 PROCEDURE — 6360000002 HC RX W HCPCS

## 2021-07-22 PROCEDURE — 6370000000 HC RX 637 (ALT 250 FOR IP): Performed by: INTERNAL MEDICINE

## 2021-07-22 PROCEDURE — 74177 CT ABD & PELVIS W/CONTRAST: CPT

## 2021-07-22 PROCEDURE — 6360000002 HC RX W HCPCS: Performed by: FAMILY MEDICINE

## 2021-07-22 PROCEDURE — 36415 COLL VENOUS BLD VENIPUNCTURE: CPT

## 2021-07-22 PROCEDURE — 71045 X-RAY EXAM CHEST 1 VIEW: CPT

## 2021-07-22 PROCEDURE — 6360000004 HC RX CONTRAST MEDICATION: Performed by: RADIOLOGY

## 2021-07-22 PROCEDURE — 83605 ASSAY OF LACTIC ACID: CPT

## 2021-07-22 PROCEDURE — 93010 ELECTROCARDIOGRAM REPORT: CPT | Performed by: INTERNAL MEDICINE

## 2021-07-22 PROCEDURE — 2580000003 HC RX 258: Performed by: EMERGENCY MEDICINE

## 2021-07-22 PROCEDURE — 85610 PROTHROMBIN TIME: CPT

## 2021-07-22 PROCEDURE — 2140000000 HC CCU INTERMEDIATE R&B

## 2021-07-22 PROCEDURE — 82962 GLUCOSE BLOOD TEST: CPT

## 2021-07-22 PROCEDURE — 87040 BLOOD CULTURE FOR BACTERIA: CPT

## 2021-07-22 PROCEDURE — 85025 COMPLETE CBC W/AUTO DIFF WBC: CPT

## 2021-07-22 PROCEDURE — 87088 URINE BACTERIA CULTURE: CPT

## 2021-07-22 PROCEDURE — 83880 ASSAY OF NATRIURETIC PEPTIDE: CPT

## 2021-07-22 PROCEDURE — 2580000003 HC RX 258: Performed by: RADIOLOGY

## 2021-07-22 PROCEDURE — 81003 URINALYSIS AUTO W/O SCOPE: CPT

## 2021-07-22 PROCEDURE — 96374 THER/PROPH/DIAG INJ IV PUSH: CPT

## 2021-07-22 PROCEDURE — 99285 EMERGENCY DEPT VISIT HI MDM: CPT

## 2021-07-22 PROCEDURE — 93005 ELECTROCARDIOGRAM TRACING: CPT | Performed by: FAMILY MEDICINE

## 2021-07-22 PROCEDURE — 80053 COMPREHEN METABOLIC PANEL: CPT

## 2021-07-22 RX ORDER — HYDROXYZINE PAMOATE 25 MG/1
25 CAPSULE ORAL ONCE
Status: DISCONTINUED | OUTPATIENT
Start: 2021-07-22 | End: 2021-07-22

## 2021-07-22 RX ORDER — SODIUM CHLORIDE 0.9 % (FLUSH) 0.9 %
5-40 SYRINGE (ML) INJECTION EVERY 12 HOURS SCHEDULED
Status: DISCONTINUED | OUTPATIENT
Start: 2021-07-22 | End: 2021-08-02 | Stop reason: HOSPADM

## 2021-07-22 RX ORDER — PANTOPRAZOLE SODIUM 40 MG/1
40 TABLET, DELAYED RELEASE ORAL DAILY
Status: DISCONTINUED | OUTPATIENT
Start: 2021-07-23 | End: 2021-08-02 | Stop reason: HOSPADM

## 2021-07-22 RX ORDER — NICOTINE POLACRILEX 4 MG
15 LOZENGE BUCCAL PRN
Status: DISCONTINUED | OUTPATIENT
Start: 2021-07-22 | End: 2021-08-02 | Stop reason: HOSPADM

## 2021-07-22 RX ORDER — 0.9 % SODIUM CHLORIDE 0.9 %
500 INTRAVENOUS SOLUTION INTRAVENOUS ONCE
Status: COMPLETED | OUTPATIENT
Start: 2021-07-22 | End: 2021-07-22

## 2021-07-22 RX ORDER — ACETAMINOPHEN 325 MG/1
650 TABLET ORAL EVERY 6 HOURS PRN
Status: DISCONTINUED | OUTPATIENT
Start: 2021-07-22 | End: 2021-08-02 | Stop reason: HOSPADM

## 2021-07-22 RX ORDER — OXYCODONE HYDROCHLORIDE AND ACETAMINOPHEN 5; 325 MG/1; MG/1
1 TABLET ORAL EVERY 6 HOURS PRN
Status: DISCONTINUED | OUTPATIENT
Start: 2021-07-22 | End: 2021-07-22

## 2021-07-22 RX ORDER — BRIMONIDINE TARTRATE 2 MG/ML
1 SOLUTION/ DROPS OPHTHALMIC DAILY
Status: DISCONTINUED | OUTPATIENT
Start: 2021-07-23 | End: 2021-08-02 | Stop reason: HOSPADM

## 2021-07-22 RX ORDER — FENTANYL CITRATE 50 UG/ML
INJECTION, SOLUTION INTRAMUSCULAR; INTRAVENOUS
Status: COMPLETED
Start: 2021-07-22 | End: 2021-07-22

## 2021-07-22 RX ORDER — ONDANSETRON 2 MG/ML
4 INJECTION INTRAMUSCULAR; INTRAVENOUS EVERY 6 HOURS PRN
Status: DISCONTINUED | OUTPATIENT
Start: 2021-07-22 | End: 2021-08-02 | Stop reason: HOSPADM

## 2021-07-22 RX ORDER — LATANOPROST 50 UG/ML
1 SOLUTION/ DROPS OPHTHALMIC DAILY
Status: DISCONTINUED | OUTPATIENT
Start: 2021-07-23 | End: 2021-08-02 | Stop reason: HOSPADM

## 2021-07-22 RX ORDER — ACETAMINOPHEN 650 MG/1
650 SUPPOSITORY RECTAL EVERY 6 HOURS PRN
Status: DISCONTINUED | OUTPATIENT
Start: 2021-07-22 | End: 2021-08-02 | Stop reason: HOSPADM

## 2021-07-22 RX ORDER — DEXTROSE MONOHYDRATE 50 MG/ML
100 INJECTION, SOLUTION INTRAVENOUS PRN
Status: DISCONTINUED | OUTPATIENT
Start: 2021-07-22 | End: 2021-08-02 | Stop reason: HOSPADM

## 2021-07-22 RX ORDER — SODIUM CHLORIDE 0.9 % (FLUSH) 0.9 %
5-40 SYRINGE (ML) INJECTION PRN
Status: DISCONTINUED | OUTPATIENT
Start: 2021-07-22 | End: 2021-08-02 | Stop reason: HOSPADM

## 2021-07-22 RX ORDER — POLYETHYLENE GLYCOL 3350 17 G/17G
17 POWDER, FOR SOLUTION ORAL DAILY PRN
Status: DISCONTINUED | OUTPATIENT
Start: 2021-07-22 | End: 2021-08-02 | Stop reason: HOSPADM

## 2021-07-22 RX ORDER — SODIUM CHLORIDE 0.9 % (FLUSH) 0.9 %
10 SYRINGE (ML) INJECTION ONCE
Status: COMPLETED | OUTPATIENT
Start: 2021-07-22 | End: 2021-07-22

## 2021-07-22 RX ORDER — FENTANYL CITRATE 50 UG/ML
50 INJECTION, SOLUTION INTRAMUSCULAR; INTRAVENOUS ONCE
Status: COMPLETED | OUTPATIENT
Start: 2021-07-22 | End: 2021-07-22

## 2021-07-22 RX ORDER — ATORVASTATIN CALCIUM 20 MG/1
20 TABLET, FILM COATED ORAL DAILY
Status: DISCONTINUED | OUTPATIENT
Start: 2021-07-23 | End: 2021-08-02 | Stop reason: HOSPADM

## 2021-07-22 RX ORDER — SODIUM CHLORIDE 9 MG/ML
25 INJECTION, SOLUTION INTRAVENOUS PRN
Status: DISCONTINUED | OUTPATIENT
Start: 2021-07-22 | End: 2021-08-02 | Stop reason: HOSPADM

## 2021-07-22 RX ORDER — SODIUM CHLORIDE 9 MG/ML
INJECTION, SOLUTION INTRAVENOUS CONTINUOUS
Status: DISCONTINUED | OUTPATIENT
Start: 2021-07-22 | End: 2021-07-24

## 2021-07-22 RX ORDER — ONDANSETRON 4 MG/1
4 TABLET, ORALLY DISINTEGRATING ORAL EVERY 8 HOURS PRN
Status: DISCONTINUED | OUTPATIENT
Start: 2021-07-22 | End: 2021-08-02 | Stop reason: HOSPADM

## 2021-07-22 RX ORDER — OXYCODONE HYDROCHLORIDE AND ACETAMINOPHEN 5; 325 MG/1; MG/1
1 TABLET ORAL
Status: COMPLETED | OUTPATIENT
Start: 2021-07-22 | End: 2021-07-22

## 2021-07-22 RX ORDER — DEXTROSE MONOHYDRATE 25 G/50ML
12.5 INJECTION, SOLUTION INTRAVENOUS PRN
Status: DISCONTINUED | OUTPATIENT
Start: 2021-07-22 | End: 2021-08-02 | Stop reason: HOSPADM

## 2021-07-22 RX ADMIN — FENTANYL CITRATE 50 MCG: 50 INJECTION, SOLUTION INTRAMUSCULAR; INTRAVENOUS at 14:06

## 2021-07-22 RX ADMIN — OXYCODONE AND ACETAMINOPHEN 1 TABLET: 5; 325 TABLET ORAL at 23:53

## 2021-07-22 RX ADMIN — FENTANYL CITRATE 50 MCG: 50 INJECTION, SOLUTION INTRAMUSCULAR; INTRAVENOUS at 17:45

## 2021-07-22 RX ADMIN — FENTANYL CITRATE 50 MCG: 0.05 INJECTION, SOLUTION INTRAMUSCULAR; INTRAVENOUS at 17:45

## 2021-07-22 RX ADMIN — SODIUM CHLORIDE 500 ML: 9 INJECTION, SOLUTION INTRAVENOUS at 12:15

## 2021-07-22 RX ADMIN — IOPAMIDOL 90 ML: 755 INJECTION, SOLUTION INTRAVENOUS at 15:59

## 2021-07-22 RX ADMIN — Medication 10 ML: at 15:59

## 2021-07-22 RX ADMIN — SODIUM CHLORIDE 100 ML/HR: 9 INJECTION, SOLUTION INTRAVENOUS at 12:21

## 2021-07-22 ASSESSMENT — PAIN SCALES - GENERAL
PAINLEVEL_OUTOF10: 8
PAINLEVEL_OUTOF10: 8
PAINLEVEL_OUTOF10: 10
PAINLEVEL_OUTOF10: 6
PAINLEVEL_OUTOF10: 8

## 2021-07-22 ASSESSMENT — ENCOUNTER SYMPTOMS
SHORTNESS OF BREATH: 0
VOMITING: 0
CHEST TIGHTNESS: 0
NAUSEA: 1
BACK PAIN: 0
CONSTIPATION: 0
ABDOMINAL DISTENTION: 1
ABDOMINAL PAIN: 1
DIARRHEA: 0

## 2021-07-22 ASSESSMENT — PAIN DESCRIPTION - PAIN TYPE
TYPE: ACUTE PAIN
TYPE: ACUTE PAIN

## 2021-07-22 ASSESSMENT — PAIN DESCRIPTION - LOCATION
LOCATION: ABDOMEN
LOCATION: ABDOMEN

## 2021-07-22 ASSESSMENT — PAIN DESCRIPTION - DESCRIPTORS: DESCRIPTORS: ACHING

## 2021-07-22 ASSESSMENT — PAIN DESCRIPTION - ORIENTATION: ORIENTATION: RIGHT;LEFT

## 2021-07-22 NOTE — ED NOTES
Bed: 01  Expected date:   Expected time:   Means of arrival:   Comments:     Landon Patel RN  07/22/21 1132

## 2021-07-22 NOTE — TELEPHONE ENCOUNTER
Writer contacted ED provider Lisa Boone  to inform of 30 day readmission risk. Writer's attempt to contact ED provider was unsuccessful. No Decision on disposition at this time.       Call Back: If you need to call back to inform of disposition you can contact me at [phone]

## 2021-07-22 NOTE — ED NOTES
Urine obtained via straight cth. Patient tolerated well.  Patient repositioned for comfort w 2 assist.     Adán Servin RN  07/22/21 0753

## 2021-07-22 NOTE — DISCHARGE SUMMARY
Patient ID:  Tiara Bailey  [id number]  49 y.o.  1945    Admit date: 7/14/2021    Discharge date and time: 7/16/2021  2:50 PM     Admitting Physician: Michael Kelly MD     Discharge Provider: Maribel Lopez PA-C    Admission Diagnoses: Severe aortic stenosis [I35.0]    Discharge Diagnoses:   1. Severe symptomatic AS. 2. Successful transcutaneous aortic valve replacement (TAVR) with 29mm Evolut PRO+ valve. Admission Condition: fair    Discharged Condition: good    Indication for Admission: 77 y.o. admitted post TAVR for severe symptomatic AS. Hospital Course: 77 y.o. male admitted after elective TAVR procedure for severe symptomatic AS. TAVR was performed with a 29mm Evolut PRO+ valve on 7/14/2021 using b/l groin approach. TTE post procedure: Trace paravalvular regurgitation. MG 7, DVI 0.67    She was observed in CVICU for few hours and was transferred to CIU on POD # 0. She was ambulated in the hallways with no significant SOB or CP. B/l groin sites were stable and distal pulses were normal. She had uneventful post procedure course and was discharged on POD # 2. Discharge Exam:  Unchanged. Disposition: home    Patient Instructions: Activity: as per discharge instructions  Diet: regular diet  Wound Care: as directed    Follow Up:   1. With valve clinic in 2 weeks. Time Spent on discharge is more than 31 min in the examination, evaluation, counseling and review of medications and discharge plan.       Signed:  Maribel Lopez PA-C  7/22/2021  6:48 PM

## 2021-07-22 NOTE — ED PROVIDER NOTES
disease. Past Surgical History:  has a past surgical history that includes Tubal ligation; Tonsillectomy; Coronary artery bypass graft; Cardiac catheterization (2007); Cardiac valve replacement; Cataract removal with implant (Right, 08/24/2016); transesophageal echocardiogram (05/07/2021); Cardiac catheterization (Right, 05/07/2021); Colonoscopy; and Aortic valve replacement (N/A, 7/14/2021). Social History:  reports that she has never smoked. She has never used smokeless tobacco. She reports that she does not drink alcohol and does not use drugs. Family History: family history includes Heart Disease in her father and mother. The patients home medications have been reviewed.     Allergies: Lactose intolerance (gi) and Phenobarbital    -------------------------------------------------- RESULTS -------------------------------------------------  All laboratory and radiology results have been personally reviewed by myself   LABS:  Results for orders placed or performed during the hospital encounter of 07/22/21   CBC Auto Differential   Result Value Ref Range    WBC 9.6 4.5 - 11.5 E9/L    RBC 3.68 3.50 - 5.50 E12/L    Hemoglobin 10.8 (L) 11.5 - 15.5 g/dL    Hematocrit 33.6 (L) 34.0 - 48.0 %    MCV 91.3 80.0 - 99.9 fL    MCH 29.3 26.0 - 35.0 pg    MCHC 32.1 32.0 - 34.5 %    RDW 13.2 11.5 - 15.0 fL    Platelets 773 200 - 355 E9/L    MPV 11.2 7.0 - 12.0 fL    Neutrophils % 72.0 43.0 - 80.0 %    Immature Granulocytes % 0.6 0.0 - 5.0 %    Lymphocytes % 16.8 (L) 20.0 - 42.0 %    Monocytes % 10.1 2.0 - 12.0 %    Eosinophils % 0.1 0.0 - 6.0 %    Basophils % 0.4 0.0 - 2.0 %    Neutrophils Absolute 6.89 1.80 - 7.30 E9/L    Immature Granulocytes # 0.06 E9/L    Lymphocytes Absolute 1.61 1.50 - 4.00 E9/L    Monocytes Absolute 0.97 (H) 0.10 - 0.95 E9/L    Eosinophils Absolute 0.01 (L) 0.05 - 0.50 E9/L    Basophils Absolute 0.04 0.00 - 0.20 E9/L   Comprehensive Metabolic Panel w/ Reflex to MG   Result Value Ref Range Sodium 134 132 - 146 mmol/L    Potassium reflex Magnesium 5.0 3.5 - 5.0 mmol/L    Chloride 98 98 - 107 mmol/L    CO2 22 22 - 29 mmol/L    Anion Gap 14 7 - 16 mmol/L    Glucose 219 (H) 74 - 99 mg/dL    BUN 22 6 - 23 mg/dL    CREATININE 1.2 (H) 0.5 - 1.0 mg/dL    GFR Non-African American 44 >=60 mL/min/1.73    GFR African American 53     Calcium 8.8 8.6 - 10.2 mg/dL    Total Protein 6.2 (L) 6.4 - 8.3 g/dL    Albumin 3.6 3.5 - 5.2 g/dL    Total Bilirubin 1.0 0.0 - 1.2 mg/dL    Alkaline Phosphatase 99 35 - 104 U/L    ALT 26 0 - 32 U/L    AST 38 (H) 0 - 31 U/L   Lipase   Result Value Ref Range    Lipase 24 13 - 60 U/L   Urinalysis, reflex to microscopic   Result Value Ref Range    Color, UA DARK YELLOW (A) Straw/Yellow    Clarity, UA Clear Clear    Glucose, Ur Negative Negative mg/dL    Bilirubin Urine Negative Negative    Ketones, Urine Negative Negative mg/dL    Specific Gravity, UA 1.020 1.005 - 1.030    Blood, Urine Negative Negative    pH, UA 6.0 5.0 - 9.0    Protein, UA Negative Negative mg/dL    Urobilinogen, Urine 0.2 <2.0 E.U./dL    Nitrite, Urine Negative Negative    Leukocyte Esterase, Urine Negative Negative   Lactate, Sepsis   Result Value Ref Range    Lactic Acid, Sepsis 3.7 (HH) 0.5 - 1.9 mmol/L   Protime-INR   Result Value Ref Range    Protime 24.5 (H) 9.3 - 12.4 sec    INR 2.2    Brain Natriuretic Peptide   Result Value Ref Range    Pro- (H) 0 - 450 pg/mL   EKG 12 Lead   Result Value Ref Range    Ventricular Rate 100 BPM    Atrial Rate 227 BPM    QRS Duration 140 ms    Q-T Interval 360 ms    QTc Calculation (Bazett) 464 ms    R Axis 71 degrees    T Axis -8 degrees       RADIOLOGY:  Interpreted by Radiologist.  CT ABDOMEN PELVIS W IV CONTRAST Additional Contrast? None   Final Result   1. Large left lateral abdominal wall hematoma measuring 13 cm x 12 cm x 8 cm. There is a fluid fluid level seen within the abdominal wall hematoma   consistent with acute on subacute hemorrhage.   There is minimal dense   material seen layering on the fluid fluid level centrally consistent with   minimal active bleeding. Surrounding intramuscular edema is noted within the   oblique muscles. There is also induration within the subcutaneous soft   tissues consistent with reactive changes. 2. Postsurgical changes within the right groin from recent intervention and   TAVR procedure. There is a 2.5 x 2.5 cm hematoma within the right groin. 3. Cholelithiasis. There is no evidence of acute cholecystitis. 4. There is no aortic or iliac artery injury. XR CHEST PORTABLE   Final Result   1. Status post more remote mid sternotomy. Status post recent endovascular   repair for the aortic root. 2.  Borderline size heart. No acute pulmonary process. ------------------------- NURSING NOTES AND VITALS REVIEWED ---------------------------   The nursing notes within the ED encounter and vital signs as below have been reviewed. /74   Pulse 94   Temp 97 °F (36.1 °C)   Resp 22   Ht 5' 4\" (1.626 m)   Wt 230 lb (104.3 kg)   SpO2 99%   BMI 39.48 kg/m²   Oxygen Saturation Interpretation: Normal      ---------------------------------------------------PHYSICAL EXAM--------------------------------------      Constitutional/General: Alert and oriented x3, well appearing, non toxic in NAD  Head: Normocephalic and atraumatic  Eyes: PERRL, EOMI  Mouth: Oropharynx clear, handling secretions, no trismus  Neck: Supple, full ROM,   Pulmonary: Lungs clear to auscultation bilaterally, no wheezes, rales, or rhonchi. Not in respiratory distress  Cardiovascular: In Afib, no murmurs, gallops, or rubs. 2+ distal pulses  Abdomen: Soft, enlarged secondary to abdominal girth ; tenderness to palpation RLQ, LLQ. Tenderness on movement of abdominal pannus. Right femoral incision for TAVR healing well, non erythematous. Ecchymoses of abdominal wall secondary to Lovenox injections. Extremities: Moves all extremities x 4. Warm and well perfused  Skin: warm and dry without rash  Neurologic: GCS 15,  Psych: Normal Affect      ------------------------------ ED COURSE/MEDICAL DECISION MAKING----------------------  Medications   0.9 % sodium chloride infusion (100 mL/hr Intravenous New Bag 7/22/21 1221)   hydrOXYzine (VISTARIL) capsule 25 mg (has no administration in time range)   0.9 % sodium chloride bolus (500 mLs Intravenous New Bag 7/22/21 1215)   fentaNYL (SUBLIMAZE) injection 50 mcg (50 mcg Intravenous Given 7/22/21 1406)   iopamidol (ISOVUE-370) 76 % injection 90 mL (90 mLs Intravenous Given 7/22/21 1559)   sodium chloride flush 0.9 % injection 10 mL (10 mLs Intravenous Given 7/22/21 1559)   fentaNYL (SUBLIMAZE) injection 50 mcg (50 mcg Intravenous Given 7/22/21 1745)         ED COURSE:       Medical Decision Making:     Patient seen and examined for weakness, nausea and abdominal pain. Complaining of abdominal pain and nausea. Patient was hypotensive on presentation and started on IV fluids. Patient is a Dr. Jodell Duverney private patient and states she has been going to the clinic every few days for INR checks s/p procedure. She has been receiving lovenox injections and warfarin in order to bring INR up between 2.5-3.5. Blood work pertinent for Lactic Acid 3.7 , H/H 10.8/33.6   CT abdomen performed secondary to complaints of abdominal pain. Positive for Large left lateral abdominal wall hematoma measuring 13 cm x 12 cm x 8 cm and smaller right femoral hematoma near entry site for TAVR. At this time, due to patient presentation and requirement for medical mangement; patient admitted to Internal Medicine. Lovenox to be held for now, with no need for INR reversal.   Patient given fentanyl for pain, otherwise stabilized in the ER. Counseling:    The emergency provider has spoken with the patient and family member granddaughter and discussed todays results, in addition to providing specific details for the plan of care and counseling regarding the diagnosis and prognosis. Questions are answered at this time and they are agreeable with the plan.      --------------------------------- IMPRESSION AND DISPOSITION ---------------------------------    IMPRESSION  1. Abdominal wall hematoma, initial encounter        DISPOSITION  Disposition: Admit to telemetry  Patient condition is fair      NOTE: This report was transcribed using voice recognition software.  Every effort was made to ensure accuracy; however, inadvertent computerized transcription errors may be present     Siobhan Lema MD  Resident  07/22/21 7697

## 2021-07-23 ENCOUNTER — APPOINTMENT (OUTPATIENT)
Dept: CT IMAGING | Age: 76
DRG: 813 | End: 2021-07-23
Payer: MEDICARE

## 2021-07-23 LAB
ABO/RH: NORMAL
ALBUMIN SERPL-MCNC: 3 G/DL (ref 3.5–5.2)
ALBUMIN SERPL-MCNC: 3.2 G/DL (ref 3.5–5.2)
ALP BLD-CCNC: 69 U/L (ref 35–104)
ALP BLD-CCNC: 75 U/L (ref 35–104)
ALT SERPL-CCNC: 20 U/L (ref 0–32)
ALT SERPL-CCNC: 20 U/L (ref 0–32)
ANION GAP SERPL CALCULATED.3IONS-SCNC: 10 MMOL/L (ref 7–16)
ANION GAP SERPL CALCULATED.3IONS-SCNC: 11 MMOL/L (ref 7–16)
ANTIBODY SCREEN: NORMAL
AST SERPL-CCNC: 27 U/L (ref 0–31)
AST SERPL-CCNC: 31 U/L (ref 0–31)
BASOPHILS ABSOLUTE: 0.02 E9/L (ref 0–0.2)
BASOPHILS ABSOLUTE: 0.03 E9/L (ref 0–0.2)
BASOPHILS RELATIVE PERCENT: 0.2 % (ref 0–2)
BASOPHILS RELATIVE PERCENT: 0.3 % (ref 0–2)
BILIRUB SERPL-MCNC: 0.7 MG/DL (ref 0–1.2)
BILIRUB SERPL-MCNC: 0.7 MG/DL (ref 0–1.2)
BLOOD BANK DISPENSE STATUS: NORMAL
BLOOD BANK DISPENSE STATUS: NORMAL
BLOOD BANK PRODUCT CODE: NORMAL
BLOOD BANK PRODUCT CODE: NORMAL
BPU ID: NORMAL
BPU ID: NORMAL
BUN BLDV-MCNC: 24 MG/DL (ref 6–23)
BUN BLDV-MCNC: 27 MG/DL (ref 6–23)
CALCIUM IONIZED: 1.18 MMOL/L (ref 1.15–1.33)
CALCIUM SERPL-MCNC: 8 MG/DL (ref 8.6–10.2)
CALCIUM SERPL-MCNC: 8.1 MG/DL (ref 8.6–10.2)
CHLORIDE BLD-SCNC: 100 MMOL/L (ref 98–107)
CHLORIDE BLD-SCNC: 103 MMOL/L (ref 98–107)
CHLORIDE URINE RANDOM: <20 MMOL/L
CO2: 23 MMOL/L (ref 22–29)
CO2: 24 MMOL/L (ref 22–29)
CREAT SERPL-MCNC: 1.1 MG/DL (ref 0.5–1)
CREAT SERPL-MCNC: 1.3 MG/DL (ref 0.5–1)
DESCRIPTION BLOOD BANK: NORMAL
DESCRIPTION BLOOD BANK: NORMAL
EOSINOPHILS ABSOLUTE: 0 E9/L (ref 0.05–0.5)
EOSINOPHILS ABSOLUTE: 0 E9/L (ref 0.05–0.5)
EOSINOPHILS RELATIVE PERCENT: 0 % (ref 0–6)
EOSINOPHILS RELATIVE PERCENT: 0 % (ref 0–6)
GFR AFRICAN AMERICAN: 48
GFR AFRICAN AMERICAN: 59
GFR NON-AFRICAN AMERICAN: 40 ML/MIN/1.73
GFR NON-AFRICAN AMERICAN: 48 ML/MIN/1.73
GLUCOSE BLD-MCNC: 177 MG/DL (ref 74–99)
GLUCOSE BLD-MCNC: 185 MG/DL (ref 74–99)
HCT VFR BLD CALC: 22.4 % (ref 34–48)
HCT VFR BLD CALC: 24 % (ref 34–48)
HCT VFR BLD CALC: 24.7 % (ref 34–48)
HCT VFR BLD CALC: 25.5 % (ref 34–48)
HEMOGLOBIN: 7.2 G/DL (ref 11.5–15.5)
HEMOGLOBIN: 7.5 G/DL (ref 11.5–15.5)
HEMOGLOBIN: 7.7 G/DL (ref 11.5–15.5)
HEMOGLOBIN: 8 G/DL (ref 11.5–15.5)
IMMATURE GRANULOCYTES #: 0.14 E9/L
IMMATURE GRANULOCYTES #: 0.16 E9/L
IMMATURE GRANULOCYTES %: 1.4 % (ref 0–5)
IMMATURE GRANULOCYTES %: 1.6 % (ref 0–5)
INR BLD: 2.4
LACTIC ACID: 2.4 MMOL/L (ref 0.5–2.2)
LACTIC ACID: 3.1 MMOL/L (ref 0.5–2.2)
LYMPHOCYTES ABSOLUTE: 1.36 E9/L (ref 1.5–4)
LYMPHOCYTES ABSOLUTE: 1.42 E9/L (ref 1.5–4)
LYMPHOCYTES RELATIVE PERCENT: 13.9 % (ref 20–42)
LYMPHOCYTES RELATIVE PERCENT: 14.2 % (ref 20–42)
MCH RBC QN AUTO: 29.2 PG (ref 26–35)
MCH RBC QN AUTO: 29.4 PG (ref 26–35)
MCHC RBC AUTO-ENTMCNC: 31.3 % (ref 32–34.5)
MCHC RBC AUTO-ENTMCNC: 31.4 % (ref 32–34.5)
MCV RBC AUTO: 93.4 FL (ref 80–99.9)
MCV RBC AUTO: 93.8 FL (ref 80–99.9)
METER GLUCOSE: 158 MG/DL (ref 74–99)
METER GLUCOSE: 164 MG/DL (ref 74–99)
METER GLUCOSE: 195 MG/DL (ref 74–99)
METER GLUCOSE: 208 MG/DL (ref 74–99)
METER GLUCOSE: 213 MG/DL (ref 74–99)
MONOCYTES ABSOLUTE: 1.01 E9/L (ref 0.1–0.95)
MONOCYTES ABSOLUTE: 1.13 E9/L (ref 0.1–0.95)
MONOCYTES RELATIVE PERCENT: 10.1 % (ref 2–12)
MONOCYTES RELATIVE PERCENT: 11.6 % (ref 2–12)
NEUTROPHILS ABSOLUTE: 7.12 E9/L (ref 1.8–7.3)
NEUTROPHILS ABSOLUTE: 7.39 E9/L (ref 1.8–7.3)
NEUTROPHILS RELATIVE PERCENT: 72.8 % (ref 43–80)
NEUTROPHILS RELATIVE PERCENT: 73.9 % (ref 43–80)
PDW BLD-RTO: 13.3 FL (ref 11.5–15)
PDW BLD-RTO: 13.5 FL (ref 11.5–15)
PLATELET # BLD: 224 E9/L (ref 130–450)
PLATELET # BLD: 247 E9/L (ref 130–450)
PMV BLD AUTO: 10.9 FL (ref 7–12)
PMV BLD AUTO: 11.6 FL (ref 7–12)
POTASSIUM SERPL-SCNC: 4.7 MMOL/L (ref 3.5–5)
POTASSIUM SERPL-SCNC: 4.9 MMOL/L (ref 3.5–5)
POTASSIUM, UR: 58.4 MMOL/L
PROTHROMBIN TIME: 26.5 SEC (ref 9.3–12.4)
RBC # BLD: 2.57 E12/L (ref 3.5–5.5)
RBC # BLD: 2.72 E12/L (ref 3.5–5.5)
SODIUM BLD-SCNC: 135 MMOL/L (ref 132–146)
SODIUM BLD-SCNC: 136 MMOL/L (ref 132–146)
SODIUM URINE: <20 MMOL/L
TOTAL PROTEIN: 5.2 G/DL (ref 6.4–8.3)
TOTAL PROTEIN: 5.3 G/DL (ref 6.4–8.3)
WBC # BLD: 10 E9/L (ref 4.5–11.5)
WBC # BLD: 9.8 E9/L (ref 4.5–11.5)

## 2021-07-23 PROCEDURE — 6370000000 HC RX 637 (ALT 250 FOR IP): Performed by: INTERNAL MEDICINE

## 2021-07-23 PROCEDURE — 74176 CT ABD & PELVIS W/O CONTRAST: CPT

## 2021-07-23 PROCEDURE — 6360000002 HC RX W HCPCS: Performed by: INTERNAL MEDICINE

## 2021-07-23 PROCEDURE — 82436 ASSAY OF URINE CHLORIDE: CPT

## 2021-07-23 PROCEDURE — 83605 ASSAY OF LACTIC ACID: CPT

## 2021-07-23 PROCEDURE — 84133 ASSAY OF URINE POTASSIUM: CPT

## 2021-07-23 PROCEDURE — 86901 BLOOD TYPING SEROLOGIC RH(D): CPT

## 2021-07-23 PROCEDURE — 86900 BLOOD TYPING SEROLOGIC ABO: CPT

## 2021-07-23 PROCEDURE — 2580000003 HC RX 258: Performed by: INTERNAL MEDICINE

## 2021-07-23 PROCEDURE — 36430 TRANSFUSION BLD/BLD COMPNT: CPT

## 2021-07-23 PROCEDURE — 85610 PROTHROMBIN TIME: CPT

## 2021-07-23 PROCEDURE — 80053 COMPREHEN METABOLIC PANEL: CPT

## 2021-07-23 PROCEDURE — 36415 COLL VENOUS BLD VENIPUNCTURE: CPT

## 2021-07-23 PROCEDURE — 85014 HEMATOCRIT: CPT

## 2021-07-23 PROCEDURE — 84300 ASSAY OF URINE SODIUM: CPT

## 2021-07-23 PROCEDURE — 2140000000 HC CCU INTERMEDIATE R&B

## 2021-07-23 PROCEDURE — 85018 HEMOGLOBIN: CPT

## 2021-07-23 PROCEDURE — 99223 1ST HOSP IP/OBS HIGH 75: CPT | Performed by: INTERNAL MEDICINE

## 2021-07-23 PROCEDURE — 82330 ASSAY OF CALCIUM: CPT

## 2021-07-23 PROCEDURE — 82962 GLUCOSE BLOOD TEST: CPT

## 2021-07-23 PROCEDURE — 86850 RBC ANTIBODY SCREEN: CPT

## 2021-07-23 PROCEDURE — 85025 COMPLETE CBC W/AUTO DIFF WBC: CPT

## 2021-07-23 PROCEDURE — P9016 RBC LEUKOCYTES REDUCED: HCPCS

## 2021-07-23 PROCEDURE — 86923 COMPATIBILITY TEST ELECTRIC: CPT

## 2021-07-23 RX ORDER — SODIUM CHLORIDE 9 MG/ML
INJECTION, SOLUTION INTRAVENOUS PRN
Status: DISCONTINUED | OUTPATIENT
Start: 2021-07-23 | End: 2021-07-27 | Stop reason: SDUPTHER

## 2021-07-23 RX ORDER — HYDROXYZINE HYDROCHLORIDE 50 MG/ML
50 INJECTION, SOLUTION INTRAMUSCULAR ONCE
Status: COMPLETED | OUTPATIENT
Start: 2021-07-23 | End: 2021-07-23

## 2021-07-23 RX ADMIN — INSULIN LISPRO 2 UNITS: 100 INJECTION, SOLUTION INTRAVENOUS; SUBCUTANEOUS at 17:33

## 2021-07-23 RX ADMIN — INSULIN LISPRO 1 UNITS: 100 INJECTION, SOLUTION INTRAVENOUS; SUBCUTANEOUS at 21:34

## 2021-07-23 RX ADMIN — INSULIN LISPRO 1 UNITS: 100 INJECTION, SOLUTION INTRAVENOUS; SUBCUTANEOUS at 00:01

## 2021-07-23 RX ADMIN — PANTOPRAZOLE SODIUM 40 MG: 40 TABLET, DELAYED RELEASE ORAL at 09:45

## 2021-07-23 RX ADMIN — Medication 5 ML: at 00:55

## 2021-07-23 RX ADMIN — ACETAMINOPHEN 650 MG: 325 TABLET ORAL at 09:44

## 2021-07-23 RX ADMIN — HYDROMORPHONE HYDROCHLORIDE 0.5 MG: 1 INJECTION, SOLUTION INTRAMUSCULAR; INTRAVENOUS; SUBCUTANEOUS at 11:01

## 2021-07-23 RX ADMIN — SODIUM CHLORIDE: 9 INJECTION, SOLUTION INTRAVENOUS at 05:47

## 2021-07-23 RX ADMIN — ACETAMINOPHEN 650 MG: 325 TABLET ORAL at 03:25

## 2021-07-23 RX ADMIN — INSULIN LISPRO 4 UNITS: 100 INJECTION, SOLUTION INTRAVENOUS; SUBCUTANEOUS at 09:46

## 2021-07-23 RX ADMIN — Medication 10 ML: at 09:45

## 2021-07-23 RX ADMIN — HYDROXYZINE HYDROCHLORIDE 50 MG: 50 INJECTION, SOLUTION INTRAMUSCULAR at 11:01

## 2021-07-23 RX ADMIN — Medication 10 ML: at 21:35

## 2021-07-23 RX ADMIN — BRIMONIDINE TARTRATE 1 DROP: 2 SOLUTION OPHTHALMIC at 09:45

## 2021-07-23 RX ADMIN — LATANOPROST 1 DROP: 50 SOLUTION OPHTHALMIC at 09:45

## 2021-07-23 RX ADMIN — INSULIN LISPRO 2 UNITS: 100 INJECTION, SOLUTION INTRAVENOUS; SUBCUTANEOUS at 12:01

## 2021-07-23 RX ADMIN — ATORVASTATIN CALCIUM 20 MG: 20 TABLET, FILM COATED ORAL at 09:45

## 2021-07-23 ASSESSMENT — PAIN SCALES - GENERAL
PAINLEVEL_OUTOF10: 6
PAINLEVEL_OUTOF10: 3
PAINLEVEL_OUTOF10: 10
PAINLEVEL_OUTOF10: 4
PAINLEVEL_OUTOF10: 10

## 2021-07-23 ASSESSMENT — PAIN DESCRIPTION - LOCATION: LOCATION: ABDOMEN

## 2021-07-23 ASSESSMENT — PAIN DESCRIPTION - DESCRIPTORS: DESCRIPTORS: CONSTANT;DISCOMFORT

## 2021-07-23 ASSESSMENT — PAIN DESCRIPTION - ORIENTATION: ORIENTATION: LOWER;MID

## 2021-07-23 ASSESSMENT — PAIN DESCRIPTION - PAIN TYPE: TYPE: ACUTE PAIN

## 2021-07-23 NOTE — PROGRESS NOTES
Patient's diet order currently regular, patient recent TAVR and elevated blood sugars, diet order changed to cardiac, with carb control.      Toya Berrios RN, BSN

## 2021-07-23 NOTE — PROGRESS NOTES
[facility]  Internal Medicine Residency Program  Progress Note - House Team     Patient:  Jaswant Oh 77 y.o. female MRN: [id number]     Date of Service: 2021     CC: Abd pain, N/V  Overnight events: Patient was in 10/10 abd pain. Given percocet     Subjective     Patient was seen and examined this morning at bedside in no acute distress. Patient states that her pain is not adequately controlled. She states that she is in a 9/10 pain. She was given fent in the ED and stated that it \"worked for 30 seconds\". She also noted that the percocet given for the pain overnight caused her to become diaphoretic and asked if there was more that can be given. She also asked if she can be given something to help her calm down. She denies any chest pain, Sob, n/v.     Objective     Physical Exam:  TEMPERATURE:  Current - Temp: 98.9 °F (37.2 °C); Max - Temp  Av.1 °F (36.7 °C)  Min: 97 °F (36.1 °C)  Max: 98.9 °F (37.2 °C)  RESPIRATIONS RANGE: Resp  Av.4  Min: 14  Max: 25  PULSE RANGE: Pulse  Av.9  Min: 82  Max: 105  BLOOD PRESSURE RANGE:  Systolic (97CLE), YUX:735 , Min:90 , XEB:071   ; Diastolic (55XCB), CLAUDIO:41, Min:40, Max:74    PULSE OXIMETRY RANGE: SpO2  Av.7 %  Min: 96 %  Max: 99 %    I & O - 24hr:    Intake/Output Summary (Last 24 hours) at 2021 6240  Last data filed at 2021 0332  Gross per 24 hour   Intake 240 ml   Output 0 ml   Net 240 ml     No intake/output data recorded. I/O this shift:  In: 240 [P.O.:240]  Out: 0    Weight change:     Physical Exam  Constitutional:       General: She is not in acute distress. Appearance: She is well-developed. She is obese. She is not diaphoretic. HENT:      Head: Normocephalic and atraumatic. Eyes:      General: No scleral icterus. Pupils: Pupils are equal, round, and reactive to light. Neck:      Thyroid: No thyromegaly. Vascular: No JVD. Trachea: No tracheal deviation.    Cardiovascular:      Rate and Normal right ventricular size and function. Mitral Valve  Well-seated surgical mitral bioprosthesis. No significant mitral  regurgitation. Tricuspid Valve  Moderate tricuspid regurgitation. RVSP is ~37 mmHg. Aortic Valve  There is a well-seated transcatheter valve in the aortic position. Trace  paravalvular regurgitation. MG 7, DVI 0.67. Pericardial Effusion  No evidence of pericardial effusion. Aorta  Aortic root dimension within normal limits. Miscellaneous  Normal Inferior Vena Cava diameter and respiratory variation. Conclusions   Summary  **POD1 s/p TAVR with 29-mm Evolut PRO+; h/o MVR with #25 [place] (2007)**  Normal left ventricle size. Estimated left ventricle ejection fraction  60+/-5 %. Normal right ventricular size and function. Well-seated surgical mitral bioprosthesis. No significant mitral  regurgitation. There is a well-seated transcatheter valve in the aortic position. Trace  paravalvular regurgitation. MG 7, DVI 0.67. No evidence of pericardial effusion. Signature   ----------------------------------------------------------------  Electronically signed by Letty Trevino MD(Interpreting physician)  on 07/16/2021 12:54 PM  ----------------------------------------------------------------  M-Mode/2D Measurements & Calculations    LVOT: 2.1 cm              Ascending Aorta: 2.8 cm                              IVC Expiration: 1.7 cm  Doppler Measurements & Calculations    AV Peak Velocity: 1.77 m/s LVOT Peak Velocity: 1.21 m/s   AV Peak Gradient: 12.52    LVOT Mean Velocity: 0.76 m/s   mmHg                       LVOT Peak Gradient: 5.9 mmHgLVOT Mean   AV Mean Velocity: 1.13 m/s Gradient: 2.9 mmHg   AV Mean Gradient: 6.1 mmHg   AV VTI: 45.4 cm   AV Area (Continuity):2.33   cm^2                       TR Velocity:2.91 m/s                              TR Gradient:33.8 mmHg   LVOT VTI: 30.5 cm  [email or web address] [id number]    XR CHEST (2 VW)    Result Date: 7/9/2021  EXAMINATION: TWO XRAY VIEWS OF THE CHEST 7/9/2021 1:00 pm COMPARISON: May 26, 2021 HISTORY: ORDERING SYSTEM PROVIDED HISTORY: Dyspnea, unspecified type TECHNOLOGIST PROVIDED HISTORY: What reading provider will be dictating this exam?->CRC FINDINGS: Moderate cardiomegaly. No airspace opacity or pleural effusion. Median sternotomy wires are present. The heart is normal in size. No pneumothorax. 1. Moderate cardiomegaly. 2. No airspace opacity or pleural effusion. CT ABDOMEN PELVIS W IV CONTRAST Additional Contrast? None    Result Date: 7/22/2021  EXAMINATION: CT OF THE ABDOMEN AND PELVIS WITH CONTRAST 7/22/2021 3:53 pm TECHNIQUE: CT of the abdomen and pelvis was performed with the administration of intravenous contrast. Multiplanar reformatted images are provided for review. Dose modulation, iterative reconstruction, and/or weight based adjustment of the mA/kV was utilized to reduce the radiation dose to as low as reasonably achievable. COMPARISON: None. HISTORY: ORDERING SYSTEM PROVIDED HISTORY: ab pain s/p TAVR TECHNOLOGIST PROVIDED HISTORY: Reason for exam:->ab pain s/p TAVR Additional Contrast?->None Decision Support Exception - unselect if not a suspected or confirmed emergency medical condition->Emergency Medical Condition (MA) What reading provider will be dictating this exam?->CRC FINDINGS: Lower Chest: Images through lung bases demonstrate mild cardiomegaly. Note is made of an aortic valve. The left atrium is enlarged. There is no right or left lung base infiltrate or effusion. Organs: The liver, spleen, pancreas and adrenal glands are unremarkable. The kidneys enhance symmetrically without evidence of hydronephrosis. GI/Bowel: There is a large stones seen within the gallbladder lumen measuring 3.4 x 2.4 cm. There are no findings of acute cholecystitis. The stomach is normally distended.   There is a small hiatal hernia. There is no large or small bowel obstruction. There are no findings of inflammatory bowel disease. Multiple sigmoid diverticula noted. There is no evidence of diverticulitis. Pelvis:  Bladder is unremarkable in appearance. There is a uterine fibroid measuring approximately 3.3 x 3.4 cm. Peritoneum/Retroperitoneum: There are postsurgical changes seen within the right groin from recent surgical intervention. There is a small hematoma measuring 2.5 x 2.5 cm. The abdominal aorta is normal caliber. There is no dissection of the abdominal aorta or of the iliac arteries. There is a large left lateral abdominal wall hematoma measuring 13 cm in maximum craniocaudal dimension by 12 cm in maximum AP diameter by 8 cm in maximum transverse diameter. There is a fluid fluid level seen within the left lateral abdominal wall hematoma consistent with acute on subacute hemorrhage. Along the superior aspect of the fluid fluid level there is minimal dense layering material suggestive of mild active bleeding. Bones/Soft Tissues:  Age related degenerative changes of the visualized osseous structures without focal destructive lesion. 1. Large left lateral abdominal wall hematoma measuring 13 cm x 12 cm x 8 cm. There is a fluid fluid level seen within the abdominal wall hematoma consistent with acute on subacute hemorrhage. There is minimal dense material seen layering on the fluid fluid level centrally consistent with minimal active bleeding. Surrounding intramuscular edema is noted within the oblique muscles. There is also induration within the subcutaneous soft tissues consistent with reactive changes. 2. Postsurgical changes within the right groin from recent intervention and TAVR procedure. There is a 2.5 x 2.5 cm hematoma within the right groin. 3. Cholelithiasis. There is no evidence of acute cholecystitis. 4. There is no aortic or iliac artery injury.      XR CHEST PORTABLE    Result Date: 7/22/2021  EXAMINATION: ONE XRAY VIEW OF THE CHEST 7/22/2021 1:17 pm COMPARISON: March 8, May 26, July 9 HISTORY: ORDERING SYSTEM PROVIDED HISTORY: Shortness of breath TECHNOLOGIST PROVIDED HISTORY: Reason for exam:->Shortness of breath What reading provider will be dictating this exam?->CRC FINDINGS: Status post previous mid sternotomy. More recent endovascular repair for the aortic root. Heart has borderline size. The no acute infiltrates, consolidates or pleural effusions are seen. There is no perihilar vascular congestion. 1.  Status post more remote mid sternotomy. Status post recent endovascular repair for the aortic root. 2.  Borderline size heart. No acute pulmonary process.        Resident's Assessment and Plan     Assessment and Plan:    Abd hematoma and groin hematoma likely 2/2 hep injections and TAVR, respectively  Ct abd showed 98o69x2 cm abd wall hematoma consistent with subacute hemorrhage and a 2.5x2.5 cm groin hematoma  Hemoglobin on admission 10.8 -- this am 8.0 -- continue to trend H&H -- threshold for transfusion 7  Hold all anticoagulation  PTT 26.5  INR 2.4   Patient states that pain is not adequately controlled -- fent and percocet did not help  ALDA 2/2 to volume depletion vs ATN  Cr 1.2 on admission -- now 1.1 -- Baseline 0.7  Cont NS  Urine Cr still pending   Urine lytes still pending  Hx of a fib  Hold metoprolol due to BP -- 103/60  Hold anticoagulation due to multiple hematomas  Hx of mitral valve replacement  On anticoag at home -- continue to hold due to multiple hematomas  Hx of AS s/p TAVR  On anticoagulation at home -- cont to hold due to groin hematoma  LA type A  LA 3.1 -- 2.4 this AM  Cont Ns 100 cc/hr  Hx of GERD  Cont protonix   Hx of DM  On LDSS 1 U of lispro given at this point     Hx of HFpEF  Pro  -- at baseline  CHADSVASc 6 -- 9.7% stroke risk per year  Hx of glaucoma  Cont latanaprost and bromonidine    PT/OT evaluation: None  DVT prophylaxis/ GI prophylaxis: protonix  Disposition: continue current care    Nolvia Fernandes DO, PhD  PGY-1  Attending physician: Dr. Melany Ahn  [facility]    Attending Physician Statement:  Brayan Ford M.D., F.A.C.P. I have discussed the case, including pertinent history and exam findings with the resident/NP. I have seen and examined the patient and the key elements of the encounter have been performed by me. I agree with the resident ROS, PMHx, PSHx, meds reviewed and assessment, plan and orders as documented by the resident/NP      Hospital charts reviewed, including other providers notes, relevant labs and imaging. 77 y.o. male admitted sp elective TAVR procedure bleeding complications for severe symptomatic AS. Groin hematoma sp femoral approach TAVR   Then L abdominal hematoma more sypmtomatic one more recently bleed expanded sp lovenox shots  Anemia due to bleed  Low BP due to bleed/volume contraction-- coby out afib and AS complications (reasess valve function)  Sp Fluids reassess bP improving  Vagal?  Pain issues    Urinary retention issues, dec detrussor tone, also unwilling to move 2 to pain, unwilling to valsalva    Roberth, PT/OT  Pain control  sp 29mm Evolut PRO+ valve on 7/14/2021 using b/l groin approach. TTE post procedure: Trace paravalvular regurgitation. MG 7, DVI 0.67--   >50% of time spent coordinating care with other providers and/or counseling patient/family  Remainder of medical problems as per resident note.

## 2021-07-23 NOTE — PLAN OF CARE
Problem: Falls - Risk of:  Goal: Will remain free from falls  Description: Will remain free from falls  7/23/2021 1807 by Gus Donato RN  Outcome: Met This Shift  7/23/2021 1754 by Gus Donato RN  Outcome: Met This Shift  Goal: Absence of physical injury  Description: Absence of physical injury  7/23/2021 1807 by Gus Donato RN  Outcome: Met This Shift  7/23/2021 1754 by Gus Donato RN  Outcome: Met This Shift     Problem: Pain:  Goal: Pain level will decrease  Description: Pain level will decrease  7/23/2021 1807 by Gus Donato RN  Outcome: Met This Shift  7/23/2021 1754 by Gus Donato RN  Outcome: Met This Shift  Goal: Control of acute pain  Description: Control of acute pain  7/23/2021 1807 by Gus Donato RN  Outcome: Met This Shift  7/23/2021 1754 by Gus Donato RN  Outcome: Met This Shift  Goal: Control of chronic pain  Description: Control of chronic pain  7/23/2021 1807 by Gus Donato RN  Outcome: Met This Shift  7/23/2021 1754 by Gus Donato RN  Outcome: Met This Shift     Problem: Skin Integrity:  Goal: Will show no infection signs and symptoms  Description: Will show no infection signs and symptoms  7/23/2021 1807 by Gus Donato RN  Outcome: Met This Shift  7/23/2021 1754 by Gus Donato RN  Outcome: Met This Shift  Goal: Absence of new skin breakdown  Description: Absence of new skin breakdown  7/23/2021 1807 by Gus Donato RN  Outcome: Met This Shift  7/23/2021 1754 by Gus Donato RN  Outcome: Met This Shift

## 2021-07-23 NOTE — PROGRESS NOTES
Message sent to [facility] via perfect serve regarding orders for pt/ot.    Elizabeth Raymond RN

## 2021-07-23 NOTE — CARE COORDINATION
Care Coordination;  Patient admitted after presenting from home to ED with severe abdominal pain. Was discharged from this facility on 7/16 following TAVR. She lives with granddaughter age 27 who works full time. Home is ranch with 2 entry steps with no rails, She was independent in ADL. Granddaughter did most cooking and homemaking. Patient does not drive. She owns a wheeled walker, cane and has elevated toilet seats. No hx of [facility] of [facility]. 5 children live locally and are supportive. PCP is Dr. Viktoria Wilson. SW will follow. Anticipate discharge home with granddaughter .    Will follow

## 2021-07-23 NOTE — H&P
Date    Ambulates with cane     Atrial fibrillation ([facility] [address] 11/22/2010    CAD (coronary artery disease) 11/22/2010    NO CARDIOLOGIST, F/U W/ pcp STABLE    CHF (congestive heart failure) ([facility] [address] 11/22/2010    Diverticular disease 11/22/2010    Edentulous     does not wear dentures    Glaucoma     History of blood transfusion     once with CABG, once with a severe epistaxis.  Hyperlipidemia     Hypertension     Restless leg syndrome 11/22/2010    Valvular heart disease 11/22/2010       Past Surgical History:        Procedure Laterality Date    AORTIC VALVE REPLACEMENT N/A 7/14/2021    TRANSCATHETER AORTIC VALVE REPLACEMENT FEMORAL APPROACH performed by Tona Romo MD at [address]  2007    CARDIAC CATHETERIZATION Right 05/07/2021    Right Heart cath no stents Dr Danielle Ferreira Right 08/24/2016    trabulectomy    COLONOSCOPY      CORONARY ARTERY BYPASS GRAFT      TONSILLECTOMY      TRANSESOPHAGEAL ECHOCARDIOGRAM  05/07/2021    Dr Solitario Sharp         Medications Prior to Admission:    Prior to Admission medications    Medication Sig Start Date End Date Taking?  Authorizing Provider   benzonatate (TESSALON PERLES) 100 MG capsule Take 1 capsule by mouth 3 times daily as needed for Cough 7/21/21 7/31/21 Yes Francisco Dias, DO   enoxaparin (LOVENOX) 120 MG/0.8ML injection Inject 0.73 mLs into the skin every 12 hours 7/21/21  Yes Francisco Dias, DO   aspirin 81 MG EC tablet Take 1 tablet by mouth daily 7/17/21  Yes Tona Romo MD   amLODIPine (NORVASC) 10 MG tablet Take 1 tablet by mouth daily 7/17/21  Yes Tona Romo MD   amoxicillin (AMOXIL) 500 MG capsule Take 4 capsules (2000 mg) by mouth one hour prior to dental appointments 7/16/21  Yes Tona Romo MD   brimonidine (ALPHAGAN) 0.2 % ophthalmic solution INSTILL 1 DROP EVERY DAY INTO LEFT EYE AT 8 AM. 4/15/21  Yes Historical Provider, MD   latanoprost (XALATAN) 0.005 % ophthalmic solution INSTILL 1 DROP IN THE LEFT EYE EVERY DAY AT BEDTIME 4/15/21  Yes Ant Gasca MD   furosemide (LASIX) 20 MG tablet Take 20mg alternating with 30mg every other day (one tablet alternating with 1 and 1/2 tabs every other day). Patient taking differently: Take 20 mg by mouth daily Take 20mg alternating with 30mg every other day (one tablet alternating with 1 and 1/2 tabs every other day). 4/28/21  Yes Francisco Dias DO   potassium chloride (KLOR-CON M) 10 MEQ extended release tablet Take 1 tab alternating with 1 and 1/2 tablet every other day (10 cecy alternating with 15 cecy every other day)  Patient taking differently: Take 10 mEq by mouth daily Take 1 tab alternating with 1 and 1/2 tablet every other day (10 cecy alternating with 15 cecy every other day) 4/28/21  Yes Asuncion Dias DO   pantoprazole (PROTONIX) 40 MG tablet Take 1 tablet by mouth daily 4/28/21  Yes Francisco Dias DO   simvastatin (ZOCOR) 20 MG tablet TAKE ONE TABLET BY MOUTH DAILY  Patient taking differently: Take 20 mg by mouth nightly  4/20/21  Yes Joseph Ayala MD   metoprolol tartrate (LOPRESSOR) 50 MG tablet One tab twice daily 3/25/21  Yes Lindsey Barrera MD   warfarin (COUMADIN) 1 MG tablet Take 1 mg every wed with coumadin 3 mg to equal 4 mg wed only  Patient taking differently: 3 mg M, T, TH, F, Sat, Sun 3 mg, W takes 4 mg 11/30/20  Yes Francisco Dias DO   enalapril (VASOTEC) 10 MG tablet TAKE ONE TABLET BY MOUTH TWO TIMES A DAY  Patient taking differently: Take 10 mg by mouth 2 times daily  10/26/20  Yes Francisco Dias DO       Allergies:  Lactose intolerance (gi) and Phenobarbital    Social History:   TOBACCO:   reports that she has never smoked. She has never used smokeless tobacco.  ETOH:   reports no history of alcohol use.       Family History:       Problem Relation Age of Onset    Heart Disease Mother     Heart Disease Father        REVIEW OF SYSTEMS:    · Constitutional: No fever, no chills, no change in weight; good appetite  · HEENT: No blurred vision, no ear problems, no sore throat, no rhinorrhea. · Respiratory: No cough, no sputum production, no pleuritic chest pain, no shortness of breath  · Cardiology: No angina, no dyspnea on exertion, no paroxysmal nocturnal dyspnea, no orthopnea, no palpitation, no leg swelling. · Gastroenterology: No dysphagia, no reflux; no abdominal pain, no nausea or vomiting; no constipation or diarrhea.  No hematochezia   · Genitourinary: No dysuria, no frequency, hesitancy; no hematuria  · Musculoskeletal: no joint pain, no myalgia, no change in range of movement  · Neurology: no focal weakness in extremities, no slurred speech, no double vision, no tingling or numbness sensation  · Endocrinology: no temperature intolerance, no polyphagia, polydipsia or polyuria  · Hematology: no increased bleeding, no bruising, no lymphadenopathy  · Skin: no skin changes noticed by patient  · Psychology: no depressed mood, no suicidal ideation    Physical Exam   · Vitals: /62   Pulse 98   Temp 98.5 °F (36.9 °C)   Resp 15   Ht 5' 4\" (1.626 m)   Wt 230 lb (104.3 kg)   SpO2 97%   BMI 39.48 kg/m²     · General Appearance: alert and oriented to person, place and time, well-developed and well-nourished, in no acute distress, obese and lying on side in mild distress  · Skin: warm and dry and ecchymoses noted on abdomen  · Head: normocephalic and atraumatic  · Eyes: extraocular eye movements intact and sclera anicteric   · Pulmonary/Chest: clear to auscultation bilaterally- no wheezes, rales or rhonchi, normal air movement, no respiratory distress  · Cardiovascular: normal rate, regular rhythm, normal S1 and S2, no gallops and distal pulses diminished R>L  · Abdomen: Tender to palpation all 4 quadrants, with guarding, but soft  · Musculoskeletal: normal range of motion, no joint swelling, deformity or tenderness  · Neurologic: speech normal, can spontaneously move all 4 extremities   Labs and Imaging Studies   Basic Labs  Recent Labs     07/22/21  1153      K 5.0   CL 98   CO2 22   BUN 22   CREATININE 1.2*   GLUCOSE 219*   CALCIUM 8.8       Recent Labs     07/22/21  1153   WBC 9.6   RBC 3.68   HGB 10.8*   HCT 33.6*   MCV 91.3   MCH 29.3   MCHC 32.1   RDW 13.2      MPV 11.2         Imaging Studies:  CT ABDOMEN PELVIS W IV CONTRAST Additional Contrast? None   Final Result   1. Large left lateral abdominal wall hematoma measuring 13 cm x 12 cm x 8 cm. There is a fluid fluid level seen within the abdominal wall hematoma   consistent with acute on subacute hemorrhage. There is minimal dense   material seen layering on the fluid fluid level centrally consistent with   minimal active bleeding. Surrounding intramuscular edema is noted within the   oblique muscles. There is also induration within the subcutaneous soft   tissues consistent with reactive changes. 2. Postsurgical changes within the right groin from recent intervention and   TAVR procedure. There is a 2.5 x 2.5 cm hematoma within the right groin. 3. Cholelithiasis. There is no evidence of acute cholecystitis. 4. There is no aortic or iliac artery injury. XR CHEST PORTABLE   Final Result   1. Status post more remote mid sternotomy. Status post recent endovascular   repair for the aortic root. 2.  Borderline size heart. No acute pulmonary process. EKG:  Atrial fibrillation Right bundle branch block  T wave abnormality,    Resident's Assessment and Plan     Assessment and Plan:    1. Abdominal hematoma and groin hematoma most likely 2/2 heparin injections and TAVR procedure in context of anticoagulation status  - will follow CBC one now, one in AM, but will order sparingly to not cause iatrogenic bleeding. Will follow INR and PTT  -will hold heparin and warfarin. Hematomas may resorb, surgery consultation not warranted at this time. Patient is stable  -Fentanyl for pain management     2. ALDA possibly likely prerenal 2/2 intravascular volume depletion 2/2 blood loss vs ATN   -baseline creatinine is 0.7, now 1.2   -continue normal saline, follow BMP, follow urine creatinine and urine electrolytes. 3.Hx of Mitral valve replacement  -holding heparin and warfarin    4. Hx of chronic a-fib  -holding warfarin  -holding metoprolol due to low BP and potential renal hypoperfusion    5. Hx of aortic stenosis s/p TAVR  -groin hematoma thought to be 2/2 TAVR  -hold all anticoagulation     6. Lactic acidosis type A   - will follow lactate Q6, continue normal saline at 100cc's/hr     7. Hx of GERD   -Continue protonix      8. Hx of DMTII  -will start on LDSS  -POCT glucose ACHS    9. Hx of HFpEF  -ProBNP 704, seems to be around her baseline  -no physical exam findings to suggest CHF    10. Hx of acute angle glaucoma  -continue latanaprost and bromonidine          PT/OT evaluation: none  DVT prophylaxis/ GI prophylaxis: holding/protonix   Disposition: admit to [facility] [place] [facility]    Blanco Garcia MD, PGY-1  Attending physician: Krista Fontaine MD

## 2021-07-24 LAB
ALBUMIN SERPL-MCNC: 3 G/DL (ref 3.5–5.2)
ALP BLD-CCNC: 84 U/L (ref 35–104)
ALT SERPL-CCNC: 25 U/L (ref 0–32)
ANION GAP SERPL CALCULATED.3IONS-SCNC: 12 MMOL/L (ref 7–16)
ANION GAP SERPL CALCULATED.3IONS-SCNC: 9 MMOL/L (ref 7–16)
AST SERPL-CCNC: 66 U/L (ref 0–31)
BASOPHILS ABSOLUTE: 0.02 E9/L (ref 0–0.2)
BASOPHILS RELATIVE PERCENT: 0.2 % (ref 0–2)
BILIRUB SERPL-MCNC: 0.8 MG/DL (ref 0–1.2)
BLOOD BANK DISPENSE STATUS: NORMAL
BLOOD BANK PRODUCT CODE: NORMAL
BPU ID: NORMAL
BUN BLDV-MCNC: 25 MG/DL (ref 6–23)
BUN BLDV-MCNC: 28 MG/DL (ref 6–23)
CALCIUM SERPL-MCNC: 8 MG/DL (ref 8.6–10.2)
CALCIUM SERPL-MCNC: 8 MG/DL (ref 8.6–10.2)
CHLORIDE BLD-SCNC: 104 MMOL/L (ref 98–107)
CHLORIDE BLD-SCNC: 104 MMOL/L (ref 98–107)
CO2: 21 MMOL/L (ref 22–29)
CO2: 23 MMOL/L (ref 22–29)
CREAT SERPL-MCNC: 0.9 MG/DL (ref 0.5–1)
CREAT SERPL-MCNC: 1.1 MG/DL (ref 0.5–1)
DESCRIPTION BLOOD BANK: NORMAL
EOSINOPHILS ABSOLUTE: 0.02 E9/L (ref 0.05–0.5)
EOSINOPHILS RELATIVE PERCENT: 0.2 % (ref 0–6)
GFR AFRICAN AMERICAN: 59
GFR AFRICAN AMERICAN: >60
GFR NON-AFRICAN AMERICAN: 48 ML/MIN/1.73
GFR NON-AFRICAN AMERICAN: >60 ML/MIN/1.73
GLUCOSE BLD-MCNC: 122 MG/DL (ref 74–99)
GLUCOSE BLD-MCNC: 156 MG/DL (ref 74–99)
HCT VFR BLD CALC: 23.6 % (ref 34–48)
HCT VFR BLD CALC: 24.4 % (ref 34–48)
HCT VFR BLD CALC: 24.6 % (ref 34–48)
HCT VFR BLD CALC: 24.9 % (ref 34–48)
HEMOGLOBIN: 7.6 G/DL (ref 11.5–15.5)
HEMOGLOBIN: 7.8 G/DL (ref 11.5–15.5)
HEMOGLOBIN: 7.9 G/DL (ref 11.5–15.5)
HEMOGLOBIN: 8 G/DL (ref 11.5–15.5)
IMMATURE GRANULOCYTES #: 0.18 E9/L
IMMATURE GRANULOCYTES %: 1.7 % (ref 0–5)
INR BLD: 2.2
LACTIC ACID: 1.1 MMOL/L (ref 0.5–2.2)
LYMPHOCYTES ABSOLUTE: 1.54 E9/L (ref 1.5–4)
LYMPHOCYTES RELATIVE PERCENT: 14.6 % (ref 20–42)
MAGNESIUM: 2.2 MG/DL (ref 1.6–2.6)
MCH RBC QN AUTO: 29.4 PG (ref 26–35)
MCHC RBC AUTO-ENTMCNC: 32.1 % (ref 32–34.5)
MCV RBC AUTO: 91.4 FL (ref 80–99.9)
METER GLUCOSE: 135 MG/DL (ref 74–99)
METER GLUCOSE: 136 MG/DL (ref 74–99)
METER GLUCOSE: 168 MG/DL (ref 74–99)
METER GLUCOSE: 203 MG/DL (ref 74–99)
MONOCYTES ABSOLUTE: 1.03 E9/L (ref 0.1–0.95)
MONOCYTES RELATIVE PERCENT: 9.8 % (ref 2–12)
NEUTROPHILS ABSOLUTE: 7.77 E9/L (ref 1.8–7.3)
NEUTROPHILS RELATIVE PERCENT: 73.5 % (ref 43–80)
PDW BLD-RTO: 13.9 FL (ref 11.5–15)
PHOSPHORUS: 3.2 MG/DL (ref 2.5–4.5)
PLATELET # BLD: 240 E9/L (ref 130–450)
PMV BLD AUTO: 10.5 FL (ref 7–12)
POTASSIUM REFLEX MAGNESIUM: 4.6 MMOL/L (ref 3.5–5)
POTASSIUM SERPL-SCNC: 4.3 MMOL/L (ref 3.5–5)
PROTHROMBIN TIME: 24.5 SEC (ref 9.3–12.4)
RBC # BLD: 2.69 E12/L (ref 3.5–5.5)
SODIUM BLD-SCNC: 136 MMOL/L (ref 132–146)
SODIUM BLD-SCNC: 137 MMOL/L (ref 132–146)
TOTAL PROTEIN: 5.2 G/DL (ref 6.4–8.3)
URINE CULTURE, ROUTINE: NORMAL
WBC # BLD: 10.6 E9/L (ref 4.5–11.5)

## 2021-07-24 PROCEDURE — 2580000003 HC RX 258: Performed by: INTERNAL MEDICINE

## 2021-07-24 PROCEDURE — P9016 RBC LEUKOCYTES REDUCED: HCPCS

## 2021-07-24 PROCEDURE — 99221 1ST HOSP IP/OBS SF/LOW 40: CPT | Performed by: SURGERY

## 2021-07-24 PROCEDURE — 36430 TRANSFUSION BLD/BLD COMPNT: CPT

## 2021-07-24 PROCEDURE — 80048 BASIC METABOLIC PNL TOTAL CA: CPT

## 2021-07-24 PROCEDURE — 83605 ASSAY OF LACTIC ACID: CPT

## 2021-07-24 PROCEDURE — 6360000002 HC RX W HCPCS: Performed by: INTERNAL MEDICINE

## 2021-07-24 PROCEDURE — 85610 PROTHROMBIN TIME: CPT

## 2021-07-24 PROCEDURE — 85014 HEMATOCRIT: CPT

## 2021-07-24 PROCEDURE — 6370000000 HC RX 637 (ALT 250 FOR IP): Performed by: INTERNAL MEDICINE

## 2021-07-24 PROCEDURE — 82962 GLUCOSE BLOOD TEST: CPT

## 2021-07-24 PROCEDURE — 2140000000 HC CCU INTERMEDIATE R&B

## 2021-07-24 PROCEDURE — 85025 COMPLETE CBC W/AUTO DIFF WBC: CPT

## 2021-07-24 PROCEDURE — 83735 ASSAY OF MAGNESIUM: CPT

## 2021-07-24 PROCEDURE — 99233 SBSQ HOSP IP/OBS HIGH 50: CPT | Performed by: INTERNAL MEDICINE

## 2021-07-24 PROCEDURE — 36415 COLL VENOUS BLD VENIPUNCTURE: CPT

## 2021-07-24 PROCEDURE — 85018 HEMOGLOBIN: CPT

## 2021-07-24 PROCEDURE — 80053 COMPREHEN METABOLIC PANEL: CPT

## 2021-07-24 PROCEDURE — 84100 ASSAY OF PHOSPHORUS: CPT

## 2021-07-24 RX ORDER — SODIUM CHLORIDE 9 MG/ML
INJECTION, SOLUTION INTRAVENOUS PRN
Status: DISCONTINUED | OUTPATIENT
Start: 2021-07-24 | End: 2021-07-27 | Stop reason: SDUPTHER

## 2021-07-24 RX ORDER — SODIUM CHLORIDE 9 MG/ML
INJECTION, SOLUTION INTRAVENOUS PRN
Status: DISCONTINUED | OUTPATIENT
Start: 2021-07-24 | End: 2021-08-02 | Stop reason: HOSPADM

## 2021-07-24 RX ORDER — METOPROLOL TARTRATE 50 MG/1
50 TABLET, FILM COATED ORAL 2 TIMES DAILY
Status: DISCONTINUED | OUTPATIENT
Start: 2021-07-24 | End: 2021-08-02 | Stop reason: HOSPADM

## 2021-07-24 RX ORDER — IPRATROPIUM BROMIDE AND ALBUTEROL SULFATE 2.5; .5 MG/3ML; MG/3ML
1 SOLUTION RESPIRATORY (INHALATION)
Status: DISCONTINUED | OUTPATIENT
Start: 2021-07-24 | End: 2021-07-24

## 2021-07-24 RX ORDER — ALBUTEROL SULFATE 2.5 MG/3ML
2.5 SOLUTION RESPIRATORY (INHALATION) EVERY 6 HOURS PRN
Status: DISCONTINUED | OUTPATIENT
Start: 2021-07-24 | End: 2021-08-02 | Stop reason: HOSPADM

## 2021-07-24 RX ADMIN — HYDROMORPHONE HYDROCHLORIDE 0.5 MG: 1 INJECTION, SOLUTION INTRAMUSCULAR; INTRAVENOUS; SUBCUTANEOUS at 11:07

## 2021-07-24 RX ADMIN — PANTOPRAZOLE SODIUM 40 MG: 40 TABLET, DELAYED RELEASE ORAL at 08:01

## 2021-07-24 RX ADMIN — METOPROLOL TARTRATE 50 MG: 50 TABLET, FILM COATED ORAL at 11:43

## 2021-07-24 RX ADMIN — METOPROLOL TARTRATE 50 MG: 50 TABLET, FILM COATED ORAL at 20:11

## 2021-07-24 RX ADMIN — SODIUM CHLORIDE: 9 INJECTION, SOLUTION INTRAVENOUS at 16:54

## 2021-07-24 RX ADMIN — INSULIN LISPRO 2 UNITS: 100 INJECTION, SOLUTION INTRAVENOUS; SUBCUTANEOUS at 07:47

## 2021-07-24 RX ADMIN — ATORVASTATIN CALCIUM 20 MG: 20 TABLET, FILM COATED ORAL at 08:01

## 2021-07-24 RX ADMIN — BRIMONIDINE TARTRATE 1 DROP: 2 SOLUTION OPHTHALMIC at 11:53

## 2021-07-24 RX ADMIN — Medication 5 ML: at 20:12

## 2021-07-24 RX ADMIN — LATANOPROST 1 DROP: 50 SOLUTION OPHTHALMIC at 11:53

## 2021-07-24 RX ADMIN — INSULIN LISPRO 4 UNITS: 100 INJECTION, SOLUTION INTRAVENOUS; SUBCUTANEOUS at 11:45

## 2021-07-24 RX ADMIN — Medication 10 ML: at 08:01

## 2021-07-24 RX ADMIN — HYDROMORPHONE HYDROCHLORIDE 0.5 MG: 1 INJECTION, SOLUTION INTRAMUSCULAR; INTRAVENOUS; SUBCUTANEOUS at 06:51

## 2021-07-24 RX ADMIN — HYDROMORPHONE HYDROCHLORIDE 0.5 MG: 1 INJECTION, SOLUTION INTRAMUSCULAR; INTRAVENOUS; SUBCUTANEOUS at 16:54

## 2021-07-24 RX ADMIN — HYDROMORPHONE HYDROCHLORIDE 0.5 MG: 1 INJECTION, SOLUTION INTRAMUSCULAR; INTRAVENOUS; SUBCUTANEOUS at 01:10

## 2021-07-24 RX ADMIN — HYDROMORPHONE HYDROCHLORIDE 0.5 MG: 1 INJECTION, SOLUTION INTRAMUSCULAR; INTRAVENOUS; SUBCUTANEOUS at 23:22

## 2021-07-24 ASSESSMENT — PAIN SCALES - GENERAL
PAINLEVEL_OUTOF10: 10
PAINLEVEL_OUTOF10: 10
PAINLEVEL_OUTOF10: 4
PAINLEVEL_OUTOF10: 6
PAINLEVEL_OUTOF10: 8
PAINLEVEL_OUTOF10: 5
PAINLEVEL_OUTOF10: 10
PAINLEVEL_OUTOF10: 4
PAINLEVEL_OUTOF10: 8
PAINLEVEL_OUTOF10: 7
PAINLEVEL_OUTOF10: 10
PAINLEVEL_OUTOF10: 9

## 2021-07-24 ASSESSMENT — PAIN DESCRIPTION - PROGRESSION: CLINICAL_PROGRESSION: GRADUALLY WORSENING

## 2021-07-24 ASSESSMENT — PAIN DESCRIPTION - PAIN TYPE
TYPE: ACUTE PAIN

## 2021-07-24 ASSESSMENT — PAIN DESCRIPTION - FREQUENCY
FREQUENCY: CONTINUOUS
FREQUENCY: CONTINUOUS

## 2021-07-24 ASSESSMENT — PAIN DESCRIPTION - ORIENTATION
ORIENTATION: RIGHT;LEFT;LOWER

## 2021-07-24 ASSESSMENT — PAIN DESCRIPTION - LOCATION
LOCATION: ABDOMEN

## 2021-07-24 ASSESSMENT — PAIN DESCRIPTION - DESCRIPTORS
DESCRIPTORS: ACHING;DISCOMFORT
DESCRIPTORS: DISCOMFORT

## 2021-07-24 ASSESSMENT — PAIN DESCRIPTION - ONSET: ONSET: ON-GOING

## 2021-07-24 NOTE — PROGRESS NOTES
[facility]     Attending Physician Statement:  Emeli Lucia M.D., F.A.C.P.     I have discussed the case, including pertinent history and exam findings with the resident/NP. I have seen and examined the patient and the key elements of the encounter have been performed by me. I agree with the resident ROS, PMHx, PSHx, meds reviewed and assessment, plan and orders as documented by the resident/NP    [facility] charts reviewed, including other providers notes, relevant labs and imaging.         77 y. o. male admitted sp elective TAVR procedure bleeding complications for severe symptomatic AS.     Groin hematoma sp femoral approach TAVR   Then L abdominal hematoma more sypmtomatic one more recently bleed expanded sp lovenox shots  Anemia due to bleed  Low BP due to bleed/volume contraction-- coby out afib and AS complications (reasess valve function)  Sp Fluids reassess bP improving  Vagal?  Pain issues     Urinary retention issues, dec detrussor tone, also unwilling to move 2 to pain, unwilling to valsalva   -- urinary catheter  +- bethanacol vs flomax later if need    Mobitilitalha, PT/OT  Pain control  sp 29mm Evolut PRO+ valve on 7/14/2021 using b/l groin approach. TTE post procedure: Trace paravalvular regurgitation. MG 7, DVI 0.67--   Also afib- high chadsvas    Hematoma-s- repeat CT evolving - slight growth - less hyperacute - likely stopped now hematoma   -- spreading pain around back. Also bc immobile in bed. Needs anticoagulation soon  Will continue holding coumadin - but resume very soon  ?bridge if need when INR drops- IV heparin vs off label use eliquis could days - discussed  Then back on coumadin goal INR 2-2. 5?  For now- CVA risk vs bleeds    Hematomas at risk for myositis ossificans  Vs infected hematoma-- watching for these  Her case also urine outflow- urethral flow affected    sheeba improving  >50% of time spent coordinating care with other providers and/or counseling patient/family  Remainder of medical problems as per resident note.

## 2021-07-24 NOTE — PROGRESS NOTES
[facility]  Internal Medicine Residency Program  Progress Note - House Team     Patient:  Charlotte Cody 77 y.o. female MRN: [id number]     Date of Service: 2021     CC: Abd pain, N/V  Overnight events: Patient received 2 units of blood and was tachycardic overnight. Post transfusion hgb was 7.8    Subjective     Patient was seen and examined this morning at bedside in no acute distress. Patient says the pain regimen she is receiving has decreased her pain to a more tolerable level, however the pain is still enough to make it difficult to sleep. Patient also felt very anxious last night due to the blood transfusions she was receiving and still is anxious. Patient's abdominal pain has also changed in radiation pattern and goes into her back. Patient also developed wheezing this morning that she believes is because she hasn't been moving around a lot. Otherwise, patient has no other complaints    Patient denies nausea vomiting, weakness, new chest pain, chest tightness, palpiations, numbness tingling, fever, chills  Objective     Physical Exam:  TEMPERATURE:  Current - Temp: 98.4 °F (36.9 °C); Max - Temp  Av.5 °F (36.9 °C)  Min: 98 °F (36.7 °C)  Max: 99.1 °F (37.3 °C)  RESPIRATIONS RANGE: Resp  Av.1  Min: 16  Max: 20  PULSE RANGE: Pulse  Av.3  Min: 79  Max: 132  BLOOD PRESSURE RANGE:  Systolic (15TRO), JXK:545 , Min:100 , BYY:786   ; Diastolic (87ZLJ), TWX:38, Min:53, Max:72    PULSE OXIMETRY RANGE: SpO2  Av %  Min: 92 %  Max: 98 %    I & O - 24hr:    Intake/Output Summary (Last 24 hours) at 2021 0755  Last data filed at 2021 0615  Gross per 24 hour   Intake 1056.25 ml   Output 1050 ml   Net 6.25 ml     I/O last 3 completed shifts: In: 1056.3 [P.O.:420; Blood:636.3]  Out: 1050 [Urine:1050] No intake/output data recorded.    Weight change:   Physical Exam  Constitutional:       General: in no acute distress, patient is laying on her back with bed slightly elevated. Appearance: She is well-developed, obese  HEENT:      Head: Normocephalic atraumatic. No scleral icterus Pupils are equal, round, and reactive to light. Cardiovascular: RRR no murmurs rubs or gallops  Pulmonary: Chest rise limited by pain. Wheezes heard best at bases R and L lower lobes  Abdominal: soft, tender to deep palpation in all 4 quadrants, worst on L upper and lower. Echymosis noted  Musculoskeletal: Can spontaneously move all 4 extremities. ecchymosis noted on both arms   Neurological:      Mental Status: She is alert and oriented to person, place, and time. Cranial Nerves: No cranial nerve deficit. Psychiatric:         Behavior: Behavior normal.         Thought Content: Thought content normal.         Judgment: Judgment normal.   Subject  Pertinent Labs & Imaging Studies   nuvia  CBC:   Recent Labs     07/22/21 2329 07/22/21  2329 07/23/21  0541 07/23/21  0541 07/23/21  1811 07/23/21  2207 07/24/21  0530   WBC 9.8  --  10.0  --   --   --  10.6   HGB 8.0*   < > 7.5*   < > 7.7* 7.2* 8.0*  7.9*   HCT 25.5*   < > 24.0*   < > 24.7* 22.4* 24.9*  24.6*   MCV 93.8  --  93.4  --   --   --  91.4     --  247  --   --   --  240    < > = values in this interval not displayed. BMP:    Recent Labs     07/22/21 2330 07/23/21  0541 07/24/21  0530    136 137   K 4.7 4.9 4.3    103 104   CO2 24 23 21*   BUN 24* 27* 28*   CREATININE 1.1* 1.3* 1.1*   GLUCOSE 185* 177* 156*       LIVER PROFILE:   Recent Labs     07/22/21  1153 07/22/21  1153 07/22/21  2330 07/23/21  0541 07/24/21  0530   AST 38*   < > 27 31 66*   ALT 26   < > 20 20 25   LIPASE 24  --   --   --   --    BILITOT 1.0   < > 0.7 0.7 0.8   ALKPHOS 99   < > 75 69 84    < > = values in this interval not displayed. PT/INR:   Recent Labs     07/22/21  1153 07/23/21  0541 07/24/21  0530   PROTIME 24.5* 26.5* 24.5*   INR 2.2 2.4 2.2       APTT:   No results for input(s): APTT in the last 72 hours.     Fasting Lipid Panel:    Lab Results   Component Value Date    CHOL 129 03/08/2021    TRIG 57 03/08/2021    HDL 53 03/08/2021       Notable Cultures:      Blood cultures   Blood Culture, Routine   Date Value Ref Range Status   07/22/2021 24 Hours no growth  Preliminary     Respiratory cultures No results found for: RESPCULTURE No results found for: LABGRAM  Urine   Urine Culture, Routine   Date Value Ref Range Status   07/22/2021 Growth not present  Final     Legionella No results found for: LABLEGI  C Diff PCR No results found for: CDIFPCR  Wound culture/abscess: No results for input(s): WNDABS in the last 72 hours. Tip culture:No results for input(s): CXCATHTIP in the last 72 hours. Echo Limited    Result Date: 7/16/2021  Transthoracic Echocardiography Report (TTE)  Demographics   Patient Name      Asia Dobbs Gender              Female                    L   Medical Record    [id number]       Room Number         4877  Number   Account #         [de-identified]      Procedure Date      07/15/2021   Corporate ID                     Ordering Physician  Sarai Calabrese MD   Accession Number  [id number]     Referring Physician   Date of Birth     1945     Sonographer         Eran Mendoza                                                       [facility]   Age               77 year(s)     Interpreting        Sarai Calabrese MD                                   Physician                                    Any Other  Procedure Type of Study   TTE procedure:Echo Limited Study. Procedure Date Date: 07/15/2021 Start: 09:18 AM Study Location: Portable Technical Quality: Limited visualization due to body habitus. Indications:S/P TAVR. Patient Status: Pending Discharge Height: 64 inches Weight: 240 pounds BSA: 2.11 m^2 BMI: 41.2 kg/m^2 Allergies   - Other drug:(Phenobarbital). Findings   Left Ventricle  Normal left ventricle size. Estimated left ventricle ejection fraction 60+/-5 %. Right Ventricle  Normal right ventricular size and function. (2 VW)    Result Date: 7/9/2021  EXAMINATION: TWO XRAY VIEWS OF THE CHEST 7/9/2021 1:00 pm COMPARISON: May 26, 2021 HISTORY: ORDERING SYSTEM PROVIDED HISTORY: Dyspnea, unspecified type TECHNOLOGIST PROVIDED HISTORY: What reading provider will be dictating this exam?->CRC FINDINGS: Moderate cardiomegaly. No airspace opacity or pleural effusion. Median sternotomy wires are present. The heart is normal in size. No pneumothorax. 1. Moderate cardiomegaly. 2. No airspace opacity or pleural effusion. CT ABDOMEN PELVIS W IV CONTRAST Additional Contrast? None    Result Date: 7/22/2021  EXAMINATION: CT OF THE ABDOMEN AND PELVIS WITH CONTRAST 7/22/2021 3:53 pm TECHNIQUE: CT of the abdomen and pelvis was performed with the administration of intravenous contrast. Multiplanar reformatted images are provided for review. Dose modulation, iterative reconstruction, and/or weight based adjustment of the mA/kV was utilized to reduce the radiation dose to as low as reasonably achievable. COMPARISON: None. HISTORY: ORDERING SYSTEM PROVIDED HISTORY: ab pain s/p TAVR TECHNOLOGIST PROVIDED HISTORY: Reason for exam:->ab pain s/p TAVR Additional Contrast?->None Decision Support Exception - unselect if not a suspected or confirmed emergency medical condition->Emergency Medical Condition (MA) What reading provider will be dictating this exam?->CRC FINDINGS: Lower Chest: Images through lung bases demonstrate mild cardiomegaly. Note is made of an aortic valve. The left atrium is enlarged. There is no right or left lung base infiltrate or effusion. Organs: The liver, spleen, pancreas and adrenal glands are unremarkable. The kidneys enhance symmetrically without evidence of hydronephrosis. GI/Bowel: There is a large stones seen within the gallbladder lumen measuring 3.4 x 2.4 cm. There are no findings of acute cholecystitis. The stomach is normally distended. There is a small hiatal hernia.  There is no large or small bowel obstruction. There are no findings of inflammatory bowel disease. Multiple sigmoid diverticula noted. There is no evidence of diverticulitis. Pelvis:  Bladder is unremarkable in appearance. There is a uterine fibroid measuring approximately 3.3 x 3.4 cm. Peritoneum/Retroperitoneum: There are postsurgical changes seen within the right groin from recent surgical intervention. There is a small hematoma measuring 2.5 x 2.5 cm. The abdominal aorta is normal caliber. There is no dissection of the abdominal aorta or of the iliac arteries. There is a large left lateral abdominal wall hematoma measuring 13 cm in maximum craniocaudal dimension by 12 cm in maximum AP diameter by 8 cm in maximum transverse diameter. There is a fluid fluid level seen within the left lateral abdominal wall hematoma consistent with acute on subacute hemorrhage. Along the superior aspect of the fluid fluid level there is minimal dense layering material suggestive of mild active bleeding. Bones/Soft Tissues:  Age related degenerative changes of the visualized osseous structures without focal destructive lesion. 1. Large left lateral abdominal wall hematoma measuring 13 cm x 12 cm x 8 cm. There is a fluid fluid level seen within the abdominal wall hematoma consistent with acute on subacute hemorrhage. There is minimal dense material seen layering on the fluid fluid level centrally consistent with minimal active bleeding. Surrounding intramuscular edema is noted within the oblique muscles. There is also induration within the subcutaneous soft tissues consistent with reactive changes. 2. Postsurgical changes within the right groin from recent intervention and TAVR procedure. There is a 2.5 x 2.5 cm hematoma within the right groin. 3. Cholelithiasis. There is no evidence of acute cholecystitis. 4. There is no aortic or iliac artery injury.      XR CHEST PORTABLE    Result Date: 7/22/2021  EXAMINATION: ONE XRAY VIEW OF THE CHEST 7/22/2021 been due to A-fib status. Currently rate controlled, metoprolol restarted  Urinary retention 2/2 #1  Patient cannot valsalva or breath deeply due to pain. Continue guevara, will decide on Flomax or bethanecol when guevara removed   Hx of mitral valve replacement  On anticoag at home -- continue to hold due to multiple hematomas  Hx of severe aortic stenosis s/p TAVR  On anticoagulation at home -- cont to hold due to groin hematoma  LA type A-improving  LA 1.1 this AM, 2.4 yesterday  Continue normal saline infusion  Hx of GERD  Continue protonix   Hx of DM  On LDSS.  Required 2 U of lispro overnight    Hx of HFpEF  Pro  -- at baseline  CHADSVASc 6 -- 9.7% stroke risk per year  Hx of glaucoma  Continue latanaprost and bromonidine    PT/OT evaluation: None  DVT prophylaxis/ GI prophylaxis: protonix  Disposition: continue current care    Magen Wallace MD PGY-1  Attending physician: Dr. Amilcar Miramontes

## 2021-07-24 NOTE — CONSULTS
GENERAL SURGERY  CONSULT NOTE  7/24/2021    Physician Consulted: Dr. Kermit Sandifer   Reason for Consult: Abdominal Wall Hematoma   Referring Physician: Dr. Sukhi Cervantes is a 77 y.o. female with PMH of CAD s/p cabg (unknown date) and aortic stenosis s/p TAVR (7/14) presented to ED for worsening abdominal pain 7/22 and was found to have a large L abdominal wall hematoma shortly after. Patient takes warfarin, lovenox, and aspirin at home which were held 7/22. She was initially hypotensive but pressures have since improved. Hb has been stable 7.5->7.2->7.9. INR 2. CTAP showed a large stable abdominal wall hematoma. Abdominal pain has been progressively improving. Past Medical History:   Diagnosis Date    Ambulates with cane     Atrial fibrillation ([address] 11/22/2010    CAD (coronary artery disease) 11/22/2010    NO CARDIOLOGIST, F/U W/ pcp STABLE    CHF (congestive heart failure) ([address] 11/22/2010    Diverticular disease 11/22/2010    Edentulous     does not wear dentures    Glaucoma     History of blood transfusion     once with CABG, once with a severe epistaxis.  Hyperlipidemia     Hypertension     Restless leg syndrome 11/22/2010    Valvular heart disease 11/22/2010       Past Surgical History:   Procedure Laterality Date    AORTIC VALVE REPLACEMENT N/A 7/14/2021    TRANSCATHETER AORTIC VALVE REPLACEMENT FEMORAL APPROACH performed by Mason Rouse MD at [address] [facility]  2007    CARDIAC CATHETERIZATION Right 05/07/2021    Right Heart cath no stents Dr Jac Amato Right 08/24/2016    trabulectomy    COLONOSCOPY      CORONARY ARTERY BYPASS GRAFT      TONSILLECTOMY      TRANSESOPHAGEAL ECHOCARDIOGRAM  05/07/2021    [facility]         Medications Prior to Admission:    Prior to Admission medications    Medication Sig Start Date End Date Taking?  Authorizing Provider   benzonatate (TESSALON PERLES) 100 MG capsule Take 1 capsule by mouth 3 times daily as needed for Cough 7/21/21 7/31/21 Yes Francisco Dias DO   enoxaparin (LOVENOX) 120 MG/0.8ML injection Inject 0.73 mLs into the skin every 12 hours 7/21/21  Yes Francisco Dias DO   aspirin 81 MG EC tablet Take 1 tablet by mouth daily 7/17/21  Yes Luís Barroso MD   amLODIPine (NORVASC) 10 MG tablet Take 1 tablet by mouth daily 7/17/21  Yes Luís Barroso MD   brimonidine (ALPHAGAN) 0.2 % ophthalmic solution INSTILL 1 DROP EVERY DAY INTO LEFT EYE AT 8 AM. 4/15/21  Yes Historical Provider, MD   latanoprost (XALATAN) 0.005 % ophthalmic solution INSTILL 1 DROP IN THE LEFT EYE EVERY DAY AT BEDTIME 4/15/21  Yes Historical Provider, MD   furosemide (LASIX) 20 MG tablet Take 20mg alternating with 30mg every other day (one tablet alternating with 1 and 1/2 tabs every other day). Patient taking differently: Take 20 mg by mouth daily Take 20mg alternating with 30mg every other day (one tablet alternating with 1 and 1/2 tabs every other day).  4/28/21  Yes Francisco Dias DO   potassium chloride (KLOR-CON M) 10 MEQ extended release tablet Take 1 tab alternating with 1 and 1/2 tablet every other day (10 cecy alternating with 15 cecy every other day)  Patient taking differently: Take 10 mEq by mouth daily Take 1 tab alternating with 1 and 1/2 tablet every other day (10 cecy alternating with 15 cecy every other day) 4/28/21  Yes Maday Dias DO   pantoprazole (PROTONIX) 40 MG tablet Take 1 tablet by mouth daily 4/28/21  Yes Francisco Dias DO   simvastatin (ZOCOR) 20 MG tablet TAKE ONE TABLET BY MOUTH DAILY  Patient taking differently: Take 20 mg by mouth nightly  4/20/21  Yes Zeus Serna MD   metoprolol tartrate (LOPRESSOR) 50 MG tablet One tab twice daily 3/25/21  Yes Louis Hernandez MD   warfarin (COUMADIN) 1 MG tablet Take 1 mg every wed with coumadin 3 mg to equal 4 mg wed only  Patient taking differently: 3 mg M, T, TH, F, Sat, Sun 3 mg, W takes 4 mg 11/30/20  Yes Francisco Dias DO   enalapril (VASOTEC) 10 MG tablet TAKE ONE TABLET BY MOUTH TWO TIMES A DAY  Patient taking differently: Take 10 mg by mouth 2 times daily  10/26/20  Yes Francisco Dias DO   amoxicillin (AMOXIL) 500 MG capsule Take 4 capsules (2000 mg) by mouth one hour prior to dental appointments 7/16/21   Cecilio Sigala MD       Allergies   Allergen Reactions    Lactose Intolerance (Gi) Diarrhea and Nausea And Vomiting    Phenobarbital Rash       Family History   Problem Relation Age of Onset    Heart Disease Mother     Heart Disease Father        Social History     Tobacco Use    Smoking status: Never Smoker    Smokeless tobacco: Never Used   Substance Use Topics    Alcohol use: No    Drug use: No         Review of Systems   General ROS: negative  Hematological and Lymphatic ROS: negative  Respiratory ROS: positive for - shortness of breath  Cardiovascular ROS: negative  Gastrointestinal ROS: positive for - minimal abdominal pain  Genito-Urinary ROS: no dysuria, trouble voiding, or hematuria  Musculoskeletal ROS: negative      PHYSICAL EXAM:    Vitals:    07/24/21 0745   BP: (!) 126/57   Pulse: 115   Resp: 20   Temp: 97.7 °F (36.5 °C)   SpO2: 95%       General Appearance:  awake, alert, oriented, in no acute distress  Head/face:  NCAT  Eyes:  Pupils equal and reactive, pale conjuntiva  Lungs:  No chest wall tenderness. Good chest wall expansion no laboured breathing   Heart:  Atrial fibrillation    Abdomen:  Large L abdominal and flank ecchymosis, overlaying skin is in tact with no signs of breakdown, Small R sided ecchymosis. Tenderness on palpation of LUQ/LLQ. Extremities: Extremities warm to touch, pink, with no edema.     LABS:  CBC  Recent Labs     07/24/21  0530   WBC 10.6   HGB 8.0*  7.9*   HCT 24.9*  24.6*        BMP  Recent Labs     07/24/21  0530      K 4.3      CO2 21*   BUN 28*   CREATININE 1.1*   CALCIUM 8.0*     Liver Function  Recent Labs 07/22/21  1153 07/22/21  2330 07/24/21  0530   LIPASE 24  --   --    BILITOT 1.0   < > 0.8   AST 38*   < > 66*   ALT 26   < > 25   ALKPHOS 99   < > 84   PROT 6.2*   < > 5.2*   LABALBU 3.6   < > 3.0*    < > = values in this interval not displayed. No results for input(s): LACTATE in the last 72 hours. Recent Labs     07/24/21  0530   INR 2.2       RADIOLOGY    CT ABDOMEN PELVIS WO CONTRAST Additional Contrast? None    Result Date: 7/23/2021  EXAMINATION: CT OF THE ABDOMEN AND PELVIS WITHOUT CONTRAST 7/23/2021 3:22 pm TECHNIQUE: CT of the abdomen and pelvis was performed without the administration of intravenous contrast. Multiplanar reformatted images are provided for review. Dose modulation, iterative reconstruction, and/or weight based adjustment of the mA/kV was utilized to reduce the radiation dose to as low as reasonably achievable. COMPARISON: None. HISTORY: ORDERING SYSTEM PROVIDED HISTORY: assess change of abdominal wall hematoma and groin hematoma TECHNOLOGIST PROVIDED HISTORY: Reason for exam:->assess change of abdominal wall hematoma and groin hematoma Additional Contrast?->None What reading provider will be dictating this exam?->CRC FINDINGS: In the left lateral abdominal wall at the level of the oblique/transverse muscles there is a large acute hematoma measuring 11 by 11.6 by 11 cm. On the previous study of a July 22nd it measured 12.5 by 8.5 x 11.8 cm. The overall size of the hematoma is stable account some differences in measurement, presently there is more uniform moderated hyperdense appearance of the hematoma indicating an acute phase. No longer seen blood/blood level which indicates a hyperacute phase or an active bleeding hematoma.  There is significant edema and increased density of the adjacent muscular planes of the left lateral chest wall extending anteriorly, posteriorly, superior inferiorly, and also into the deep subcutaneous soft tissues of the left lateral abdominal wall indicating oozing of a hemorrhage, forming a ill-defined surrounding interstitial hematoma. There is a localized small hematoma in the right inguinal region which is related with the right common femoral artery. Cannot exclude the pseudoaneurysm formation. Correlation with ultrasound of the right inguinal areas seen. The there is a small pocket of air associated. The patient had aortic valve replacement procedure in July 14, 2021. There is no extension of hematoma into the pelvic sidewall. There is a Menendez catheter in the bladder. The bladder is not distended. Uterus and ovaries have unremarkable appearance. There is no free intraperitoneal air. There is no indication for acute intraperitoneal process in the abdomen or in the pelvis accounting the left lateral chest wall hematoma which spreads into adjacent soft tissues including the left pelvic sidewall. Uncomplicated diverticulosis seen in the sigmoid colon. There is no indication for bowel obstruction. Kidneys are preserved size and cortical thickness, parenchymal enhancement and excretion of the contrast.  Presence of a cyst-like structures in both kidneys but they have density higher than simple cyst, they cannot be characterize presently. When patient condition permits consider initial correlation with ultrasound. They could represent hemorrhagic cysts or cyst with high proteinaceous content, but other possibilities are not excluded. There are normal size and the density for the liver and spleen. Pancreas demonstrates some atrophy. Gallbladder is well distended with 3 cm gallstone. There is no dilatation of the biliary tree pancreatic ductal system. There is a mild to moderate size hiatal hernia. Adrenals not enlarged. Calcified atheromatous changes are seen in the abdominal aorta there is no aneurysm formation. There is no midline retro peritoneal adenopathy. Heart has normal size.   There is endovascular repair for the aortic valve and there is also prosthesis for the mitral valve. The heart is not fully covered in this study. There are areas of a confluent loss of volume indicate a component of atelectasis in the medial aspect of the right lower lobe and towards the base in the left lower lobe. These appear to be more chronic findings but increase since May 7, 2021.     1.  Overall stable large hematoma of the left lateral abdominal wall with surrounding hemorrhage into adjacent muscle planes and deep subcutaneous soft tissues by oozing. The large dominant hematoma now has a uniform hyperdensity indicating acute phase. No longer seen blood/blood level which indicates a hyperacute active bleeding hematoma as demonstrate on the study of July 22nd. 2.  Hematoma of a small size superficial to the right common femoral artery. Cannot exclude a pseudoaneurysm formation. Can correlate with the ultrasound of the right groin. CT ABDOMEN PELVIS W IV CONTRAST Additional Contrast? None    Result Date: 7/22/2021  EXAMINATION: CT OF THE ABDOMEN AND PELVIS WITH CONTRAST 7/22/2021 3:53 pm TECHNIQUE: CT of the abdomen and pelvis was performed with the administration of intravenous contrast. Multiplanar reformatted images are provided for review. Dose modulation, iterative reconstruction, and/or weight based adjustment of the mA/kV was utilized to reduce the radiation dose to as low as reasonably achievable. COMPARISON: None. HISTORY: ORDERING SYSTEM PROVIDED HISTORY: ab pain s/p TAVR TECHNOLOGIST PROVIDED HISTORY: Reason for exam:->ab pain s/p TAVR Additional Contrast?->None Decision Support Exception - unselect if not a suspected or confirmed emergency medical condition->Emergency Medical Condition (MA) What reading provider will be dictating this exam?->CRC FINDINGS: Lower Chest: Images through lung bases demonstrate mild cardiomegaly. Note is made of an aortic valve. The left atrium is enlarged. There is no right or left lung base infiltrate or effusion. Organs: The liver, spleen, pancreas and adrenal glands are unremarkable. The kidneys enhance symmetrically without evidence of hydronephrosis. GI/Bowel: There is a large stones seen within the gallbladder lumen measuring 3.4 x 2.4 cm. There are no findings of acute cholecystitis. The stomach is normally distended. There is a small hiatal hernia. There is no large or small bowel obstruction. There are no findings of inflammatory bowel disease. Multiple sigmoid diverticula noted. There is no evidence of diverticulitis. Pelvis:  Bladder is unremarkable in appearance. There is a uterine fibroid measuring approximately 3.3 x 3.4 cm. Peritoneum/Retroperitoneum: There are postsurgical changes seen within the right groin from recent surgical intervention. There is a small hematoma measuring 2.5 x 2.5 cm. The abdominal aorta is normal caliber. There is no dissection of the abdominal aorta or of the iliac arteries. There is a large left lateral abdominal wall hematoma measuring 13 cm in maximum craniocaudal dimension by 12 cm in maximum AP diameter by 8 cm in maximum transverse diameter. There is a fluid fluid level seen within the left lateral abdominal wall hematoma consistent with acute on subacute hemorrhage. Along the superior aspect of the fluid fluid level there is minimal dense layering material suggestive of mild active bleeding. Bones/Soft Tissues:  Age related degenerative changes of the visualized osseous structures without focal destructive lesion. 1. Large left lateral abdominal wall hematoma measuring 13 cm x 12 cm x 8 cm. There is a fluid fluid level seen within the abdominal wall hematoma consistent with acute on subacute hemorrhage. There is minimal dense material seen layering on the fluid fluid level centrally consistent with minimal active bleeding. Surrounding intramuscular edema is noted within the oblique muscles.   There is also induration within the subcutaneous soft tissues consistent with reactive changes. 2. Postsurgical changes within the right groin from recent intervention and TAVR procedure. There is a 2.5 x 2.5 cm hematoma within the right groin. 3. Cholelithiasis. There is no evidence of acute cholecystitis. 4. There is no aortic or iliac artery injury. XR CHEST PORTABLE    Result Date: 7/22/2021  EXAMINATION: ONE XRAY VIEW OF THE CHEST 7/22/2021 1:17 pm COMPARISON: March 8, May 26, July 9 HISTORY: ORDERING SYSTEM PROVIDED HISTORY: Shortness of breath TECHNOLOGIST PROVIDED HISTORY: Reason for exam:->Shortness of breath What reading provider will be dictating this exam?->CRC FINDINGS: Status post previous mid sternotomy. More recent endovascular repair for the aortic root. Heart has borderline size. The no acute infiltrates, consolidates or pleural effusions are seen. There is no perihilar vascular congestion. 1.  Status post more remote mid sternotomy. Status post recent endovascular repair for the aortic root. 2.  Borderline size heart. No acute pulmonary process. ASSESSMENT:  77 y.o. female with hx TAVR 7/14 large left sided abdominal wall hematoma  Hb stable, off anticoags INR 2.2, skin without necrotic changes  Recommend correction of INR given conern of hematoma enlarging if ok with cardiology   Monitor h/h and hematoma   Hematoma vs pseudoaneurysm of r fem recommend duplex ultrasound    Will discuss with Dr. Curtis Orta.     Electronically signed by Scotty Cadet MD on 7/24/21 at 10:24 AM EDT

## 2021-07-25 ENCOUNTER — APPOINTMENT (OUTPATIENT)
Dept: CT IMAGING | Age: 76
DRG: 813 | End: 2021-07-25
Payer: MEDICARE

## 2021-07-25 ENCOUNTER — APPOINTMENT (OUTPATIENT)
Dept: GENERAL RADIOLOGY | Age: 76
DRG: 813 | End: 2021-07-25
Payer: MEDICARE

## 2021-07-25 LAB
ALBUMIN SERPL-MCNC: 2.9 G/DL (ref 3.5–5.2)
ALP BLD-CCNC: 79 U/L (ref 35–104)
ALT SERPL-CCNC: 24 U/L (ref 0–32)
ANION GAP SERPL CALCULATED.3IONS-SCNC: 9 MMOL/L (ref 7–16)
AST SERPL-CCNC: 59 U/L (ref 0–31)
BASOPHILS ABSOLUTE: 0.02 E9/L (ref 0–0.2)
BASOPHILS RELATIVE PERCENT: 0.2 % (ref 0–2)
BILIRUB SERPL-MCNC: 1 MG/DL (ref 0–1.2)
BUN BLDV-MCNC: 21 MG/DL (ref 6–23)
CALCIUM SERPL-MCNC: 7.9 MG/DL (ref 8.6–10.2)
CHLORIDE BLD-SCNC: 107 MMOL/L (ref 98–107)
CO2: 21 MMOL/L (ref 22–29)
CREAT SERPL-MCNC: 0.8 MG/DL (ref 0.5–1)
EOSINOPHILS ABSOLUTE: 0.11 E9/L (ref 0.05–0.5)
EOSINOPHILS RELATIVE PERCENT: 1.3 % (ref 0–6)
GFR AFRICAN AMERICAN: >60
GFR NON-AFRICAN AMERICAN: >60 ML/MIN/1.73
GLUCOSE BLD-MCNC: 113 MG/DL (ref 74–99)
HCT VFR BLD CALC: 24.5 % (ref 34–48)
HCT VFR BLD CALC: 24.7 % (ref 34–48)
HCT VFR BLD CALC: 26.2 % (ref 34–48)
HEMOGLOBIN: 7.9 G/DL (ref 11.5–15.5)
HEMOGLOBIN: 8 G/DL (ref 11.5–15.5)
HEMOGLOBIN: 8.2 G/DL (ref 11.5–15.5)
IMMATURE GRANULOCYTES #: 0.15 E9/L
IMMATURE GRANULOCYTES %: 1.8 % (ref 0–5)
INR BLD: 1.9
LYMPHOCYTES ABSOLUTE: 1.41 E9/L (ref 1.5–4)
LYMPHOCYTES RELATIVE PERCENT: 17.3 % (ref 20–42)
MCH RBC QN AUTO: 29 PG (ref 26–35)
MCHC RBC AUTO-ENTMCNC: 31.3 % (ref 32–34.5)
MCV RBC AUTO: 92.6 FL (ref 80–99.9)
METER GLUCOSE: 122 MG/DL (ref 74–99)
METER GLUCOSE: 146 MG/DL (ref 74–99)
METER GLUCOSE: 153 MG/DL (ref 74–99)
METER GLUCOSE: 155 MG/DL (ref 74–99)
MONOCYTES ABSOLUTE: 0.73 E9/L (ref 0.1–0.95)
MONOCYTES RELATIVE PERCENT: 8.9 % (ref 2–12)
NEUTROPHILS ABSOLUTE: 5.74 E9/L (ref 1.8–7.3)
NEUTROPHILS RELATIVE PERCENT: 70.5 % (ref 43–80)
PDW BLD-RTO: 14.7 FL (ref 11.5–15)
PLATELET # BLD: 218 E9/L (ref 130–450)
PMV BLD AUTO: 10.4 FL (ref 7–12)
POTASSIUM SERPL-SCNC: 4.3 MMOL/L (ref 3.5–5)
PROTHROMBIN TIME: 20.4 SEC (ref 9.3–12.4)
RBC # BLD: 2.83 E12/L (ref 3.5–5.5)
SODIUM BLD-SCNC: 137 MMOL/L (ref 132–146)
TOTAL PROTEIN: 5 G/DL (ref 6.4–8.3)
WBC # BLD: 8.2 E9/L (ref 4.5–11.5)

## 2021-07-25 PROCEDURE — 6360000002 HC RX W HCPCS: Performed by: INTERNAL MEDICINE

## 2021-07-25 PROCEDURE — 85610 PROTHROMBIN TIME: CPT

## 2021-07-25 PROCEDURE — 74176 CT ABD & PELVIS W/O CONTRAST: CPT

## 2021-07-25 PROCEDURE — 85018 HEMOGLOBIN: CPT

## 2021-07-25 PROCEDURE — 94640 AIRWAY INHALATION TREATMENT: CPT

## 2021-07-25 PROCEDURE — 99232 SBSQ HOSP IP/OBS MODERATE 35: CPT | Performed by: INTERNAL MEDICINE

## 2021-07-25 PROCEDURE — 85014 HEMATOCRIT: CPT

## 2021-07-25 PROCEDURE — 82962 GLUCOSE BLOOD TEST: CPT

## 2021-07-25 PROCEDURE — 99232 SBSQ HOSP IP/OBS MODERATE 35: CPT | Performed by: SURGERY

## 2021-07-25 PROCEDURE — 36415 COLL VENOUS BLD VENIPUNCTURE: CPT

## 2021-07-25 PROCEDURE — 71045 X-RAY EXAM CHEST 1 VIEW: CPT

## 2021-07-25 PROCEDURE — 80053 COMPREHEN METABOLIC PANEL: CPT

## 2021-07-25 PROCEDURE — 2140000000 HC CCU INTERMEDIATE R&B

## 2021-07-25 PROCEDURE — 6370000000 HC RX 637 (ALT 250 FOR IP): Performed by: INTERNAL MEDICINE

## 2021-07-25 PROCEDURE — 85025 COMPLETE CBC W/AUTO DIFF WBC: CPT

## 2021-07-25 PROCEDURE — 94664 DEMO&/EVAL PT USE INHALER: CPT

## 2021-07-25 PROCEDURE — 6370000000 HC RX 637 (ALT 250 FOR IP): Performed by: SURGERY

## 2021-07-25 PROCEDURE — 2580000003 HC RX 258: Performed by: INTERNAL MEDICINE

## 2021-07-25 RX ORDER — SENNA PLUS 8.6 MG/1
2 TABLET ORAL NIGHTLY
Status: DISCONTINUED | OUTPATIENT
Start: 2021-07-25 | End: 2021-08-02 | Stop reason: HOSPADM

## 2021-07-25 RX ORDER — HYDROXYZINE HYDROCHLORIDE 50 MG/ML
50 INJECTION, SOLUTION INTRAMUSCULAR ONCE
Status: COMPLETED | OUTPATIENT
Start: 2021-07-25 | End: 2021-07-25

## 2021-07-25 RX ORDER — DOCUSATE SODIUM 100 MG/1
100 CAPSULE, LIQUID FILLED ORAL 2 TIMES DAILY
Status: DISCONTINUED | OUTPATIENT
Start: 2021-07-25 | End: 2021-08-02 | Stop reason: HOSPADM

## 2021-07-25 RX ORDER — ACETYLCYSTEINE 200 MG/ML
600 SOLUTION ORAL; RESPIRATORY (INHALATION) 2 TIMES DAILY
Status: DISCONTINUED | OUTPATIENT
Start: 2021-07-25 | End: 2021-07-26

## 2021-07-25 RX ORDER — OXYCODONE HYDROCHLORIDE 5 MG/1
5 TABLET ORAL EVERY 4 HOURS PRN
Status: DISCONTINUED | OUTPATIENT
Start: 2021-07-25 | End: 2021-08-02 | Stop reason: HOSPADM

## 2021-07-25 RX ORDER — OXYCODONE HYDROCHLORIDE 10 MG/1
10 TABLET ORAL EVERY 4 HOURS PRN
Status: DISCONTINUED | OUTPATIENT
Start: 2021-07-25 | End: 2021-08-02 | Stop reason: HOSPADM

## 2021-07-25 RX ADMIN — ALBUTEROL SULFATE 2.5 MG: 2.5 SOLUTION RESPIRATORY (INHALATION) at 22:36

## 2021-07-25 RX ADMIN — OXYCODONE HYDROCHLORIDE 10 MG: 10 TABLET ORAL at 22:54

## 2021-07-25 RX ADMIN — HYDROMORPHONE HYDROCHLORIDE 0.5 MG: 1 INJECTION, SOLUTION INTRAMUSCULAR; INTRAVENOUS; SUBCUTANEOUS at 18:41

## 2021-07-25 RX ADMIN — INSULIN LISPRO 2 UNITS: 100 INJECTION, SOLUTION INTRAVENOUS; SUBCUTANEOUS at 11:53

## 2021-07-25 RX ADMIN — INSULIN LISPRO 1 UNITS: 100 INJECTION, SOLUTION INTRAVENOUS; SUBCUTANEOUS at 23:14

## 2021-07-25 RX ADMIN — LATANOPROST 1 DROP: 50 SOLUTION OPHTHALMIC at 08:29

## 2021-07-25 RX ADMIN — ACETYLCYSTEINE 600 MG: 200 SOLUTION ORAL; RESPIRATORY (INHALATION) at 22:36

## 2021-07-25 RX ADMIN — ATORVASTATIN CALCIUM 20 MG: 20 TABLET, FILM COATED ORAL at 08:29

## 2021-07-25 RX ADMIN — HYDROMORPHONE HYDROCHLORIDE 0.5 MG: 1 INJECTION, SOLUTION INTRAMUSCULAR; INTRAVENOUS; SUBCUTANEOUS at 08:27

## 2021-07-25 RX ADMIN — BRIMONIDINE TARTRATE 1 DROP: 2 SOLUTION OPHTHALMIC at 08:29

## 2021-07-25 RX ADMIN — HYDROXYZINE HYDROCHLORIDE 50 MG: 50 INJECTION, SOLUTION INTRAMUSCULAR at 22:55

## 2021-07-25 RX ADMIN — METOPROLOL TARTRATE 50 MG: 50 TABLET, FILM COATED ORAL at 22:55

## 2021-07-25 RX ADMIN — SENNOSIDES 17.2 MG: 8.6 TABLET, FILM COATED ORAL at 22:55

## 2021-07-25 RX ADMIN — HYDROMORPHONE HYDROCHLORIDE 0.5 MG: 1 INJECTION, SOLUTION INTRAMUSCULAR; INTRAVENOUS; SUBCUTANEOUS at 12:28

## 2021-07-25 RX ADMIN — PANTOPRAZOLE SODIUM 40 MG: 40 TABLET, DELAYED RELEASE ORAL at 08:29

## 2021-07-25 RX ADMIN — INSULIN LISPRO 2 UNITS: 100 INJECTION, SOLUTION INTRAVENOUS; SUBCUTANEOUS at 16:27

## 2021-07-25 RX ADMIN — DOCUSATE SODIUM 100 MG: 100 CAPSULE ORAL at 22:55

## 2021-07-25 RX ADMIN — Medication 10 ML: at 22:57

## 2021-07-25 RX ADMIN — METOPROLOL TARTRATE 50 MG: 50 TABLET, FILM COATED ORAL at 08:29

## 2021-07-25 RX ADMIN — SODIUM CHLORIDE, PRESERVATIVE FREE 10 ML: 5 INJECTION INTRAVENOUS at 08:27

## 2021-07-25 ASSESSMENT — PAIN DESCRIPTION - PROGRESSION: CLINICAL_PROGRESSION: GRADUALLY WORSENING

## 2021-07-25 ASSESSMENT — PAIN SCALES - GENERAL
PAINLEVEL_OUTOF10: 10
PAINLEVEL_OUTOF10: 7
PAINLEVEL_OUTOF10: 6
PAINLEVEL_OUTOF10: 8
PAINLEVEL_OUTOF10: 10
PAINLEVEL_OUTOF10: 8

## 2021-07-25 NOTE — PLAN OF CARE
Problem: Falls - Risk of:  Goal: Will remain free from falls  Description: Will remain free from falls  Outcome: Met This Shift  Goal: Absence of physical injury  Description: Absence of physical injury  Outcome: Met This Shift     Problem: Pain:  Goal: Pain level will decrease  Description: Pain level will decrease  Outcome: Met This Shift  Goal: Control of chronic pain  Description: Control of chronic pain  Outcome: Met This Shift     Problem: Skin Integrity:  Goal: Will show no infection signs and symptoms  Description: Will show no infection signs and symptoms  Outcome: Met This Shift  Goal: Absence of new skin breakdown  Description: Absence of new skin breakdown  Outcome: Met This Shift

## 2021-07-25 NOTE — PROGRESS NOTES
[facility]  Internal Medicine Residency Program  Progress Note - House Team     Patient:  Ned Bansal 77 y.o. female MRN: [id number]     Date of Service: 2021     CC: Abd pain, N/V  Overnight events: Patient received 1 unit of blood and was tachycardic overnight. hgb 7.6 last night, 8.2 this morning    Subjective     Patient was seen and examined this morning at bedside in moderate distress. Patient is upset that she has not showered, been moved in bed, or had her questions answered. Today in presence of patient's daughter via video conference, plan of care was explained and all questions were answered. Patient was very grateful for encounter and is now no longer anxious. Patient also reports current pain regimen is working for her and decreases her pain to \"manageble\" levels. Her abdominal pain also does not radiate into her back anymore and she says the pain is slightly improved from yesterday rating it an 8/10. Patient also states her wheezing is about the same but is only \"annoying\". She feels It does not effect her ability to breathe. Patient denies nausea vomiting, weakness, new chest pain, chest tightness, palpiations, numbness tingling, fever, chills, worsening leg swelling   Objective     Physical Exam:  TEMPERATURE:  Current - Temp: 97.7 °F (36.5 °C); Max - Temp  Av.5 °F (36.9 °C)  Min: 97.7 °F (36.5 °C)  Max: 99.4 °F (37.4 °C)  RESPIRATIONS RANGE: Resp  Av.3  Min: 16  Max: 20  PULSE RANGE: Pulse  Av.7  Min: 90  Max: 136  BLOOD PRESSURE RANGE:  Systolic (31XXR), PEU:850 , Min:96 , RBN:257   ; Diastolic (72SKY), QXK:11, Min:62, Max:91    PULSE OXIMETRY RANGE: SpO2  Av.6 %  Min: 93 %  Max: 97 %    I & O - 24hr:    Intake/Output Summary (Last 24 hours) at 2021 0952  Last data filed at 2021 6519  Gross per 24 hour   Intake 859 ml   Output 1000 ml   Net -141 ml     I/O last 3 completed shifts: In: 0528 [P.O.:120;  I.V.:1239]  Out: 700 [Urine:700] I/O this shift: In: 0   Out: 300 [Urine:300]   Weight change:      Physical Exam  Constitutional:       General: in no acute distress, patient is laying on her back with bed slightly elevated. Appearance: She is well-developed, obese  HEENT:      Head: Normocephalic atraumatic. No scleral icterus Pupils are equal, round, and reactive to light. Cardiovascular: RRR no murmurs rubs or gallops  Pulmonary: Chest rise limited by pain. Wheezes heard best at bases R and L lower lobes  Abdominal: soft, tender to palpation in all 4 quadrants, worst on L upper and lower. Less tender today than yesterday  Echymosis noted  Musculoskeletal: Can spontaneously move all 4 extremities. ecchymosis noted on both arms   Neurological:      Mental Status: She is alert and oriented to person, place, and time. Cranial Nerves: No cranial nerve deficit. Psychiatric:         Behavior: Behavior normal.         Thought Content: Thought content normal.         Judgment: Judgment normal.   Subject  Pertinent Labs & Imaging Studies   nuvia  CBC:   Recent Labs     07/23/21  0541 07/23/21  1811 07/24/21  0530 07/24/21  0530 07/24/21  1153 07/24/21  1845 07/25/21  0530   WBC 10.0  --  10.6  --   --   --  8.2   HGB 7.5*   < > 8.0*  7.9*   < > 7.8* 7.6* 8.2*   HCT 24.0*   < > 24.9*  24.6*   < > 24.4* 23.6* 26.2*   MCV 93.4  --  91.4  --   --   --  92.6     --  240  --   --   --  218    < > = values in this interval not displayed.        BMP:    Recent Labs     07/24/21  0530 07/24/21  1845 07/25/21  0530    136 137   K 4.3 4.6 4.3    104 107   CO2 21* 23 21*   BUN 28* 25* 21   CREATININE 1.1* 0.9 0.8   GLUCOSE 156* 122* 113*       LIVER PROFILE:   Recent Labs     07/22/21  1153 07/22/21  2330 07/23/21  0541 07/24/21  0530 07/25/21  0530   AST 38*   < > 31 66* 59*   ALT 26   < > 20 25 24   LIPASE 24  --   --   --   --    BILITOT 1.0   < > 0.7 0.8 1.0   ALKPHOS 99   < > 69 84 79    < > = values in this interval not Allergies   - Other drug:(Phenobarbital). Findings   Left Ventricle  Normal left ventricle size. Estimated left ventricle ejection fraction 60+/-5 %. Right Ventricle  Normal right ventricular size and function. Mitral Valve  Well-seated surgical mitral bioprosthesis. No significant mitral  regurgitation. Tricuspid Valve  Moderate tricuspid regurgitation. RVSP is ~37 mmHg. Aortic Valve  There is a well-seated transcatheter valve in the aortic position. Trace  paravalvular regurgitation. MG 7, DVI 0.67. Pericardial Effusion  No evidence of pericardial effusion. Aorta  Aortic root dimension within normal limits. Miscellaneous  Normal Inferior Vena Cava diameter and respiratory variation. Conclusions   Summary  **POD1 s/p TAVR with 29-mm Evolut PRO+; h/o MVR with #25 Mosaic (2007)**  Normal left ventricle size. Estimated left ventricle ejection fraction  60+/-5 %. Normal right ventricular size and function. Well-seated surgical mitral bioprosthesis. No significant mitral  regurgitation. There is a well-seated transcatheter valve in the aortic position. Trace  paravalvular regurgitation. MG 7, DVI 0.67. No evidence of pericardial effusion.    Signature   ----------------------------------------------------------------  Electronically signed by Liliana Brown MD(Interpreting physician)  on 07/16/2021 12:54 PM  ----------------------------------------------------------------  M-Mode/2D Measurements & Calculations    LVOT: 2.1 cm              Ascending Aorta: 2.8 cm                              IVC Expiration: 1.7 cm  Doppler Measurements & Calculations    AV Peak Velocity: 1.77 m/s LVOT Peak Velocity: 1.21 m/s   AV Peak Gradient: 12.52    LVOT Mean Velocity: 0.76 m/s   mmHg                       LVOT Peak Gradient: 5.9 mmHgLVOT Mean   AV Mean Velocity: 1.13 m/s Gradient: 2.9 mmHg   AV Mean Gradient: 6.1 mmHg   AV VTI: 45.4 cm   AV Area (Continuity):2.33   cm^2                       TR Velocity:2.91 m/s TR Gradient:33.8 mmHg   LVOT VTI: 30.5 cm  [id number]    XR CHEST (2 VW)    Result Date: 7/9/2021  EXAMINATION: TWO XRAY VIEWS OF THE CHEST 7/9/2021 1:00 pm COMPARISON: May 26, 2021 HISTORY: ORDERING SYSTEM PROVIDED HISTORY: Dyspnea, unspecified type TECHNOLOGIST PROVIDED HISTORY: What reading provider will be dictating this exam?->CRC FINDINGS: Moderate cardiomegaly. No airspace opacity or pleural effusion. Median sternotomy wires are present. The heart is normal in size. No pneumothorax. 1. Moderate cardiomegaly. 2. No airspace opacity or pleural effusion. CT ABDOMEN PELVIS W IV CONTRAST Additional Contrast? None    Result Date: 7/22/2021  EXAMINATION: CT OF THE ABDOMEN AND PELVIS WITH CONTRAST 7/22/2021 3:53 pm TECHNIQUE: CT of the abdomen and pelvis was performed with the administration of intravenous contrast. Multiplanar reformatted images are provided for review. Dose modulation, iterative reconstruction, and/or weight based adjustment of the mA/kV was utilized to reduce the radiation dose to as low as reasonably achievable. COMPARISON: None. HISTORY: ORDERING SYSTEM PROVIDED HISTORY: ab pain s/p TAVR TECHNOLOGIST PROVIDED HISTORY: Reason for exam:->ab pain s/p TAVR Additional Contrast?->None Decision Support Exception - unselect if not a suspected or confirmed emergency medical condition->Emergency Medical Condition (MA) What reading provider will be dictating this exam?->CRC FINDINGS: Lower Chest: Images through lung bases demonstrate mild cardiomegaly. Note is made of an aortic valve. The left atrium is enlarged. There is no right or left lung base infiltrate or effusion. Organs: The liver, spleen, pancreas and adrenal glands are unremarkable. The kidneys enhance symmetrically without evidence of hydronephrosis. GI/Bowel:  There is a large stones seen within the gallbladder lumen measuring 3.4 x 2.4 cm. There are no findings of acute cholecystitis. The stomach is normally distended. There is a small hiatal hernia. There is no large or small bowel obstruction. There are no findings of inflammatory bowel disease. Multiple sigmoid diverticula noted. There is no evidence of diverticulitis. Pelvis:  Bladder is unremarkable in appearance. There is a uterine fibroid measuring approximately 3.3 x 3.4 cm. Peritoneum/Retroperitoneum: There are postsurgical changes seen within the right groin from recent surgical intervention. There is a small hematoma measuring 2.5 x 2.5 cm. The abdominal aorta is normal caliber. There is no dissection of the abdominal aorta or of the iliac arteries. There is a large left lateral abdominal wall hematoma measuring 13 cm in maximum craniocaudal dimension by 12 cm in maximum AP diameter by 8 cm in maximum transverse diameter. There is a fluid fluid level seen within the left lateral abdominal wall hematoma consistent with acute on subacute hemorrhage. Along the superior aspect of the fluid fluid level there is minimal dense layering material suggestive of mild active bleeding. Bones/Soft Tissues:  Age related degenerative changes of the visualized osseous structures without focal destructive lesion. 1. Large left lateral abdominal wall hematoma measuring 13 cm x 12 cm x 8 cm. There is a fluid fluid level seen within the abdominal wall hematoma consistent with acute on subacute hemorrhage. There is minimal dense material seen layering on the fluid fluid level centrally consistent with minimal active bleeding. Surrounding intramuscular edema is noted within the oblique muscles. There is also induration within the subcutaneous soft tissues consistent with reactive changes. 2. Postsurgical changes within the right groin from recent intervention and TAVR procedure.   There is a 2.5 x 2.5 cm hematoma within the right groin. 3. Cholelithiasis. There is no evidence of acute cholecystitis. 4. There is no aortic or iliac artery injury. XR CHEST PORTABLE    Result Date: 7/22/2021  EXAMINATION: ONE XRAY VIEW OF THE CHEST 7/22/2021 1:17 pm COMPARISON: March 8, May 26, July 9 HISTORY: ORDERING SYSTEM PROVIDED HISTORY: Shortness of breath TECHNOLOGIST PROVIDED HISTORY: Reason for exam:->Shortness of breath What reading provider will be dictating this exam?->CRC FINDINGS: Status post previous mid sternotomy. More recent endovascular repair for the aortic root. Heart has borderline size. The no acute infiltrates, consolidates or pleural effusions are seen. There is no perihilar vascular congestion. 1.  Status post more remote mid sternotomy. Status post recent endovascular repair for the aortic root. 2.  Borderline size heart. No acute pulmonary process. Resident's Assessment and Plan     Assessment and Plan:    Abdominal hematoma and groin hematoma likely 2/2 hep injections and TAVR, in context of increased PTT/ dual anticoagulation   abdominal CT showed [id number] and a 2.5x2.5 cm groin hematoma. Will repeat CT today assess for growth  Hemoglobin 8.2 today, required 1 U of blood overnight. Will consult Gen surg to follow  transfuse below 8hgb  Hold all anticoagulation today, will consider eliquis tomorrow  PTT today: 20.4 24.5 yesterday  INR today 1.9, 2.2 yesterday  Hematomas are at risk for myositis ossificans and infection. Will monitor for fever and leukocytosis  Patient states pain is still present but controlled. Dilaudid works best. Currently Dilaudid 0.4 [id number], tylenol PRN Q6   Back pain has resolved.    ALDA likely pre-renal 2/2 to intravascular volume depletion 2/2 #1 vs ATN-improving  Cr 0.8 today, baseline 0.7  Will stop NS in light of resolved ALDA, however, will follow chest xray today in light of wheezing and concern for lung fluid status v.s reaction from 3 transfused units in total  Hx of a fib, currently in A-fib  Continue metoprolol  Hold anticoagulation due to #1 however, may put on eliquis tomorrow pending CT results. Will consider switching to eliquis as INR drops, then resuming warfarin after. Tachycardia last night may have been due to A-fib status. Currently rate controlled  Urinary retention 2/2 #1  Patient cannot valsalva or breath deeply due to pain. Continue guevara, will decide on Flomax or bethanecol when guevara removed   Hx of mitral valve replacement  Continue holding anti coag due to #1  Hx of severe aortic stenosis s/p TAVR  Still holding anticoagulation due to #1  LA type A-resolved  Hx of GERD  Continue protonix   Hx of DM  On LDSS.  Insulin requirement not recorded  Last   Hx of HFpEF  Pro , at baseline  CHADSVASc 6, 9.7% stroke risk/yr  Hx of glaucoma  Continue latanaprost and bromonidine    PT/OT evaluation: None  DVT prophylaxis/ GI prophylaxis: protonix  Disposition: continue current care    José Antonio Jacques MD PGY-1  Attending physician: Dr. Kimberley Medellin

## 2021-07-25 NOTE — PROGRESS NOTES
[facility]  [facility]    Attending Physician Statement  I have discussed the case, including pertinent history and exam findings with the resident physician. I have seen and examined the patient and the key elements of the encounter have been performed by me. I agree with the assessment, plan and orders as documented by the resident. I have reviewed all pertinent PMHx, PSHx, FamHx, SocialHx, medications, and allergies and updated history as appropriate. Pt reported some improvement on left abdominal pain, the repeat CT of abdomen showed slight increase on size of hematoma from 12x 11x 8 to 11x 11x 11, from hyperacute bleed to acute bleed pattern. Pt has multi med problem with CAD and AF ad coumadin was held since discharge and today;s INR 1.9. Recommended to repeat CT of abdomen and may consider to start Eliquis if no further increase in size of hematoma. ALDA has been improved with creatinine 0.8 today. Remainder of medical problems as per resident note.     Srinivas Toro MD  7/25/2021 10:46 AM

## 2021-07-25 NOTE — PROGRESS NOTES
Spoke to Dr. Segovia,  of patient.  Not doing well, moderate dysphagia.  Has questions about crushing meds, dysphagia, hospice.    rytary can be opened, sinemet IR crushed, but CR cannot be crushed.  Is it worth trying the rytary (had been held when she was dysphagic), opened?  I think it is worth trying, though unlikely to improve ability to swallow as she was having some problems even when taking it regularly.    3 years, rapid parkinsonism with rigidity, apraxia.  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   Dr. Mireille Ortega notified of increased bruising and CT of the abdomen not being read yet.

## 2021-07-25 NOTE — PROGRESS NOTES
Dr. Geetha Ahn about hemoglobin results. Add: Dr. Emanuel Hendrixs back and stated no transfusion at this time.

## 2021-07-25 NOTE — PROGRESS NOTES
[facility] SURGICAL [facility] - WHITE TEAM  TRAUMA/GENERAL SURGERY  ATTENDING PROGRESS NOTE  __________________________________    Patient:    Jessica Ramirez   Room:    3898/4697-X  Date of service:   7/25/2021   LOS:     3  __________________________________    CC:   Abdominal wall hematoma    Subjective:  States pain is well controlled with IV Dilaudid  Concerned about having a bowel movement and having to strain and push    Objective:  General -pleasant, obese white woman    Neuro - Awake, alert, attentive   Abdomen -large left lateral abdominal wall ecchymosis that extends to the flank,with left lower quadrant abdominal wall hematoma, minimally tender     Hemoglobin = 7.9      Assessment:    Spontaneous abdominal wall hematoma due to anticoagulation and antiplatelet therapy-minimally symptomatic    Plan:  Hold antiplatelets and anticoagulants for 24 to 48 hours to allow some time for spontaneous clotting to occur  Monitor H&H  Start oral analgesics  Bowel regimen  Evacuation of the hematoma would be indicated for hemodynamically significant hematoma bleeding, overlying skin necrosis, or infected hematoma      NOTE: This report was transcribed using voice recognition software. Every effort was made to ensure accuracy; however, inadvertent computerized transcription errors may be present.

## 2021-07-26 LAB
ALBUMIN SERPL-MCNC: 3 G/DL (ref 3.5–5.2)
ALP BLD-CCNC: 90 U/L (ref 35–104)
ALT SERPL-CCNC: 28 U/L (ref 0–32)
ANION GAP SERPL CALCULATED.3IONS-SCNC: 6 MMOL/L (ref 7–16)
AST SERPL-CCNC: 54 U/L (ref 0–31)
BASOPHILS ABSOLUTE: 0.03 E9/L (ref 0–0.2)
BASOPHILS RELATIVE PERCENT: 0.3 % (ref 0–2)
BILIRUB SERPL-MCNC: 1.9 MG/DL (ref 0–1.2)
BUN BLDV-MCNC: 15 MG/DL (ref 6–23)
CALCIUM SERPL-MCNC: 8.2 MG/DL (ref 8.6–10.2)
CHLORIDE BLD-SCNC: 104 MMOL/L (ref 98–107)
CO2: 32 MMOL/L (ref 22–29)
CREAT SERPL-MCNC: 0.7 MG/DL (ref 0.5–1)
EOSINOPHILS ABSOLUTE: 0.11 E9/L (ref 0.05–0.5)
EOSINOPHILS RELATIVE PERCENT: 1.1 % (ref 0–6)
GFR AFRICAN AMERICAN: >60
GFR NON-AFRICAN AMERICAN: >60 ML/MIN/1.73
GLUCOSE BLD-MCNC: 132 MG/DL (ref 74–99)
HBA1C MFR BLD: 6.2 % (ref 4–5.6)
HCT VFR BLD CALC: 26.7 % (ref 34–48)
HEMOGLOBIN: 8.4 G/DL (ref 11.5–15.5)
IMMATURE GRANULOCYTES #: 0.16 E9/L
IMMATURE GRANULOCYTES %: 1.6 % (ref 0–5)
INR BLD: 1.6
LYMPHOCYTES ABSOLUTE: 1.3 E9/L (ref 1.5–4)
LYMPHOCYTES RELATIVE PERCENT: 12.7 % (ref 20–42)
MCH RBC QN AUTO: 29.5 PG (ref 26–35)
MCHC RBC AUTO-ENTMCNC: 31.5 % (ref 32–34.5)
MCV RBC AUTO: 93.7 FL (ref 80–99.9)
METER GLUCOSE: 138 MG/DL (ref 74–99)
METER GLUCOSE: 139 MG/DL (ref 74–99)
METER GLUCOSE: 161 MG/DL (ref 74–99)
METER GLUCOSE: 275 MG/DL (ref 74–99)
MONOCYTES ABSOLUTE: 0.69 E9/L (ref 0.1–0.95)
MONOCYTES RELATIVE PERCENT: 6.7 % (ref 2–12)
NEUTROPHILS ABSOLUTE: 7.95 E9/L (ref 1.8–7.3)
NEUTROPHILS RELATIVE PERCENT: 77.6 % (ref 43–80)
PDW BLD-RTO: 14.6 FL (ref 11.5–15)
PLATELET # BLD: 252 E9/L (ref 130–450)
PMV BLD AUTO: 10.1 FL (ref 7–12)
POTASSIUM SERPL-SCNC: 4.4 MMOL/L (ref 3.5–5)
PROTHROMBIN TIME: 17.7 SEC (ref 9.3–12.4)
RBC # BLD: 2.85 E12/L (ref 3.5–5.5)
SODIUM BLD-SCNC: 142 MMOL/L (ref 132–146)
TOTAL PROTEIN: 5.3 G/DL (ref 6.4–8.3)
WBC # BLD: 10.2 E9/L (ref 4.5–11.5)

## 2021-07-26 PROCEDURE — 2580000003 HC RX 258: Performed by: INTERNAL MEDICINE

## 2021-07-26 PROCEDURE — 36415 COLL VENOUS BLD VENIPUNCTURE: CPT

## 2021-07-26 PROCEDURE — 2140000000 HC CCU INTERMEDIATE R&B

## 2021-07-26 PROCEDURE — 97530 THERAPEUTIC ACTIVITIES: CPT

## 2021-07-26 PROCEDURE — 85025 COMPLETE CBC W/AUTO DIFF WBC: CPT

## 2021-07-26 PROCEDURE — 99233 SBSQ HOSP IP/OBS HIGH 50: CPT | Performed by: INTERNAL MEDICINE

## 2021-07-26 PROCEDURE — 85610 PROTHROMBIN TIME: CPT

## 2021-07-26 PROCEDURE — 6370000000 HC RX 637 (ALT 250 FOR IP): Performed by: INTERNAL MEDICINE

## 2021-07-26 PROCEDURE — 97161 PT EVAL LOW COMPLEX 20 MIN: CPT

## 2021-07-26 PROCEDURE — 6370000000 HC RX 637 (ALT 250 FOR IP): Performed by: SURGERY

## 2021-07-26 PROCEDURE — 94640 AIRWAY INHALATION TREATMENT: CPT

## 2021-07-26 PROCEDURE — 82962 GLUCOSE BLOOD TEST: CPT

## 2021-07-26 PROCEDURE — 99232 SBSQ HOSP IP/OBS MODERATE 35: CPT | Performed by: SURGERY

## 2021-07-26 PROCEDURE — 80053 COMPREHEN METABOLIC PANEL: CPT

## 2021-07-26 PROCEDURE — 83036 HEMOGLOBIN GLYCOSYLATED A1C: CPT

## 2021-07-26 PROCEDURE — 6360000002 HC RX W HCPCS: Performed by: INTERNAL MEDICINE

## 2021-07-26 RX ADMIN — ATORVASTATIN CALCIUM 20 MG: 20 TABLET, FILM COATED ORAL at 10:35

## 2021-07-26 RX ADMIN — BRIMONIDINE TARTRATE 1 DROP: 2 SOLUTION OPHTHALMIC at 10:41

## 2021-07-26 RX ADMIN — LATANOPROST 1 DROP: 50 SOLUTION OPHTHALMIC at 10:41

## 2021-07-26 RX ADMIN — METOPROLOL TARTRATE 50 MG: 50 TABLET, FILM COATED ORAL at 21:19

## 2021-07-26 RX ADMIN — DOCUSATE SODIUM 100 MG: 100 CAPSULE ORAL at 21:19

## 2021-07-26 RX ADMIN — ACETYLCYSTEINE 600 MG: 200 SOLUTION ORAL; RESPIRATORY (INHALATION) at 09:35

## 2021-07-26 RX ADMIN — INSULIN LISPRO 6 UNITS: 100 INJECTION, SOLUTION INTRAVENOUS; SUBCUTANEOUS at 13:06

## 2021-07-26 RX ADMIN — PANTOPRAZOLE SODIUM 40 MG: 40 TABLET, DELAYED RELEASE ORAL at 10:34

## 2021-07-26 RX ADMIN — OXYCODONE HYDROCHLORIDE 10 MG: 10 TABLET ORAL at 10:34

## 2021-07-26 RX ADMIN — INSULIN LISPRO 1 UNITS: 100 INJECTION, SOLUTION INTRAVENOUS; SUBCUTANEOUS at 21:44

## 2021-07-26 RX ADMIN — Medication 10 ML: at 10:35

## 2021-07-26 RX ADMIN — SENNOSIDES 17.2 MG: 8.6 TABLET, FILM COATED ORAL at 21:19

## 2021-07-26 RX ADMIN — DOCUSATE SODIUM 100 MG: 100 CAPSULE ORAL at 10:34

## 2021-07-26 RX ADMIN — ALBUTEROL SULFATE 2.5 MG: 2.5 SOLUTION RESPIRATORY (INHALATION) at 09:35

## 2021-07-26 RX ADMIN — METOPROLOL TARTRATE 50 MG: 50 TABLET, FILM COATED ORAL at 10:35

## 2021-07-26 ASSESSMENT — PAIN DESCRIPTION - ORIENTATION
ORIENTATION: RIGHT;LEFT

## 2021-07-26 ASSESSMENT — PAIN DESCRIPTION - LOCATION
LOCATION: ABDOMEN

## 2021-07-26 ASSESSMENT — PAIN DESCRIPTION - PAIN TYPE
TYPE: ACUTE PAIN

## 2021-07-26 ASSESSMENT — PAIN DESCRIPTION - FREQUENCY
FREQUENCY: CONTINUOUS

## 2021-07-26 ASSESSMENT — PAIN SCALES - GENERAL
PAINLEVEL_OUTOF10: 5
PAINLEVEL_OUTOF10: 5
PAINLEVEL_OUTOF10: 0
PAINLEVEL_OUTOF10: 5
PAINLEVEL_OUTOF10: 0
PAINLEVEL_OUTOF10: 10

## 2021-07-26 ASSESSMENT — PAIN DESCRIPTION - PROGRESSION
CLINICAL_PROGRESSION: GRADUALLY IMPROVING
CLINICAL_PROGRESSION: GRADUALLY WORSENING

## 2021-07-26 ASSESSMENT — PAIN DESCRIPTION - ONSET
ONSET: ON-GOING

## 2021-07-26 ASSESSMENT — PAIN DESCRIPTION - DESCRIPTORS
DESCRIPTORS: ACHING;CONSTANT;DISCOMFORT

## 2021-07-26 NOTE — PROGRESS NOTES
chair. Stairs. Current Treatment Recommendations:   [x] Strengthening to improve independence with functional mobility   [] ROM to improve independence with functional mobility   [x] Balance Training to improve static/dynamic balance and to reduce fall risk  [x] Endurance Training to improve activity tolerance during functional mobility   [x] Transfer Training to improve safety and independence with all functional transfers   [x] Gait Training to improve gait mechanics, endurance and asses need for appropriate assistive device  [x] Stair Training in preparation for safe discharge home and/or into the community   [] Positioning to prevent skin breakdown and contractures  [] Safety and Education Training   [] Patient/Caregiver Education   [] HEP  [] Other     PT long term treatment goals are located in above grid    Frequency of treatments: 2-5x/week x 1-2 weeks. Time in  0800  Time out  0820    Total Treatment Time  10 minutes     Evaluation Time includes thorough review of current medical information, gathering information on past medical history/social history and prior level of function, completion of standardized testing/informal observation of tasks, assessment of data and education on plan of care and goals.     CPT codes:  [x] Low Complexity PT evaluation 26855  [] Moderate Complexity PT evaluation 40191  [] High Complexity PT evaluation 85723  [] PT Re-evaluation 11033  [] Gait training 10348 - minutes  [] Manual therapy 89349 - minutes  [x] Therapeutic activities 98949 10 minutes  [] Therapeutic exercises 71729 - minutes  [] Neuromuscular reeducation 82835 - minutes     Eden Paige PT, DPT  [id number]

## 2021-07-26 NOTE — PROGRESS NOTES
GENERAL SURGERY  DAILY PROGRESS NOTE  7/26/2021    Patient ok to resume coumadin on 7/28.      Electronically signed by Bismark Snyder DO on 7/26/2021 at 11:31 AM

## 2021-07-26 NOTE — PROGRESS NOTES
[facility] Res[facility]  Progress Note - House Team 1    Patient:  Keke Bender 77 y.o. female MRN: [id number]     Date of Service: 7/26/2021   Hospital Day: 5      CHIEF  COMPLAINT:     -had concerns including Abdominal Pain (states it started a few days ago but recently started vomiting). OVERNIGHT  EVENTS:     -None    SUBJECTIVE:     -informed by nursing, Today (7/26/21) that pt has worsening ecchymosis over abd and flank, likely dependent spread  -Pt is tolerating PO diet, making urine, and had 1 BM last night  -Pain is improving, 7/10  -Pt c/o increased resp mucus production and being unable to cough it up  -Pt encouraged and shown how to use incentive spirometer  -Denies dyspnea, orthopnea, presyncopal sxs, fevers  -Pt states feeling much better since admission    OBJECTIVE:     PHYSICAL  EXAM:  · Vitals: BP (!) 125/56   Pulse 76   Temp 98.6 °F (37 °C) (Oral)   Resp 18   Ht 5' 4\" (1.626 m)   Wt 254 lb 10.1 oz (115.5 kg)   SpO2 95%   BMI 43.71 kg/m²     · I & O - 24hr: I/O this shift:  · In: 370 [P.O.:360; I.V.:10]  · Out: -    · General Appearance: alert, appears stated age and cooperative  · HEENT:  Head: Normal, normocephalic, atraumatic. · Neck: no JVD and supple, symmetrical, trachea midline  · Lung: end expiratory wheezes b/l, throughout. No rales or rhonchi. No increase work of breathing. Speaking in full sentances  · Heart: regular rate and rhythm, S1, S2 normal, no murmur, click, rub or gallop  · Abdomen: bowel sounds throughout. soft w/o rebound or gaurding. Mild to moderate pain on palpation of L abd in region of hematoma on CT scan. Extensive purple ecchymosis throughout upper abd and Left lower abd w/ extension into Left flank and paraspinal region b/l. · Extremities:  extremities normal, atraumatic, no cyanosis or edema  · Musculokeletal: No joint swelling, no muscle tenderness. ROM normal in all joints of extremities.    · Neurologic: Mental status: Alert, oriented, thought content appropriate    LABS  -&-  IMAGING  STUDIES:     LABORATORY  WORK:    Recent Labs     07/24/21  1845 07/25/21  0530 07/26/21  0447    137 142   K 4.6 4.3 4.4    107 104   CO2 23 21* 32*   BUN 25* 21 15   CREATININE 0.9 0.8 0.7   GLUCOSE 122* 113* 132*   CALCIUM 8.0* 7.9* 8.2*       Recent Labs     07/24/21  0530 07/24/21  1153 07/25/21  0530 07/25/21  0530 07/25/21  1114 07/25/21  1829 07/26/21  0447   WBC 10.6  --  8.2  --   --   --  10.2   RBC 2.69*  --  2.83*  --   --   --  2.85*   HGB 8.0*  7.9*   < > 8.2*   < > 7.9* 8.0* 8.4*   HCT 24.9*  24.6*   < > 26.2*   < > 24.5* 24.7* 26.7*   MCV 91.4  --  92.6  --   --   --  93.7   MCH 29.4  --  29.0  --   --   --  29.5   MCHC 32.1  --  31.3*  --   --   --  31.5*   RDW 13.9  --  14.7  --   --   --  14.6     --  218  --   --   --  252   MPV 10.5  --  10.4  --   --   --  10.1    < > = values in this interval not displayed. IMAGING  STUDIES:    Echo Limited    Result Date: 7/16/2021  Transthoracic Echocardiography Report (TTE)  Demographics   Patient Name      Radha Kevin Gender              Female                    L   Medical Record    [id number]       Room Number         9780  Number   Account #         [de-identified]      Procedure Date      07/15/2021   Corporate ID                     Ordering Physician  Dale Winter MD   Accession Number  [id number]     Referring Physician   Date of Birth     1945     Sonographer         James Mccord RDCS   Age               77 year(s)     Interpreting        Dale Winter MD                                   Physician                                    Any Other  Procedure Type of Study   TTE procedure:Echo Limited Study. Procedure Date Date: 07/15/2021 Start: 09:18 AM Study Location: Portable Technical Quality: Limited visualization due to body habitus. Indications:S/P TAVR.  Patient Status: Pending Discharge Height: 64 inches Weight: 240 pounds BSA: 2.11 m^2 BMI: 41.2 kg/m^2 Allergies   - Other drug:(Phenobarbital). Findings   Left Ventricle  Normal left ventricle size. Estimated left ventricle ejection fraction 60+/-5 %. Right Ventricle  Normal right ventricular size and function. Mitral Valve  Well-seated surgical mitral bioprosthesis. No significant mitral  regurgitation. Tricuspid Valve  Moderate tricuspid regurgitation. RVSP is ~37 mmHg. Aortic Valve  There is a well-seated transcatheter valve in the aortic position. Trace  paravalvular regurgitation. MG 7, DVI 0.67. Pericardial Effusion  No evidence of pericardial effusion. Aorta  Aortic root dimension within normal limits. Miscellaneous  Normal Inferior Vena Cava diameter and respiratory variation. Conclusions   Summary  **POD1 s/p TAVR with 29-mm Evolut PRO+; h/o MVR with #25 Mosaic (2007)**  Normal left ventricle size. Estimated left ventricle ejection fraction  60+/-5 %. Normal right ventricular size and function. Well-seated surgical mitral bioprosthesis. No significant mitral  regurgitation. There is a well-seated transcatheter valve in the aortic position. Trace  paravalvular regurgitation. MG 7, DVI 0.67. No evidence of pericardial effusion.    Signature   ----------------------------------------------------------------  Electronically signed by Stephanie Florence MD(Interpreting physician)  on 07/16/2021 12:54 PM  ----------------------------------------------------------------  M-Mode/2D Measurements & Calculations    LVOT: 2.1 cm              Ascending Aorta: 2.8 cm                              IVC Expiration: 1.7 cm  Doppler Measurements & Calculations    AV Peak Velocity: 1.77 m/s LVOT Peak Velocity: 1.21 m/s   AV Peak Gradient: 12.52    LVOT Mean Velocity: 0.76 m/s   mmHg                       LVOT Peak Gradient: 5.9 mmHgLVOT Mean   AV Mean Velocity: 1.13 m/s Gradient: 2.9 mmHg   AV Mean Gradient: 6.1 mmHg   AV VTI: 45.4 cm   AV Area (Continuity):2.33   cm^2                       TR Velocity:2.91 m/s                              TR Gradient:33.8 mmHg   LVOT VTI: 30.5 cm  [email or web address] [id number]    CT ABDOMEN PELVIS WO CONTRAST Additional Contrast? None    Result Date: 7/25/2021  EXAMINATION: CT OF THE ABDOMEN AND PELVIS WITHOUT CONTRAST 7/25/2021 10:27 am TECHNIQUE: CT of the abdomen and pelvis was performed without the administration of intravenous contrast. Multiplanar reformatted images are provided for review. Dose modulation, iterative reconstruction, and/or weight based adjustment of the mA/kV was utilized to reduce the radiation dose to as low as reasonably achievable. COMPARISON: July 23, 2021 HISTORY: ORDERING SYSTEM PROVIDED HISTORY: assess change of abdominal wall hematoma and groin hematoma TECHNOLOGIST PROVIDED HISTORY: Reason for exam:->assess change of abdominal wall hematoma and groin hematoma Additional Contrast?->None What reading provider will be dictating this exam?->CRC FINDINGS: Lower Chest: Heart size is top-normal to mildly enlarged. Postoperative changes related to transfemoral aortic valve replacement are noted. Mild dependent atelectasis is present at both lung bases. There is a small hiatal hernia. Organs: The gallbladder is distended without obvious wall thickening. There is a large peripherally calcified gallstone near the neck of the gallbladder. The unenhanced liver, spleen and adrenal glands are normal in appearance. The pancreas demonstrates diffuse fatty atrophy. The kidneys are without hydronephrosis or radiopaque calculus. There are bilateral indeterminate/intermediate density renal lesions. GI/Bowel: No evidence of a bowel obstruction, free air or pneumatosis. Portions the colon are decompressed, limiting assessment for mucosal based abnormalities.  There is diverticulosis without evidence of diverticulitis. The appendix is not confidently visualized. No obvious pericecal inflammatory changes. The stomach and duodenal sweep are unremarkable aside from the hiatal hernia. Pelvis: The urinary bladder is decompressed around a Menendez catheter. No obvious uterine or ovarian abnormality aside from a presumed fibroid at the fundus of the uterus. Small volume of free fluid is present in the pelvis. No pelvic lymphadenopathy. Peritoneum/Retroperitoneum: The abdominal aorta is normal in caliber. No retroperitoneal lymphadenopathy or mass. Bones/Soft Tissues: Again noted is a large hematoma along the left oblique abdominal musculature, slightly decreased in size on today's examination, measuring 12.8 x 9.2 cm (previously 13.2 x 10.7 cm). The 3.7 x 2.2 cm hematoma superficial to the right common femoral artery is stable in size. Suspect there is also a small right-sided rectus sheath hematoma on axial image 93, stable to slightly decreased in size. No acute osseous abnormality or evidence of aggressive osseous lesion. Slight interval decrease in the size of the left oblique abdominal wall intramuscular hematoma with a stable right inguinal hematoma and suspected small right rectus sheath hematoma. Multiple indeterminate bilateral renal lesions for which correlation with renal ultrasound is recommended. Additional, incidental findings as above. CT ABDOMEN PELVIS WO CONTRAST Additional Contrast? None    Result Date: 7/23/2021  EXAMINATION: CT OF THE ABDOMEN AND PELVIS WITHOUT CONTRAST 7/23/2021 3:22 pm TECHNIQUE: CT of the abdomen and pelvis was performed without the administration of intravenous contrast. Multiplanar reformatted images are provided for review. Dose modulation, iterative reconstruction, and/or weight based adjustment of the mA/kV was utilized to reduce the radiation dose to as low as reasonably achievable. COMPARISON: None.  HISTORY: ORDERING SYSTEM PROVIDED HISTORY: assess change of abdominal wall hematoma and groin hematoma TECHNOLOGIST PROVIDED HISTORY: Reason for exam:->assess change of abdominal wall hematoma and groin hematoma Additional Contrast?->None What reading provider will be dictating this exam?->CRC FINDINGS: In the left lateral abdominal wall at the level of the oblique/transverse muscles there is a large acute hematoma measuring 11 by 11.6 by 11 cm. On the previous study of a July 22nd it measured 12.5 by 8.5 x 11.8 cm. The overall size of the hematoma is stable account some differences in measurement, presently there is more uniform moderated hyperdense appearance of the hematoma indicating an acute phase. No longer seen blood/blood level which indicates a hyperacute phase or an active bleeding hematoma. There is significant edema and increased density of the adjacent muscular planes of the left lateral chest wall extending anteriorly, posteriorly, superior inferiorly, and also into the deep subcutaneous soft tissues of the left lateral abdominal wall indicating oozing of a hemorrhage, forming a ill-defined surrounding interstitial hematoma. There is a localized small hematoma in the right inguinal region which is related with the right common femoral artery. Cannot exclude the pseudoaneurysm formation. Correlation with ultrasound of the right inguinal areas seen. The there is a small pocket of air associated. The patient had aortic valve replacement procedure in July 14, 2021. There is no extension of hematoma into the pelvic sidewall. There is a Menendez catheter in the bladder. The bladder is not distended. Uterus and ovaries have unremarkable appearance. There is no free intraperitoneal air. There is no indication for acute intraperitoneal process in the abdomen or in the pelvis accounting the left lateral chest wall hematoma which spreads into adjacent soft tissues including the left pelvic sidewall. Uncomplicated diverticulosis seen in the sigmoid colon. 7/9/2021  EXAMINATION: TWO XRAY VIEWS OF THE CHEST 7/9/2021 1:00 pm COMPARISON: May 26, 2021 HISTORY: ORDERING SYSTEM PROVIDED HISTORY: Dyspnea, unspecified type TECHNOLOGIST PROVIDED HISTORY: What reading provider will be dictating this exam?->CRC FINDINGS: Moderate cardiomegaly. No airspace opacity or pleural effusion. Median sternotomy wires are present. The heart is normal in size. No pneumothorax. 1. Moderate cardiomegaly. 2. No airspace opacity or pleural effusion. CT ABDOMEN PELVIS W IV CONTRAST Additional Contrast? None    Result Date: 7/22/2021  EXAMINATION: CT OF THE ABDOMEN AND PELVIS WITH CONTRAST 7/22/2021 3:53 pm TECHNIQUE: CT of the abdomen and pelvis was performed with the administration of intravenous contrast. Multiplanar reformatted images are provided for review. Dose modulation, iterative reconstruction, and/or weight based adjustment of the mA/kV was utilized to reduce the radiation dose to as low as reasonably achievable. COMPARISON: None. HISTORY: ORDERING SYSTEM PROVIDED HISTORY: ab pain s/p TAVR TECHNOLOGIST PROVIDED HISTORY: Reason for exam:->ab pain s/p TAVR Additional Contrast?->None Decision Support Exception - unselect if not a suspected or confirmed emergency medical condition->Emergency Medical Condition (MA) What reading provider will be dictating this exam?->CRC FINDINGS: Lower Chest: Images through lung bases demonstrate mild cardiomegaly. Note is made of an aortic valve. The left atrium is enlarged. There is no right or left lung base infiltrate or effusion. Organs: The liver, spleen, pancreas and adrenal glands are unremarkable. The kidneys enhance symmetrically without evidence of hydronephrosis. GI/Bowel: There is a large stones seen within the gallbladder lumen measuring 3.4 x 2.4 cm. There are no findings of acute cholecystitis. The stomach is normally distended. There is a small hiatal hernia. There is no large or small bowel obstruction.   There are no findings of inflammatory bowel disease. Multiple sigmoid diverticula noted. There is no evidence of diverticulitis. Pelvis:  Bladder is unremarkable in appearance. There is a uterine fibroid measuring approximately 3.3 x 3.4 cm. Peritoneum/Retroperitoneum: There are postsurgical changes seen within the right groin from recent surgical intervention. There is a small hematoma measuring 2.5 x 2.5 cm. The abdominal aorta is normal caliber. There is no dissection of the abdominal aorta or of the iliac arteries. There is a large left lateral abdominal wall hematoma measuring 13 cm in maximum craniocaudal dimension by 12 cm in maximum AP diameter by 8 cm in maximum transverse diameter. There is a fluid fluid level seen within the left lateral abdominal wall hematoma consistent with acute on subacute hemorrhage. Along the superior aspect of the fluid fluid level there is minimal dense layering material suggestive of mild active bleeding. Bones/Soft Tissues:  Age related degenerative changes of the visualized osseous structures without focal destructive lesion. 1. Large left lateral abdominal wall hematoma measuring 13 cm x 12 cm x 8 cm. There is a fluid fluid level seen within the abdominal wall hematoma consistent with acute on subacute hemorrhage. There is minimal dense material seen layering on the fluid fluid level centrally consistent with minimal active bleeding. Surrounding intramuscular edema is noted within the oblique muscles. There is also induration within the subcutaneous soft tissues consistent with reactive changes. 2. Postsurgical changes within the right groin from recent intervention and TAVR procedure. There is a 2.5 x 2.5 cm hematoma within the right groin. 3. Cholelithiasis. There is no evidence of acute cholecystitis. 4. There is no aortic or iliac artery injury.      XR CHEST PORTABLE    Result Date: 7/25/2021  EXAMINATION: ONE XRAY VIEW OF THE CHEST 7/25/2021 9:09 am COMPARISON: July 22, 2021 HISTORY: ORDERING SYSTEM PROVIDED HISTORY: Wheezing TECHNOLOGIST PROVIDED HISTORY: Reason for exam:->Wheezing What reading provider will be dictating this exam?->CRC FINDINGS: Heart size is unable to be accurately assessed on this single portable view of the chest, but appears to be stable. Sternotomy wires are in place. There are postprocedural changes related to transfemoral aortic valve replacement. Prominence of the pulmonary vasculature raises possibility of mild pulmonary edema. No evidence of a sizable pleural effusion or pneumothorax. No acute osseous abnormality. Prominence of pulmonary vasculature raising the possibility of mild pulmonary edema. XR CHEST PORTABLE    Result Date: 7/22/2021  EXAMINATION: ONE XRAY VIEW OF THE CHEST 7/22/2021 1:17 pm COMPARISON: March 8, May 26, July 9 HISTORY: ORDERING SYSTEM PROVIDED HISTORY: Shortness of breath TECHNOLOGIST PROVIDED HISTORY: Reason for exam:->Shortness of breath What reading provider will be dictating this exam?->CRC FINDINGS: Status post previous mid sternotomy. More recent endovascular repair for the aortic root. Heart has borderline size. The no acute infiltrates, consolidates or pleural effusions are seen. There is no perihilar vascular congestion. 1.  Status post more remote mid sternotomy. Status post recent endovascular repair for the aortic root. 2.  Borderline size heart. No acute pulmonary process. STUDENT  ASSESSMENT  -&-  PLAN:     Tiara Bailey is a 77 y.o. female    1. Abd Hematoma 2/2 lovenox use   -Hold low dose heparin drip bridge to Coumadin until 7/28/21 per General surgery service   -may restart coumadin 7/28/21   -trend labs   -cont pain meds  2. A-fib, rate controlled   -anticoags as above   -will notify cardiology that pt is off anticoags  3.urinary retention   -d/c guevara tomorrow (07/27/21)   -voiding trial tomorrow (07/27/21)   -may added Bethanecol per voiding trial  4.DM, HgbA1c on 7/9/21 = 6.6   -cont LDSS  5. Increase resp Mucus production   -encourage pulmonary toilet   -monitor at this time, CXR if pt develops pain, change in sputum color, fevers, leukocytosis, or dypnea  6. H/O HFpEF   -not clinically overloaded currently   -cont to monitor  7. H/O TAVR, mitral valve, and CABG   -anticoag as above  8. H/O CAD   -anticoag as above  9. H/O GERD   -cont protonix  10. H/O gluacoma   -cont Latanaprost & bromonidine    PT / OT  EVALUATION:  To eval    DVT  PROPHYLAXIS:  SCD    GI  PROPHYLAXIS:  Diet / Protonix    DISPOSITION:  cont Admission    Abdirashid Gloria OMS-3/4   ATTENDING  PHYSICIAN: Dr. Chuck Calvillo

## 2021-07-26 NOTE — PROGRESS NOTES
Notified [facility] internal medicine that patient had recent TAVR and has large abdominal bruising.

## 2021-07-26 NOTE — CARE COORDINATION
Care Coordination: pt to resume coumadin/loveonox and as per attending goal inr 2-2.5, will evaluate for hhc if needed at discharge. As noted from previous assessment. Lives with granddaughter in a ranch home- no hx o hhc or malachi. 5 children locally to assist as needed.  Will follow up to see if skilled nursing would be needed as bridging INR with possible lovenox    Reino Gulling

## 2021-07-26 NOTE — PROGRESS NOTES
gallop. Comments: Increased valvular heart sounds  Pulmonary:      Effort: Pulmonary effort is normal. No respiratory distress. Breath sounds: Normal breath sounds. No wheezing or rales. Abdominal:      General: Bowel sounds are normal. There is no distension. Palpations: Abdomen is soft. There is no mass. Tenderness: There is no abdominal tenderness. There is no guarding or rebound. Comments: Mass cannot be apprecated   Musculoskeletal:         General: Normal range of motion. Skin:     General: Skin is warm and dry. Capillary Refill: Capillary refill takes less than 2 seconds. Coloration: Skin is not pale. Findings: Bruising (large areas on abd. Bruising on arms b/l) present. No rash. Neurological:      Mental Status: She is alert and oriented to person, place, and time. Cranial Nerves: No cranial nerve deficit. Psychiatric:         Behavior: Behavior normal.         Thought Content: Thought content normal.         Judgment: Judgment normal.       Subject  Pertinent Labs & Imaging Studies   nuvia  CBC:   Recent Labs     07/24/21  0530 07/24/21  1153 07/25/21  0530 07/25/21  0530 07/25/21  1114 07/25/21  1829 07/26/21  0447   WBC 10.6  --  8.2  --   --   --  10.2   HGB 8.0*  7.9*   < > 8.2*   < > 7.9* 8.0* 8.4*   HCT 24.9*  24.6*   < > 26.2*   < > 24.5* 24.7* 26.7*   MCV 91.4  --  92.6  --   --   --  93.7     --  218  --   --   --  252    < > = values in this interval not displayed.        BMP:    Recent Labs     07/24/21  1845 07/25/21  0530 07/26/21  0447    137 142   K 4.6 4.3 4.4    107 104   CO2 23 21* 32*   BUN 25* 21 15   CREATININE 0.9 0.8 0.7   GLUCOSE 122* 113* 132*       LIVER PROFILE:   Recent Labs     07/24/21  0530 07/25/21  0530 07/26/21  0447   AST 66* 59* 54*   ALT 25 24 28   BILITOT 0.8 1.0 1.9*   ALKPHOS 84 79 90       PT/INR:   Recent Labs     07/24/21  0530 07/25/21  0530 07/26/21  0447   PROTIME 24.5* 20.4* 17.7*   INR 2.2 1.9 1.6       APTT:   No results for input(s): APTT in the last 72 hours. Fasting Lipid Panel:    Lab Results   Component Value Date    CHOL 129 03/08/2021    TRIG 57 03/08/2021    HDL 53 03/08/2021       Notable Cultures:      Blood cultures   Blood Culture, Routine   Date Value Ref Range Status   07/22/2021 24 Hours no growth  Preliminary     Respiratory cultures No results found for: RESPCULTURE No results found for: LABGRAM  Urine   Urine Culture, Routine   Date Value Ref Range Status   07/22/2021 Growth not present  Final     Legionella No results found for: LABLEGI  C Diff PCR No results found for: CDIFPCR  Wound culture/abscess: No results for input(s): WNDABS in the last 72 hours. Tip culture:No results for input(s): CXCATHTIP in the last 72 hours. Echo Limited    Result Date: 7/16/2021  Transthoracic Echocardiography Report (TTE)  Demographics   Patient Name      Wendy Mack Gender              Female                    L   Medical Record    [id number]       Room Number         9697  Number   Account #         [de-identified]      Procedure Date      07/15/2021   Corporate ID                     Ordering Physician  Liliana Brown MD   Accession Number  [id number]     Referring Physician   Date of Birth     1945     Sonographer         Jeffodilon Dorantes                                                       [facility]   Age               77 year(s)     Interpreting        Liliana Brown MD                                   Physician                                    Any Other  Procedure Type of Study   TTE procedure:Echo Limited Study. Procedure Date Date: 07/15/2021 Start: 09:18 AM Study Location: Portable Technical Quality: Limited visualization due to body habitus. Indications:S/P TAVR. Patient Status: Pending Discharge Height: 64 inches Weight: 240 pounds BSA: 2.11 m^2 BMI: 41.2 kg/m^2 Allergies   - Other drug:(Phenobarbital). Findings   Left Ventricle  Normal left ventricle size.   Estimated left ventricle ejection fraction 60+/-5 %. Right Ventricle  Normal right ventricular size and function. Mitral Valve  Well-seated surgical mitral bioprosthesis. No significant mitral  regurgitation. Tricuspid Valve  Moderate tricuspid regurgitation. RVSP is ~37 mmHg. Aortic Valve  There is a well-seated transcatheter valve in the aortic position. Trace  paravalvular regurgitation. MG 7, DVI 0.67. Pericardial Effusion  No evidence of pericardial effusion. Aorta  Aortic root dimension within normal limits. Miscellaneous  Normal Inferior Vena Cava diameter and respiratory variation. Conclusions   Summary  **POD1 s/p TAVR with 29-mm Evolut PRO+; h/o MVR with #25 Mosaic (2007)**  Normal left ventricle size. Estimated left ventricle ejection fraction  60+/-5 %. Normal right ventricular size and function. Well-seated surgical mitral bioprosthesis. No significant mitral  regurgitation. There is a well-seated transcatheter valve in the aortic position. Trace  paravalvular regurgitation. MG 7, DVI 0.67. No evidence of pericardial effusion.    Signature   ----------------------------------------------------------------  Electronically signed by Cecilio Sigala MD(Interpreting physician)  on 07/16/2021 12:54 PM  ----------------------------------------------------------------  M-Mode/2D Measurements & Calculations    LVOT: 2.1 cm              Ascending Aorta: 2.8 cm                              IVC Expiration: 1.7 cm  Doppler Measurements & Calculations    AV Peak Velocity: 1.77 m/s LVOT Peak Velocity: 1.21 m/s   AV Peak Gradient: 12.52    LVOT Mean Velocity: 0.76 m/s   mmHg                       LVOT Peak Gradient: 5.9 mmHgLVOT Mean   AV Mean Velocity: 1.13 m/s Gradient: 2.9 mmHg   AV Mean Gradient: 6.1 mmHg   AV VTI: 45.4 cm   AV Area (Continuity):2.33   cm^2                       TR Velocity:2.91 m/s                              TR Gradient:33.8 mmHg   LVOT VTI: 30.5 cm [email or web address] [id number]    XR CHEST (2 VW)    Result Date: 7/9/2021  EXAMINATION: TWO XRAY VIEWS OF THE CHEST 7/9/2021 1:00 pm COMPARISON: May 26, 2021 HISTORY: ORDERING SYSTEM PROVIDED HISTORY: Dyspnea, unspecified type TECHNOLOGIST PROVIDED HISTORY: What reading provider will be dictating this exam?->CRC FINDINGS: Moderate cardiomegaly. No airspace opacity or pleural effusion. Median sternotomy wires are present. The heart is normal in size. No pneumothorax. 1. Moderate cardiomegaly. 2. No airspace opacity or pleural effusion. CT ABDOMEN PELVIS W IV CONTRAST Additional Contrast? None    Result Date: 7/22/2021  EXAMINATION: CT OF THE ABDOMEN AND PELVIS WITH CONTRAST 7/22/2021 3:53 pm TECHNIQUE: CT of the abdomen and pelvis was performed with the administration of intravenous contrast. Multiplanar reformatted images are provided for review. Dose modulation, iterative reconstruction, and/or weight based adjustment of the mA/kV was utilized to reduce the radiation dose to as low as reasonably achievable. COMPARISON: None. HISTORY: ORDERING SYSTEM PROVIDED HISTORY: ab pain s/p TAVR TECHNOLOGIST PROVIDED HISTORY: Reason for exam:->ab pain s/p TAVR Additional Contrast?->None Decision Support Exception - unselect if not a suspected or confirmed emergency medical condition->Emergency Medical Condition (MA) What reading provider will be dictating this exam?->CRC FINDINGS: Lower Chest: Images through lung bases demonstrate mild cardiomegaly. Note is made of an aortic valve. The left atrium is enlarged. There is no right or left lung base infiltrate or effusion. Organs: The liver, spleen, pancreas and adrenal glands are unremarkable. The kidneys enhance symmetrically without evidence of hydronephrosis. GI/Bowel: There is a large stones seen within the gallbladder lumen measuring 3.4 x 2.4 cm.   There are no findings of acute cholecystitis. The stomach is normally distended. There is a small hiatal hernia. There is no large or small bowel obstruction. There are no findings of inflammatory bowel disease. Multiple sigmoid diverticula noted. There is no evidence of diverticulitis. Pelvis:  Bladder is unremarkable in appearance. There is a uterine fibroid measuring approximately 3.3 x 3.4 cm. Peritoneum/Retroperitoneum: There are postsurgical changes seen within the right groin from recent surgical intervention. There is a small hematoma measuring 2.5 x 2.5 cm. The abdominal aorta is normal caliber. There is no dissection of the abdominal aorta or of the iliac arteries. There is a large left lateral abdominal wall hematoma measuring 13 cm in maximum craniocaudal dimension by 12 cm in maximum AP diameter by 8 cm in maximum transverse diameter. There is a fluid fluid level seen within the left lateral abdominal wall hematoma consistent with acute on subacute hemorrhage. Along the superior aspect of the fluid fluid level there is minimal dense layering material suggestive of mild active bleeding. Bones/Soft Tissues:  Age related degenerative changes of the visualized osseous structures without focal destructive lesion. 1. Large left lateral abdominal wall hematoma measuring 13 cm x 12 cm x 8 cm. There is a fluid fluid level seen within the abdominal wall hematoma consistent with acute on subacute hemorrhage. There is minimal dense material seen layering on the fluid fluid level centrally consistent with minimal active bleeding. Surrounding intramuscular edema is noted within the oblique muscles. There is also induration within the subcutaneous soft tissues consistent with reactive changes. 2. Postsurgical changes within the right groin from recent intervention and TAVR procedure. There is a 2.5 x 2.5 cm hematoma within the right groin. 3. Cholelithiasis. There is no evidence of acute cholecystitis.  4. There is no aortic or iliac artery injury. XR CHEST PORTABLE    Result Date: 7/22/2021  EXAMINATION: ONE XRAY VIEW OF THE CHEST 7/22/2021 1:17 pm COMPARISON: March 8, May 26, July 9 HISTORY: ORDERING SYSTEM PROVIDED HISTORY: Shortness of breath TECHNOLOGIST PROVIDED HISTORY: Reason for exam:->Shortness of breath What reading provider will be dictating this exam?->CRC FINDINGS: Status post previous mid sternotomy. More recent endovascular repair for the aortic root. Heart has borderline size. The no acute infiltrates, consolidates or pleural effusions are seen. There is no perihilar vascular congestion. 1.  Status post more remote mid sternotomy. Status post recent endovascular repair for the aortic root. 2.  Borderline size heart. No acute pulmonary process. Resident's Assessment and Plan     Assessment and Plan:    Abd hematoma and groin hematoma likely 2/2 hep injections and TAVR, respectively  Ct abd showed [id number] cm abd wall hematoma consistent with subacute hemorrhage and a 2.5x2.5 cm groin hematoma -- repeat CT largely unchanged from previous studies  Hemoglobin on admission 10.8 -- this am 8.4 -- continue to trend H&H -- threshold for transfusion 7  PTT 17.7  INR 1.6  Oral analgesics for pain  GS consulted: no need for evacuation  Patient has vast abd ecchymosis -- still nontender and soft  Restart eliquis/coumadin tomorrow -- closely track H+H  ALDA 2/2 to volume depletion vs ATN  Cr 1.2 on admission -- now 0.7 -- Baseline 0.7  Urine culture still pending  Patient still has a guevara.  Voiding trial  Hx of a fib  Continue metoprolol  Hold anticoagulation due to multiple hematomas  Hx of mitral valve replacement  On anticoag at home to resume today  Hb 8.4  Hx of AS s/p TAVR  On anticoagulation at home -- to resume tomorrow  Hb 8.4  LA type A  LA 3.7  Hx of GERD  Cont protonix   Hx of DM  On LDSS 5 U last 24 hours    Hx of HFpEF  Pro  -- at baseline  CHADSVASc 6 -- 9.7% stroke risk per year  Hx of glaucoma  Cont latanaprost and bromonidine    PT/OT evaluation: to consult  DVT prophylaxis/ GI prophylaxis: protonix  Disposition: continue current care    Neville Coombs DO, PhD  PGY-1  Attending physician: Dr. Chau Fearing  Attending Physician Statement:  José Morales M.D., F.A.C.P.     I have discussed the case, including pertinent history and exam findings with the resident/NP. I have seen and examined the patient and the key elements of the encounter have been performed by me.  I agree with the resident ROS, PMHx, PSHx, meds reviewed and assessment, plan and orders as documented by the resident/NP [facility] charts reviewed, including other providers notes, relevant labs and imaging.         77 y. o. male admitted sp elective TAVR procedure bleeding complications for severe symptomatic AS.     Groin hematoma sp femoral approach TAVR   Then L abdominal hematoma more sypmtomatic one more recently bleed expanded sp lovenox shots  Anemia due to bleed  Low BP due to bleed/volume contraction-- coby out afib and AS complications (reasess valve function)  Sp Fluids reassess bP improving  Vagal?  Pain issues       gen surg  Now s/p 3 CTs  Overnight concerns expanding hematoma  Mobitiliy, PT/OT  Pain control  sp 29mm Evolut PRO+ valve on 7/14/2021 using b/l groin approach. TTE post procedure: Trace paravalvular regurgitation. MG 7, DVI 0.67--   Also afib- high chadsvas     Hematoma-s- repeat CT evolving - slight growth - less hyperacute - likely stopped now hematoma   -- spreading pain around back. Also bc immobile in bed.   sp 3rd CT stable, but looks worse possibly today  Will dfefer reversing anticaoguation though, bc clincially stable  Needs anticoagulation soon  Will continue holding coumadin - but resume very soon  inr 1.6 subtherapeutic  surgergy opinion 7/28 resume anticoagulation  ? bridge if need when INR drops- IV heparin vs off label use eliquis could days - discussed  Then back on coumadin goal INR 2-2. 5? For now- CVA risk vs bleeds     Hematomas at risk for myositis ossificans  Vs infected hematoma-- watching for these  Her case also urine outflow- urethral flow affected     Urinary retention issues, dec detrussor tone, also unwilling to move 2 to pain, unwilling to valsalva   -- urinary catheter  +- bethanacol vs flomax later if need     Tomorrow remove urinary catheter - trial check post void    sheeba improving  Mobitiliy, PT/OT  Pain control    >50% of time spent coordinating care with other providers and/or counseling patient/family  Remainder of medical problems as per resident note.

## 2021-07-26 NOTE — PROGRESS NOTES
[facility]  ATTENDING PHYSICIAN PROGRESS NOTE     I have examined the patient and  reviewed the record. I have reviewed all relevant labs and imaging data. The following summarizes my clinical findings and independent assessment. CC: Abdominal wall hematoma    S. Patient was up this morning work on physical therapy. O.  General Pleasant, no apparent distress  Awake and alert x3  Abdomen soft large left abdominal wall ecchymosis with hematoma    ASSESSMENT:  Active Problems:    Abdominal wall hematoma  Resolved Problems:    * No resolved hospital problems. *       PLAN:  Abdominal wall hematoma secondary to anticoagulation antiplatelet therapy-mainly symptomatic  Anticoagulants and antiplatelets currently on hold  Currently no evidence of skin necrosis or infection. Would not evacuate hematoma at this point  Okay to resume full anticoagulation and antiplatelets on 1/74  We will sign off  DVT Proph: LUCIO/         Zion Romero MD, FACS  7/26/2021  1:55 PM    NOTE: This report was transcribed using voice recognition software. Every effort was made to ensure accuracy; however, inadvertent computerized transcription errors may be present.

## 2021-07-27 LAB
ALBUMIN SERPL-MCNC: 2.6 G/DL (ref 3.5–5.2)
ALP BLD-CCNC: 89 U/L (ref 35–104)
ALT SERPL-CCNC: 29 U/L (ref 0–32)
ANION GAP SERPL CALCULATED.3IONS-SCNC: 12 MMOL/L (ref 7–16)
APTT: 24.4 SEC (ref 24.5–35.1)
APTT: 26.2 SEC (ref 24.5–35.1)
AST SERPL-CCNC: 45 U/L (ref 0–31)
BASOPHILS ABSOLUTE: 0.02 E9/L (ref 0–0.2)
BASOPHILS RELATIVE PERCENT: 0.2 % (ref 0–2)
BILIRUB SERPL-MCNC: 2.2 MG/DL (ref 0–1.2)
BLOOD CULTURE, ROUTINE: NORMAL
BUN BLDV-MCNC: 12 MG/DL (ref 6–23)
CALCIUM SERPL-MCNC: 8 MG/DL (ref 8.6–10.2)
CHLORIDE BLD-SCNC: 102 MMOL/L (ref 98–107)
CO2: 21 MMOL/L (ref 22–29)
CREAT SERPL-MCNC: 0.6 MG/DL (ref 0.5–1)
CULTURE, BLOOD 2: NORMAL
EOSINOPHILS ABSOLUTE: 0.14 E9/L (ref 0.05–0.5)
EOSINOPHILS RELATIVE PERCENT: 1.5 % (ref 0–6)
GFR AFRICAN AMERICAN: >60
GFR NON-AFRICAN AMERICAN: >60 ML/MIN/1.73
GLUCOSE BLD-MCNC: 111 MG/DL (ref 74–99)
HCT VFR BLD CALC: 27.3 % (ref 34–48)
HEMOGLOBIN: 8.7 G/DL (ref 11.5–15.5)
IMMATURE GRANULOCYTES #: 0.2 E9/L
IMMATURE GRANULOCYTES %: 2.1 % (ref 0–5)
INR BLD: 1.4
LYMPHOCYTES ABSOLUTE: 1.48 E9/L (ref 1.5–4)
LYMPHOCYTES RELATIVE PERCENT: 15.7 % (ref 20–42)
MCH RBC QN AUTO: 29.1 PG (ref 26–35)
MCHC RBC AUTO-ENTMCNC: 31.9 % (ref 32–34.5)
MCV RBC AUTO: 91.3 FL (ref 80–99.9)
METER GLUCOSE: 115 MG/DL (ref 74–99)
METER GLUCOSE: 120 MG/DL (ref 74–99)
METER GLUCOSE: 142 MG/DL (ref 74–99)
METER GLUCOSE: 211 MG/DL (ref 74–99)
MONOCYTES ABSOLUTE: 0.57 E9/L (ref 0.1–0.95)
MONOCYTES RELATIVE PERCENT: 6 % (ref 2–12)
NEUTROPHILS ABSOLUTE: 7.04 E9/L (ref 1.8–7.3)
NEUTROPHILS RELATIVE PERCENT: 74.5 % (ref 43–80)
PDW BLD-RTO: 14.8 FL (ref 11.5–15)
PLATELET # BLD: 264 E9/L (ref 130–450)
PMV BLD AUTO: 10.2 FL (ref 7–12)
POTASSIUM SERPL-SCNC: 4.3 MMOL/L (ref 3.5–5)
PROTHROMBIN TIME: 15.4 SEC (ref 9.3–12.4)
RBC # BLD: 2.99 E12/L (ref 3.5–5.5)
SODIUM BLD-SCNC: 135 MMOL/L (ref 132–146)
TOTAL PROTEIN: 5.4 G/DL (ref 6.4–8.3)
WBC # BLD: 9.5 E9/L (ref 4.5–11.5)

## 2021-07-27 PROCEDURE — 80053 COMPREHEN METABOLIC PANEL: CPT

## 2021-07-27 PROCEDURE — 6370000000 HC RX 637 (ALT 250 FOR IP): Performed by: SURGERY

## 2021-07-27 PROCEDURE — 2580000003 HC RX 258: Performed by: INTERNAL MEDICINE

## 2021-07-27 PROCEDURE — 6370000000 HC RX 637 (ALT 250 FOR IP): Performed by: INTERNAL MEDICINE

## 2021-07-27 PROCEDURE — 85610 PROTHROMBIN TIME: CPT

## 2021-07-27 PROCEDURE — 85730 THROMBOPLASTIN TIME PARTIAL: CPT

## 2021-07-27 PROCEDURE — 82962 GLUCOSE BLOOD TEST: CPT

## 2021-07-27 PROCEDURE — 36415 COLL VENOUS BLD VENIPUNCTURE: CPT

## 2021-07-27 PROCEDURE — 2140000000 HC CCU INTERMEDIATE R&B

## 2021-07-27 PROCEDURE — 99233 SBSQ HOSP IP/OBS HIGH 50: CPT | Performed by: INTERNAL MEDICINE

## 2021-07-27 PROCEDURE — 85025 COMPLETE CBC W/AUTO DIFF WBC: CPT

## 2021-07-27 PROCEDURE — 97165 OT EVAL LOW COMPLEX 30 MIN: CPT

## 2021-07-27 PROCEDURE — 97535 SELF CARE MNGMENT TRAINING: CPT

## 2021-07-27 RX ORDER — HEPARIN SODIUM 1000 [USP'U]/ML
4000 INJECTION, SOLUTION INTRAVENOUS; SUBCUTANEOUS PRN
Status: DISCONTINUED | OUTPATIENT
Start: 2021-07-28 | End: 2021-08-02

## 2021-07-27 RX ORDER — HEPARIN SODIUM 1000 [USP'U]/ML
2000 INJECTION, SOLUTION INTRAVENOUS; SUBCUTANEOUS PRN
Status: DISCONTINUED | OUTPATIENT
Start: 2021-07-28 | End: 2021-08-02

## 2021-07-27 RX ORDER — HEPARIN SODIUM 1000 [USP'U]/ML
4000 INJECTION, SOLUTION INTRAVENOUS; SUBCUTANEOUS ONCE
Status: COMPLETED | OUTPATIENT
Start: 2021-07-28 | End: 2021-07-28

## 2021-07-27 RX ORDER — HEPARIN SODIUM 10000 [USP'U]/100ML
5-30 INJECTION, SOLUTION INTRAVENOUS CONTINUOUS
Status: DISCONTINUED | OUTPATIENT
Start: 2021-07-28 | End: 2021-08-02

## 2021-07-27 RX ORDER — WARFARIN SODIUM 3 MG/1
3 TABLET ORAL DAILY
Status: DISCONTINUED | OUTPATIENT
Start: 2021-07-28 | End: 2021-07-30

## 2021-07-27 RX ADMIN — BRIMONIDINE TARTRATE 1 DROP: 2 SOLUTION OPHTHALMIC at 09:58

## 2021-07-27 RX ADMIN — DOCUSATE SODIUM 100 MG: 100 CAPSULE ORAL at 20:07

## 2021-07-27 RX ADMIN — INSULIN LISPRO 4 UNITS: 100 INJECTION, SOLUTION INTRAVENOUS; SUBCUTANEOUS at 11:46

## 2021-07-27 RX ADMIN — Medication 10 ML: at 09:56

## 2021-07-27 RX ADMIN — PANTOPRAZOLE SODIUM 40 MG: 40 TABLET, DELAYED RELEASE ORAL at 09:55

## 2021-07-27 RX ADMIN — INSULIN LISPRO 1 UNITS: 100 INJECTION, SOLUTION INTRAVENOUS; SUBCUTANEOUS at 20:31

## 2021-07-27 RX ADMIN — Medication 10 ML: at 20:07

## 2021-07-27 RX ADMIN — METOPROLOL TARTRATE 50 MG: 50 TABLET, FILM COATED ORAL at 09:55

## 2021-07-27 RX ADMIN — SENNOSIDES 17.2 MG: 8.6 TABLET, FILM COATED ORAL at 20:06

## 2021-07-27 RX ADMIN — OXYCODONE HYDROCHLORIDE 10 MG: 10 TABLET ORAL at 15:47

## 2021-07-27 RX ADMIN — METOPROLOL TARTRATE 50 MG: 50 TABLET, FILM COATED ORAL at 20:06

## 2021-07-27 RX ADMIN — LATANOPROST 1 DROP: 50 SOLUTION OPHTHALMIC at 09:59

## 2021-07-27 RX ADMIN — OXYCODONE HYDROCHLORIDE 10 MG: 10 TABLET ORAL at 09:55

## 2021-07-27 RX ADMIN — Medication 10 ML: at 00:03

## 2021-07-27 RX ADMIN — ATORVASTATIN CALCIUM 20 MG: 20 TABLET, FILM COATED ORAL at 09:55

## 2021-07-27 RX ADMIN — DOCUSATE SODIUM 100 MG: 100 CAPSULE ORAL at 09:55

## 2021-07-27 ASSESSMENT — PAIN SCALES - GENERAL
PAINLEVEL_OUTOF10: 7
PAINLEVEL_OUTOF10: 10
PAINLEVEL_OUTOF10: 10
PAINLEVEL_OUTOF10: 0
PAINLEVEL_OUTOF10: 0
PAINLEVEL_OUTOF10: 5
PAINLEVEL_OUTOF10: 0

## 2021-07-27 NOTE — PROGRESS NOTES
[facility]  Internal Medicine Residency Program  Progress Note - House Team     Patient:  Cristian Juares 77 y.o. female MRN: [id number]     Date of Service: 7/27/2021     CC: Abdominal pain, N/V   Overnight events: Vital signs stable      Subjective     Patient was seen and examined this morning at bedside in no acute distress. Patient states that her bruising and pain over her abdomen are continuing to improve. She reiterated today that she does not want injectable anticoagulation. She still currently has a guevara this morning. She has been trying to go longer without her oral pain medication. Overnight-Vital signs stable     Objective     Physical Exam:  Vitals: /74   Pulse 114   Temp 98 °F (36.7 °C) (Oral)   Resp 18   Ht 5' 4\" (1.626 m)   Wt 259 lb (117.5 kg)   SpO2 97%   BMI 44.46 kg/m²     I & O - 24hr: No intake/output data recorded. General Appearance: alert, appears stated age, cooperative and morbidly obese  HEENT:  Head: Normocephalic, no lesions, without obvious abnormality. Eye: Normal external eye, conjunctiva, lids cornea, AMY. Neck / Thyroid: Supple, no masses, nodes, nodules or enlargement. Neck: no adenopathy, no carotid bruit, no JVD, supple, symmetrical, trachea midline and thyroid not enlarged, symmetric, no tenderness/mass/nodules  Lung: clear to auscultation bilaterally  Heart: Regular rate. Normal S 1 and S2 with no murmur rub or gallop. Rhythm irregular. Abdomen: soft, non-tender; bowel sounds normal; no masses,  no organomegaly and Brusing noted over primarily the entire left side of the abdomen as well as on her arms bilaterally  Extremities:  extremities normal, atraumatic, no cyanosis or edema  Musculokeletal: No joint swelling, no muscle tenderness. ROM normal in all joints of extremities.    Neurologic: Mental status: Alert, oriented, thought content appropriate  Subject  Pertinent Labs & Imaging Studies   nuvia  CBC with Differential:    Lab Results   Component Value Date    WBC 9.5 07/27/2021    RBC 2.99 07/27/2021    HGB 8.7 07/27/2021    HCT 27.3 07/27/2021     07/27/2021    MCV 91.3 07/27/2021    MCH 29.1 07/27/2021    MCHC 31.9 07/27/2021    RDW 14.8 07/27/2021    NRBC 0.9 03/08/2021    SEGSPCT 62 02/22/2012    LYMPHOPCT 15.7 07/27/2021    MONOPCT 6.0 07/27/2021    BASOPCT 0.2 07/27/2021    MONOSABS 0.57 07/27/2021    LYMPHSABS 1.48 07/27/2021    EOSABS 0.14 07/27/2021    BASOSABS 0.02 07/27/2021     CMP:    Lab Results   Component Value Date     07/27/2021    K 4.3 07/27/2021    K 4.6 07/24/2021     07/27/2021    CO2 21 07/27/2021    BUN 12 07/27/2021    CREATININE 0.6 07/27/2021    GFRAA >60 07/27/2021    LABGLOM >60 07/27/2021    GLUCOSE 111 07/27/2021    GLUCOSE 90 06/06/2012    PROT 5.4 07/27/2021    LABALBU 2.6 07/27/2021    LABALBU 4.2 06/06/2012    CALCIUM 8.0 07/27/2021    BILITOT 2.2 07/27/2021    ALKPHOS 89 07/27/2021    AST 45 07/27/2021    ALT 29 07/27/2021       CT ABDOMEN PELVIS WO CONTRAST Additional Contrast? None   Final Result   Slight interval decrease in the size of the left oblique abdominal wall   intramuscular hematoma with a stable right inguinal hematoma and suspected   small right rectus sheath hematoma. Multiple indeterminate bilateral renal lesions for which correlation with   renal ultrasound is recommended. Additional, incidental findings as above. XR CHEST PORTABLE   Final Result   Prominence of pulmonary vasculature raising the possibility of mild pulmonary   edema. CT ABDOMEN PELVIS WO CONTRAST Additional Contrast? None   Final Result   1. Overall stable large hematoma of the left lateral abdominal wall with   surrounding hemorrhage into adjacent muscle planes and deep subcutaneous soft   tissues by oozing. The large dominant hematoma now has a uniform   hyperdensity indicating acute phase.   No longer seen blood/blood level which   indicates a hyperacute active bleeding hematoma as demonstrate on the study   of July 22nd. 2.  Hematoma of a small size superficial to the right common femoral artery. Cannot exclude a pseudoaneurysm formation. Can correlate with the ultrasound   of the right groin. CT ABDOMEN PELVIS W IV CONTRAST Additional Contrast? None   Final Result   1. Large left lateral abdominal wall hematoma measuring 13 cm x 12 cm x 8 cm. There is a fluid fluid level seen within the abdominal wall hematoma   consistent with acute on subacute hemorrhage. There is minimal dense   material seen layering on the fluid fluid level centrally consistent with   minimal active bleeding. Surrounding intramuscular edema is noted within the   oblique muscles. There is also induration within the subcutaneous soft   tissues consistent with reactive changes. 2. Postsurgical changes within the right groin from recent intervention and   TAVR procedure. There is a 2.5 x 2.5 cm hematoma within the right groin. 3. Cholelithiasis. There is no evidence of acute cholecystitis. 4. There is no aortic or iliac artery injury. XR CHEST PORTABLE   Final Result   1. Status post more remote mid sternotomy. Status post recent endovascular   repair for the aortic root. 2.  Borderline size heart. No acute pulmonary process.               Resident's Assessment and Plan     Assessment and Plan:    Abdominal wall hematoma likely 2/2 heparin injections and TAVR  Repeat CT showed slight decrease in left oblique hematoma and stable right inguinal hematoma   Hemoglobin 8.7 today (stable with 8.4 yesterday)   Follow daily CBCs   INR 1.4 and PTT 15.4 today  [facility] on 07/28   To discuss with CT surgery, bridging to coumadin with eliquis vs IV Heparin bridge to coumadin   Resolved ALDA 2/2 volume depletion   Cr down to .6 today consistent with baseline of  .7   To Discontinue guevara today and monitor for a PVR and urine output

## 2021-07-27 NOTE — PLAN OF CARE
Problem: Falls - Risk of:  Goal: Will remain free from falls  Description: Will remain free from falls  7/27/2021 1057 by Horace Abraham RN  Outcome: Met This Shift  7/26/2021 2229 by Roslyn Cat RN  Outcome: Not Met This Shift  Goal: Absence of physical injury  Description: Absence of physical injury  7/27/2021 1057 by Horace Abraham RN  Outcome: Met This Shift  7/26/2021 2229 by Roslyn Cat RN  Outcome: Not Met This Shift     Problem: Pain:  Goal: Pain level will decrease  Description: Pain level will decrease  7/27/2021 1057 by Horace Abraham RN  Outcome: Met This Shift  7/26/2021 2229 by Roslyn Cat RN  Outcome: Not Met This Shift  Goal: Control of acute pain  Description: Control of acute pain  7/27/2021 1057 by Horace Abraham RN  Outcome: Met This Shift  7/26/2021 2229 by Roslyn Cat RN  Outcome: Not Met This Shift  Goal: Control of chronic pain  Description: Control of chronic pain  7/27/2021 1057 by Horace Abraham RN  Outcome: Met This Shift  7/26/2021 2229 by Roslyn Cat RN  Outcome: Not Met This Shift     Problem: Skin Integrity:  Goal: Will show no infection signs and symptoms  Description: Will show no infection signs and symptoms  7/27/2021 1057 by Horace Abraham RN  Outcome: Met This Shift  7/26/2021 2229 by Roslyn Cat RN  Outcome: Not Met This Shift  Goal: Absence of new skin breakdown  Description: Absence of new skin breakdown  7/27/2021 1057 by Horace Abraham RN  Outcome: Met This Shift  7/26/2021 2229 by Roslyn Cat RN  Outcome: Not Met This Shift     Problem: Musculor/Skeletal Functional Status  Goal: Highest potential functional level  7/27/2021 1057 by Horace Abraham RN  Outcome: Met This Shift  7/26/2021 2229 by Roslyn Cat RN  Outcome: Not Met This Shift  Goal: Absence of falls  7/27/2021 1057 by Horace Abraham RN  Outcome: Met This Shift  7/26/2021 2229 by Roslyn Cat RN  Outcome: Not Met This Shift

## 2021-07-27 NOTE — PROGRESS NOTES
[facility]  Internal [facility] Residency Program  Progress Note - House Team 1    Patient:  Swati Hernandez 77 y.o. female MRN: [id number]     Date of Service: 7/27/2021   Hospital Day: 6      CHIEF  COMPLAINT:     -had concerns including Abdominal Pain (states it started a few days ago but recently started vomiting). OVERNIGHT  EVENTS:     -none    SUBJECTIVE:     -had 3 BM, Today (7/27/21)  -feeling better  -agreeable to staying for IV heparin bridge  -agreeable for PT/OT, wants d/c to home if possible  -agreeable for guevara removal    OBJECTIVE:     PHYSICAL  EXAM:  · Vitals: /74   Pulse 74   Temp 98.4 °F (36.9 °C) (Temporal)   Resp 18   Ht 5' 4\" (1.626 m)   Wt 259 lb (117.5 kg)   SpO2 97%   BMI 44.46 kg/m²     · I & O - 24hr: No intake/output data recorded. · General Appearance: alert, appears stated age and cooperative  · HEENT:  Head: Normal, normocephalic, atraumatic. · Neck: no JVD and supple, symmetrical, trachea midline  · Lung: clear to auscultation bilaterally  · Heart: regular rate and rhythm, S1, S2 normal, no murmur, click, rub or gallop  · Abdomen: bowel sounds throughout. soft w/o rebound or gaurding. Mild to moderate pain on palpation of L abd in region of hematoma on CT scan. Extensive purple and slightly green ecchymosis throughout upper abd and Left lower abd w/ extension into Left flank and paraspinal region b/l slightly worse today. · Extremities:  extremities normal, atraumatic, no cyanosis or edema  · Musculokeletal: No joint swelling, no muscle tenderness. ROM normal in all joints of extremities.    · Neurologic: Mental status: Alert, oriented, thought content appropriate    LABS  -&-  IMAGING  STUDIES:     LABORATORY  WORK:    Recent Labs     07/25/21  0530 07/26/21  0447 07/27/21  0458    142 135   K 4.3 4.4 4.3    104 102   CO2 21* 32* 21*   BUN 21 15 12   CREATININE 0.8 0.7 0.6   GLUCOSE 113* 132* 111*   CALCIUM 7.9* 8.2* 8.0* Recent Labs     07/25/21  0530 07/25/21  1114 07/25/21  1829 07/26/21  0447 07/27/21  0458   WBC 8.2  --   --  10.2 9.5   RBC 2.83*  --   --  2.85* 2.99*   HGB 8.2*   < > 8.0* 8.4* 8.7*   HCT 26.2*   < > 24.7* 26.7* 27.3*   MCV 92.6  --   --  93.7 91.3   MCH 29.0  --   --  29.5 29.1   MCHC 31.3*  --   --  31.5* 31.9*   RDW 14.7  --   --  14.6 14.8     --   --  252 264   MPV 10.4  --   --  10.1 10.2    < > = values in this interval not displayed. PT/INR:    Lab Results   Component Value Date    PROTIME 15.4 07/27/2021    PROTIME 21.2 07/21/2021    INR 1.4 07/27/2021         IMAGING  STUDIES:    Echo Limited    Result Date: 7/16/2021  Transthoracic Echocardiography Report (TTE)  Demographics   Patient Name      Kurtis Waller Gender              Female                    L   Medical Record    [id number]       Room Number         2294  Number   Account #         [de-identified]      Procedure Date      07/15/2021   Corporate ID                     Ordering Physician  Miguel Keller MD   Accession Number  [id number]     Referring Physician   Date of Birth     1945     Sonographer         Saskiamadai Taylor                                                       [facility]   Age               77 year(s)     Interpreting        Miguel Keller MD                                   Physician                                    Any Other  Procedure Type of Study   TTE procedure:Echo Limited Study. Procedure Date Date: 07/15/2021 Start: 09:18 AM Study Location: Portable Technical Quality: Limited visualization due to body habitus. Indications:S/P TAVR. Patient Status: Pending Discharge Height: 64 inches Weight: 240 pounds BSA: 2.11 m^2 BMI: 41.2 kg/m^2 Allergies   - Other drug:(Phenobarbital). Findings   Left Ventricle  Normal left ventricle size. Estimated left ventricle ejection fraction 60+/-5 %. Right Ventricle  Normal right ventricular size and function. Mitral Valve  Well-seated surgical mitral bioprosthesis.  No significant mitral  regurgitation. Tricuspid Valve  Moderate tricuspid regurgitation. RVSP is ~37 mmHg. Aortic Valve  There is a well-seated transcatheter valve in the aortic position. Trace  paravalvular regurgitation. MG 7, DVI 0.67. Pericardial Effusion  No evidence of pericardial effusion. Aorta  Aortic root dimension within normal limits. Miscellaneous  Normal Inferior Vena Cava diameter and respiratory variation. Conclusions   Summary  **POD1 s/p TAVR with 29-mm Evolut PRO+; h/o MVR with #25 Mosaic (2007)**  Normal left ventricle size. Estimated left ventricle ejection fraction  60+/-5 %. Normal right ventricular size and function. Well-seated surgical mitral bioprosthesis. No significant mitral  regurgitation. There is a well-seated transcatheter valve in the aortic position. Trace  paravalvular regurgitation. MG 7, DVI 0.67. No evidence of pericardial effusion. Signature   ----------------------------------------------------------------  Electronically signed by Tristen Garcia MD(Interpreting physician)  on 07/16/2021 12:54 PM  ----------------------------------------------------------------  M-Mode/2D Measurements & Calculations    LVOT: 2.1 cm              Ascending Aorta: 2.8 cm                              IVC Expiration: 1.7 cm  Doppler Measurements & Calculations    AV Peak Velocity: 1.77 m/s LVOT Peak Velocity: 1.21 m/s   AV Peak Gradient: 12.52    LVOT Mean Velocity: 0.76 m/s   mmHg                       LVOT Peak Gradient: 5.9 mmHgLVOT Mean   AV Mean Velocity: 1.13 m/s Gradient: 2.9 mmHg   AV Mean Gradient: 6.1 mmHg   AV VTI: 45.4 cm   AV Area (Continuity):2.33   cm^2                       TR Velocity:2.91 m/s                              TR Gradient:33.8 mmHg   LVOT VTI: 30.5 cm  [email or web address] [id number]    CT ABDOMEN PELVIS WO CONTRAST Additional Contrast? None    Result Date: 7/25/2021  EXAMINATION: CT OF THE ABDOMEN AND PELVIS WITHOUT CONTRAST 7/25/2021 10:27 am TECHNIQUE: CT of the abdomen and pelvis was performed without the administration of intravenous contrast. Multiplanar reformatted images are provided for review. Dose modulation, iterative reconstruction, and/or weight based adjustment of the mA/kV was utilized to reduce the radiation dose to as low as reasonably achievable. COMPARISON: July 23, 2021 HISTORY: ORDERING SYSTEM PROVIDED HISTORY: assess change of abdominal wall hematoma and groin hematoma TECHNOLOGIST PROVIDED HISTORY: Reason for exam:->assess change of abdominal wall hematoma and groin hematoma Additional Contrast?->None What reading provider will be dictating this exam?->CRC FINDINGS: Lower Chest: Heart size is top-normal to mildly enlarged. Postoperative changes related to transfemoral aortic valve replacement are noted. Mild dependent atelectasis is present at both lung bases. There is a small hiatal hernia. Organs: The gallbladder is distended without obvious wall thickening. There is a large peripherally calcified gallstone near the neck of the gallbladder. The unenhanced liver, spleen and adrenal glands are normal in appearance. The pancreas demonstrates diffuse fatty atrophy. The kidneys are without hydronephrosis or radiopaque calculus. There are bilateral indeterminate/intermediate density renal lesions. GI/Bowel: No evidence of a bowel obstruction, free air or pneumatosis. Portions the colon are decompressed, limiting assessment for mucosal based abnormalities. There is diverticulosis without evidence of diverticulitis. The appendix is not confidently visualized. No obvious pericecal inflammatory changes. The stomach and duodenal sweep are unremarkable aside from the hiatal hernia. Pelvis: The urinary bladder is decompressed around a Menendez catheter.   No obvious uterine or ovarian abnormality aside from a presumed fibroid at the fundus of the uterus. Small volume of free fluid is present in the pelvis. No pelvic lymphadenopathy. Peritoneum/Retroperitoneum: The abdominal aorta is normal in caliber. No retroperitoneal lymphadenopathy or mass. Bones/Soft Tissues: Again noted is a large hematoma along the left oblique abdominal musculature, slightly decreased in size on today's examination, measuring 12.8 x 9.2 cm (previously 13.2 x 10.7 cm). The 3.7 x 2.2 cm hematoma superficial to the right common femoral artery is stable in size. Suspect there is also a small right-sided rectus sheath hematoma on axial image 93, stable to slightly decreased in size. No acute osseous abnormality or evidence of aggressive osseous lesion. Slight interval decrease in the size of the left oblique abdominal wall intramuscular hematoma with a stable right inguinal hematoma and suspected small right rectus sheath hematoma. Multiple indeterminate bilateral renal lesions for which correlation with renal ultrasound is recommended. Additional, incidental findings as above. CT ABDOMEN PELVIS WO CONTRAST Additional Contrast? None    Result Date: 7/23/2021  EXAMINATION: CT OF THE ABDOMEN AND PELVIS WITHOUT CONTRAST 7/23/2021 3:22 pm TECHNIQUE: CT of the abdomen and pelvis was performed without the administration of intravenous contrast. Multiplanar reformatted images are provided for review. Dose modulation, iterative reconstruction, and/or weight based adjustment of the mA/kV was utilized to reduce the radiation dose to as low as reasonably achievable. COMPARISON: None.  HISTORY: ORDERING SYSTEM PROVIDED HISTORY: assess change of abdominal wall hematoma and groin hematoma TECHNOLOGIST PROVIDED HISTORY: Reason for exam:->assess change of abdominal wall hematoma and groin hematoma Additional Contrast?->None What reading provider will be dictating this exam?->CRC FINDINGS: In the left lateral abdominal wall at the level of the oblique/transverse muscles there is a large acute hematoma measuring 11 by 11.6 by 11 cm. On the previous study of a July 22nd it measured 12.5 by 8.5 x 11.8 cm. The overall size of the hematoma is stable account some differences in measurement, presently there is more uniform moderated hyperdense appearance of the hematoma indicating an acute phase. No longer seen blood/blood level which indicates a hyperacute phase or an active bleeding hematoma. There is significant edema and increased density of the adjacent muscular planes of the left lateral chest wall extending anteriorly, posteriorly, superior inferiorly, and also into the deep subcutaneous soft tissues of the left lateral abdominal wall indicating oozing of a hemorrhage, forming a ill-defined surrounding interstitial hematoma. There is a localized small hematoma in the right inguinal region which is related with the right common femoral artery. Cannot exclude the pseudoaneurysm formation. Correlation with ultrasound of the right inguinal areas seen. The there is a small pocket of air associated. The patient had aortic valve replacement procedure in July 14, 2021. There is no extension of hematoma into the pelvic sidewall. There is a Menendez catheter in the bladder. The bladder is not distended. Uterus and ovaries have unremarkable appearance. There is no free intraperitoneal air. There is no indication for acute intraperitoneal process in the abdomen or in the pelvis accounting the left lateral chest wall hematoma which spreads into adjacent soft tissues including the left pelvic sidewall. Uncomplicated diverticulosis seen in the sigmoid colon. There is no indication for bowel obstruction. Kidneys are preserved size and cortical thickness, parenchymal enhancement and excretion of the contrast.  Presence of a cyst-like structures in both kidneys but they have density higher than simple cyst, they cannot be characterize presently.   When patient condition permits consider initial correlation with ultrasound. They could represent hemorrhagic cysts or cyst with high proteinaceous content, but other possibilities are not excluded. There are normal size and the density for the liver and spleen. Pancreas demonstrates some atrophy. Gallbladder is well distended with 3 cm gallstone. There is no dilatation of the biliary tree pancreatic ductal system. There is a mild to moderate size hiatal hernia. Adrenals not enlarged. Calcified atheromatous changes are seen in the abdominal aorta there is no aneurysm formation. There is no midline retro peritoneal adenopathy. Heart has normal size. There is endovascular repair for the aortic valve and there is also prosthesis for the mitral valve. The heart is not fully covered in this study. There are areas of a confluent loss of volume indicate a component of atelectasis in the medial aspect of the right lower lobe and towards the base in the left lower lobe. These appear to be more chronic findings but increase since May 7, 2021.     1.  Overall stable large hematoma of the left lateral abdominal wall with surrounding hemorrhage into adjacent muscle planes and deep subcutaneous soft tissues by oozing. The large dominant hematoma now has a uniform hyperdensity indicating acute phase. No longer seen blood/blood level which indicates a hyperacute active bleeding hematoma as demonstrate on the study of July 22nd. 2.  Hematoma of a small size superficial to the right common femoral artery. Cannot exclude a pseudoaneurysm formation. Can correlate with the ultrasound of the right groin. XR CHEST (2 VW)    Result Date: 7/9/2021  EXAMINATION: TWO XRAY VIEWS OF THE CHEST 7/9/2021 1:00 pm COMPARISON: May 26, 2021 HISTORY: ORDERING SYSTEM PROVIDED HISTORY: Dyspnea, unspecified type TECHNOLOGIST PROVIDED HISTORY: What reading provider will be dictating this exam?->CRC FINDINGS: Moderate cardiomegaly. No airspace opacity or pleural effusion.   Median sternotomy wires are present. The heart is normal in size. No pneumothorax. 1. Moderate cardiomegaly. 2. No airspace opacity or pleural effusion. CT ABDOMEN PELVIS W IV CONTRAST Additional Contrast? None    Result Date: 7/22/2021  EXAMINATION: CT OF THE ABDOMEN AND PELVIS WITH CONTRAST 7/22/2021 3:53 pm TECHNIQUE: CT of the abdomen and pelvis was performed with the administration of intravenous contrast. Multiplanar reformatted images are provided for review. Dose modulation, iterative reconstruction, and/or weight based adjustment of the mA/kV was utilized to reduce the radiation dose to as low as reasonably achievable. COMPARISON: None. HISTORY: ORDERING SYSTEM PROVIDED HISTORY: ab pain s/p TAVR TECHNOLOGIST PROVIDED HISTORY: Reason for exam:->ab pain s/p TAVR Additional Contrast?->None Decision Support Exception - unselect if not a suspected or confirmed emergency medical condition->Emergency Medical Condition (MA) What reading provider will be dictating this exam?->CRC FINDINGS: Lower Chest: Images through lung bases demonstrate mild cardiomegaly. Note is made of an aortic valve. The left atrium is enlarged. There is no right or left lung base infiltrate or effusion. Organs: The liver, spleen, pancreas and adrenal glands are unremarkable. The kidneys enhance symmetrically without evidence of hydronephrosis. GI/Bowel: There is a large stones seen within the gallbladder lumen measuring 3.4 x 2.4 cm. There are no findings of acute cholecystitis. The stomach is normally distended. There is a small hiatal hernia. There is no large or small bowel obstruction. There are no findings of inflammatory bowel disease. Multiple sigmoid diverticula noted. There is no evidence of diverticulitis. Pelvis:  Bladder is unremarkable in appearance. There is a uterine fibroid measuring approximately 3.3 x 3.4 cm. Peritoneum/Retroperitoneum: There are postsurgical changes seen within the right groin from recent surgical intervention. There is a small hematoma measuring 2.5 x 2.5 cm. The abdominal aorta is normal caliber. There is no dissection of the abdominal aorta or of the iliac arteries. There is a large left lateral abdominal wall hematoma measuring 13 cm in maximum craniocaudal dimension by 12 cm in maximum AP diameter by 8 cm in maximum transverse diameter. There is a fluid fluid level seen within the left lateral abdominal wall hematoma consistent with acute on subacute hemorrhage. Along the superior aspect of the fluid fluid level there is minimal dense layering material suggestive of mild active bleeding. Bones/Soft Tissues:  Age related degenerative changes of the visualized osseous structures without focal destructive lesion. 1. Large left lateral abdominal wall hematoma measuring 13 cm x 12 cm x 8 cm. There is a fluid fluid level seen within the abdominal wall hematoma consistent with acute on subacute hemorrhage. There is minimal dense material seen layering on the fluid fluid level centrally consistent with minimal active bleeding. Surrounding intramuscular edema is noted within the oblique muscles. There is also induration within the subcutaneous soft tissues consistent with reactive changes. 2. Postsurgical changes within the right groin from recent intervention and TAVR procedure. There is a 2.5 x 2.5 cm hematoma within the right groin. 3. Cholelithiasis. There is no evidence of acute cholecystitis. 4. There is no aortic or iliac artery injury. XR CHEST PORTABLE    Result Date: 7/25/2021  EXAMINATION: ONE XRAY VIEW OF THE CHEST 7/25/2021 9:09 am COMPARISON: July 22, 2021 HISTORY: ORDERING SYSTEM PROVIDED HISTORY: Wheezing TECHNOLOGIST PROVIDED HISTORY: Reason for exam:->Wheezing What reading provider will be dictating this exam?->CRC FINDINGS: Heart size is unable to be accurately assessed on this single portable view of the chest, but appears to be stable. Sternotomy wires are in place.  There are postprocedural changes related to transfemoral aortic valve replacement. Prominence of the pulmonary vasculature raises possibility of mild pulmonary edema. No evidence of a sizable pleural effusion or pneumothorax. No acute osseous abnormality. Prominence of pulmonary vasculature raising the possibility of mild pulmonary edema. XR CHEST PORTABLE    Result Date: 7/22/2021  EXAMINATION: ONE XRAY VIEW OF THE CHEST 7/22/2021 1:17 pm COMPARISON: March 8, May 26, July 9 HISTORY: ORDERING SYSTEM PROVIDED HISTORY: Shortness of breath TECHNOLOGIST PROVIDED HISTORY: Reason for exam:->Shortness of breath What reading provider will be dictating this exam?->CRC FINDINGS: Status post previous mid sternotomy. More recent endovascular repair for the aortic root. Heart has borderline size. The no acute infiltrates, consolidates or pleural effusions are seen. There is no perihilar vascular congestion. 1.  Status post more remote mid sternotomy. Status post recent endovascular repair for the aortic root. 2.  Borderline size heart. No acute pulmonary process. STUDENT  ASSESSMENT  -&-  PLAN:     Ingrid Car is a 77 y.o. female    1. Abd Hematoma 2/2 lovenox use              -Hold low dose heparin drip bridge to Coumadin until 7/28/21 per General surgery service              -willrestart coumadin 7/28/21              -trend labs              -cont pain meds w/preferrence for PO meds  2. A-fib, rate controlled              -anticoags as above              -cardiothoracic recommends IV heparin bridge to coumadin  3.urinary retention              -d/c guevara & pvr today today (07/27/21)              -voiding trial today (07/27/21)              -may added Bethanecol per voiding trial  4.DM, HgbA1c on 7/9/21 = 6.6              -cont LDSS  5. Increase resp Mucus production              -encourage pulmonary toilet   -nebs prn               -monitor at this time, CXR if pt develops pain, change in sputum color, fevers, leukocytosis, or dypnea  6. H/O HFpEF              -not clinically overloaded currently              -cont to monitor  7. H/O TAVR, mitral valve, and CABG              -anticoag as above  8. H/O CAD              -anticoag as above  9. H/O GERD              -cont protonix  10. H/O gluacoma              -cont Latanaprost & bromonidine     PT / OT  EVALUATION:  evaluating     DVT  PROPHYLAXIS:  SCD     GI  PROPHYLAXIS:  Diet / Protonix     DISPOSITION:  cont Admission     Leonard Rivera OMS-3/4   ATTENDING  PHYSICIAN: Dr. Reshma Peace

## 2021-07-27 NOTE — PROGRESS NOTES
Discussed with resident for attending team about anticoagulation order clarification. Per surgery, recommended to start anticoagulation tomorrow 7/28. Heparin gtt ordered today 7/27. Advised by resident that everything is to start tomorrow 7/28. Heparin gtt to coumadin bridging to begin 7/28 0600. Pharmacy notified.

## 2021-07-27 NOTE — PROGRESS NOTES
Occupational Therapy  OCCUPATIONAL THERAPY INITIAL EVALUATION    [facility] [address] [place] [address]      Date:2021                                                Patient Name: Jaswant Oh  MRN: [id number]  : 1945  Room: [address] [place] [address]    Evaluating OT: Nils Flores OTR/L #[id number]     Referring Provider:   Specific Provider Orders/Date: OT eval and treat 21    Diagnosis: Abdominal hematoma [S30.1XXA]   Pt admitted to hospital with abdominal pain    Pertinent Medical History:  has a past medical history of Ambulates with cane, Atrial fibrillation ([facility] [address] CAD (coronary artery disease), CHF (congestive heart failure) ([facility] [address] Diverticular disease, Edentulous, Glaucoma, History of blood transfusion, Hyperlipidemia, Hypertension, Restless leg syndrome, and Valvular heart disease.      Precautions:  Fall Risk    Assessment of current deficits    [x] Functional mobility  [x]ADLs  [x] Strength               []Cognition    [x] Functional transfers   [x] IADLs         [x] Safety Awareness   [x]Endurance    [] Fine Coordination              [x] Balance      [] Vision/perception   []Sensation     []Gross Motor Coordination  [] ROM  [] Delirium                   [] Motor Control     OT PLAN OF CARE   OT POC based on physician orders, patient diagnosis and results of clinical assessment    Frequency/Duration 1-3 days/wk for 2 weeks PRN   Specific OT Treatment Interventions to include:   * Instruction/training on adapted ADL techniques and AE recommendations to increase functional independence within precautions       * Training on energy conservation strategies, correct breathing pattern and techniques to improve independence/tolerance for self-care routine  * Functional transfer/mobility training/DME recommendations for increased independence, safety, and fall prevention  * Patient/Family education to increase follow through with safety techniques and functional independence  * Recommendation of environmental modifications for increased safety with functional transfers/mobility and ADLs  * Therapeutic exercise to improve motor endurance, ROM, and functional strength for ADLs/functional transfers  * Therapeutic activities to facilitate/challenge dynamic balance, stand tolerance for increased safety and independence with ADLs    Recommended Adaptive Equipment:  TBD     Home Living: Pt lives with granddaughter in a 1 story home with 2+2 BULMARO and 0+1 rails. 1st floor set-up    Bathroom setup: tub/shower combo    Equipment owned: tub bench    Prior Level of Function: mod I with ADLs , mod I with IADLs; ambulated with SPC and w/w   Driving: no   Occupation: na    Pain Level: Pt reports 12/10 abdominal pain;  Therapist facilitated repositioning to address pain   Nursing notified of pain medication request    Cognition: A&O: 4/4; Follows 2 step directions   Memory:  good   Sequencing:  good   Problem solving:  good   Judgement/safety:  fair     Functional Assessment:  AM-PAC Daily Activity Raw Score: 16/24   Initial Eval Status  Date: 7/27/21 Treatment Status  Date: STGs = LTGs  Time frame: 10-14 days   Feeding Independent      Grooming Stand by Assist     Standing at sink  Modified [place]    UB Dressing Stand by Assist   Modified [place]    LB Dressing Maximal Assist   Modified [place]    Bathing Moderate Assist  Modified [place]    Toileting Moderate Assist   Modified [place]    Bed Mobility  Supine to sit: Minimal Assist   Sit to supine: Minimal Assist   Supine to sit: Modified [place]   Sit to supine: Modified [place]    Functional Transfers Minimal Assist   Modified [place]    Functional Mobility Minimal Assist   Modified [place]    Balance Sitting:     Static:  SBA    Dynamic:SBA  Standing: min A     Activity Tolerance F  F+   Visual/  Perceptual Glasses: yes  wfl                  Hand Dominance right   Strength ROM Additional Info:    RUE   4/5 wfl good  and wfl FMC/dexterity noted during ADL tasks     LUE 4/5 wfl good  and wfl FMC/dexterity noted during ADL tasks     Hearing: wfl  Sensation:wfl  Tone: wfl  Edema:none noted     Comments: Upon arrival patient supine in bed and agreeable to OT Session. Therapist educated pt on role of OT. At end of session, patient seated in chair with call light and phone within reach, all lines and tubes intact. Overall patient demonstrated  decreased independence and safety during completion of ADL/functional transfer/mobility tasks. Pt would benefit from continued skilled OT to increase safety and independence with completion of ADL/IADL tasks for functional independence and quality of life. Treatment: OT treatment provided this date includes: Facilitation of bed mobility, unsupported sitting balance (impacting ADLs; addressing posture, weight shifting, dynamic reaching), functional transfers (various surfaces: bed, toilet, chair), standing tolerance tasks (addressing posture, balance and activity tolerance while incorporating light functional reaching; impacting ADLs and functional activity) and functional ambulation tasks with w/w (including to/ from bathroom and in preparation for item retrieval tasks; cuing on posture, w/w management and safety) - skilled cuing on hand placement, posture, body mechanics, energy conservation techniques and safety. Therapist facilitated self-care retraining: UB/LB self-care tasks, toileting task (all aspects) and standing grooming tasks at sink while educating pt on modified techniques, posture, safety and energy conservation techniques. Skilled monitoring of HR, O2 sats and pts response to treatment. Rehab Potential: Good  for established goals     Patient / Family Goal: none stated      Patient and/or family were instructed on functional diagnosis, prognosis/goals and OT plan of care. Demonstrated fair understanding.      Magdi Complexity: Low    Time In: 910  Time Out: 935  Total Treatment Time: 10 minutes    Min Units   OT Eval Low 97165  x  1   OT Eval Medium 57216      OT Eval High 76753      OT Re-Eval [id number]       Therapeutic Ex 65056       Therapeutic Activities 05111  2     ADL/Self Care 53087  8  1   Orthotic Management 86644       Manual [id number]     Neuro Re-Ed [id number]       Non-Billable Time          Evaluation Time additionally includes thorough review of current medical information, gathering information on past medical history/social history and prior level of function, interpretation of standardized testing/informal observation of tasks, assessment of data and development of plan of care and goals.           Arthuro Keller OTR/L #[id number]

## 2021-07-27 NOTE — PLAN OF CARE
Problem: Falls - Risk of:  Goal: Will remain free from falls  Description: Will remain free from falls  7/27/2021 1847 by Zev Washington RN  Outcome: Not Met This Shift  7/27/2021 1057 by Brad Hernandez RN  Outcome: Met This Shift  Goal: Absence of physical injury  Description: Absence of physical injury  7/27/2021 1847 by Zev Washington RN  Outcome: Not Met This Shift  7/27/2021 1057 by Brad Hernandez RN  Outcome: Met This Shift     Problem: Falls - Risk of:  Goal: Will remain free from falls  Description: Will remain free from falls  7/27/2021 1847 by Zev Washington RN  Outcome: Not Met This Shift  7/27/2021 1057 by Brad Hernandez RN  Outcome: Met This Shift     Problem: Falls - Risk of:  Goal: Absence of physical injury  Description: Absence of physical injury  7/27/2021 1847 by Zev Washington RN  Outcome: Not Met This Shift  7/27/2021 1057 by Brad Hernandez RN  Outcome: Met This Shift     Problem: Pain:  Goal: Pain level will decrease  Description: Pain level will decrease  7/27/2021 1847 by Zev Washington RN  Outcome: Not Met This Shift  7/27/2021 1057 by Brad Hernandez RN  Outcome: Met This Shift  Goal: Control of acute pain  Description: Control of acute pain  7/27/2021 1847 by Zev Washington RN  Outcome: Not Met This Shift  7/27/2021 1057 by Brad Hernandez RN  Outcome: Met This Shift  Goal: Control of chronic pain  Description: Control of chronic pain  7/27/2021 1847 by Zev Washington RN  Outcome: Not Met This Shift  7/27/2021 1057 by Brad Hernandez RN  Outcome: Met This Shift     Problem: Pain:  Goal: Pain level will decrease  Description: Pain level will decrease  7/27/2021 1847 by Zev Washington RN  Outcome: Not Met This Shift  7/27/2021 1057 by Brad Hernandez RN  Outcome: Met This Shift     Problem: Pain:  Goal: Control of acute pain  Description: Control of acute pain  7/27/2021 1847 by Zev Washington RN  Outcome: Not Met This Shift  7/27/2021 1057 by Brad Hernandez RN  Outcome: Met This Shift     Problem: Pain:  Goal: Control of chronic pain  Description: Control of chronic pain  7/27/2021 1847 by Rosalinda Castañeda RN  Outcome: Not Met This Shift  7/27/2021 1057 by Damion Lewis RN  Outcome: Met This Shift     Problem: Musculor/Skeletal Functional Status  Goal: Absence of falls  7/27/2021 1847 by Rosalinda Castañeda RN  Outcome: Not Met This Shift  7/27/2021 1057 by Damion Lewis RN  Outcome: Met This Shift     Problem: Musculor/Skeletal Functional Status  Goal: Highest potential functional level  7/27/2021 1847 by Rosalinda Castañeda RN  Outcome: Not Met This Shift  7/27/2021 1057 by Damion Lewis RN  Outcome: Met This Shift     Problem: Musculor/Skeletal Functional Status  Goal: Highest potential functional level  7/27/2021 1847 by Rosalinda Castañeda RN  Outcome: Not Met This Shift  7/27/2021 1057 by Damion Lewis RN  Outcome: Met This Shift  Goal: Absence of falls  7/27/2021 1847 by Rosalinda Castañeda RN  Outcome: Not Met This Shift  7/27/2021 1057 by Damion Lewis RN  Outcome: Met This Shift     Problem: Skin Integrity:  Goal: Absence of new skin breakdown  Description: Absence of new skin breakdown  7/27/2021 1847 by Rosalinda Castañeda RN  Outcome: Not Met This Shift  7/27/2021 1057 by Damion Lewis RN  Outcome: Met This Shift     Problem: Skin Integrity:  Goal: Will show no infection signs and symptoms  Description: Will show no infection signs and symptoms  7/27/2021 1847 by Rosalinda Castañeda RN  Outcome: Not Met This Shift  7/27/2021 1057 by Damion Lewis RN  Outcome: Met This Shift     Problem: Skin Integrity:  Goal: Will show no infection signs and symptoms  Description: Will show no infection signs and symptoms  7/27/2021 1847 by Rosalinda Castañeda RN  Outcome: Not Met This Shift  7/27/2021 1057 by Damion Lewis RN  Outcome: Met This Shift  Goal: Absence of new skin breakdown  Description: Absence of new skin breakdown  7/27/2021 1847 by Rosalinda Castañeda

## 2021-07-27 NOTE — PROGRESS NOTES
Attending Physician Statement:  Jeri Leach M.D., F.A.C.P.     I have discussed the case, including pertinent history and exam findings with the resident/NP. I have seen and examined the patient and the key elements of the encounter have been performed by me.  I agree with the resident ROS, PMHx, PSHx, meds reviewed and assessment, plan and orders as documented by the resident/NP [facility] [place] [facility] charts reviewed, including other providers notes, relevant labs and imaging.         77 y. o. male admitted sp elective TAVR procedure bleeding complications for severe symptomatic AS.     Groin hematoma sp femoral approach TAVR   Then L abdominal hematoma more sypmtomatic one more recently bleed expanded sp lovenox shots  Anemia due to bleed  Low BP due to bleed/volume contraction-- coby out afib and AS complications (reasess valve function)  Sp Fluids reassess bP improving  Vagal?  Pain issues        gen surg  Now s/p 3 CTs  Overnight concerns expanding hematoma  Roberth, PT/OT  Pain control  sp 29mm Evolut PRO+ valve on 7/14/2021 using b/l groin approach. TTE post procedure: Trace paravalvular regurgitation. MG 7, DVI 0.67--   Also afib- high chadsvas     Hematoma-s- repeat CT evolving - slight growth - less hyperacute - likely stopped now hematoma   -- spreading pain around back. Also bc immobile in bed.   sp 3rd CT stable, but looks worse possibly today  Will dfefer reversing anticaoguation though, bc clincially stable  Needs anticoagulation soon  Will continue holding coumadin - but resume very soon  inr 1.6 subtherapeutic  surgergy opinion 7/28 resume anticoagulation  ? bridge if need when INR drops- IV heparin vs off label use eliquis could days - discussed  Then back on coumadin goal INR 2-2. 5?  For now- CVA risk vs bleeds     We Discussed case with CV surgery -at [address] [place] [address]-- he does want bridging coumaidin s- OK with starting in am tomorrow  Start low dose heparin protocol and give first dose coumadin in AM tomorrow (don't wait till evening)- I ordered this  Hematomas at risk for myositis ossificans  Vs infected hematoma-- watching for these  Her case also urine outflow- urethral flow affected     Urinary retention issues, dec detrussor tone, also unwilling to move 2 to pain, unwilling to valsalva   -- urinary catheter  +- bethanacol vs flomax later if need     Today remove urinary catheter - trial check post void- again requesting    Bedside commode   sheeba improving  Mobitiliy, PT/OT  Pain control     >50% of time spent coordinating care with other providers and/or counseling patient/family  Remainder of medical problems as per resident note. IV heparin bridge will start in am +coumadin  She WILL NOT be getting lovenox shots  NO shots through skin    Patient preference for IV heparin and coumadin bridge - will use both and this means she would need to stay in hospital (vs [facility]) for next 4-5 days as anticoaglation builds up.   Patient wants to \"play\" it safe, also per request of CV surgery CC- so plan to stay in house over next 5 days or so- medically high risk for rebleed or CVA

## 2021-07-27 NOTE — PLAN OF CARE
surgergy opinion 7/28 resume anticoagulation  ? bridge if need when INR drops- IV heparin vs off label use eliquis could days - discussed  Then back on coumadin goal INR 2-2. 5? For now- CVA risk vs bleeds     We Discussed case with CV surgery -at [address] [place] [address]-- he does want bridging coumaidin s- OK with starting in am tomorrow  Start low dose heparin IV protocol and give first dose coumadin in AM tomorrow (don't wait till evening)- I ordered this    She WILL NOT be getting lovenox shots  NO shots through skin    Patient preference for IV heparin and coumadin bridge - will use both and this means she would need to stay in hospital (vs [facility]) for next 4-5 days as anticoaglation builds up.   Patient wants to \"play\" it safe, also per request of CV surgery CC- so plan to stay in house over next 5 days or so- medically high risk for rebleed or CVA

## 2021-07-27 NOTE — PROGRESS NOTES
Patient missed scheduled [facility] at [facility] due to inpatient admission. Right groin incision intact, well healed without erythema, ecchymosis, hematoma or drainage. Left groin soft, non tender without ecchymosis or hematoma. Large abdominal ecchymosis noted, soft, non tender. Chart reviewed re: current admission. Plans to resume anticoagulation tomorrow with IV heparin bridge noted.  Patient to follow up in [facility] for 30 day echo on 8/17/21; sooner PRN    Josephine Gomez PA-C

## 2021-07-27 NOTE — PLAN OF CARE
Problem: Falls - Risk of:  Goal: Will remain free from falls  Description: Will remain free from falls  Outcome: Not Met This Shift  Goal: Absence of physical injury  Description: Absence of physical injury  Outcome: Not Met This Shift     Problem: Falls - Risk of:  Goal: Absence of physical injury  Description: Absence of physical injury  Outcome: Not Met This Shift     Problem: Pain:  Goal: Pain level will decrease  Description: Pain level will decrease  Outcome: Not Met This Shift  Goal: Control of acute pain  Description: Control of acute pain  Outcome: Not Met This Shift  Goal: Control of chronic pain  Description: Control of chronic pain  Outcome: Not Met This Shift     Problem: Pain:  Goal: Pain level will decrease  Description: Pain level will decrease  Outcome: Not Met This Shift     Problem: Pain:  Goal: Control of acute pain  Description: Control of acute pain  Outcome: Not Met This Shift     Problem: Pain:  Goal: Control of chronic pain  Description: Control of chronic pain  Outcome: Not Met This Shift     Problem: Skin Integrity:  Goal: Will show no infection signs and symptoms  Description: Will show no infection signs and symptoms  Outcome: Not Met This Shift  Goal: Absence of new skin breakdown  Description: Absence of new skin breakdown  Outcome: Not Met This Shift     Problem: Skin Integrity:  Goal: Will show no infection signs and symptoms  Description: Will show no infection signs and symptoms  Outcome: Not Met This Shift     Problem: Skin Integrity:  Goal: Absence of new skin breakdown  Description: Absence of new skin breakdown  Outcome: Not Met This Shift     Problem: Musculor/Skeletal Functional Status  Goal: Absence of falls  Outcome: Not Met This Shift     Problem: Musculor/Skeletal Functional Status  Goal: Highest potential functional level  Outcome: Not Met This Shift     Problem: Musculor/Skeletal Functional Status  Goal: Highest potential functional level  Outcome: Not Met This Shift  Goal: Absence of falls  Outcome: Not Met This Shift

## 2021-07-27 NOTE — CARE COORDINATION
Care Coordination:  Per attending. Plan IV heparin and coumadin bridge and maintain hospitalization for next 5 days or so ( cannot do IV heparin in [facility]) .  Will continue to follow for goal of 2 - 2.5    Brittnee Dunham

## 2021-07-28 LAB
ALBUMIN SERPL-MCNC: 2.9 G/DL (ref 3.5–5.2)
ALP BLD-CCNC: 103 U/L (ref 35–104)
ALT SERPL-CCNC: 32 U/L (ref 0–32)
ANION GAP SERPL CALCULATED.3IONS-SCNC: 12 MMOL/L (ref 7–16)
APTT: 25.4 SEC (ref 24.5–35.1)
APTT: 27.4 SEC (ref 24.5–35.1)
APTT: 34.1 SEC (ref 24.5–35.1)
APTT: 62.4 SEC (ref 24.5–35.1)
APTT: 90.6 SEC (ref 24.5–35.1)
AST SERPL-CCNC: 35 U/L (ref 0–31)
BASOPHILS ABSOLUTE: 0.02 E9/L (ref 0–0.2)
BASOPHILS RELATIVE PERCENT: 0.2 % (ref 0–2)
BILIRUB SERPL-MCNC: 3 MG/DL (ref 0–1.2)
BUN BLDV-MCNC: 13 MG/DL (ref 6–23)
CALCIUM SERPL-MCNC: 8.5 MG/DL (ref 8.6–10.2)
CHLORIDE BLD-SCNC: 100 MMOL/L (ref 98–107)
CO2: 24 MMOL/L (ref 22–29)
CREAT SERPL-MCNC: 0.8 MG/DL (ref 0.5–1)
EOSINOPHILS ABSOLUTE: 0.14 E9/L (ref 0.05–0.5)
EOSINOPHILS RELATIVE PERCENT: 1.6 % (ref 0–6)
GFR AFRICAN AMERICAN: >60
GFR NON-AFRICAN AMERICAN: >60 ML/MIN/1.73
GLUCOSE BLD-MCNC: 152 MG/DL (ref 74–99)
HCT VFR BLD CALC: 28.6 % (ref 34–48)
HCT VFR BLD CALC: 28.8 % (ref 34–48)
HEMOGLOBIN: 8.9 G/DL (ref 11.5–15.5)
HEMOGLOBIN: 8.9 G/DL (ref 11.5–15.5)
IMMATURE GRANULOCYTES #: 0.21 E9/L
IMMATURE GRANULOCYTES %: 2.3 % (ref 0–5)
INR BLD: 1.4
LV EF: 70 %
LVEF MODALITY: NORMAL
LYMPHOCYTES ABSOLUTE: 1.55 E9/L (ref 1.5–4)
LYMPHOCYTES RELATIVE PERCENT: 17.3 % (ref 20–42)
MCH RBC QN AUTO: 28.9 PG (ref 26–35)
MCHC RBC AUTO-ENTMCNC: 31.1 % (ref 32–34.5)
MCV RBC AUTO: 92.9 FL (ref 80–99.9)
METER GLUCOSE: 122 MG/DL (ref 74–99)
METER GLUCOSE: 133 MG/DL (ref 74–99)
METER GLUCOSE: 147 MG/DL (ref 74–99)
METER GLUCOSE: 160 MG/DL (ref 74–99)
MONOCYTES ABSOLUTE: 0.54 E9/L (ref 0.1–0.95)
MONOCYTES RELATIVE PERCENT: 6 % (ref 2–12)
NEUTROPHILS ABSOLUTE: 6.48 E9/L (ref 1.8–7.3)
NEUTROPHILS RELATIVE PERCENT: 72.6 % (ref 43–80)
PDW BLD-RTO: 15 FL (ref 11.5–15)
PLATELET # BLD: 292 E9/L (ref 130–450)
PMV BLD AUTO: 10.5 FL (ref 7–12)
POTASSIUM SERPL-SCNC: 4 MMOL/L (ref 3.5–5)
PROTHROMBIN TIME: 15.7 SEC (ref 9.3–12.4)
RBC # BLD: 3.08 E12/L (ref 3.5–5.5)
REASON FOR REJECTION: NORMAL
REJECTED TEST: NORMAL
SODIUM BLD-SCNC: 136 MMOL/L (ref 132–146)
TOTAL PROTEIN: 5.6 G/DL (ref 6.4–8.3)
WBC # BLD: 8.9 E9/L (ref 4.5–11.5)

## 2021-07-28 PROCEDURE — 85018 HEMOGLOBIN: CPT

## 2021-07-28 PROCEDURE — 6370000000 HC RX 637 (ALT 250 FOR IP): Performed by: INTERNAL MEDICINE

## 2021-07-28 PROCEDURE — 85014 HEMATOCRIT: CPT

## 2021-07-28 PROCEDURE — 6370000000 HC RX 637 (ALT 250 FOR IP): Performed by: SURGERY

## 2021-07-28 PROCEDURE — 85025 COMPLETE CBC W/AUTO DIFF WBC: CPT

## 2021-07-28 PROCEDURE — 99233 SBSQ HOSP IP/OBS HIGH 50: CPT | Performed by: INTERNAL MEDICINE

## 2021-07-28 PROCEDURE — 6360000004 HC RX CONTRAST MEDICATION

## 2021-07-28 PROCEDURE — 6360000002 HC RX W HCPCS: Performed by: INTERNAL MEDICINE

## 2021-07-28 PROCEDURE — 85730 THROMBOPLASTIN TIME PARTIAL: CPT

## 2021-07-28 PROCEDURE — 93306 TTE W/DOPPLER COMPLETE: CPT

## 2021-07-28 PROCEDURE — 80053 COMPREHEN METABOLIC PANEL: CPT

## 2021-07-28 PROCEDURE — 85610 PROTHROMBIN TIME: CPT

## 2021-07-28 PROCEDURE — 36415 COLL VENOUS BLD VENIPUNCTURE: CPT

## 2021-07-28 PROCEDURE — 2580000003 HC RX 258: Performed by: INTERNAL MEDICINE

## 2021-07-28 PROCEDURE — 82962 GLUCOSE BLOOD TEST: CPT

## 2021-07-28 PROCEDURE — 2140000000 HC CCU INTERMEDIATE R&B

## 2021-07-28 RX ADMIN — HEPARIN SODIUM 8.5 UNITS/KG/HR: 10000 INJECTION, SOLUTION INTRAVENOUS at 05:49

## 2021-07-28 RX ADMIN — METOPROLOL TARTRATE 50 MG: 50 TABLET, FILM COATED ORAL at 10:16

## 2021-07-28 RX ADMIN — OXYCODONE HYDROCHLORIDE 10 MG: 10 TABLET ORAL at 10:16

## 2021-07-28 RX ADMIN — LATANOPROST 1 DROP: 50 SOLUTION OPHTHALMIC at 20:12

## 2021-07-28 RX ADMIN — ATORVASTATIN CALCIUM 20 MG: 20 TABLET, FILM COATED ORAL at 10:17

## 2021-07-28 RX ADMIN — HEPARIN SODIUM 4000 UNITS: 1000 INJECTION INTRAVENOUS; SUBCUTANEOUS at 05:48

## 2021-07-28 RX ADMIN — HEPARIN SODIUM 12.5 UNITS/KG/HR: 10000 INJECTION, SOLUTION INTRAVENOUS at 16:35

## 2021-07-28 RX ADMIN — DOCUSATE SODIUM 100 MG: 100 CAPSULE ORAL at 10:17

## 2021-07-28 RX ADMIN — Medication 10 ML: at 10:17

## 2021-07-28 RX ADMIN — Medication 10 ML: at 20:10

## 2021-07-28 RX ADMIN — SENNOSIDES 17.2 MG: 8.6 TABLET, FILM COATED ORAL at 19:58

## 2021-07-28 RX ADMIN — WARFARIN SODIUM 3 MG: 3 TABLET ORAL at 06:48

## 2021-07-28 RX ADMIN — PANTOPRAZOLE SODIUM 40 MG: 40 TABLET, DELAYED RELEASE ORAL at 10:17

## 2021-07-28 RX ADMIN — BRIMONIDINE TARTRATE 1 DROP: 2 SOLUTION OPHTHALMIC at 10:18

## 2021-07-28 RX ADMIN — HEPARIN SODIUM 4000 UNITS: 1000 INJECTION INTRAVENOUS; SUBCUTANEOUS at 16:34

## 2021-07-28 RX ADMIN — INSULIN LISPRO 2 UNITS: 100 INJECTION, SOLUTION INTRAVENOUS; SUBCUTANEOUS at 12:39

## 2021-07-28 RX ADMIN — OXYCODONE HYDROCHLORIDE 10 MG: 10 TABLET ORAL at 19:57

## 2021-07-28 RX ADMIN — DOCUSATE SODIUM 100 MG: 100 CAPSULE ORAL at 19:57

## 2021-07-28 RX ADMIN — METOPROLOL TARTRATE 50 MG: 50 TABLET, FILM COATED ORAL at 19:57

## 2021-07-28 RX ADMIN — INSULIN LISPRO 1 UNITS: 100 INJECTION, SOLUTION INTRAVENOUS; SUBCUTANEOUS at 20:05

## 2021-07-28 ASSESSMENT — PAIN DESCRIPTION - ORIENTATION: ORIENTATION: RIGHT;LEFT

## 2021-07-28 ASSESSMENT — PAIN SCALES - GENERAL
PAINLEVEL_OUTOF10: 0
PAINLEVEL_OUTOF10: 9
PAINLEVEL_OUTOF10: 5
PAINLEVEL_OUTOF10: 6
PAINLEVEL_OUTOF10: 0
PAINLEVEL_OUTOF10: 0
PAINLEVEL_OUTOF10: 7

## 2021-07-28 ASSESSMENT — PAIN DESCRIPTION - LOCATION: LOCATION: ABDOMEN

## 2021-07-28 ASSESSMENT — PAIN DESCRIPTION - PAIN TYPE: TYPE: ACUTE PAIN

## 2021-07-28 ASSESSMENT — PAIN DESCRIPTION - DESCRIPTORS: DESCRIPTORS: ACHING;DISCOMFORT

## 2021-07-28 ASSESSMENT — PAIN DESCRIPTION - FREQUENCY: FREQUENCY: CONTINUOUS

## 2021-07-28 ASSESSMENT — PAIN DESCRIPTION - ONSET: ONSET: ON-GOING

## 2021-07-28 ASSESSMENT — PAIN DESCRIPTION - PROGRESSION: CLINICAL_PROGRESSION: GRADUALLY IMPROVING

## 2021-07-28 NOTE — PROGRESS NOTES
Drug information request per Dr. Gigi Meier:    Pauline Cancel Factor Xa inhibitors be used for valvular afib? \"    Reviewed the following literature:    SEE and ENGAGE: had a sub-analysis group of patients with bioprosthetic heart valves that showed patients had a similar stroke rate when compared to warfarin. ROCKET-AF compared a sub analysis of rivaroxaban to warfarin in patients with valvular heart disease and non valvular heart disease. The patients with valvular heart disease on rivaroxaban had an increased risk of bleeding. Conclusion: All of these studies were sub group analyses with a very limited number of patients. There is not enough data to support FDA approval of DOACs in valvular afib. Further studies are needed to confirm DOAC use in valvular afib. Patient is to be bridged with heparin drip to warfarin in setting of valvular afib.      Thank you,    Jemma Woods, PharmD, BCPS 7/28/2021 2:24 PM  Phone: [phone]

## 2021-07-28 NOTE — PROGRESS NOTES
Pharmacy Consultation Note  (Anticoagulant Dosing and Monitoring)    Initial consult date: 7/28/21  Consulting physician: Dr. Vincent Godinez    Allergies:  Lactose intolerance (gi) and Phenobarbital    77 y.o. female      Ht Readings from Last 1 Encounters:   07/22/21 5' 4\" (1.626 m)     Wt Readings from Last 1 Encounters:   07/28/21 246 lb (111.6 kg)         Warfarin Indication Target   INR Range Home   Dose  (if applicable) Diet/Feeding Tube   Bioprosthetic mitral valve replacement  2.0-2.5 3mg [id number]  4mg W Regular diet       Vitamin K or Blood product  Administration Date                 Warfarin drug-drug interactions  Start  Stop Home Med? Comments                                 TSH:    Lab Results   Component Value Date    TSH 2.350 03/08/2021        Hepatic Function Panel:                            Lab Results   Component Value Date    ALKPHOS 103 07/28/2021    ALT 32 07/28/2021    AST 35 07/28/2021    PROT 5.6 07/28/2021    BILITOT 3.0 07/28/2021    BILIDIR <0.2 08/15/2015    IBILI 0.6 08/15/2015    LABALBU 2.9 07/28/2021    LABALBU 4.2 06/06/2012       Date Warfarin Dose INR Heparin or LMWH HBG/  HCT PLT Comment   7/28 3mg 1.4 Heparin gtt 8.9/28.6 292                                          Assessment:  · 77year old female on chronic anticoagulation with warfarin due to previous bioprosthetic mitral valve replacement. · INR goal: 2.0-2.5; Home dose: 3mg daily except 4mg Wed. · 7/28: Bridging with heparin vs Lovenox after abdominal hematoma from Lovenox; INR 1.4 today    Plan:   · Continue warfarin 3mg daily (dose given 7/28 AM)  · Daily PT/INR until the INR is stable within the therapeutic range  · Pharmacist will follow and monitor/adjust dosing as necessary    Michell Jacobs PharmD Candidate 7/28/2021 1:36 PM    I have reviewed the progress note written by the pharmacy student and agree with the documentation and treatment plan.   All medication and/or lab orders have been entered by myself according

## 2021-07-28 NOTE — PROGRESS NOTES
[facility]  [facility] Residency Program  Progress Note - House Team 1    Patient:  Rg Ott 77 y.o. female MRN: [id number]     Date of Service: 7/28/2021   Hospital Day: 7      CHIEF  COMPLAINT:     -had concerns including Abdominal Pain (states it started a few days ago but recently started vomiting). OVERNIGHT  EVENTS:     -None    SUBJECTIVE:     -having BM, tolerating diet  -s/p guevara removal has had two micturations  -no PVR yesterday  -pain well controlled  -Pain was not well controlled when PT/OT eval yesterday 7/27/21  -ambulating to bedside commode w/help    OBJECTIVE:     PHYSICAL  EXAM:  · Vitals: /70   Pulse 102   Temp 98.6 °F (37 °C) (Temporal)   Resp 20   Ht 5' 4\" (1.626 m)   Wt 246 lb (111.6 kg)   SpO2 98%   BMI 42.23 kg/m²     · I & O - 24hr: No intake/output data recorded. · General Appearance: alert, appears stated age and cooperative  · HEENT:  Head: Normal, normocephalic, atraumatic. · Neck: no JVD and supple, symmetrical, trachea midline  · Lung: rhonchi noted throughout the lower lung fields b/l in posterior  · Heart: irregularly irregular  · Abdomen: bowel sounds throughout. soft w/o rebound or gaurding. Mild to moderate pain on palpation of L abd in region of hematoma on CT scan. Extensive yellow/purple and slightly green ecchymosis throughout upper abd and Left lower abd w/ extension into Left flank and paraspinal region b/l slightly improving today. · Extremities:  extremities normal, atraumatic, no cyanosis or edema  · Musculokeletal: No joint swelling, no muscle tenderness. ROM normal in all joints of extremities.    · Neurologic: Mental status: Alert, oriented, thought content appropriate    LABS  -&-  IMAGING  STUDIES:     LABORATORY  WORK:    Recent Labs     07/26/21  0447 07/27/21  0458    135   K 4.4 4.3    102   CO2 32* 21*   BUN 15 12   CREATININE 0.7 0.6   GLUCOSE 132* 111*   CALCIUM 8.2* 8.0*       Recent Labs 07/26/21  0447 07/27/21  0458 07/28/21  0650   WBC 10.2 9.5 8.9   RBC 2.85* 2.99* 3.08*   HGB 8.4* 8.7* 8.9*   HCT 26.7* 27.3* 28.6*   MCV 93.7 91.3 92.9   MCH 29.5 29.1 28.9   MCHC 31.5* 31.9* 31.1*   RDW 14.6 14.8 15.0    264 292   MPV 10.1 10.2 10.5       CMP:    Lab Results   Component Value Date     07/27/2021    K 4.3 07/27/2021    K 4.6 07/24/2021     07/27/2021    CO2 21 07/27/2021    BUN 12 07/27/2021    CREATININE 0.6 07/27/2021    GFRAA >60 07/27/2021    LABGLOM >60 07/27/2021    GLUCOSE 111 07/27/2021    GLUCOSE 90 06/06/2012    PROT 5.4 07/27/2021    LABALBU 2.6 07/27/2021    LABALBU 4.2 06/06/2012    CALCIUM 8.0 07/27/2021    BILITOT 2.2 07/27/2021    ALKPHOS 89 07/27/2021    AST 45 07/27/2021    ALT 29 07/27/2021     Albumin:    Lab Results   Component Value Date    LABALBU 2.6 07/27/2021    LABALBU 4.2 06/06/2012       PTT:    Lab Results   Component Value Date    APTT 34.1 07/28/2021       Protime-INR   Ref Range & Units 07/27/21 0458   Protime 9.3 - 12.4 sec 15. 4High     INR  1.4                 IMAGING  STUDIES:    Echo Limited    Result Date: 7/16/2021  Transthoracic Echocardiography Report (TTE)  Demographics   Patient Name      Maryam Zhou Gender              Female                    L   Medical Record    [id number]       Room Number         2614  Number   Account #         [de-identified]      Procedure Date      07/15/2021   Corporate ID                     Ordering Physician  Madiha Arana MD   Accession Number  [id number]     Referring Physician   Date of Birth     1945     Sonographer         Erika Mercado RDCS   Age               77 year(s)     Interpreting        Madiha Arana MD                                   Physician                                    Any Other  Procedure Type of Study   TTE procedure:Echo Limited Study.   Procedure Date Date: 07/15/2021 Start: 09:18 AM Study Location: Portable Technical Quality: Limited visualization due to body habitus. Indications:S/P TAVR. Patient Status: Pending Discharge Height: 64 inches Weight: 240 pounds BSA: 2.11 m^2 BMI: 41.2 kg/m^2 Allergies   - Other drug:(Phenobarbital). Findings   Left Ventricle  Normal left ventricle size. Estimated left ventricle ejection fraction 60+/-5 %. Right Ventricle  Normal right ventricular size and function. Mitral Valve  Well-seated surgical mitral bioprosthesis. No significant mitral  regurgitation. Tricuspid Valve  Moderate tricuspid regurgitation. RVSP is ~37 mmHg. Aortic Valve  There is a well-seated transcatheter valve in the aortic position. Trace  paravalvular regurgitation. MG 7, DVI 0.67. Pericardial Effusion  No evidence of pericardial effusion. Aorta  Aortic root dimension within normal limits. Miscellaneous  Normal Inferior Vena Cava diameter and respiratory variation. Conclusions   Summary  **POD1 s/p TAVR with 29-mm Evolut PRO+; h/o MVR with #25 Mosaic (2007)**  Normal left ventricle size. Estimated left ventricle ejection fraction  60+/-5 %. Normal right ventricular size and function. Well-seated surgical mitral bioprosthesis. No significant mitral  regurgitation. There is a well-seated transcatheter valve in the aortic position. Trace  paravalvular regurgitation. MG 7, DVI 0.67. No evidence of pericardial effusion.    Signature   ----------------------------------------------------------------  Electronically signed by Yossi Figueroa MD(Interpreting physician)  on 07/16/2021 12:54 PM  ----------------------------------------------------------------  M-Mode/2D Measurements & Calculations    LVOT: 2.1 cm              Ascending Aorta: 2.8 cm                              IVC Expiration: 1.7 cm  Doppler Measurements & Calculations    AV Peak Velocity: 1.77 m/s LVOT Peak Velocity: 1.21 m/s   AV Peak Gradient: 12.52    LVOT Mean Velocity: 0.76 m/s   mmHg                       LVOT Peak Gradient: 5.9 mmHgLVOT Mean AV Mean Velocity: 1.13 m/s Gradient: 2.9 mmHg   AV Mean Gradient: 6.1 mmHg   AV VTI: 45.4 cm   AV Area (Continuity):2.33   cm^2                       TR Velocity:2.91 m/s                              TR Gradient:33.8 mmHg   LVOT VTI: 30.5 cm  [email or web address] [id number]    CT ABDOMEN PELVIS WO CONTRAST Additional Contrast? None    Result Date: 7/25/2021  EXAMINATION: CT OF THE ABDOMEN AND PELVIS WITHOUT CONTRAST 7/25/2021 10:27 am TECHNIQUE: CT of the abdomen and pelvis was performed without the administration of intravenous contrast. Multiplanar reformatted images are provided for review. Dose modulation, iterative reconstruction, and/or weight based adjustment of the mA/kV was utilized to reduce the radiation dose to as low as reasonably achievable. COMPARISON: July 23, 2021 HISTORY: ORDERING SYSTEM PROVIDED HISTORY: assess change of abdominal wall hematoma and groin hematoma TECHNOLOGIST PROVIDED HISTORY: Reason for exam:->assess change of abdominal wall hematoma and groin hematoma Additional Contrast?->None What reading provider will be dictating this exam?->CRC FINDINGS: Lower Chest: Heart size is top-normal to mildly enlarged. Postoperative changes related to transfemoral aortic valve replacement are noted. Mild dependent atelectasis is present at both lung bases. There is a small hiatal hernia. Organs: The gallbladder is distended without obvious wall thickening. There is a large peripherally calcified gallstone near the neck of the gallbladder. The unenhanced liver, spleen and adrenal glands are normal in appearance. The pancreas demonstrates diffuse fatty atrophy. The kidneys are without hydronephrosis or radiopaque calculus. There are bilateral indeterminate/intermediate density renal lesions. GI/Bowel: No evidence of a bowel obstruction, free air or pneumatosis.  Portions the colon are decompressed, limiting assessment for mucosal based abnormalities. There is diverticulosis without evidence of diverticulitis. The appendix is not confidently visualized. No obvious pericecal inflammatory changes. The stomach and duodenal sweep are unremarkable aside from the hiatal hernia. Pelvis: The urinary bladder is decompressed around a Menendez catheter. No obvious uterine or ovarian abnormality aside from a presumed fibroid at the fundus of the uterus. Small volume of free fluid is present in the pelvis. No pelvic lymphadenopathy. Peritoneum/Retroperitoneum: The abdominal aorta is normal in caliber. No retroperitoneal lymphadenopathy or mass. Bones/Soft Tissues: Again noted is a large hematoma along the left oblique abdominal musculature, slightly decreased in size on today's examination, measuring 12.8 x 9.2 cm (previously 13.2 x 10.7 cm). The 3.7 x 2.2 cm hematoma superficial to the right common femoral artery is stable in size. Suspect there is also a small right-sided rectus sheath hematoma on axial image 93, stable to slightly decreased in size. No acute osseous abnormality or evidence of aggressive osseous lesion. Slight interval decrease in the size of the left oblique abdominal wall intramuscular hematoma with a stable right inguinal hematoma and suspected small right rectus sheath hematoma. Multiple indeterminate bilateral renal lesions for which correlation with renal ultrasound is recommended. Additional, incidental findings as above. CT ABDOMEN PELVIS WO CONTRAST Additional Contrast? None    Result Date: 7/23/2021  EXAMINATION: CT OF THE ABDOMEN AND PELVIS WITHOUT CONTRAST 7/23/2021 3:22 pm TECHNIQUE: CT of the abdomen and pelvis was performed without the administration of intravenous contrast. Multiplanar reformatted images are provided for review.  Dose modulation, iterative reconstruction, and/or weight based adjustment of the mA/kV was utilized to reduce the radiation dose to as low as reasonably achievable. COMPARISON: None. HISTORY: ORDERING SYSTEM PROVIDED HISTORY: assess change of abdominal wall hematoma and groin hematoma TECHNOLOGIST PROVIDED HISTORY: Reason for exam:->assess change of abdominal wall hematoma and groin hematoma Additional Contrast?->None What reading provider will be dictating this exam?->CRC FINDINGS: In the left lateral abdominal wall at the level of the oblique/transverse muscles there is a large acute hematoma measuring 11 by 11.6 by 11 cm. On the previous study of a July 22nd it measured 12.5 by 8.5 x 11.8 cm. The overall size of the hematoma is stable account some differences in measurement, presently there is more uniform moderated hyperdense appearance of the hematoma indicating an acute phase. No longer seen blood/blood level which indicates a hyperacute phase or an active bleeding hematoma. There is significant edema and increased density of the adjacent muscular planes of the left lateral chest wall extending anteriorly, posteriorly, superior inferiorly, and also into the deep subcutaneous soft tissues of the left lateral abdominal wall indicating oozing of a hemorrhage, forming a ill-defined surrounding interstitial hematoma. There is a localized small hematoma in the right inguinal region which is related with the right common femoral artery. Cannot exclude the pseudoaneurysm formation. Correlation with ultrasound of the right inguinal areas seen. The there is a small pocket of air associated. The patient had aortic valve replacement procedure in July 14, 2021. There is no extension of hematoma into the pelvic sidewall. There is a Menendez catheter in the bladder. The bladder is not distended. Uterus and ovaries have unremarkable appearance. There is no free intraperitoneal air.   There is no indication for acute intraperitoneal process in the abdomen or in the pelvis accounting the left lateral chest wall hematoma which spreads into adjacent soft tissues including the left pelvic sidewall. Uncomplicated diverticulosis seen in the sigmoid colon. There is no indication for bowel obstruction. Kidneys are preserved size and cortical thickness, parenchymal enhancement and excretion of the contrast.  Presence of a cyst-like structures in both kidneys but they have density higher than simple cyst, they cannot be characterize presently. When patient condition permits consider initial correlation with ultrasound. They could represent hemorrhagic cysts or cyst with high proteinaceous content, but other possibilities are not excluded. There are normal size and the density for the liver and spleen. Pancreas demonstrates some atrophy. Gallbladder is well distended with 3 cm gallstone. There is no dilatation of the biliary tree pancreatic ductal system. There is a mild to moderate size hiatal hernia. Adrenals not enlarged. Calcified atheromatous changes are seen in the abdominal aorta there is no aneurysm formation. There is no midline retro peritoneal adenopathy. Heart has normal size. There is endovascular repair for the aortic valve and there is also prosthesis for the mitral valve. The heart is not fully covered in this study. There are areas of a confluent loss of volume indicate a component of atelectasis in the medial aspect of the right lower lobe and towards the base in the left lower lobe. These appear to be more chronic findings but increase since May 7, 2021.     1.  Overall stable large hematoma of the left lateral abdominal wall with surrounding hemorrhage into adjacent muscle planes and deep subcutaneous soft tissues by oozing. The large dominant hematoma now has a uniform hyperdensity indicating acute phase. No longer seen blood/blood level which indicates a hyperacute active bleeding hematoma as demonstrate on the study of July 22nd. 2.  Hematoma of a small size superficial to the right common femoral artery.  Cannot exclude a pseudoaneurysm distended. There is a small hiatal hernia. There is no large or small bowel obstruction. There are no findings of inflammatory bowel disease. Multiple sigmoid diverticula noted. There is no evidence of diverticulitis. Pelvis:  Bladder is unremarkable in appearance. There is a uterine fibroid measuring approximately 3.3 x 3.4 cm. Peritoneum/Retroperitoneum: There are postsurgical changes seen within the right groin from recent surgical intervention. There is a small hematoma measuring 2.5 x 2.5 cm. The abdominal aorta is normal caliber. There is no dissection of the abdominal aorta or of the iliac arteries. There is a large left lateral abdominal wall hematoma measuring 13 cm in maximum craniocaudal dimension by 12 cm in maximum AP diameter by 8 cm in maximum transverse diameter. There is a fluid fluid level seen within the left lateral abdominal wall hematoma consistent with acute on subacute hemorrhage. Along the superior aspect of the fluid fluid level there is minimal dense layering material suggestive of mild active bleeding. Bones/Soft Tissues:  Age related degenerative changes of the visualized osseous structures without focal destructive lesion. 1. Large left lateral abdominal wall hematoma measuring 13 cm x 12 cm x 8 cm. There is a fluid fluid level seen within the abdominal wall hematoma consistent with acute on subacute hemorrhage. There is minimal dense material seen layering on the fluid fluid level centrally consistent with minimal active bleeding. Surrounding intramuscular edema is noted within the oblique muscles. There is also induration within the subcutaneous soft tissues consistent with reactive changes. 2. Postsurgical changes within the right groin from recent intervention and TAVR procedure. There is a 2.5 x 2.5 cm hematoma within the right groin. 3. Cholelithiasis. There is no evidence of acute cholecystitis. 4. There is no aortic or iliac artery injury.      XR CHEST PORTABLE    Result Date: 7/25/2021  EXAMINATION: ONE XRAY VIEW OF THE CHEST 7/25/2021 9:09 am COMPARISON: July 22, 2021 HISTORY: ORDERING SYSTEM PROVIDED HISTORY: Wheezing TECHNOLOGIST PROVIDED HISTORY: Reason for exam:->Wheezing What reading provider will be dictating this exam?->CRC FINDINGS: Heart size is unable to be accurately assessed on this single portable view of the chest, but appears to be stable. Sternotomy wires are in place. There are postprocedural changes related to transfemoral aortic valve replacement. Prominence of the pulmonary vasculature raises possibility of mild pulmonary edema. No evidence of a sizable pleural effusion or pneumothorax. No acute osseous abnormality. Prominence of pulmonary vasculature raising the possibility of mild pulmonary edema. XR CHEST PORTABLE    Result Date: 7/22/2021  EXAMINATION: ONE XRAY VIEW OF THE CHEST 7/22/2021 1:17 pm COMPARISON: March 8, May 26, July 9 HISTORY: ORDERING SYSTEM PROVIDED HISTORY: Shortness of breath TECHNOLOGIST PROVIDED HISTORY: Reason for exam:->Shortness of breath What reading provider will be dictating this exam?->CRC FINDINGS: Status post previous mid sternotomy. More recent endovascular repair for the aortic root. Heart has borderline size. The no acute infiltrates, consolidates or pleural effusions are seen. There is no perihilar vascular congestion. 1.  Status post more remote mid sternotomy. Status post recent endovascular repair for the aortic root. 2.  Borderline size heart. No acute pulmonary process. STUDENT  ASSESSMENT  -&-  PLAN:     Michell Lau is a 77 y.o. female    1. Abd Hematoma 2/2 lovenox use              -Hold low dose heparin drip bridge to Coumadin until 7/28/21 per General surgery service              -willrestart coumadin 7/28/21              -trend labs              -cont pain meds w/preferrence for PO meds  2. A-fib, rate controlled              -anticoags as above              -cardiothoracic recommends IV heparin bridge to coumadin  3. Hyperbilirubinemia, likely 2/2 hematoma   -obtain bilirubin d/i   -Talk w/pharmacy about potential medication s/e   4.urinary retention              -d/c guevara & pvr today today (07/27/21)              -voiding trial today (07/27/21)              -may added Bethanecol per voiding trial  5.DM, HgbA1c on 7/9/21 = 6.6              -cont LDSS  6. Increase resp Mucus production              -encourage pulmonary toilet              -nebs prn               -monitor at this time, CXR if pt develops pain, change in sputum color, fevers, leukocytosis, or dypnea  7. H/O HFpEF              -not clinically overloaded currently              -cont to monitor  8. H/O TAVR, mitral valve, and CABG              -anticoag as above  8. H/O CAD              -anticoag as above  10. H/O GERD              -cont protonix  11. H/O gluacoma              -cont Latanaprost & bromonidine     PT / OT  EVALUATION:  evaluating     DVT  PROPHYLAXIS:  SCD     GI  PROPHYLAXIS:  Diet / Protonix     DISPOSITION:  cont Admission     Tiffany Her OMS-3/4   ATTENDING  PHYSICIAN: Daria Silver

## 2021-07-28 NOTE — PLAN OF CARE
Problem: Falls - Risk of:  Goal: Will remain free from falls  Description: Will remain free from falls  Outcome: Not Met This Shift  Goal: Absence of physical injury  Description: Absence of physical injury  Outcome: Not Met This Shift     Problem: Falls - Risk of:  Goal: Will remain free from falls  Description: Will remain free from falls  Outcome: Not Met This Shift     Problem: Falls - Risk of:  Goal: Absence of physical injury  Description: Absence of physical injury  Outcome: Not Met This Shift     Problem: Pain:  Goal: Pain level will decrease  Description: Pain level will decrease  Outcome: Not Met This Shift  Goal: Control of acute pain  Description: Control of acute pain  Outcome: Not Met This Shift  Goal: Control of chronic pain  Description: Control of chronic pain  Outcome: Not Met This Shift     Problem: Pain:  Goal: Pain level will decrease  Description: Pain level will decrease  Outcome: Not Met This Shift     Problem: Pain:  Goal: Control of acute pain  Description: Control of acute pain  Outcome: Not Met This Shift     Problem: Pain:  Goal: Control of chronic pain  Description: Control of chronic pain  Outcome: Not Met This Shift     Problem: Skin Integrity:  Goal: Will show no infection signs and symptoms  Description: Will show no infection signs and symptoms  Outcome: Not Met This Shift  Goal: Absence of new skin breakdown  Description: Absence of new skin breakdown  Outcome: Not Met This Shift     Problem: Skin Integrity:  Goal: Will show no infection signs and symptoms  Description: Will show no infection signs and symptoms  Outcome: Not Met This Shift     Problem: Skin Integrity:  Goal: Absence of new skin breakdown  Description: Absence of new skin breakdown  Outcome: Not Met This Shift     Problem: Musculor/Skeletal Functional Status  Goal: Absence of falls  Outcome: Not Met This Shift     Problem: Musculor/Skeletal Functional Status  Goal: Highest potential functional level  Outcome: Not Met This Shift     Problem: Musculor/Skeletal Functional Status  Goal: Highest potential functional level  Outcome: Not Met This Shift  Goal: Absence of falls  Outcome: Not Met This Shift

## 2021-07-28 NOTE — PROGRESS NOTES
[facility]  Internal Medicine Residency Program  Progress Note - House Team     Patient:  Nory Lambert 77 y.o. female MRN: [id number]     Date of Service: 7/28/2021     CC: Abdominal pain, N/V   Overnight events: Vital signs stable      Subjective     Patient was seen and examined this morning at bedside in no acute distress. Patient had guevara removed earlier today. Was able to urinate twice, feels like she is not retaining any urine, denies urinary symptoms. Urination feels unchanged from before admission. Patient has no new complaints, and reports no new symptoms today. Patient was started on heparin + warfarin today. Abdominal pain has not changed, but feels improved. Patient states she is taking less pain medicine. Objective     Physical Exam:  Vitals: /70   Pulse 102   Temp 98.6 °F (37 °C) (Temporal)   Resp 20   Ht 5' 4\" (1.626 m)   Wt 246 lb (111.6 kg)   SpO2 98%   BMI 42.23 kg/m²     I & O - 24hr: No intake/output data recorded. General Appearance: alert, appears stated age, cooperative and morbidly obese  HEENT:  Head: Normocephalic, no lesions, without obvious abnormality. Eye: Normal external eye, conjunctiva, lids cornea, AMY. Neck / Thyroid: Supple, no masses, nodes, nodules or enlargement. Neck: no adenopathy, no carotid bruit, no JVD, supple, symmetrical, trachea midline and thyroid not enlarged, symmetric, no tenderness/mass/nodules  Lung: clear to auscultation bilaterally  Heart: Regular rate. Normal S 1 and S2 with no murmur rub or gallop. Rhythm irregular. Abdomen: soft, non-tender; bowel sounds normal; no masses,  no organomegaly and Brusing noted over primarily the entire left side of the abdomen as well as on her arms bilaterally  Extremities:  extremities normal, atraumatic, no cyanosis or edema  Musculokeletal: No joint swelling, no muscle tenderness. ROM normal in all joints of extremities.    Neurologic: Mental status: Alert, oriented, thought content appropriate  Subject  Pertinent Labs & Imaging Studies   nuvia  CBC with Differential:    Lab Results   Component Value Date    WBC 9.5 07/27/2021    RBC 2.99 07/27/2021    HGB 8.7 07/27/2021    HCT 27.3 07/27/2021     07/27/2021    MCV 91.3 07/27/2021    MCH 29.1 07/27/2021    MCHC 31.9 07/27/2021    RDW 14.8 07/27/2021    NRBC 0.9 03/08/2021    SEGSPCT 62 02/22/2012    LYMPHOPCT 15.7 07/27/2021    MONOPCT 6.0 07/27/2021    BASOPCT 0.2 07/27/2021    MONOSABS 0.57 07/27/2021    LYMPHSABS 1.48 07/27/2021    EOSABS 0.14 07/27/2021    BASOSABS 0.02 07/27/2021     CMP:    Lab Results   Component Value Date     07/27/2021    K 4.3 07/27/2021    K 4.6 07/24/2021     07/27/2021    CO2 21 07/27/2021    BUN 12 07/27/2021    CREATININE 0.6 07/27/2021    GFRAA >60 07/27/2021    LABGLOM >60 07/27/2021    GLUCOSE 111 07/27/2021    GLUCOSE 90 06/06/2012    PROT 5.4 07/27/2021    LABALBU 2.6 07/27/2021    LABALBU 4.2 06/06/2012    CALCIUM 8.0 07/27/2021    BILITOT 2.2 07/27/2021    ALKPHOS 89 07/27/2021    AST 45 07/27/2021    ALT 29 07/27/2021       CT ABDOMEN PELVIS WO CONTRAST Additional Contrast? None   Final Result   Slight interval decrease in the size of the left oblique abdominal wall   intramuscular hematoma with a stable right inguinal hematoma and suspected   small right rectus sheath hematoma. Multiple indeterminate bilateral renal lesions for which correlation with   renal ultrasound is recommended. Additional, incidental findings as above. XR CHEST PORTABLE   Final Result   Prominence of pulmonary vasculature raising the possibility of mild pulmonary   edema. CT ABDOMEN PELVIS WO CONTRAST Additional Contrast? None   Final Result   1. Overall stable large hematoma of the left lateral abdominal wall with   surrounding hemorrhage into adjacent muscle planes and deep subcutaneous soft   tissues by oozing.   The large dominant hematoma now has a uniform   hyperdensity indicating acute phase. No longer seen blood/blood level which   indicates a hyperacute active bleeding hematoma as demonstrate on the study   of July 22nd. 2.  Hematoma of a small size superficial to the right common femoral artery. Cannot exclude a pseudoaneurysm formation. Can correlate with the ultrasound   of the right groin. CT ABDOMEN PELVIS W IV CONTRAST Additional Contrast? None   Final Result   1. Large left lateral abdominal wall hematoma measuring 13 cm x 12 cm x 8 cm. There is a fluid fluid level seen within the abdominal wall hematoma   consistent with acute on subacute hemorrhage. There is minimal dense   material seen layering on the fluid fluid level centrally consistent with   minimal active bleeding. Surrounding intramuscular edema is noted within the   oblique muscles. There is also induration within the subcutaneous soft   tissues consistent with reactive changes. 2. Postsurgical changes within the right groin from recent intervention and   TAVR procedure. There is a 2.5 x 2.5 cm hematoma within the right groin. 3. Cholelithiasis. There is no evidence of acute cholecystitis. 4. There is no aortic or iliac artery injury. XR CHEST PORTABLE   Final Result   1. Status post more remote mid sternotomy. Status post recent endovascular   repair for the aortic root. 2.  Borderline size heart. No acute pulmonary process. Resident's Assessment and Plan     Assessment and Plan:    Abdominal wall hematoma likely 2/2 heparin injections and TAVR  Repeat CT showed slight decrease in left oblique hematoma and stable right inguinal hematoma   Hemoglobin 8.9, continues to trend up, likely hematoma from CT scan and increasing Hgb has stabilized  Follow daily CBCs   INR 1.4 and PTT 15.7 today.  Goal 2 - 2.5 INR  Per General Surgery, Ok to restart anticoagulation on 07/28   Per patient discussion and discussion with CV surgery, preference is for IV heparin expanding hematoma  Abdonlitalha, PT/OT  Pain control  sp 29mm Evolut PRO+ valve on 7/14/2021 using b/l groin approach. TTE post procedure: Trace paravalvular regurgitation. MG 7, DVI 0.67--   Also afib- high chadsvas     Hematoma-s- repeat CT evolving - slight growth - less hyperacute - likely stopped now hematoma   -- spreading pain around back. Also bc immobile in bed.   sp 3rd CT stable, but looks worse possibly today  Will dfefer reversing anticaoguation though, bc clincially stable  Needs anticoagulation soon  Will continue holding coumadin - but resume very soon  inr 1.6 subtherapeutic  surgergy opinion 7/28 resume anticoagulation  ? bridge if need when INR drops- IV heparin vs off label use eliquis could days - discussed  Then back on coumadin goal INR 2-2. 5? For now- CVA risk vs bleeds     We Discussed case with CV surgery -at -- he does want bridging coumaidin s- OK with starting in am tomorrow  Start low dose heparin protocol and give first dose coumadin in AM tomorrow (don't wait till evening)- I ordered this  Hematomas at risk for myositis ossificans  Vs infected hematoma-- watching for these  Her case also urine outflow- urethral flow affected     Urinary retention issues, dec detrussor tone, also unwilling to move 2 to pain, unwilling to valsalva   -- urinary catheter  +- bethanacol vs flomax later if need     Today remove urinary catheter - trial check post void- again requesting     Bedside commode   sheeba improving  Roberth, PT/OT  Pain control     >50% of time spent coordinating care with other providers and/or counseling patient/family  Remainder of medical problems as per resident note.     IV heparin bridge-- start in am +coumadin  She WILL NOT be getting lovenox shots  NO shots through skin     Patient preference for IV heparin and coumadin bridge - will use both and this means she would need to stay in hospital (vs [facility]) for next 4-5 days as anticoaglation builds up.   Patient wants to \"play\" it safe, also per request of CV surgery CC- so plan to stay in house over next 5 days or so- medically high risk for rebleed or CVA  surgergy opinion 7/28 resume anticoagulation  ? bridge if need when INR drops- IV heparin vs off label use eliquis could days - discussed  Then back on coumadin goal INR 2-2.5? At least for now--For now- CVA risk vs bleeds     Discussed w outpatient primary physician  We Discussed case with CV surgery -at [address] [place] [address]-- he does want bridging coumaidin s- OK with starting in am tomorrow  Start low dose heparin IV protocol and give first dose coumadin in AM tomorrow (don't wait till evening)- I ordered this     She WILL NOT be getting lovenox shots  NO shots through skin     Patient preference for IV heparin and coumadin bridge - will use both and this means she would need to stay in hospital (vs [facility]) for next 4-5 days as anticoaglation builds up.   Patient wants to \"play\" it safe, also per request of CV surgery CC- so plan to stay in house over next 5 days or so- medically high risk for rebleed or CVA  Done    I also believes she bleeds excessively related to lovenox above and beyond the trauma of the shots

## 2021-07-29 LAB
ALBUMIN SERPL-MCNC: 3 G/DL (ref 3.5–5.2)
ALP BLD-CCNC: 97 U/L (ref 35–104)
ALT SERPL-CCNC: 28 U/L (ref 0–32)
ANION GAP SERPL CALCULATED.3IONS-SCNC: 14 MMOL/L (ref 7–16)
APTT: 74.6 SEC (ref 24.5–35.1)
APTT: 78.7 SEC (ref 24.5–35.1)
AST SERPL-CCNC: 26 U/L (ref 0–31)
BASOPHILS ABSOLUTE: 0.04 E9/L (ref 0–0.2)
BASOPHILS RELATIVE PERCENT: 0.4 % (ref 0–2)
BILIRUB SERPL-MCNC: 2.9 MG/DL (ref 0–1.2)
BUN BLDV-MCNC: 11 MG/DL (ref 6–23)
CALCIUM SERPL-MCNC: 8.8 MG/DL (ref 8.6–10.2)
CHLORIDE BLD-SCNC: 102 MMOL/L (ref 98–107)
CO2: 22 MMOL/L (ref 22–29)
CREAT SERPL-MCNC: 0.7 MG/DL (ref 0.5–1)
EOSINOPHILS ABSOLUTE: 0.27 E9/L (ref 0.05–0.5)
EOSINOPHILS RELATIVE PERCENT: 2.9 % (ref 0–6)
GFR AFRICAN AMERICAN: >60
GFR NON-AFRICAN AMERICAN: >60 ML/MIN/1.73
GLUCOSE BLD-MCNC: 113 MG/DL (ref 74–99)
HCT VFR BLD CALC: 30.1 % (ref 34–48)
HEMOGLOBIN: 9.4 G/DL (ref 11.5–15.5)
IMMATURE GRANULOCYTES #: 0.16 E9/L
IMMATURE GRANULOCYTES %: 1.7 % (ref 0–5)
INR BLD: 1.3
LYMPHOCYTES ABSOLUTE: 1.89 E9/L (ref 1.5–4)
LYMPHOCYTES RELATIVE PERCENT: 20.1 % (ref 20–42)
MCH RBC QN AUTO: 29.1 PG (ref 26–35)
MCHC RBC AUTO-ENTMCNC: 31.2 % (ref 32–34.5)
MCV RBC AUTO: 93.2 FL (ref 80–99.9)
METER GLUCOSE: 123 MG/DL (ref 74–99)
METER GLUCOSE: 146 MG/DL (ref 74–99)
METER GLUCOSE: 182 MG/DL (ref 74–99)
METER GLUCOSE: 200 MG/DL (ref 74–99)
MONOCYTES ABSOLUTE: 0.67 E9/L (ref 0.1–0.95)
MONOCYTES RELATIVE PERCENT: 7.1 % (ref 2–12)
NEUTROPHILS ABSOLUTE: 6.37 E9/L (ref 1.8–7.3)
NEUTROPHILS RELATIVE PERCENT: 67.8 % (ref 43–80)
PDW BLD-RTO: 15.4 FL (ref 11.5–15)
PLATELET # BLD: 338 E9/L (ref 130–450)
PMV BLD AUTO: 10.7 FL (ref 7–12)
POTASSIUM SERPL-SCNC: 4.2 MMOL/L (ref 3.5–5)
PROTHROMBIN TIME: 14.6 SEC (ref 9.3–12.4)
RBC # BLD: 3.23 E12/L (ref 3.5–5.5)
SODIUM BLD-SCNC: 138 MMOL/L (ref 132–146)
TOTAL PROTEIN: 5.8 G/DL (ref 6.4–8.3)
WBC # BLD: 9.4 E9/L (ref 4.5–11.5)

## 2021-07-29 PROCEDURE — 85610 PROTHROMBIN TIME: CPT

## 2021-07-29 PROCEDURE — 82962 GLUCOSE BLOOD TEST: CPT

## 2021-07-29 PROCEDURE — 6370000000 HC RX 637 (ALT 250 FOR IP): Performed by: INTERNAL MEDICINE

## 2021-07-29 PROCEDURE — 85025 COMPLETE CBC W/AUTO DIFF WBC: CPT

## 2021-07-29 PROCEDURE — 2140000000 HC CCU INTERMEDIATE R&B

## 2021-07-29 PROCEDURE — 2580000003 HC RX 258: Performed by: INTERNAL MEDICINE

## 2021-07-29 PROCEDURE — 6360000002 HC RX W HCPCS: Performed by: INTERNAL MEDICINE

## 2021-07-29 PROCEDURE — 80053 COMPREHEN METABOLIC PANEL: CPT

## 2021-07-29 PROCEDURE — 99232 SBSQ HOSP IP/OBS MODERATE 35: CPT | Performed by: INTERNAL MEDICINE

## 2021-07-29 PROCEDURE — 85730 THROMBOPLASTIN TIME PARTIAL: CPT

## 2021-07-29 PROCEDURE — 6370000000 HC RX 637 (ALT 250 FOR IP): Performed by: SURGERY

## 2021-07-29 PROCEDURE — 36415 COLL VENOUS BLD VENIPUNCTURE: CPT

## 2021-07-29 RX ADMIN — DOCUSATE SODIUM 100 MG: 100 CAPSULE ORAL at 19:29

## 2021-07-29 RX ADMIN — METOPROLOL TARTRATE 50 MG: 50 TABLET, FILM COATED ORAL at 09:01

## 2021-07-29 RX ADMIN — METOPROLOL TARTRATE 50 MG: 50 TABLET, FILM COATED ORAL at 19:29

## 2021-07-29 RX ADMIN — BRIMONIDINE TARTRATE 1 DROP: 2 SOLUTION OPHTHALMIC at 09:02

## 2021-07-29 RX ADMIN — PANTOPRAZOLE SODIUM 40 MG: 40 TABLET, DELAYED RELEASE ORAL at 09:01

## 2021-07-29 RX ADMIN — INSULIN LISPRO 2 UNITS: 100 INJECTION, SOLUTION INTRAVENOUS; SUBCUTANEOUS at 11:54

## 2021-07-29 RX ADMIN — HEPARIN SODIUM 12.51 UNITS/KG/HR: 10000 INJECTION, SOLUTION INTRAVENOUS at 02:57

## 2021-07-29 RX ADMIN — WARFARIN SODIUM 3 MG: 3 TABLET ORAL at 17:33

## 2021-07-29 RX ADMIN — INSULIN LISPRO 1 UNITS: 100 INJECTION, SOLUTION INTRAVENOUS; SUBCUTANEOUS at 19:31

## 2021-07-29 RX ADMIN — SENNOSIDES 17.2 MG: 8.6 TABLET, FILM COATED ORAL at 19:29

## 2021-07-29 RX ADMIN — LATANOPROST 1 DROP: 50 SOLUTION OPHTHALMIC at 20:17

## 2021-07-29 RX ADMIN — ATORVASTATIN CALCIUM 20 MG: 20 TABLET, FILM COATED ORAL at 09:01

## 2021-07-29 RX ADMIN — Medication 10 ML: at 20:17

## 2021-07-29 RX ADMIN — INSULIN LISPRO 1 UNITS: 100 INJECTION, SOLUTION INTRAVENOUS; SUBCUTANEOUS at 16:57

## 2021-07-29 RX ADMIN — DOCUSATE SODIUM 100 MG: 100 CAPSULE ORAL at 09:01

## 2021-07-29 RX ADMIN — OXYCODONE HYDROCHLORIDE 10 MG: 10 TABLET ORAL at 15:06

## 2021-07-29 ASSESSMENT — PAIN SCALES - GENERAL
PAINLEVEL_OUTOF10: 0
PAINLEVEL_OUTOF10: 10
PAINLEVEL_OUTOF10: 0

## 2021-07-29 ASSESSMENT — PAIN DESCRIPTION - DESCRIPTORS: DESCRIPTORS: SHARP

## 2021-07-29 ASSESSMENT — PAIN DESCRIPTION - LOCATION: LOCATION: ABDOMEN

## 2021-07-29 ASSESSMENT — PAIN DESCRIPTION - ORIENTATION: ORIENTATION: LEFT;LOWER

## 2021-07-29 NOTE — PLAN OF CARE
Problem: Falls - Risk of:  Goal: Will remain free from falls  Description: Will remain free from falls  Outcome: Not Met This Shift  Goal: Absence of physical injury  Description: Absence of physical injury  Outcome: Not Met This Shift     Problem: Falls - Risk of:  Goal: Absence of physical injury  Description: Absence of physical injury  Outcome: Not Met This Shift     Problem: Falls - Risk of:  Goal: Will remain free from falls  Description: Will remain free from falls  Outcome: Not Met This Shift     Problem: Pain:  Goal: Pain level will decrease  Description: Pain level will decrease  Outcome: Not Met This Shift  Goal: Control of acute pain  Description: Control of acute pain  Outcome: Not Met This Shift  Goal: Control of chronic pain  Description: Control of chronic pain  Outcome: Not Met This Shift     Problem: Pain:  Goal: Pain level will decrease  Description: Pain level will decrease  Outcome: Not Met This Shift     Problem: Pain:  Goal: Control of acute pain  Description: Control of acute pain  Outcome: Not Met This Shift     Problem: Pain:  Goal: Control of chronic pain  Description: Control of chronic pain  Outcome: Not Met This Shift     Problem: Skin Integrity:  Goal: Will show no infection signs and symptoms  Description: Will show no infection signs and symptoms  Outcome: Not Met This Shift  Goal: Absence of new skin breakdown  Description: Absence of new skin breakdown  Outcome: Not Met This Shift     Problem: Skin Integrity:  Goal: Will show no infection signs and symptoms  Description: Will show no infection signs and symptoms  Outcome: Not Met This Shift     Problem: Inadequate oral food/beverage intake (NI-2.1)  Goal: Food and/or Nutrient Delivery  Description: Individualized approach for food/nutrient provision.   7/29/2021 1059 by Cass Simon RD, LD  Outcome: Met This Shift     Problem: Inadequate oral food/beverage intake (NI-2.1)  Goal: Food and/or Nutrient Delivery  Description: Individualized approach for food/nutrient provision.   7/29/2021 1059 by Alvin Li RD, LD  Outcome: Met This Shift     Problem: Musculor/Skeletal Functional Status  Goal: Absence of falls  Outcome: Not Met This Shift     Problem: Musculor/Skeletal Functional Status  Goal: Highest potential functional level  Outcome: Not Met This Shift     Problem: Musculor/Skeletal Functional Status  Goal: Highest potential functional level  Outcome: Not Met This Shift  Goal: Absence of falls  Outcome: Not Met This Shift

## 2021-07-29 NOTE — PROGRESS NOTES
[facility]  Internal Medicine Residency / [address]    Attending Physician Statement  I have discussed the case, including pertinent history and exam findings with the resident and the team.  I have seen and examined the patient and the key elements of the encounter have been performed by me. I have also personally reviewed imaging studies and labs. I agree with the assessment, plan and orders as documented by the resident. Doing better today. Pain is better controlled. She was able to ambulate yesterday as well. She has been able to use bedside commode and notes good urine output. She would like to return home, has good family support. Assessment:  1. Abdominal wall hematoma. Warfarin bridge started yesterday. 2. DM has been controlled  3. S/p recent TAVR      Plan:  Monitor for new signs of bleed while on heparin  Will likely stay in hospital while we achieve therapeutic INR as she is high risk for bleeding and will need to be monitored  No need for flomax or bethanecol at this time  Reduce to daily CBC  Continue PT/OT        Remainder of medical problems as per resident note. Eugene Simon MD  Internal Medicine Residency Faculty

## 2021-07-29 NOTE — PROGRESS NOTES
obese  HEENT:      Head: Normocephalic atraumatic. No scleral icterus. Pupils are equal, round, and reactive to light. Cardiovascular: irrregular rate, Irregular rhythm, no murmurs rubs or gallops    Pulmonary: Chest rise symmetric, able to breathe deep with minimal pain. No wheezes rhonchi or rales  Abdominal: soft, tender to palpation LUQ and LLQ>RUQ +RLQ Echymosis noted  Musculoskeletal: Can spontaneously move all 4 extremities. ecchymosis noted on both arms   Neurological:    AOx3, can spontaneously move all 4 extremities. Psychiatric:         Behavior: Behavior normal.         Thought Content: Thought content normal.         Judgment: Judgment normal.   Subject  Pertinent Labs & Imaging Studies   nuvia  CBC:   Recent Labs     07/27/21  0458 07/27/21  0458 07/28/21  0650 07/28/21  1737 07/29/21  0634   WBC 9.5  --  8.9  --  9.4   HGB 8.7*   < > 8.9* 8.9* 9.4*   HCT 27.3*   < > 28.6* 28.8* 30.1*   MCV 91.3  --  92.9  --  93.2     --  292  --  338    < > = values in this interval not displayed.        BMP:    Recent Labs     07/27/21  0458 07/28/21  0650 07/29/21  0634    136 138   K 4.3 4.0 4.2    100 102   CO2 21* 24 22   BUN 12 13 11   CREATININE 0.6 0.8 0.7   GLUCOSE 111* 152* 113*       LIVER PROFILE:   Recent Labs     07/27/21  0458 07/28/21  0650 07/29/21  0634   AST 45* 35* 26   ALT 29 32 28   BILITOT 2.2* 3.0* 2.9*   ALKPHOS 89 103 97       PT/INR:   Recent Labs     07/27/21  0458 07/28/21  0650 07/29/21  0634   PROTIME 15.4* 15.7* 14.6*   INR 1.4 1.4 1.3       APTT:   Recent Labs     07/28/21  2321 07/29/21  0634 07/29/21  1225   APTT 62.4* 74.6* 78.7*       Fasting Lipid Panel:    Lab Results   Component Value Date    CHOL 129 03/08/2021    TRIG 57 03/08/2021    HDL 53 03/08/2021       Notable Cultures:      Blood cultures   Blood Culture, Routine   Date Value Ref Range Status   07/22/2021 5 Days no growth  Final     Respiratory cultures No results found for: RESPCULTURE No results found for: LABGRAM  Urine   Urine Culture, Routine   Date Value Ref Range Status   07/22/2021 Growth not present  Final     Legionella No results found for: LABLEGI  C Diff PCR No results found for: CDIFPCR  Wound culture/abscess: No results for input(s): WNDABS in the last 72 hours. Tip culture:No results for input(s): CXCATHTIP in the last 72 hours. Echo Limited    Result Date: 7/16/2021  Transthoracic Echocardiography Report (TTE)  Demographics   Patient Name      Khadijah Irvin Gender              Female                    L   Medical Record    [id number]       Room Number         5032  Number   Account #         [de-identified]      Procedure Date      07/15/2021   Corporate ID                     Ordering Physician  Shannan Pina MD   Accession Number  [id number]     Referring Physician   Date of Birth     1945     Sonographer         Vivian George                                                       [facility]   Age               77 year(s)     Interpreting        Shannan Pina MD                                   Physician                                    Any Other  Procedure Type of Study   TTE procedure:Echo Limited Study. Procedure Date Date: 07/15/2021 Start: 09:18 AM Study Location: Portable Technical Quality: Limited visualization due to body habitus. Indications:S/P TAVR. Patient Status: Pending Discharge Height: 64 inches Weight: 240 pounds BSA: 2.11 m^2 BMI: 41.2 kg/m^2 Allergies   - Other drug:(Phenobarbital). Findings   Left Ventricle  Normal left ventricle size. Estimated left ventricle ejection fraction 60+/-5 %. Right Ventricle  Normal right ventricular size and function. Mitral Valve  Well-seated surgical mitral bioprosthesis. No significant mitral  regurgitation. Tricuspid Valve  Moderate tricuspid regurgitation. RVSP is ~37 mmHg. Aortic Valve  There is a well-seated transcatheter valve in the aortic position. Trace  paravalvular regurgitation. MG 7, DVI 0.67.    Pericardial Effusion opacity or pleural effusion. Median sternotomy wires are present. The heart is normal in size. No pneumothorax. 1. Moderate cardiomegaly. 2. No airspace opacity or pleural effusion. CT ABDOMEN PELVIS W IV CONTRAST Additional Contrast? None    Result Date: 7/22/2021  EXAMINATION: CT OF THE ABDOMEN AND PELVIS WITH CONTRAST 7/22/2021 3:53 pm TECHNIQUE: CT of the abdomen and pelvis was performed with the administration of intravenous contrast. Multiplanar reformatted images are provided for review. Dose modulation, iterative reconstruction, and/or weight based adjustment of the mA/kV was utilized to reduce the radiation dose to as low as reasonably achievable. COMPARISON: None. HISTORY: ORDERING SYSTEM PROVIDED HISTORY: ab pain s/p TAVR TECHNOLOGIST PROVIDED HISTORY: Reason for exam:->ab pain s/p TAVR Additional Contrast?->None Decision Support Exception - unselect if not a suspected or confirmed emergency medical condition->Emergency Medical Condition (MA) What reading provider will be dictating this exam?->CRC FINDINGS: Lower Chest: Images through lung bases demonstrate mild cardiomegaly. Note is made of an aortic valve. The left atrium is enlarged. There is no right or left lung base infiltrate or effusion. Organs: The liver, spleen, pancreas and adrenal glands are unremarkable. The kidneys enhance symmetrically without evidence of hydronephrosis. GI/Bowel: There is a large stones seen within the gallbladder lumen measuring 3.4 x 2.4 cm. There are no findings of acute cholecystitis. The stomach is normally distended. There is a small hiatal hernia. There is no large or small bowel obstruction. There are no findings of inflammatory bowel disease. Multiple sigmoid diverticula noted. There is no evidence of diverticulitis. Pelvis:  Bladder is unremarkable in appearance. There is a uterine fibroid measuring approximately 3.3 x 3.4 cm.  Peritoneum/Retroperitoneum: There are postsurgical changes seen within the right groin from recent surgical intervention. There is a small hematoma measuring 2.5 x 2.5 cm. The abdominal aorta is normal caliber. There is no dissection of the abdominal aorta or of the iliac arteries. There is a large left lateral abdominal wall hematoma measuring 13 cm in maximum craniocaudal dimension by 12 cm in maximum AP diameter by 8 cm in maximum transverse diameter. There is a fluid fluid level seen within the left lateral abdominal wall hematoma consistent with acute on subacute hemorrhage. Along the superior aspect of the fluid fluid level there is minimal dense layering material suggestive of mild active bleeding. Bones/Soft Tissues:  Age related degenerative changes of the visualized osseous structures without focal destructive lesion. 1. Large left lateral abdominal wall hematoma measuring 13 cm x 12 cm x 8 cm. There is a fluid fluid level seen within the abdominal wall hematoma consistent with acute on subacute hemorrhage. There is minimal dense material seen layering on the fluid fluid level centrally consistent with minimal active bleeding. Surrounding intramuscular edema is noted within the oblique muscles. There is also induration within the subcutaneous soft tissues consistent with reactive changes. 2. Postsurgical changes within the right groin from recent intervention and TAVR procedure. There is a 2.5 x 2.5 cm hematoma within the right groin. 3. Cholelithiasis. There is no evidence of acute cholecystitis. 4. There is no aortic or iliac artery injury. XR CHEST PORTABLE    Result Date: 7/22/2021  EXAMINATION: ONE XRAY VIEW OF THE CHEST 7/22/2021 1:17 pm COMPARISON: March 8, May 26, July 9 HISTORY: ORDERING SYSTEM PROVIDED HISTORY: Shortness of breath TECHNOLOGIST PROVIDED HISTORY: Reason for exam:->Shortness of breath What reading provider will be dictating this exam?->CRC FINDINGS: Status post previous mid sternotomy.   More recent endovascular repair for the aortic root. Heart has borderline size. The no acute infiltrates, consolidates or pleural effusions are seen. There is no perihilar vascular congestion. 1.  Status post more remote mid sternotomy. Status post recent endovascular repair for the aortic root. 2.  Borderline size heart. No acute pulmonary process. Resident's Assessment and Plan     Assessment and Plan:    Abdominal hematoma and groin hematoma likely 2/2 hep injections and TAVR, in context of increased PTT/ dual anticoagulation   abdominal CT at admission showed [id number] Lat abdominal wall and a 2.5x2.5 cm groin hematoma. Repeat CT showed transition from hyperacute to acute stage of bleeding, with possible slight enlargement of lateral wall hematoma. Final CT abd 7/25 showed small interval decrease in size of abd hematoma, groin stable. Abd hematoma thought to be stable and resorbing at this time. Pain decreasing, symptoms improving   Hemoglobin 9.4 today. Stable, trending up. Increased bili most likely indirect and due to hematomas  First dose of warfarin with heparin bridge started yesterday. INR 1.3, will continue hep drip and warfarin   Hematomas are at risk for myositis ossificans and infection. Will monitor for fever and leukocytosis. Pain radiation to back has resolved. Pain has not changed in character or radiating to new areas. Decreased intensity 3/10 today  ALDA likely pre-renal 2/2 to intravascular volume depletion 2/2 #1 vs ATN-resolved  Cr 0.7 today, baseline 0.7  Hx of a fib, currently in A-fib  Continue metoprolol 50mg BID for rate control  Anticoagulation as above  CHADSVASc 6, 9.7% stroke risk/yr     Urinary retention 2/2 #1- resolved  Menendez removed, no incontinence, no urinary symptoms PVR <100.  Etiology likely due to pain on valsalva caused by abdominal hematoma  Hx of mitral valve replacement  Continue anticoagulation as above  Hx of severe aortic stenosis s/p TAVR  Continue anticoagulation as above  LA type A-resolved  Hx of GERD  Continue protonix   Hx of DM  On LDSS. Well controlled.  Last   Hx of HFpEF  Last Pro , at baseline, no crackles, no increased lower extremity edema on exam.   Hx of glaucoma  Continue latanaprost and bromonidine    PT/OT evaluation: OT/PT seen  DVT prophylaxis/ GI prophylaxis: protonix  Disposition: continue current care    Viviane Cadet MD PGY-1  Attending physician: Rik Awad MD

## 2021-07-29 NOTE — PROGRESS NOTES
[facility]  Internal Medicine Residency Program  Progress Note - House Team 1    Patient:  Swati Hernandez 77 y.o. female MRN: [id number]     Date of Service: 7/29/2021   Hospital Day: 8      CHIEF  COMPLAINT:     -had concerns including Abdominal Pain (states it started a few days ago but recently started vomiting). OVERNIGHT  EVENTS:     -PVR < 100cc    SUBJECTIVE:     -feeling well, no complaints  -pain well controlled 3/10  -tolerating diet  -making urine, having BM    OBJECTIVE:     PHYSICAL  EXAM:  · Vitals: BP (!) 160/72   Pulse 100   Temp 98.4 °F (36.9 °C) (Oral)   Resp 17   Ht 5' 4\" (1.626 m)   Wt 244 lb (110.7 kg)   SpO2 96%   BMI 41.88 kg/m²     · I & O - 24hr: No intake/output data recorded. · General Appearance: alert, appears stated age and cooperative  · HEENT:  Head: Normal, normocephalic, atraumatic. · Neck: no JVD and supple, symmetrical, trachea midline  · Lung: clear to auscultation bilaterally  · Heart: irregularly irregular  · Abdomen: bowel sounds throughout. soft w/o rebound or gaurding. Mild to moderate pain on palpation of L abd in region of hematoma on CT scan. Extensive yellow/purple and slightly green ecchymosis throughout upper abd and Left lower abd w/ extension into Left flank and paraspinal region b/l improving today.   · Extremities:  extremities normal, atraumatic, no cyanosis or edema  · Musculokeletal: No joint swelling, no muscle tenderness. ROM normal in all joints of extremities.    · Neurologic: Mental status: Alert, oriented, thought content appropriate    LABS  -&-  IMAGING  STUDIES:     LABORATORY  WORK:    Recent Labs     07/27/21  0458 07/28/21  0650 07/29/21  0634    136 138   K 4.3 4.0 4.2    100 102   CO2 21* 24 22   BUN 12 13 11   CREATININE 0.6 0.8 0.7   GLUCOSE 111* 152* 113*   CALCIUM 8.0* 8.5* 8.8       Recent Labs     07/27/21  0458 07/27/21  0458 07/28/21  0650 07/28/21  1737 07/29/21  0634   WBC 9.5  --  8.9  -- 9. 4   RBC 2.99*  --  3.08*  --  3.23*   HGB 8.7*   < > 8.9* 8.9* 9.4*   HCT 27.3*   < > 28.6* 28.8* 30.1*   MCV 91.3  --  92.9  --  93.2   MCH 29.1  --  28.9  --  29.1   MCHC 31.9*  --  31.1*  --  31.2*   RDW 14.8  --  15.0  --  15.4*     --  292  --  338   MPV 10.2  --  10.5  --  10.7    < > = values in this interval not displayed. PT/INR:    Lab Results   Component Value Date    PROTIME 14.6 07/29/2021    PROTIME 21.2 07/21/2021    INR 1.3 07/29/2021     APTT   Ref Range & Units 07/29/21 0634 07/28/21 2321   aPTT 24.5 - 35.1 sec 74. 6High   62.4High                IMAGING  STUDIES:    Echo Complete    Result Date: 7/28/2021  Transthoracic Echocardiography Report (TTE)  Demographics   Patient Name    [address] [place] [address]          Gender            Female                  [address] [facility]  [id number]      Room Number       6408  Number   Account #       [de-identified]     Procedure Date    07/28/2021   Corporate ID                  Ordering                                Physician   Accession       [id number]    Referring  Number                        Physician   Date of Birth   1945    Sonographer       Zenaida ESPINOZA   Age             77 year(s)    Interpreting      [address]                                Physician         Physician Cardiology                                                  Juve Graham MD                                 Any Other  Procedure Type of Study   TTE procedure:Echocardiogram W/Contrast.  Procedure Date Date: 07/28/2021 Start: 11:37 AM Study Location: Portable Technical Quality: Limited visualization due to body habitus. Indications:S/P TAVR and LV function. Patient Status: Routine Contrast Medium: Definity. Amount - 2 ml Contrast Comments: given by the rn. Height: 64 inches Weight: 242 pounds BSA: 2.12 m^2 BMI: 41.54 kg/m^2 Rhythm: Within normal limits HR: 77 bpm BP: 111/63 mmHg Allergies   - Other drug:(Phenobarbital).   Findings   Left Ventricle  Normal left ventricular chamber size. Normal left ventricular systolic function. , LVEF is 70%. Mild left ventricular concentric hypertrophy noted. Indeterminate LV diastolic function. Right Ventricle  Normal right ventricle size and function. Left Atrium  Left atrium is severely enlarged. Increased left atrial volume. Interatrial septum not well visualized but appears intact. Right Atrium  Normal right atrium. 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 Mild-to-moderate mitral stenosis - Mean gradient 5,mmHg, peak 19mmHg, MVA  1.2cm2 by continuity. There is trace mitral regurgitation. Tricuspid Valve  Tricuspid was not well visualized. There is mild tricuspid regurgitation. 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 No hemodynamically significant aortic stenosis - mean gradient 6mmHg.  small mason-valvualr regurgitation. Pulmonic Valve  The pulmonic valve was not well visualized. Pericardial Effusion  No evidence of pericardial effusion. Pericardium appears normal.   Pleural Effusion  No evidence of pleural effusion. Aorta  Normal aortic root size and ascending aorta. Miscellaneous  The inferior vena cava not well visualized. Conclusions   Summary  Technically sub-optimal images - Definity Echo contrast used. Normal left ventricular chamber size. Normal left ventricular systolic function. , LVEF is 70%. Mild left ventricular concentric hypertrophy noted. Indeterminate LV diastolic function. Left atrium is severely enlarged. Increased left atrial volume. Interatrial septum not well visualized but appears intact. Normal right ventricle size and function. 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   There Mean Velocity:   mmHg                  TR Velocity:3.22 m/s  0.98 m/s            AV VTI: 34.7 cm       TR Gradient:41.34 mmHg  MV P1/2t: 60.2 msec AV Area  MVA by PHT:3.65     (Continuity):1.61  cm^2                cm^2  MV Area                                   NC ED Velocity: 0.52 m/s  (continuity): 1.2   LVOT VTI: 16.1 cm  cm^2  MV E' Septal  Velocity: 0.05 m/s  MV E' Lateral  Velocity: 5 m/s  [email or web address] [id number]    Echo Limited    Result Date: 7/16/2021  Transthoracic Echocardiography Report (TTE)  Demographics   Patient Name      Nnamdi Davies Gender              Female                    L   Medical Record    [id number]       Room Number         6999  Number   Account #         [de-identified]      Procedure Date      07/15/2021   Corporate ID                     Ordering Physician  Sean Arreaga MD   Accession Number  [id number]     Referring Physician   Date of Birth     1945     Sonographer         Helena Cole                                                       [facility]   Age               77 year(s)     Interpreting        Sean Arreaga MD                                   Physician                                    Any Other  Procedure Type of Study   TTE procedure:Echo Limited Study. Procedure Date Date: 07/15/2021 Start: 09:18 AM Study Location: Portable Technical Quality: Limited visualization due to body habitus. Indications:S/P TAVR. Patient Status: Pending Discharge Height: 64 inches Weight: 240 pounds BSA: 2.11 m^2 BMI: 41.2 kg/m^2 Allergies   - Other drug:(Phenobarbital). Findings   Left Ventricle  Normal left ventricle size. Estimated left ventricle ejection fraction 60+/-5 %. Right Ventricle  Normal right ventricular size and function. Mitral Valve  Well-seated surgical mitral bioprosthesis. No significant mitral  regurgitation.    Tricuspid Valve  Moderate tricuspid regurgitation. RVSP is ~37 mmHg. Aortic Valve  There is a well-seated transcatheter valve in the aortic position. Trace  paravalvular regurgitation. MG 7, DVI 0.67. Pericardial Effusion  No evidence of pericardial effusion. Aorta  Aortic root dimension within normal limits. Miscellaneous  Normal Inferior Vena Cava diameter and respiratory variation. Conclusions   Summary  **POD1 s/p TAVR with 29-mm Evolut PRO+; h/o MVR with #25 Mosaic (2007)**  Normal left ventricle size. Estimated left ventricle ejection fraction  60+/-5 %. Normal right ventricular size and function. Well-seated surgical mitral bioprosthesis. No significant mitral  regurgitation. There is a well-seated transcatheter valve in the aortic position. Trace  paravalvular regurgitation. MG 7, DVI 0.67. No evidence of pericardial effusion. Signature   ----------------------------------------------------------------  Electronically signed by Gregory Olivarez MD(Interpreting physician)  on 07/16/2021 12:54 PM  ----------------------------------------------------------------  M-Mode/2D Measurements & Calculations    LVOT: 2.1 cm              Ascending Aorta: 2.8 cm                              IVC Expiration: 1.7 cm  Doppler Measurements & Calculations    AV Peak Velocity: 1.77 m/s LVOT Peak Velocity: 1.21 m/s   AV Peak Gradient: 12.52    LVOT Mean Velocity: 0.76 m/s   mmHg                       LVOT Peak Gradient: 5.9 mmHgLVOT Mean   AV Mean Velocity: 1.13 m/s Gradient: 2.9 mmHg   AV Mean Gradient: 6.1 mmHg   AV VTI: 45.4 cm   AV Area (Continuity):2.33   cm^2                       TR Velocity:2.91 m/s                              TR Gradient:33.8 mmHg   LVOT VTI: 30.5 cm  [email or web address] [id number]    CT ABDOMEN PELVIS WO CONTRAST Additional Contrast? None    Result Date: 7/25/2021  EXAMINATION: CT OF THE ABDOMEN AND PELVIS WITHOUT CONTRAST 7/25/2021 10:27 am TECHNIQUE: CT of the abdomen and pelvis was performed without the administration of intravenous contrast. Multiplanar reformatted images are provided for review. Dose modulation, iterative reconstruction, and/or weight based adjustment of the mA/kV was utilized to reduce the radiation dose to as low as reasonably achievable. COMPARISON: July 23, 2021 HISTORY: ORDERING SYSTEM PROVIDED HISTORY: assess change of abdominal wall hematoma and groin hematoma TECHNOLOGIST PROVIDED HISTORY: Reason for exam:->assess change of abdominal wall hematoma and groin hematoma Additional Contrast?->None What reading provider will be dictating this exam?->CRC FINDINGS: Lower Chest: Heart size is top-normal to mildly enlarged. Postoperative changes related to transfemoral aortic valve replacement are noted. Mild dependent atelectasis is present at both lung bases. There is a small hiatal hernia. Organs: The gallbladder is distended without obvious wall thickening. There is a large peripherally calcified gallstone near the neck of the gallbladder. The unenhanced liver, spleen and adrenal glands are normal in appearance. The pancreas demonstrates diffuse fatty atrophy. The kidneys are without hydronephrosis or radiopaque calculus. There are bilateral indeterminate/intermediate density renal lesions. GI/Bowel: No evidence of a bowel obstruction, free air or pneumatosis. Portions the colon are decompressed, limiting assessment for mucosal based abnormalities. There is diverticulosis without evidence of diverticulitis. The appendix is not confidently visualized. No obvious pericecal inflammatory changes. The stomach and duodenal sweep are unremarkable aside from the hiatal hernia. Pelvis: The urinary bladder is decompressed around a Menendez catheter. No obvious uterine or ovarian abnormality aside from a presumed fibroid at the fundus of the uterus. Small volume of free fluid is present in the pelvis.  No pelvic 22nd it measured 12.5 by 8.5 x 11.8 cm. The overall size of the hematoma is stable account some differences in measurement, presently there is more uniform moderated hyperdense appearance of the hematoma indicating an acute phase. No longer seen blood/blood level which indicates a hyperacute phase or an active bleeding hematoma. There is significant edema and increased density of the adjacent muscular planes of the left lateral chest wall extending anteriorly, posteriorly, superior inferiorly, and also into the deep subcutaneous soft tissues of the left lateral abdominal wall indicating oozing of a hemorrhage, forming a ill-defined surrounding interstitial hematoma. There is a localized small hematoma in the right inguinal region which is related with the right common femoral artery. Cannot exclude the pseudoaneurysm formation. Correlation with ultrasound of the right inguinal areas seen. The there is a small pocket of air associated. The patient had aortic valve replacement procedure in July 14, 2021. There is no extension of hematoma into the pelvic sidewall. There is a Menendez catheter in the bladder. The bladder is not distended. Uterus and ovaries have unremarkable appearance. There is no free intraperitoneal air. There is no indication for acute intraperitoneal process in the abdomen or in the pelvis accounting the left lateral chest wall hematoma which spreads into adjacent soft tissues including the left pelvic sidewall. Uncomplicated diverticulosis seen in the sigmoid colon. There is no indication for bowel obstruction. Kidneys are preserved size and cortical thickness, parenchymal enhancement and excretion of the contrast.  Presence of a cyst-like structures in both kidneys but they have density higher than simple cyst, they cannot be characterize presently. When patient condition permits consider initial correlation with ultrasound.   They could represent hemorrhagic cysts or cyst with high proteinaceous content, but other possibilities are not excluded. There are normal size and the density for the liver and spleen. Pancreas demonstrates some atrophy. Gallbladder is well distended with 3 cm gallstone. There is no dilatation of the biliary tree pancreatic ductal system. There is a mild to moderate size hiatal hernia. Adrenals not enlarged. Calcified atheromatous changes are seen in the abdominal aorta there is no aneurysm formation. There is no midline retro peritoneal adenopathy. Heart has normal size. There is endovascular repair for the aortic valve and there is also prosthesis for the mitral valve. The heart is not fully covered in this study. There are areas of a confluent loss of volume indicate a component of atelectasis in the medial aspect of the right lower lobe and towards the base in the left lower lobe. These appear to be more chronic findings but increase since May 7, 2021.     1.  Overall stable large hematoma of the left lateral abdominal wall with surrounding hemorrhage into adjacent muscle planes and deep subcutaneous soft tissues by oozing. The large dominant hematoma now has a uniform hyperdensity indicating acute phase. No longer seen blood/blood level which indicates a hyperacute active bleeding hematoma as demonstrate on the study of July 22nd. 2.  Hematoma of a small size superficial to the right common femoral artery. Cannot exclude a pseudoaneurysm formation. Can correlate with the ultrasound of the right groin. XR CHEST (2 VW)    Result Date: 7/9/2021  EXAMINATION: TWO XRAY VIEWS OF THE CHEST 7/9/2021 1:00 pm COMPARISON: May 26, 2021 HISTORY: ORDERING SYSTEM PROVIDED HISTORY: Dyspnea, unspecified type TECHNOLOGIST PROVIDED HISTORY: What reading provider will be dictating this exam?->CRC FINDINGS: Moderate cardiomegaly. No airspace opacity or pleural effusion. Median sternotomy wires are present. The heart is normal in size. No pneumothorax.      1. Moderate cardiomegaly. 2. No airspace opacity or pleural effusion. CT ABDOMEN PELVIS W IV CONTRAST Additional Contrast? None    Result Date: 7/22/2021  EXAMINATION: CT OF THE ABDOMEN AND PELVIS WITH CONTRAST 7/22/2021 3:53 pm TECHNIQUE: CT of the abdomen and pelvis was performed with the administration of intravenous contrast. Multiplanar reformatted images are provided for review. Dose modulation, iterative reconstruction, and/or weight based adjustment of the mA/kV was utilized to reduce the radiation dose to as low as reasonably achievable. COMPARISON: None. HISTORY: ORDERING SYSTEM PROVIDED HISTORY: ab pain s/p TAVR TECHNOLOGIST PROVIDED HISTORY: Reason for exam:->ab pain s/p TAVR Additional Contrast?->None Decision Support Exception - unselect if not a suspected or confirmed emergency medical condition->Emergency Medical Condition (MA) What reading provider will be dictating this exam?->CRC FINDINGS: Lower Chest: Images through lung bases demonstrate mild cardiomegaly. Note is made of an aortic valve. The left atrium is enlarged. There is no right or left lung base infiltrate or effusion. Organs: The liver, spleen, pancreas and adrenal glands are unremarkable. The kidneys enhance symmetrically without evidence of hydronephrosis. GI/Bowel: There is a large stones seen within the gallbladder lumen measuring 3.4 x 2.4 cm. There are no findings of acute cholecystitis. The stomach is normally distended. There is a small hiatal hernia. There is no large or small bowel obstruction. There are no findings of inflammatory bowel disease. Multiple sigmoid diverticula noted. There is no evidence of diverticulitis. Pelvis:  Bladder is unremarkable in appearance. There is a uterine fibroid measuring approximately 3.3 x 3.4 cm. Peritoneum/Retroperitoneum: There are postsurgical changes seen within the right groin from recent surgical intervention. There is a small hematoma measuring 2.5 x 2.5 cm.  The abdominal aorta is normal caliber. There is no dissection of the abdominal aorta or of the iliac arteries. There is a large left lateral abdominal wall hematoma measuring 13 cm in maximum craniocaudal dimension by 12 cm in maximum AP diameter by 8 cm in maximum transverse diameter. There is a fluid fluid level seen within the left lateral abdominal wall hematoma consistent with acute on subacute hemorrhage. Along the superior aspect of the fluid fluid level there is minimal dense layering material suggestive of mild active bleeding. Bones/Soft Tissues:  Age related degenerative changes of the visualized osseous structures without focal destructive lesion. 1. Large left lateral abdominal wall hematoma measuring 13 cm x 12 cm x 8 cm. There is a fluid fluid level seen within the abdominal wall hematoma consistent with acute on subacute hemorrhage. There is minimal dense material seen layering on the fluid fluid level centrally consistent with minimal active bleeding. Surrounding intramuscular edema is noted within the oblique muscles. There is also induration within the subcutaneous soft tissues consistent with reactive changes. 2. Postsurgical changes within the right groin from recent intervention and TAVR procedure. There is a 2.5 x 2.5 cm hematoma within the right groin. 3. Cholelithiasis. There is no evidence of acute cholecystitis. 4. There is no aortic or iliac artery injury. XR CHEST PORTABLE    Result Date: 7/25/2021  EXAMINATION: ONE XRAY VIEW OF THE CHEST 7/25/2021 9:09 am COMPARISON: July 22, 2021 HISTORY: ORDERING SYSTEM PROVIDED HISTORY: Wheezing TECHNOLOGIST PROVIDED HISTORY: Reason for exam:->Wheezing What reading provider will be dictating this exam?->CRC FINDINGS: Heart size is unable to be accurately assessed on this single portable view of the chest, but appears to be stable. Sternotomy wires are in place. There are postprocedural changes related to transfemoral aortic valve replacement.   Prominence CXR if pt develops pain, change in sputum color, fevers, leukocytosis, or dypnea  7. H/O HFpEF              -not clinically overloaded currently              -cont to monitor  8. H/O TAVR, mitral valve, and CABG              -anticoag as above  8. H/O CAD              -anticoag as above  10. H/O GERD              -cont protonix  11. H/O gluacoma              -cont Latanaprost & bromonidine     PT / OT  EVALUATION:  evaluating     DVT  PROPHYLAXIS:  SCD     GI  PROPHYLAXIS:  Diet / Protonix     DISPOSITION:  cont Admission     JACI Christopher-3/4   ATTENDING  PHYSICIAN: Georgi Mccoy

## 2021-07-29 NOTE — PROGRESS NOTES
Comprehensive Nutrition Assessment    Type and Reason for Visit:  Initial, RD Nutrition Re-Screen/LOS    Nutrition Recommendations/Plan: Continue Diet. Will Start Glucerna Diabetic ONS BID. If intake remains minimal x next ~1-2d, would then recommend to consider EN support. Please consult for updated rec's if EN needed. Nutrition Assessment:  Pt adm w/ worsening abd pain, nausea, fever and weakness x ~2d pta likely 2/2 Abd Wall Hematoma per EMR review. PMHx CHF, CAD, DM; s/p TAVR. Pt at risk d/t poor intake x 7d now since adm d/t poor appetite w/ abd pain 2/2 Abd Wall Hematoma. Will Start ONS and monitor. Malnutrition Assessment:  Malnutrition Status: At risk for malnutrition (Comment)    Context:  Acute Illness     Findings of the 6 clinical characteristics of malnutrition:  Energy Intake:  7 - 50% or less of estimated energy requirements for 5 or more days  Weight Loss:  Unable to assess (2/2 CHF hx)     Body Fat Loss:  No significant body fat loss     Muscle Mass Loss:  No significant muscle mass loss    Fluid Accumulation:  No significant fluid accumulation     Strength:  Not Performed    Estimated Daily Nutrient Needs:  Energy (kcal):  6930-9529 (MSJx1. 2SF); Weight Used for Energy Requirements:  Admission     Protein (g):  110-125 (2.0-2.3); Weight Used for Protein Requirements:  Ideal        Fluid (ml/day):  6756-4802; Method Used for Fluid Requirements:  1 ml/kcal      Nutrition Related Findings:  A&O, poor dentition, tender/non-distended Abd, +BS, no edema, I/O's WNL      Wounds:  Surgical Incision       Current Nutrition Therapies:    ADULT DIET; Regular; 4 carb choices (60 gm/meal);  Low Fat/Low Chol/High Fiber/2 gm Na    Anthropometric Measures:  · Height: 5' 4\" (162.6 cm)  · Current Body Weight: 246 lb (111.6 kg) (stand scale 7/28)   · Admission Body Weight: 242 lb (109.8 kg) (bed 7/23)    · Usual Body Weight: 236 lb (107 kg) (actual 12/17/20 per EMR, VALERIE wt loss properly d/t CHF w/ possible fluid shifts)     · Ideal Body Weight: 120 lbs; % Ideal Body Weight     · BMI: 42.2  · Adjusted Body Weight:  ; No Adjustment   · Adjusted BMI:      · BMI Categories: Obese Class 3 (BMI 40.0 or greater)       Nutrition Diagnosis:   · Inadequate oral intake related to pain (2/2 Abd Wall Hematoma) as evidenced by intake 0-25%, poor intake prior to admission, nausea      Nutrition Interventions:   Food and/or Nutrient Delivery:  Continue Current Diet, Start Oral Nutrition Supplement (Continue Diet. Will Start Glucerna Diabetic ONS BID. If intake remains minimal x next ~1-2d, would then recommend to consider EN support. Please consult for updated rec's if EN needed.)  Nutrition Education/Counseling:  Education not indicated   Coordination of Nutrition Care:  Continue to monitor while inpatient    Goals:  PO intake >50% of meals/ONS. Nutrition Monitoring and Evaluation:   Behavioral-Environmental Outcomes:  None Identified   Food/Nutrient Intake Outcomes:  Diet Advancement/Tolerance, Food and Nutrient Intake, Supplement Intake  Physical Signs/Symptoms Outcomes:  Biochemical Data, Chewing or Swallowing, Constipation, GI Status, Nausea or Vomiting, Fluid Status or Edema, Nutrition Focused Physical Findings, Skin, Weight     Discharge Planning:     Too soon to determine     Electronically signed by Gatito Millard RD, LD on 7/29/21 at 11:01 AM EDT    Contact: ext [phone]

## 2021-07-29 NOTE — CARE COORDINATION
Care Coordination: Discussed with attending today. Plan will be home with [facility] and he discussed with pt and she is agreeable.  INR is 1.3, continues on heparin bridge with goal of 2-2.5    Isael Cardoso

## 2021-07-29 NOTE — PROGRESS NOTES
Pharmacy Consultation Note  (Anticoagulant Dosing and Monitoring)    Initial consult date: 7/28/21  Consulting physician: Dr. Arlene Miller    Allergies:  Lactose intolerance (gi) and Phenobarbital    77 y.o. female      Ht Readings from Last 1 Encounters:   07/22/21 5' 4\" (1.626 m)     Wt Readings from Last 1 Encounters:   07/29/21 244 lb (110.7 kg)         Warfarin Indication Target   INR Range Home   Dose  (if applicable) Diet/Feeding Tube   Bioprosthetic mitral valve replacement  2.0-2.5 3mg MTThFSSu  4mg W Regular diet       Vitamin K or Blood product  Administration Date                 Warfarin drug-drug interactions  Start  Stop Home Med? Comments                                 TSH:    Lab Results   Component Value Date    TSH 2.350 03/08/2021        Hepatic Function Panel:                            Lab Results   Component Value Date    ALKPHOS 97 07/29/2021    ALT 28 07/29/2021    AST 26 07/29/2021    PROT 5.8 07/29/2021    BILITOT 2.9 07/29/2021    BILIDIR <0.2 08/15/2015    IBILI 0.6 08/15/2015    LABALBU 3.0 07/29/2021    LABALBU 4.2 06/06/2012       Date Warfarin Dose INR Heparin or LMWH HBG/  HCT PLT Comment   7/28 3mg 1.4 Heparin gtt 8.9/28.6 292    7/29 3mg 1.3 Heparin gtt 9.4/30.1 338    7/30                             Assessment:  · 77year old female on chronic anticoagulation with warfarin due to previous bioprosthetic mitral valve replacement. · INR goal: 2.0-2.5; Home dose: 3mg daily except 4mg Wed. · 7/28: Bridging with heparin vs Lovenox after abdominal hematoma from Lovenox; INR 1.4  · 7/29: INR 1.3    Plan:   · Continue warfarin 3mg daily (dose given 7/28 AM) continue 7/29 PM  · Daily PT/INR until the INR is stable within the therapeutic range  · Pharmacist will follow and monitor/adjust dosing as necessary    Nithya Gillette PharmD Candidate 7/29/2021 11:46 AM    I have reviewed the progress note written by the pharmacy student and agree with the documentation and treatment plan.   All medication and/or lab orders have been entered by myself according to the outlined plan in the note.      Franklin Kennedy PharmD, BCPS 7/29/2021 2:37 PM  Phone: [phone]

## 2021-07-30 LAB
ALBUMIN SERPL-MCNC: 3 G/DL (ref 3.5–5.2)
ALP BLD-CCNC: 92 U/L (ref 35–104)
ALT SERPL-CCNC: 25 U/L (ref 0–32)
ANION GAP SERPL CALCULATED.3IONS-SCNC: 13 MMOL/L (ref 7–16)
APTT: 80.7 SEC (ref 24.5–35.1)
AST SERPL-CCNC: 21 U/L (ref 0–31)
BASOPHILS ABSOLUTE: 0.02 E9/L (ref 0–0.2)
BASOPHILS RELATIVE PERCENT: 0.3 % (ref 0–2)
BILIRUB SERPL-MCNC: 2.8 MG/DL (ref 0–1.2)
BUN BLDV-MCNC: 9 MG/DL (ref 6–23)
CALCIUM SERPL-MCNC: 8.6 MG/DL (ref 8.6–10.2)
CHLORIDE BLD-SCNC: 102 MMOL/L (ref 98–107)
CO2: 22 MMOL/L (ref 22–29)
CREAT SERPL-MCNC: 0.8 MG/DL (ref 0.5–1)
EOSINOPHILS ABSOLUTE: 0.16 E9/L (ref 0.05–0.5)
EOSINOPHILS RELATIVE PERCENT: 2.1 % (ref 0–6)
GFR AFRICAN AMERICAN: >60
GFR NON-AFRICAN AMERICAN: >60 ML/MIN/1.73
GLUCOSE BLD-MCNC: 116 MG/DL (ref 74–99)
HCT VFR BLD CALC: 30.1 % (ref 34–48)
HEMOGLOBIN: 9.2 G/DL (ref 11.5–15.5)
IMMATURE GRANULOCYTES #: 0.17 E9/L
IMMATURE GRANULOCYTES %: 2.2 % (ref 0–5)
INR BLD: 1.4
LYMPHOCYTES ABSOLUTE: 1.2 E9/L (ref 1.5–4)
LYMPHOCYTES RELATIVE PERCENT: 15.5 % (ref 20–42)
MCH RBC QN AUTO: 28.5 PG (ref 26–35)
MCHC RBC AUTO-ENTMCNC: 30.6 % (ref 32–34.5)
MCV RBC AUTO: 93.2 FL (ref 80–99.9)
METER GLUCOSE: 126 MG/DL (ref 74–99)
METER GLUCOSE: 131 MG/DL (ref 74–99)
METER GLUCOSE: 155 MG/DL (ref 74–99)
METER GLUCOSE: 175 MG/DL (ref 74–99)
MONOCYTES ABSOLUTE: 0.5 E9/L (ref 0.1–0.95)
MONOCYTES RELATIVE PERCENT: 6.5 % (ref 2–12)
NEUTROPHILS ABSOLUTE: 5.69 E9/L (ref 1.8–7.3)
NEUTROPHILS RELATIVE PERCENT: 73.4 % (ref 43–80)
PDW BLD-RTO: 15.9 FL (ref 11.5–15)
PLATELET # BLD: 287 E9/L (ref 130–450)
PMV BLD AUTO: 11.2 FL (ref 7–12)
POTASSIUM SERPL-SCNC: 4.1 MMOL/L (ref 3.5–5)
PROTHROMBIN TIME: 15.2 SEC (ref 9.3–12.4)
RBC # BLD: 3.23 E12/L (ref 3.5–5.5)
SODIUM BLD-SCNC: 137 MMOL/L (ref 132–146)
TOTAL PROTEIN: 5.8 G/DL (ref 6.4–8.3)
WBC # BLD: 7.7 E9/L (ref 4.5–11.5)

## 2021-07-30 PROCEDURE — 85025 COMPLETE CBC W/AUTO DIFF WBC: CPT

## 2021-07-30 PROCEDURE — 6370000000 HC RX 637 (ALT 250 FOR IP): Performed by: SURGERY

## 2021-07-30 PROCEDURE — 6370000000 HC RX 637 (ALT 250 FOR IP): Performed by: INTERNAL MEDICINE

## 2021-07-30 PROCEDURE — 82962 GLUCOSE BLOOD TEST: CPT

## 2021-07-30 PROCEDURE — 6360000002 HC RX W HCPCS: Performed by: INTERNAL MEDICINE

## 2021-07-30 PROCEDURE — 80053 COMPREHEN METABOLIC PANEL: CPT

## 2021-07-30 PROCEDURE — 85730 THROMBOPLASTIN TIME PARTIAL: CPT

## 2021-07-30 PROCEDURE — 2580000003 HC RX 258: Performed by: INTERNAL MEDICINE

## 2021-07-30 PROCEDURE — 85610 PROTHROMBIN TIME: CPT

## 2021-07-30 PROCEDURE — 99232 SBSQ HOSP IP/OBS MODERATE 35: CPT | Performed by: INTERNAL MEDICINE

## 2021-07-30 PROCEDURE — 2140000000 HC CCU INTERMEDIATE R&B

## 2021-07-30 PROCEDURE — 36415 COLL VENOUS BLD VENIPUNCTURE: CPT

## 2021-07-30 RX ORDER — WARFARIN SODIUM 4 MG/1
4 TABLET ORAL
Status: COMPLETED | OUTPATIENT
Start: 2021-07-30 | End: 2021-07-30

## 2021-07-30 RX ADMIN — LATANOPROST 1 DROP: 50 SOLUTION OPHTHALMIC at 23:15

## 2021-07-30 RX ADMIN — DOCUSATE SODIUM 100 MG: 100 CAPSULE ORAL at 08:16

## 2021-07-30 RX ADMIN — OXYCODONE HYDROCHLORIDE 10 MG: 10 TABLET ORAL at 22:48

## 2021-07-30 RX ADMIN — OXYCODONE HYDROCHLORIDE 10 MG: 10 TABLET ORAL at 15:32

## 2021-07-30 RX ADMIN — DOCUSATE SODIUM 100 MG: 100 CAPSULE ORAL at 22:48

## 2021-07-30 RX ADMIN — ATORVASTATIN CALCIUM 20 MG: 20 TABLET, FILM COATED ORAL at 08:16

## 2021-07-30 RX ADMIN — INSULIN LISPRO 1 UNITS: 100 INJECTION, SOLUTION INTRAVENOUS; SUBCUTANEOUS at 12:22

## 2021-07-30 RX ADMIN — METOPROLOL TARTRATE 50 MG: 50 TABLET, FILM COATED ORAL at 22:48

## 2021-07-30 RX ADMIN — SENNOSIDES 17.2 MG: 8.6 TABLET, FILM COATED ORAL at 22:48

## 2021-07-30 RX ADMIN — BRIMONIDINE TARTRATE 1 DROP: 2 SOLUTION OPHTHALMIC at 08:17

## 2021-07-30 RX ADMIN — METOPROLOL TARTRATE 50 MG: 50 TABLET, FILM COATED ORAL at 08:16

## 2021-07-30 RX ADMIN — WARFARIN SODIUM 4 MG: 4 TABLET ORAL at 17:28

## 2021-07-30 RX ADMIN — INSULIN LISPRO 1 UNITS: 100 INJECTION, SOLUTION INTRAVENOUS; SUBCUTANEOUS at 22:50

## 2021-07-30 RX ADMIN — HEPARIN SODIUM 10.64 UNITS/KG/HR: 10000 INJECTION, SOLUTION INTRAVENOUS at 01:00

## 2021-07-30 RX ADMIN — Medication 10 ML: at 08:17

## 2021-07-30 RX ADMIN — HEPARIN SODIUM 12.51 UNITS/KG/HR: 10000 INJECTION, SOLUTION INTRAVENOUS at 15:32

## 2021-07-30 RX ADMIN — PANTOPRAZOLE SODIUM 40 MG: 40 TABLET, DELAYED RELEASE ORAL at 08:16

## 2021-07-30 ASSESSMENT — PAIN DESCRIPTION - LOCATION: LOCATION: ABDOMEN

## 2021-07-30 ASSESSMENT — PAIN SCALES - GENERAL
PAINLEVEL_OUTOF10: 8
PAINLEVEL_OUTOF10: 8
PAINLEVEL_OUTOF10: 0

## 2021-07-30 ASSESSMENT — PAIN DESCRIPTION - ORIENTATION: ORIENTATION: LEFT;LOWER

## 2021-07-30 ASSESSMENT — PAIN DESCRIPTION - PROGRESSION: CLINICAL_PROGRESSION: GRADUALLY IMPROVING

## 2021-07-30 NOTE — PROGRESS NOTES
Pharmacy Consultation Note  (Anticoagulant Dosing and Monitoring)    Initial consult date: 7/28/21  Consulting physician: Dr. Russel Fu    Allergies:  Lactose intolerance (gi) and Phenobarbital    77 y.o. female      Ht Readings from Last 1 Encounters:   07/22/21 5' 4\" (1.626 m)     Wt Readings from Last 1 Encounters:   07/30/21 254 lb 10.1 oz (115.5 kg)         Warfarin Indication Target   INR Range Home   Dose  (if applicable) Diet/Feeding Tube   Bioprosthetic mitral valve replacement  2.0-2.5 3mg MTThFSSu  4mg W Regular diet       Vitamin K or Blood product  Administration Date                 Warfarin drug-drug interactions  Start  Stop Home Med? Comments                                 TSH:    Lab Results   Component Value Date    TSH 2.350 03/08/2021        Hepatic Function Panel:                            Lab Results   Component Value Date    ALKPHOS 92 07/30/2021    ALT 25 07/30/2021    AST 21 07/30/2021    PROT 5.8 07/30/2021    BILITOT 2.8 07/30/2021    BILIDIR <0.2 08/15/2015    IBILI 0.6 08/15/2015    LABALBU 3.0 07/30/2021    LABALBU 4.2 06/06/2012       Date Warfarin Dose INR Heparin or LMWH HBG/  HCT PLT Comment   7/28 3mg 1.4 Heparin gtt 8.9/28.6 292    7/29 3mg 1.3 Heparin gtt 9.4/30.1 338    7/30 4mg 1.4 Heparin gtt 9.2/30.1 287    7/31                    Assessment:  · 77year old female on chronic anticoagulation with warfarin due to previous bioprosthetic mitral valve replacement. · INR goal: 2.0-2.5; Home dose: 3mg daily except 4mg Wed. · 7/28: Bridging with heparin vs Lovenox after abdominal hematoma from Lovenox; INR 1.4  · 7/29: INR 1.3  · 7/30: INR 1.4    Plan:   · Give warfarin 4mg booster dose one time. Will likely resume 3mg tomorrow.   · Daily PT/INR until the INR is stable within the therapeutic range  · Pharmacist will follow and monitor/adjust dosing as necessary    Nory ValdezD Candidate 7/30/2021 1:32 PM     I have reviewed the progress note written by the pharmacy student and agree with the documentation and treatment plan. All medication and/or lab orders have been entered by myself according to the outlined plan in the note.      Farzaneh Pathak PharmD, BCPS 7/30/2021 2:07 PM  Phone: [phone]

## 2021-07-30 NOTE — PROGRESS NOTES
Pt just c/o\" I called-out with my call light at 10pm,1030pm and 11pm because my iv was beeping. \"   I said i'm sorry no one told me. \"This will be reported to Jacquelyn Jacinto and can I have a warm blanket? ( at the time there was an RRT going on at this time it continues) warm blanket given.

## 2021-07-30 NOTE — PROGRESS NOTES
[facility]  Internal [facility] Residency Program  Progress Note - House Team     Patient:  Parisa Puri 77 y.o. female MRN: [id number]     Date of Service: 2021     CC: abdominal pain  Overnight events: None    Subjective     Patient was seen and examined this morning at bedside in no acute distress. Blood pressure is elevated this a.m. .  Manual blood pressure is 140/65. She reports no pain this morning. She states she is able to eat urinate and have bowel movements without issue. She denies any retention. She denies any fever, chills, chest pain, palpitations, shortness of breath, tingling, numbness, nausea, vomiting. Objective     Physical Exam:  TEMPERATURE:  Current - Temp: 98.6 °F (37 °C); Max - Temp  Av.4 °F (36.9 °C)  Min: 98.2 °F (36.8 °C)  Max: 98.6 °F (37 °C)  RESPIRATIONS RANGE: Resp  Av  Min: 16  Max: 18  PULSE RANGE: Pulse  Av.5  Min: 84  Max: 89  BLOOD PRESSURE RANGE:  Systolic (59LPN), AXV:493 , Min:145 , BRADY:553   ; Diastolic (83TEQ), HEJ:69, Min:74, Max:74    PULSE OXIMETRY RANGE: SpO2  Av %  Min: 96 %  Max: 98 %    I & O - 24hr:    Intake/Output Summary (Last 24 hours) at 2021 0911  Last data filed at 2021 0644  Gross per 24 hour   Intake 248 ml   Output 400 ml   Net -152 ml     I/O last 3 completed shifts: In: 428 [P.O.:300; I.V.:128]  Out: 650 [Urine:650] No intake/output data recorded. Weight change: 10 lb 10.1 oz (4.822 kg)    Physical Exam  Constitutional:       General: She is not in acute distress. Appearance: She is well-developed. She is obese. She is not diaphoretic. HENT:      Head: Normocephalic and atraumatic. Eyes:      General: No scleral icterus. Pupils: Pupils are equal, round, and reactive to light. Neck:      Thyroid: No thyromegaly. Vascular: No JVD. Trachea: No tracheal deviation. Cardiovascular:      Rate and Rhythm: Normal rate and regular rhythm.       Heart sounds: Normal heart sounds. No murmur heard. No friction rub. No gallop. Pulmonary:      Effort: Pulmonary effort is normal. No respiratory distress. Breath sounds: Normal breath sounds. No wheezing or rales. Abdominal:      General: Bowel sounds are normal. There is no distension. Palpations: Abdomen is soft. There is no mass. Tenderness: There is no abdominal tenderness. There is no guarding or rebound. Musculoskeletal:         General: Normal range of motion. Skin:     General: Skin is warm and dry. Capillary Refill: Capillary refill takes less than 2 seconds. Coloration: Skin is not pale. Findings: Bruising (Abdominal diffuse) present. No rash. Neurological:      Mental Status: She is alert and oriented to person, place, and time. Cranial Nerves: No cranial nerve deficit. Psychiatric:         Behavior: Behavior normal.         Thought Content:  Thought content normal.         Judgment: Judgment normal.       Subject  Pertinent Labs & Imaging Studies   nuvia  CBC:   Recent Labs     07/28/21  0650 07/28/21  1737 07/29/21  0634   WBC 8.9  --  9.4   HGB 8.9* 8.9* 9.4*   HCT 28.6* 28.8* 30.1*   MCV 92.9  --  93.2     --  338       BMP:    Recent Labs     07/28/21  0650 07/29/21  0634    138   K 4.0 4.2    102   CO2 24 22   BUN 13 11   CREATININE 0.8 0.7   GLUCOSE 152* 113*       LIVER PROFILE:   Recent Labs     07/28/21  0650 07/29/21  0634   AST 35* 26   ALT 32 28   BILITOT 3.0* 2.9*   ALKPHOS 103 97       PT/INR:   Recent Labs     07/28/21  0650 07/29/21  0634   PROTIME 15.7* 14.6*   INR 1.4 1.3       APTT:   Recent Labs     07/28/21  2321 07/29/21  0634 07/29/21  1225   APTT 62.4* 74.6* 78.7*       Fasting Lipid Panel:    Lab Results   Component Value Date    CHOL 129 03/08/2021    TRIG 57 03/08/2021    HDL 53 03/08/2021       Notable Cultures:      Blood cultures   Blood Culture, Routine   Date Value Ref Range Status   07/22/2021 5 Days no growth  Final Respiratory cultures No results found for: RESPCULTURE No results found for: LABGRAM  Urine   Urine Culture, Routine   Date Value Ref Range Status   07/22/2021 Growth not present  Final     Legionella No results found for: LABLEGI  C Diff PCR No results found for: CDIFPCR  Wound culture/abscess: No results for input(s): WNDABS in the last 72 hours. Tip culture:No results for input(s): CXCATHTIP in the last 72 hours. Echo Complete    Result Date: 7/28/2021  Transthoracic Echocardiography Report (TTE)  Demographics   Patient Name    Salty Kilbourne Damon          Gender            Female                  [address]  [id number]      Room Number       6408  Number   Account #       [de-identified]     Procedure Date    07/28/2021   Corporate ID                  Ordering                                Physician   Accession       [id number]    Referring  Number                        Physician   Date of Birth   1945    Sonographer       Erin ESPINOZA   Age             77 year(s)    Interpreting      [address]                                Physician         Physician Cardiology                                                  Amanda Infante MD                                 Any Other  Procedure Type of Study   TTE procedure:Echocardiogram W/Contrast.  Procedure Date Date: 07/28/2021 Start: 11:37 AM Study Location: Portable Technical Quality: Limited visualization due to body habitus. Indications:S/P TAVR and LV function. Patient Status: Routine Contrast Medium: Definity. Amount - 2 ml Contrast Comments: given by the rn. Height: 64 inches Weight: 242 pounds BSA: 2.12 m^2 BMI: 41.54 kg/m^2 Rhythm: Within normal limits HR: 77 bpm BP: 111/63 mmHg Allergies   - Other drug:(Phenobarbital). Findings   Left Ventricle  Normal left ventricular chamber size. Normal left ventricular systolic function. , LVEF is 70%. Mild left ventricular concentric hypertrophy noted. Indeterminate LV diastolic function. Right Ventricle  Normal right ventricle size and function. Left Atrium  Left atrium is severely enlarged. Increased left atrial volume. Interatrial septum not well visualized but appears intact. Right Atrium  Normal right atrium. 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 Mild-to-moderate mitral stenosis - Mean gradient 5,mmHg, peak 19mmHg, MVA  1.2cm2 by continuity. There is trace mitral regurgitation. Tricuspid Valve  Tricuspid was not well visualized. There is mild tricuspid regurgitation. 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 No hemodynamically significant aortic stenosis - mean gradient 6mmHg.  small mason-valvualr regurgitation. Pulmonic Valve  The pulmonic valve was not well visualized. Pericardial Effusion  No evidence of pericardial effusion. Pericardium appears normal.   Pleural Effusion  No evidence of pleural effusion. Aorta  Normal aortic root size and ascending aorta. Miscellaneous  The inferior vena cava not well visualized. Conclusions   Summary  Technically sub-optimal images - Definity Echo contrast used. Normal left ventricular chamber size. Normal left ventricular systolic function. , LVEF is 70%. Mild left ventricular concentric hypertrophy noted. Indeterminate LV diastolic function. Left atrium is severely enlarged. Increased left atrial volume. Interatrial septum not well visualized but appears intact. Normal right ventricle size and function. 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 There is trace mitral regurgitation. 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 Small aortic mason-valvualr regurgitation.   There is mild tricuspid regurgitation. Mild pulmoanry hypertension, RVSP 46mmHg  Normal aortic root size. No evidence of pericardial effusion. No intra cardiac mass or thrombus. Compared to prior echo from 7/15/2021.    Signature   ----------------------------------------------------------------  Electronically signed by Haley Washington MD(Interpreting  physician) on 07/28/2021 06:17 PM  ----------------------------------------------------------------  M-Mode/2D Measurements & Calculations   LV Diastolic    LV Systolic Dimension: 2.9   AV Cusp Separation: 1.4 cmLA  Dimension: 4.9  cm                           Dimension: 6.4 cmAO Root  cm              LV Volume Diastolic: 853 ml  Dimension: 3.4 cm  LV FS:40.8 %    LV Volume Systolic: 16.2 ml  LV PW           LV EDV/LV EDV Index: 772  Diastolic: 1.2  YX/81 GP/M^9VL ESV/LV ESV  cm              Index: 31.7 ml/15ml/ m^2     RV Diastolic Dimension: 2.7  LV PW Systolic: EF Calculated: 84.1 %        cm  1.3 cm          LV Mass Index: 120 l/min*m^2  Septum          LV Length: 6.7 cm            Ascending Aorta: 2.9 cm  Diastolic: 1.4                               LA volume/Index: 172 ml  cm              LVOT: 2.1 cm                 /81.13ml/m^2  Septum                                       RA Area: 36.7 cm^2  Systolic: 1.4  cm  CO: 6.12 l/min  CI: 2.02  l/m*m^2  LV Mass: 253.72  g  Doppler Measurements & Calculations   MV Peak E-Wave: 2.2 AV Peak Velocity:     LVOT Peak Velocity: 0.76 m/s  m/s                 1.65 m/s              LVOT Mean Velocity: 0.55 m/s                      AV Peak Gradient:     LVOT Peak Gradient: 2.3 mmHgLVOT  MV Peak Gradient:   10.84 mmHg            Mean Gradient: 1.3 mmHg  19.3 mmHg           AV Mean Velocity:  MV Mean Gradient: 5 1.21 m/s  mmHg                AV Mean Gradient: 6.4  MV Mean Velocity:   mmHg                  TR Velocity:3.22 m/s  0.98 m/s            AV VTI: 34.7 cm       TR Gradient:41.34 mmHg  MV P1/2t: 60.2 msec AV Area  MVA by PHT:3.65 Effusion  No evidence of pericardial effusion. Aorta  Aortic root dimension within normal limits. Miscellaneous  Normal Inferior Vena Cava diameter and respiratory variation. Conclusions   Summary  **POD1 s/p TAVR with 29-mm Evolut PRO+; h/o MVR with #25 Mosaic (2007)**  Normal left ventricle size. Estimated left ventricle ejection fraction  60+/-5 %. Normal right ventricular size and function. Well-seated surgical mitral bioprosthesis. No significant mitral  regurgitation. There is a well-seated transcatheter valve in the aortic position. Trace  paravalvular regurgitation. MG 7, DVI 0.67. No evidence of pericardial effusion. Signature   ----------------------------------------------------------------  Electronically signed by Sarai Calabrese MD(Interpreting physician)  on 07/16/2021 12:54 PM  ----------------------------------------------------------------  M-Mode/2D Measurements & Calculations    LVOT: 2.1 cm              Ascending Aorta: 2.8 cm                              IVC Expiration: 1.7 cm  Doppler Measurements & Calculations    AV Peak Velocity: 1.77 m/s LVOT Peak Velocity: 1.21 m/s   AV Peak Gradient: 12.52    LVOT Mean Velocity: 0.76 m/s   mmHg                       LVOT Peak Gradient: 5.9 mmHgLVOT Mean   AV Mean Velocity: 1.13 m/s Gradient: 2.9 mmHg   AV Mean Gradient: 6.1 mmHg   AV VTI: 45.4 cm   AV Area (Continuity):2.33   cm^2                       TR Velocity:2.91 m/s                              TR Gradient:33.8 mmHg   LVOT VTI: 30.5 cm  [email or web address] [id number]    CT ABDOMEN PELVIS WO CONTRAST Additional Contrast? None    Result Date: 7/25/2021  EXAMINATION: CT OF THE ABDOMEN AND PELVIS WITHOUT CONTRAST 7/25/2021 10:27 am TECHNIQUE: CT of the abdomen and pelvis was performed without the administration of intravenous contrast. Multiplanar reformatted images are provided for review. Dose modulation, iterative reconstruction, and/or weight based adjustment of the mA/kV was utilized to reduce the radiation dose to as low as reasonably achievable. COMPARISON: July 23, 2021 HISTORY: ORDERING SYSTEM PROVIDED HISTORY: assess change of abdominal wall hematoma and groin hematoma TECHNOLOGIST PROVIDED HISTORY: Reason for exam:->assess change of abdominal wall hematoma and groin hematoma Additional Contrast?->None What reading provider will be dictating this exam?->CRC FINDINGS: Lower Chest: Heart size is top-normal to mildly enlarged. Postoperative changes related to transfemoral aortic valve replacement are noted. Mild dependent atelectasis is present at both lung bases. There is a small hiatal hernia. Organs: The gallbladder is distended without obvious wall thickening. There is a large peripherally calcified gallstone near the neck of the gallbladder. The unenhanced liver, spleen and adrenal glands are normal in appearance. The pancreas demonstrates diffuse fatty atrophy. The kidneys are without hydronephrosis or radiopaque calculus. There are bilateral indeterminate/intermediate density renal lesions. GI/Bowel: No evidence of a bowel obstruction, free air or pneumatosis. Portions the colon are decompressed, limiting assessment for mucosal based abnormalities. There is diverticulosis without evidence of diverticulitis. The appendix is not confidently visualized. No obvious pericecal inflammatory changes. The stomach and duodenal sweep are unremarkable aside from the hiatal hernia. Pelvis: The urinary bladder is decompressed around a Menendez catheter. No obvious uterine or ovarian abnormality aside from a presumed fibroid at the fundus of the uterus. Small volume of free fluid is present in the pelvis. No pelvic lymphadenopathy. Peritoneum/Retroperitoneum: The abdominal aorta is normal in caliber. No retroperitoneal lymphadenopathy or mass.  Bones/Soft Tissues: Again noted is a large hematoma along the left oblique abdominal musculature, slightly decreased in size on today's examination, measuring 12.8 x 9.2 cm (previously 13.2 x 10.7 cm). The 3.7 x 2.2 cm hematoma superficial to the right common femoral artery is stable in size. Suspect there is also a small right-sided rectus sheath hematoma on axial image 93, stable to slightly decreased in size. No acute osseous abnormality or evidence of aggressive osseous lesion. Slight interval decrease in the size of the left oblique abdominal wall intramuscular hematoma with a stable right inguinal hematoma and suspected small right rectus sheath hematoma. Multiple indeterminate bilateral renal lesions for which correlation with renal ultrasound is recommended. Additional, incidental findings as above. CT ABDOMEN PELVIS WO CONTRAST Additional Contrast? None    Result Date: 7/23/2021  EXAMINATION: CT OF THE ABDOMEN AND PELVIS WITHOUT CONTRAST 7/23/2021 3:22 pm TECHNIQUE: CT of the abdomen and pelvis was performed without the administration of intravenous contrast. Multiplanar reformatted images are provided for review. Dose modulation, iterative reconstruction, and/or weight based adjustment of the mA/kV was utilized to reduce the radiation dose to as low as reasonably achievable. COMPARISON: None. HISTORY: ORDERING SYSTEM PROVIDED HISTORY: assess change of abdominal wall hematoma and groin hematoma TECHNOLOGIST PROVIDED HISTORY: Reason for exam:->assess change of abdominal wall hematoma and groin hematoma Additional Contrast?->None What reading provider will be dictating this exam?->CRC FINDINGS: In the left lateral abdominal wall at the level of the oblique/transverse muscles there is a large acute hematoma measuring 11 by 11.6 by 11 cm. On the previous study of a July 22nd it measured 12.5 by 8.5 x 11.8 cm.   The overall size of the hematoma is stable account some differences in measurement, presently there is more uniform moderated hyperdense appearance of the hematoma indicating an acute phase. No longer seen blood/blood level which indicates a hyperacute phase or an active bleeding hematoma. There is significant edema and increased density of the adjacent muscular planes of the left lateral chest wall extending anteriorly, posteriorly, superior inferiorly, and also into the deep subcutaneous soft tissues of the left lateral abdominal wall indicating oozing of a hemorrhage, forming a ill-defined surrounding interstitial hematoma. There is a localized small hematoma in the right inguinal region which is related with the right common femoral artery. Cannot exclude the pseudoaneurysm formation. Correlation with ultrasound of the right inguinal areas seen. The there is a small pocket of air associated. The patient had aortic valve replacement procedure in July 14, 2021. There is no extension of hematoma into the pelvic sidewall. There is a Menendez catheter in the bladder. The bladder is not distended. Uterus and ovaries have unremarkable appearance. There is no free intraperitoneal air. There is no indication for acute intraperitoneal process in the abdomen or in the pelvis accounting the left lateral chest wall hematoma which spreads into adjacent soft tissues including the left pelvic sidewall. Uncomplicated diverticulosis seen in the sigmoid colon. There is no indication for bowel obstruction. Kidneys are preserved size and cortical thickness, parenchymal enhancement and excretion of the contrast.  Presence of a cyst-like structures in both kidneys but they have density higher than simple cyst, they cannot be characterize presently. When patient condition permits consider initial correlation with ultrasound. They could represent hemorrhagic cysts or cyst with high proteinaceous content, but other possibilities are not excluded. There are normal size and the density for the liver and spleen. Pancreas demonstrates some atrophy.  Gallbladder is well distended with 3 cm gallstone. There is no dilatation of the biliary tree pancreatic ductal system. There is a mild to moderate size hiatal hernia. Adrenals not enlarged. Calcified atheromatous changes are seen in the abdominal aorta there is no aneurysm formation. There is no midline retro peritoneal adenopathy. Heart has normal size. There is endovascular repair for the aortic valve and there is also prosthesis for the mitral valve. The heart is not fully covered in this study. There are areas of a confluent loss of volume indicate a component of atelectasis in the medial aspect of the right lower lobe and towards the base in the left lower lobe. These appear to be more chronic findings but increase since May 7, 2021.     1.  Overall stable large hematoma of the left lateral abdominal wall with surrounding hemorrhage into adjacent muscle planes and deep subcutaneous soft tissues by oozing. The large dominant hematoma now has a uniform hyperdensity indicating acute phase. No longer seen blood/blood level which indicates a hyperacute active bleeding hematoma as demonstrate on the study of July 22nd. 2.  Hematoma of a small size superficial to the right common femoral artery. Cannot exclude a pseudoaneurysm formation. Can correlate with the ultrasound of the right groin. XR CHEST (2 VW)    Result Date: 7/9/2021  EXAMINATION: TWO XRAY VIEWS OF THE CHEST 7/9/2021 1:00 pm COMPARISON: May 26, 2021 HISTORY: ORDERING SYSTEM PROVIDED HISTORY: Dyspnea, unspecified type TECHNOLOGIST PROVIDED HISTORY: What reading provider will be dictating this exam?->CRC FINDINGS: Moderate cardiomegaly. No airspace opacity or pleural effusion. Median sternotomy wires are present. The heart is normal in size. No pneumothorax. 1. Moderate cardiomegaly. 2. No airspace opacity or pleural effusion.      CT ABDOMEN PELVIS W IV CONTRAST Additional Contrast? None    Result Date: 7/22/2021  EXAMINATION: CT OF THE ABDOMEN AND PELVIS WITH CONTRAST 7/22/2021 3:53 pm TECHNIQUE: CT of the abdomen and pelvis was performed with the administration of intravenous contrast. Multiplanar reformatted images are provided for review. Dose modulation, iterative reconstruction, and/or weight based adjustment of the mA/kV was utilized to reduce the radiation dose to as low as reasonably achievable. COMPARISON: None. HISTORY: ORDERING SYSTEM PROVIDED HISTORY: ab pain s/p TAVR TECHNOLOGIST PROVIDED HISTORY: Reason for exam:->ab pain s/p TAVR Additional Contrast?->None Decision Support Exception - unselect if not a suspected or confirmed emergency medical condition->Emergency Medical Condition (MA) What reading provider will be dictating this exam?->CRC FINDINGS: Lower Chest: Images through lung bases demonstrate mild cardiomegaly. Note is made of an aortic valve. The left atrium is enlarged. There is no right or left lung base infiltrate or effusion. Organs: The liver, spleen, pancreas and adrenal glands are unremarkable. The kidneys enhance symmetrically without evidence of hydronephrosis. GI/Bowel: There is a large stones seen within the gallbladder lumen measuring 3.4 x 2.4 cm. There are no findings of acute cholecystitis. The stomach is normally distended. There is a small hiatal hernia. There is no large or small bowel obstruction. There are no findings of inflammatory bowel disease. Multiple sigmoid diverticula noted. There is no evidence of diverticulitis. Pelvis:  Bladder is unremarkable in appearance. There is a uterine fibroid measuring approximately 3.3 x 3.4 cm. Peritoneum/Retroperitoneum: There are postsurgical changes seen within the right groin from recent surgical intervention. There is a small hematoma measuring 2.5 x 2.5 cm. The abdominal aorta is normal caliber. There is no dissection of the abdominal aorta or of the iliac arteries.  There is a large left lateral abdominal wall hematoma measuring 13 cm in maximum craniocaudal dimension by 12 cm in maximum AP diameter by 8 cm in maximum transverse diameter. There is a fluid fluid level seen within the left lateral abdominal wall hematoma consistent with acute on subacute hemorrhage. Along the superior aspect of the fluid fluid level there is minimal dense layering material suggestive of mild active bleeding. Bones/Soft Tissues:  Age related degenerative changes of the visualized osseous structures without focal destructive lesion. 1. Large left lateral abdominal wall hematoma measuring 13 cm x 12 cm x 8 cm. There is a fluid fluid level seen within the abdominal wall hematoma consistent with acute on subacute hemorrhage. There is minimal dense material seen layering on the fluid fluid level centrally consistent with minimal active bleeding. Surrounding intramuscular edema is noted within the oblique muscles. There is also induration within the subcutaneous soft tissues consistent with reactive changes. 2. Postsurgical changes within the right groin from recent intervention and TAVR procedure. There is a 2.5 x 2.5 cm hematoma within the right groin. 3. Cholelithiasis. There is no evidence of acute cholecystitis. 4. There is no aortic or iliac artery injury. XR CHEST PORTABLE    Result Date: 7/25/2021  EXAMINATION: ONE XRAY VIEW OF THE CHEST 7/25/2021 9:09 am COMPARISON: July 22, 2021 HISTORY: ORDERING SYSTEM PROVIDED HISTORY: Wheezing TECHNOLOGIST PROVIDED HISTORY: Reason for exam:->Wheezing What reading provider will be dictating this exam?->CRC FINDINGS: Heart size is unable to be accurately assessed on this single portable view of the chest, but appears to be stable. Sternotomy wires are in place. There are postprocedural changes related to transfemoral aortic valve replacement. Prominence of the pulmonary vasculature raises possibility of mild pulmonary edema. No evidence of a sizable pleural effusion or pneumothorax. No acute osseous abnormality.      Prominence of pulmonary vasculature raising the possibility of mild pulmonary edema. XR CHEST PORTABLE    Result Date: 7/22/2021  EXAMINATION: ONE XRAY VIEW OF THE CHEST 7/22/2021 1:17 pm COMPARISON: March 8, May 26, July 9 HISTORY: ORDERING SYSTEM PROVIDED HISTORY: Shortness of breath TECHNOLOGIST PROVIDED HISTORY: Reason for exam:->Shortness of breath What reading provider will be dictating this exam?->CRC FINDINGS: Status post previous mid sternotomy. More recent endovascular repair for the aortic root. Heart has borderline size. The no acute infiltrates, consolidates or pleural effusions are seen. There is no perihilar vascular congestion. 1.  Status post more remote mid sternotomy. Status post recent endovascular repair for the aortic root. 2.  Borderline size heart. No acute pulmonary process. Resident's Assessment and Plan     Assessment and Plan:    Abdominal hematoma and groin hematoma secondary to heparin injection and TAVR  CT on admission showed a 13 x 12 x 8 lateral abdominal wall and a 2.5 x 2.5 groin hematoma. Follow-up CT is found abdominal and groin hematoma to be stable. Symptoms improved and pain decreasing  Hemoglobin today still pending   Warfarin with heparin bridge started 7/28/2021  INR still pending -- goal is 2.0-2.5  Continue to monitor fever leukocytosis  Plan discharge once INR therapeutic home with home health  ALDA likely prerenal secondary to intravascular volume depletion secondary to #1--resolved  Creatinine today 0.7 --baseline creatinine 0.7  History A.  Fib  Continue metoprolol twice daily for rate control  Continue anticoagulation  Urinary retention secondary to #1--currently resolved  History of mitral valve replacement  Continue anticoagulation  History of aortic stenosis status post TAVR  Continue anticoagulation  LA type a--resolved  History of GERD  Continue Protonix  History diabetes  On low-dose sliding scale  Blood sugar 126  History of HFpEF --at baseline  History of glaucoma  Continue latanoprost and brimonidine      PT/OT evaluation: [facility] 16/24  DVT prophylaxis/ GI prophylaxis: protonix/heparin warfarin  Disposition: continue current care    Pipo Ramos DO, PhD  PGY-1  Attending physician: Dr. Bushra Castañeda    Attending Physician Statement:  Sudhakar Heart M.D., F.A.C.P.     I have discussed the case, including pertinent history and exam findings with the resident/NP. I have seen and examined the patient and the key elements of the encounter have been performed by me.  I agree with the resident ROS, PMHx, PSHx, meds reviewed and assessment, plan and orders as documented by the resident/NP [facility] charts reviewed, including other providers notes, relevant labs and imaging.         77 y. o. male admitted sp elective TAVR procedure bleeding complications for severe symptomatic AS.     Groin hematoma sp femoral approach TAVR   Then L abdominal hematoma more sypmtomatic one more recently bleed expanded sp lovenox shots  Anemia due to bleed  Low BP due to bleed/volume contraction-- coby out afib and AS complications (reasess valve function)  Sp Fluids reassess bP improving  Vagal?  Pain issues        gen surg  Now s/p 3 CTs  Overnight concerns expanding hematoma  Mobitilitalha, PT/OT  Pain control  sp 29mm Evolut PRO+ valve on 7/14/2021 using b/l groin approach. TTE post procedure: Trace paravalvular regurgitation. MG 7, DVI 0.67--   Also afib- high chadsvas     Hematoma-s- repeat CT evolving - slight growth - less hyperacute - likely stopped now hematoma   -- spreading pain around back. Also bc immobile in bed.   sp 3rd CT stable, but looks worse possibly today  Will dfefer reversing anticaoguation though, bc clincially stable  Needs anticoagulation soon  Will continue holding coumadin - but resume very soon  inr 1.6 subtherapeutic  surgergy opinion 7/28 resume anticoagulation  ? bridge if need when INR drops- IV heparin vs off label use eliquis could days - discussed  Then back on coumadin goal INR 2-2. 5? For now- CVA risk vs bleeds     We Discussed case with CV surgery -at [address] [place] [address]-- he does want bridging coumaidin s- OK with starting in am tomorrow  Start low dose heparin protocol and give first dose coumadin in AM tomorrow (don't wait till evening)- I ordered this  Hematomas at risk for myositis ossificans  Vs infected hematoma-- watching for these  Her case also urine outflow- urethral flow affected     Urinary retention issues, dec detrussor tone, also unwilling to move 2 to pain, unwilling to valsalva   -- urinary catheter  +- bethanacol vs flomax later if need     Today remove urinary catheter - trial check post void- again requesting     Bedside commode   sheeba improving  Roberth, PT/OT  Pain control     Bladder scan 58cc     IV heparin bridge-- start in am +coumadin  She WILL NOT be getting lovenox shots  NO shots through skin     Patient preference for IV heparin and coumadin bridge - will use both and this means she would need to stay in hospital (vs [facility]) for next 4-5 days as anticoaglation builds up. Patient wants to \"play\" it safe, also per request of CV surgery CC- so plan to stay in house over next 5 days or so- medically high risk for rebleed or CVA  surgergy opinion 7/28 resume anticoagulation  ? bridge if need when INR drops- IV heparin vs off label use eliquis could days - discussed  Then back on coumadin goal INR 2-2.5?   At least for now--For now- CVA risk vs bleeds     Discussed w outpatient primary physician  We Discussed case with CV surgery -at [address] [place] [address]-- he does want bridging coumaidin s- OK with starting in am tomorrow  Start low dose heparin IV protocol and give first dose coumadin in AM tomorrow (don't wait till evening)- I ordered this     She WILL NOT be getting lovenox shots  NO shots through skin     Patient preference for IV heparin and coumadin bridge - will use both and this means she would need to stay in hospital (vs [facility]) for next 4-5 days as anticoaglation builds up.  Patient wants to \"play\" it safe, also per request of CV surgery CC- so plan to stay in house over next 5 days or so- medically high risk for rebleed or CVA  Done     I also believes she bleeds excessively related to lovenox above and beyond the trauma of the shots

## 2021-07-30 NOTE — PROGRESS NOTES
[facility]  Internal Medicine Residency Program  Progress Note - House Team 1    Patient:  Dinora Beasley 77 y.o. female MRN: [id number]     Date of Service: 7/30/2021   Hospital Day: 9      CHIEF  COMPLAINT:     -had concerns including Abdominal Pain (states it started a few days ago but recently started vomiting). OVERNIGHT  EVENTS:     -None    SUBJECTIVE:     -plan of to d/c home w/ HHC   -feeling well, no complaints  -pain well controlled 5/10  -tolerating diet  -making urine, having BM    OBJECTIVE:     PHYSICAL  EXAM:  · Vitals: BP (!) 164/74   Pulse 89   Temp 98.6 °F (37 °C) (Oral)   Resp 16   Ht 5' 4\" (1.626 m)   Wt 254 lb 10.1 oz (115.5 kg)   SpO2 98%   BMI 43.71 kg/m²     · I & O - 24hr: No intake/output data recorded. · General Appearance: alert, appears stated age and cooperative  · HEENT:  Head: Normal, normocephalic, atraumatic. · Neck: no JVD and supple, symmetrical, trachea midline  · Lung: clear to auscultation bilaterally  · Heart: irregularly irregular. Manual /65 performed by me ~ 08:45  · Abdomen: bowel sounds throughout. soft w/o rebound or gaurding. Mild to moderate pain on palpation of L abd in region of hematoma on CT scan. Extensive yellow/purple and slightly green ecchymosis throughout upper abd and Left lower abd w/ extension into Left flank and paraspinal region b/l resolving today.   · Extremities:  extremities normal, atraumatic, no cyanosis or edema  · Musculokeletal: No joint swelling, no muscle tenderness. ROM normal in all joints of extremities.    · Neurologic: Mental status: Alert, oriented, thought content appropriate    LABS  -&-  IMAGING  STUDIES:     LABORATORY  WORK:    Recent Labs     07/28/21  0650 07/29/21  0634    138   K 4.0 4.2    102   CO2 24 22   BUN 13 11   CREATININE 0.8 0.7   GLUCOSE 152* 113*   CALCIUM 8.5* 8.8       Recent Labs     07/28/21  0650 07/28/21  1737 07/29/21  0634   WBC 8.9  --  9.4   RBC 3.08* --  3.23*   HGB 8.9* 8.9* 9.4*   HCT 28.6* 28.8* 30.1*   MCV 92.9  --  93.2   MCH 28.9  --  29.1   MCHC 31.1*  --  31.2*   RDW 15.0  --  15.4*     --  338   MPV 10.5  --  10.7       CMP:    Lab Results   Component Value Date     07/29/2021    K 4.2 07/29/2021    K 4.6 07/24/2021     07/29/2021    CO2 22 07/29/2021    BUN 11 07/29/2021    CREATININE 0.7 07/29/2021    GFRAA >60 07/29/2021    LABGLOM >60 07/29/2021    GLUCOSE 113 07/29/2021    GLUCOSE 90 06/06/2012    PROT 5.8 07/29/2021    LABALBU 3.0 07/29/2021    LABALBU 4.2 06/06/2012    CALCIUM 8.8 07/29/2021    BILITOT 2.9 07/29/2021    ALKPHOS 97 07/29/2021    AST 26 07/29/2021    ALT 28 07/29/2021     PT/INR:    Lab Results   Component Value Date    PROTIME 14.6 07/29/2021    PROTIME 21.2 07/21/2021    INR 1.3 07/29/2021     PTT:    Lab Results   Component Value Date    APTT 78.7 07/29/2021   [APTT}      IMAGING  STUDIES:    Echo Complete    Result Date: 7/28/2021  Transthoracic Echocardiography Report (TTE)  Demographics   Patient Name    [address] [place] [address]          Gender            Female                  [address] [facility] Record  [id number]      Room Number       1385  Number   Account #       [de-identified]     Procedure Date    07/28/2021   Corporate ID                  Ordering                                Physician   Accession       [id number]    Referring  Number                        Physician   Date of Birth   1945    Sonographer       Kat ESPINOZA   Age             77 year(s)    Interpreting      [address]                                Physician         Physician Cardiology                                                  Blanco Solitario MD                                 Any Other  Procedure Type of Study   TTE procedure:Echocardiogram W/Contrast.  Procedure Date Date: 07/28/2021 Start: 11:37 AM Study Location: Portable Technical Quality: Limited visualization due to body habitus. Indications:S/P TAVR and LV function. Interatrial septum not well visualized but appears intact. Normal right ventricle size and function. 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 There is trace mitral regurgitation. 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 Small aortic mason-valvualr regurgitation. There is mild tricuspid regurgitation. Mild pulmoanry hypertension, RVSP 46mmHg  Normal aortic root size. No evidence of pericardial effusion. No intra cardiac mass or thrombus. Compared to prior echo from 7/15/2021.    Signature   ----------------------------------------------------------------  Electronically signed by Haley Washington MD(Interpreting  physician) on 07/28/2021 06:17 PM  ----------------------------------------------------------------  M-Mode/2D Measurements & Calculations   LV Diastolic    LV Systolic Dimension: 2.9   AV Cusp Separation: 1.4 cmLA  Dimension: 4.9  cm                           Dimension: 6.4 cmAO Root  cm              LV Volume Diastolic: 909 ml  Dimension: 3.4 cm  LV FS:40.8 %    LV Volume Systolic: 33.7 ml  LV PW           LV EDV/LV EDV Index: 138  Diastolic: 1.2  MU/88 BG/V^5LZ ESV/LV ESV  cm              Index: 31.7 ml/15ml/ m^2     RV Diastolic Dimension: 2.7  LV PW Systolic: EF Calculated: 82.0 %        cm  1.3 cm          LV Mass Index: 120 l/min*m^2  Septum          LV Length: 6.7 cm            Ascending Aorta: 2.9 cm  Diastolic: 1.4                               LA volume/Index: 172 ml  cm              LVOT: 2.1 cm                 /81.13ml/m^2  Septum                                       RA Area: 09.7 cm^2  Systolic: 1.4  cm  CO: 5.22 l/min  CI: 2.02  l/m*m^2  LV Mass: 253.72  g  Doppler Measurements & Calculations   MV Peak E-Wave: 2.2 AV Peak Velocity:     LVOT Peak Velocity: 0.76 m/s  m/s 1.65 m/s              LVOT Mean Velocity: 0.55 m/s                      AV Peak Gradient:     LVOT Peak Gradient: 2.3 mmHgLVOT  MV Peak Gradient:   10.84 mmHg            Mean Gradient: 1.3 mmHg  19.3 mmHg           AV Mean Velocity:  MV Mean Gradient: 5 1.21 m/s  mmHg                AV Mean Gradient: 6.4  MV Mean Velocity:   mmHg                  TR Velocity:3.22 m/s  0.98 m/s            AV VTI: 34.7 cm       TR Gradient:41.34 mmHg  MV P1/2t: 60.2 msec AV Area  MVA by PHT:3.65     (Continuity):1.61  cm^2                cm^2  MV Area                                   MT ED Velocity: 0.52 m/s  (continuity): 1.2   LVOT VTI: 16.1 cm  cm^2  MV E' Septal  Velocity: 0.05 m/s  MV E' Lateral  Velocity: 5 m/s  [email or web address] [id number]    Echo Limited    Result Date: 7/16/2021  Transthoracic Echocardiography Report (TTE)  Demographics   Patient Name      Roger Ren Gender              Female                    L   Medical Record    [id number]       Room Number         0332  Number   Account #         [de-identified]      Procedure Date      07/15/2021   Corporate ID                     Ordering Physician  Isabel Buck MD   Accession Number  [id number]     Referring Physician   Date of Birth     1945     Sonographer         Kalani Solitario                                                       [facility]   Age               77 year(s)     Interpreting        Isabel Buck MD                                   Physician                                    Any Other  Procedure Type of Study   TTE procedure:Echo Limited Study. Procedure Date Date: 07/15/2021 Start: 09:18 AM Study Location: Portable Technical Quality: Limited visualization due to body habitus. Indications:S/P TAVR. Patient Status: Pending Discharge Height: 64 inches Weight: 240 pounds BSA: 2.11 m^2 BMI: 41.2 kg/m^2 Allergies   - Other drug:(Phenobarbital).   Findings Left Ventricle  Normal left ventricle size. Estimated left ventricle ejection fraction 60+/-5 %. Right Ventricle  Normal right ventricular size and function. Mitral Valve  Well-seated surgical mitral bioprosthesis. No significant mitral  regurgitation. Tricuspid Valve  Moderate tricuspid regurgitation. RVSP is ~37 mmHg. Aortic Valve  There is a well-seated transcatheter valve in the aortic position. Trace  paravalvular regurgitation. MG 7, DVI 0.67. Pericardial Effusion  No evidence of pericardial effusion. Aorta  Aortic root dimension within normal limits. Miscellaneous  Normal Inferior Vena Cava diameter and respiratory variation. Conclusions   Summary  **POD1 s/p TAVR with 29-mm Evolut PRO+; h/o MVR with #25 Mosaic (2007)**  Normal left ventricle size. Estimated left ventricle ejection fraction  60+/-5 %. Normal right ventricular size and function. Well-seated surgical mitral bioprosthesis. No significant mitral  regurgitation. There is a well-seated transcatheter valve in the aortic position. Trace  paravalvular regurgitation. MG 7, DVI 0.67. No evidence of pericardial effusion.    Signature   ----------------------------------------------------------------  Electronically signed by Shannan Pina MD(Interpreting physician)  on 07/16/2021 12:54 PM  ----------------------------------------------------------------  M-Mode/2D Measurements & Calculations    LVOT: 2.1 cm              Ascending Aorta: 2.8 cm                              IVC Expiration: 1.7 cm  Doppler Measurements & Calculations    AV Peak Velocity: 1.77 m/s LVOT Peak Velocity: 1.21 m/s   AV Peak Gradient: 12.52    LVOT Mean Velocity: 0.76 m/s   mmHg                       LVOT Peak Gradient: 5.9 mmHgLVOT Mean   AV Mean Velocity: 1.13 m/s Gradient: 2.9 mmHg   AV Mean Gradient: 6.1 mmHg   AV VTI: 45.4 cm   AV Area (Continuity):2.33   cm^2                       TR Velocity:2.91 m/s                              TR Gradient:33.8 mmHg   LVOT duodenal sweep are unremarkable aside from the hiatal hernia. Pelvis: The urinary bladder is decompressed around a Menendez catheter. No obvious uterine or ovarian abnormality aside from a presumed fibroid at the fundus of the uterus. Small volume of free fluid is present in the pelvis. No pelvic lymphadenopathy. Peritoneum/Retroperitoneum: The abdominal aorta is normal in caliber. No retroperitoneal lymphadenopathy or mass. Bones/Soft Tissues: Again noted is a large hematoma along the left oblique abdominal musculature, slightly decreased in size on today's examination, measuring 12.8 x 9.2 cm (previously 13.2 x 10.7 cm). The 3.7 x 2.2 cm hematoma superficial to the right common femoral artery is stable in size. Suspect there is also a small right-sided rectus sheath hematoma on axial image 93, stable to slightly decreased in size. No acute osseous abnormality or evidence of aggressive osseous lesion. Slight interval decrease in the size of the left oblique abdominal wall intramuscular hematoma with a stable right inguinal hematoma and suspected small right rectus sheath hematoma. Multiple indeterminate bilateral renal lesions for which correlation with renal ultrasound is recommended. Additional, incidental findings as above. CT ABDOMEN PELVIS WO CONTRAST Additional Contrast? None    Result Date: 7/23/2021  EXAMINATION: CT OF THE ABDOMEN AND PELVIS WITHOUT CONTRAST 7/23/2021 3:22 pm TECHNIQUE: CT of the abdomen and pelvis was performed without the administration of intravenous contrast. Multiplanar reformatted images are provided for review. Dose modulation, iterative reconstruction, and/or weight based adjustment of the mA/kV was utilized to reduce the radiation dose to as low as reasonably achievable. COMPARISON: None.  HISTORY: ORDERING SYSTEM PROVIDED HISTORY: assess change of abdominal wall hematoma and groin hematoma TECHNOLOGIST PROVIDED HISTORY: Reason for exam:->assess change of abdominal wall hematoma and groin hematoma Additional Contrast?->None What reading provider will be dictating this exam?->CRC FINDINGS: In the left lateral abdominal wall at the level of the oblique/transverse muscles there is a large acute hematoma measuring 11 by 11.6 by 11 cm. On the previous study of a July 22nd it measured 12.5 by 8.5 x 11.8 cm. The overall size of the hematoma is stable account some differences in measurement, presently there is more uniform moderated hyperdense appearance of the hematoma indicating an acute phase. No longer seen blood/blood level which indicates a hyperacute phase or an active bleeding hematoma. There is significant edema and increased density of the adjacent muscular planes of the left lateral chest wall extending anteriorly, posteriorly, superior inferiorly, and also into the deep subcutaneous soft tissues of the left lateral abdominal wall indicating oozing of a hemorrhage, forming a ill-defined surrounding interstitial hematoma. There is a localized small hematoma in the right inguinal region which is related with the right common femoral artery. Cannot exclude the pseudoaneurysm formation. Correlation with ultrasound of the right inguinal areas seen. The there is a small pocket of air associated. The patient had aortic valve replacement procedure in July 14, 2021. There is no extension of hematoma into the pelvic sidewall. There is a Menendez catheter in the bladder. The bladder is not distended. Uterus and ovaries have unremarkable appearance. There is no free intraperitoneal air. There is no indication for acute intraperitoneal process in the abdomen or in the pelvis accounting the left lateral chest wall hematoma which spreads into adjacent soft tissues including the left pelvic sidewall. Uncomplicated diverticulosis seen in the sigmoid colon. There is no indication for bowel obstruction.  Kidneys are preserved size and cortical thickness, parenchymal enhancement and excretion of the contrast.  Presence of a cyst-like structures in both kidneys but they have density higher than simple cyst, they cannot be characterize presently. When patient condition permits consider initial correlation with ultrasound. They could represent hemorrhagic cysts or cyst with high proteinaceous content, but other possibilities are not excluded. There are normal size and the density for the liver and spleen. Pancreas demonstrates some atrophy. Gallbladder is well distended with 3 cm gallstone. There is no dilatation of the biliary tree pancreatic ductal system. There is a mild to moderate size hiatal hernia. Adrenals not enlarged. Calcified atheromatous changes are seen in the abdominal aorta there is no aneurysm formation. There is no midline retro peritoneal adenopathy. Heart has normal size. There is endovascular repair for the aortic valve and there is also prosthesis for the mitral valve. The heart is not fully covered in this study. There are areas of a confluent loss of volume indicate a component of atelectasis in the medial aspect of the right lower lobe and towards the base in the left lower lobe. These appear to be more chronic findings but increase since May 7, 2021.     1.  Overall stable large hematoma of the left lateral abdominal wall with surrounding hemorrhage into adjacent muscle planes and deep subcutaneous soft tissues by oozing. The large dominant hematoma now has a uniform hyperdensity indicating acute phase. No longer seen blood/blood level which indicates a hyperacute active bleeding hematoma as demonstrate on the study of July 22nd. 2.  Hematoma of a small size superficial to the right common femoral artery. Cannot exclude a pseudoaneurysm formation. Can correlate with the ultrasound of the right groin.      XR CHEST (2 VW)    Result Date: 7/9/2021  EXAMINATION: TWO XRAY VIEWS OF THE CHEST 7/9/2021 1:00 pm COMPARISON: May 26, 2021 HISTORY: [address] HISTORY: Dyspnea, unspecified type TECHNOLOGIST PROVIDED HISTORY: What reading provider will be dictating this exam?->CRC FINDINGS: Moderate cardiomegaly. No airspace opacity or pleural effusion. Median sternotomy wires are present. The heart is normal in size. No pneumothorax. 1. Moderate cardiomegaly. 2. No airspace opacity or pleural effusion. CT ABDOMEN PELVIS W IV CONTRAST Additional Contrast? None    Result Date: 7/22/2021  EXAMINATION: CT OF THE ABDOMEN AND PELVIS WITH CONTRAST 7/22/2021 3:53 pm TECHNIQUE: CT of the abdomen and pelvis was performed with the administration of intravenous contrast. Multiplanar reformatted images are provided for review. Dose modulation, iterative reconstruction, and/or weight based adjustment of the mA/kV was utilized to reduce the radiation dose to as low as reasonably achievable. COMPARISON: None. HISTORY: ORDERING SYSTEM PROVIDED HISTORY: ab pain s/p TAVR TECHNOLOGIST PROVIDED HISTORY: Reason for exam:->ab pain s/p TAVR Additional Contrast?->None Decision Support Exception - unselect if not a suspected or confirmed emergency medical condition->Emergency Medical Condition (MA) What reading provider will be dictating this exam?->CRC FINDINGS: Lower Chest: Images through lung bases demonstrate mild cardiomegaly. Note is made of an aortic valve. The left atrium is enlarged. There is no right or left lung base infiltrate or effusion. Organs: The liver, spleen, pancreas and adrenal glands are unremarkable. The kidneys enhance symmetrically without evidence of hydronephrosis. GI/Bowel: There is a large stones seen within the gallbladder lumen measuring 3.4 x 2.4 cm. There are no findings of acute cholecystitis. The stomach is normally distended. There is a small hiatal hernia. There is no large or small bowel obstruction. There are no findings of inflammatory bowel disease. Multiple sigmoid diverticula noted. There is no evidence of diverticulitis.  Pelvis: Bladder is unremarkable in appearance. There is a uterine fibroid measuring approximately 3.3 x 3.4 cm. Peritoneum/Retroperitoneum: There are postsurgical changes seen within the right groin from recent surgical intervention. There is a small hematoma measuring 2.5 x 2.5 cm. The abdominal aorta is normal caliber. There is no dissection of the abdominal aorta or of the iliac arteries. There is a large left lateral abdominal wall hematoma measuring 13 cm in maximum craniocaudal dimension by 12 cm in maximum AP diameter by 8 cm in maximum transverse diameter. There is a fluid fluid level seen within the left lateral abdominal wall hematoma consistent with acute on subacute hemorrhage. Along the superior aspect of the fluid fluid level there is minimal dense layering material suggestive of mild active bleeding. Bones/Soft Tissues:  Age related degenerative changes of the visualized osseous structures without focal destructive lesion. 1. Large left lateral abdominal wall hematoma measuring 13 cm x 12 cm x 8 cm. There is a fluid fluid level seen within the abdominal wall hematoma consistent with acute on subacute hemorrhage. There is minimal dense material seen layering on the fluid fluid level centrally consistent with minimal active bleeding. Surrounding intramuscular edema is noted within the oblique muscles. There is also induration within the subcutaneous soft tissues consistent with reactive changes. 2. Postsurgical changes within the right groin from recent intervention and TAVR procedure. There is a 2.5 x 2.5 cm hematoma within the right groin. 3. Cholelithiasis. There is no evidence of acute cholecystitis. 4. There is no aortic or iliac artery injury.      XR CHEST PORTABLE    Result Date: 7/25/2021  EXAMINATION: ONE XRAY VIEW OF THE CHEST 7/25/2021 9:09 am COMPARISON: July 22, 2021 HISTORY: ORDERING SYSTEM PROVIDED HISTORY: Wheezing TECHNOLOGIST PROVIDED HISTORY: Reason for exam:->Wheezing What reading provider will be dictating this exam?->CRC FINDINGS: Heart size is unable to be accurately assessed on this single portable view of the chest, but appears to be stable. Sternotomy wires are in place. There are postprocedural changes related to transfemoral aortic valve replacement. Prominence of the pulmonary vasculature raises possibility of mild pulmonary edema. No evidence of a sizable pleural effusion or pneumothorax. No acute osseous abnormality. Prominence of pulmonary vasculature raising the possibility of mild pulmonary edema. XR CHEST PORTABLE    Result Date: 7/22/2021  EXAMINATION: ONE XRAY VIEW OF THE CHEST 7/22/2021 1:17 pm COMPARISON: March 8, May 26, July 9 HISTORY: ORDERING SYSTEM PROVIDED HISTORY: Shortness of breath TECHNOLOGIST PROVIDED HISTORY: Reason for exam:->Shortness of breath What reading provider will be dictating this exam?->CRC FINDINGS: Status post previous mid sternotomy. More recent endovascular repair for the aortic root. Heart has borderline size. The no acute infiltrates, consolidates or pleural effusions are seen. There is no perihilar vascular congestion. 1.  Status post more remote mid sternotomy. Status post recent endovascular repair for the aortic root. 2.  Borderline size heart. No acute pulmonary process. STUDENT  ASSESSMENT  -&-  PLAN:     Radha Mueller is a 77 y.o. female    1. Abd Hematoma 2/2 lovenox use              -Heparin drip bridge to Coumadin started 7/28/21 per General surgery service              -Restarted coumadin 7/28/21              -trend labs              -cont pain meds w/preferrence for PO meds  2. A-fib, rate controlled              -anticoags as above              -cardiothoracic recommends IV heparin bridge to coumadin  3. Hyperbilirubinemia, likely 2/2 hematoma  4.urinary retention              -d/c guevara & pvr <100cc               -voiding trial today - passed  5.DM, HgbA1c on 7/9/21 = 6.6              -cont LDSS  6. Increase resp Mucus production, resolving              -encourage pulmonary toilet              -nebs prn               -monitor at this time, CXR if pt develops pain, change in sputum color, fevers, leukocytosis, or dypnea  7. H/O HFpEF              -not clinically overloaded currently              -cont to monitor  8. H/O TAVR, mitral valve, and CABG              -anticoag as above  8. H/O CAD              -anticoag as above  10. H/O GERD              -cont protonix  11. H/O gluacoma              -cont Latanaprost & bromonidine     PT / OT  EVALUATION:  evaluating     DVT  PROPHYLAXIS:  SCD     GI  PROPHYLAXIS:  Diet / Protonix     DISPOSITION:  cont Admission w/ plan of to d/c home w/ [facility]-3/4   ATTENDING  PHYSICIAN: Dr. Jess House

## 2021-07-30 NOTE — CARE COORDINATION
Care Coordination: met with pt at bedside to update transition of care. Discussed hhc with pt as Dr Esteban Aceves recommends skilled nursing to follow her at home after discharge. INR today is 1.4 , with goal 2-2.5  Pharmacy following. Will need Orders for Skilled nursing and if any lab draws are needed. [facility] will [facility] . Please notify them on discharge    Javier Zaidi

## 2021-07-31 LAB
ALBUMIN SERPL-MCNC: 3.1 G/DL (ref 3.5–5.2)
ALP BLD-CCNC: 85 U/L (ref 35–104)
ALT SERPL-CCNC: 21 U/L (ref 0–32)
ANION GAP SERPL CALCULATED.3IONS-SCNC: 9 MMOL/L (ref 7–16)
APTT: 75.3 SEC (ref 24.5–35.1)
AST SERPL-CCNC: 19 U/L (ref 0–31)
BASOPHILS ABSOLUTE: 0.03 E9/L (ref 0–0.2)
BASOPHILS RELATIVE PERCENT: 0.5 % (ref 0–2)
BILIRUB SERPL-MCNC: 2.3 MG/DL (ref 0–1.2)
BUN BLDV-MCNC: 11 MG/DL (ref 6–23)
CALCIUM SERPL-MCNC: 8.9 MG/DL (ref 8.6–10.2)
CHLORIDE BLD-SCNC: 101 MMOL/L (ref 98–107)
CO2: 24 MMOL/L (ref 22–29)
CREAT SERPL-MCNC: 0.9 MG/DL (ref 0.5–1)
EOSINOPHILS ABSOLUTE: 0.19 E9/L (ref 0.05–0.5)
EOSINOPHILS RELATIVE PERCENT: 3 % (ref 0–6)
GFR AFRICAN AMERICAN: >60
GFR NON-AFRICAN AMERICAN: >60 ML/MIN/1.73
GLUCOSE BLD-MCNC: 124 MG/DL (ref 74–99)
HCT VFR BLD CALC: 28.6 % (ref 34–48)
HEMOGLOBIN: 8.9 G/DL (ref 11.5–15.5)
IMMATURE GRANULOCYTES #: 0.12 E9/L
IMMATURE GRANULOCYTES %: 1.9 % (ref 0–5)
INR BLD: 1.4
LYMPHOCYTES ABSOLUTE: 1.55 E9/L (ref 1.5–4)
LYMPHOCYTES RELATIVE PERCENT: 24.2 % (ref 20–42)
MCH RBC QN AUTO: 29.3 PG (ref 26–35)
MCHC RBC AUTO-ENTMCNC: 31.1 % (ref 32–34.5)
MCV RBC AUTO: 94.1 FL (ref 80–99.9)
METER GLUCOSE: 130 MG/DL (ref 74–99)
METER GLUCOSE: 138 MG/DL (ref 74–99)
METER GLUCOSE: 144 MG/DL (ref 74–99)
METER GLUCOSE: 166 MG/DL (ref 74–99)
MONOCYTES ABSOLUTE: 0.55 E9/L (ref 0.1–0.95)
MONOCYTES RELATIVE PERCENT: 8.6 % (ref 2–12)
NEUTROPHILS ABSOLUTE: 3.97 E9/L (ref 1.8–7.3)
NEUTROPHILS RELATIVE PERCENT: 61.8 % (ref 43–80)
PDW BLD-RTO: 16.3 FL (ref 11.5–15)
PLATELET # BLD: 329 E9/L (ref 130–450)
PMV BLD AUTO: 9.9 FL (ref 7–12)
POTASSIUM SERPL-SCNC: 3.9 MMOL/L (ref 3.5–5)
PROTHROMBIN TIME: 16.1 SEC (ref 9.3–12.4)
RBC # BLD: 3.04 E12/L (ref 3.5–5.5)
SODIUM BLD-SCNC: 134 MMOL/L (ref 132–146)
TOTAL PROTEIN: 5.9 G/DL (ref 6.4–8.3)
WBC # BLD: 6.4 E9/L (ref 4.5–11.5)

## 2021-07-31 PROCEDURE — 82962 GLUCOSE BLOOD TEST: CPT

## 2021-07-31 PROCEDURE — 6370000000 HC RX 637 (ALT 250 FOR IP): Performed by: INTERNAL MEDICINE

## 2021-07-31 PROCEDURE — 6370000000 HC RX 637 (ALT 250 FOR IP): Performed by: SURGERY

## 2021-07-31 PROCEDURE — 85730 THROMBOPLASTIN TIME PARTIAL: CPT

## 2021-07-31 PROCEDURE — 99232 SBSQ HOSP IP/OBS MODERATE 35: CPT | Performed by: INTERNAL MEDICINE

## 2021-07-31 PROCEDURE — 36415 COLL VENOUS BLD VENIPUNCTURE: CPT

## 2021-07-31 PROCEDURE — 85610 PROTHROMBIN TIME: CPT

## 2021-07-31 PROCEDURE — 85025 COMPLETE CBC W/AUTO DIFF WBC: CPT

## 2021-07-31 PROCEDURE — 2140000000 HC CCU INTERMEDIATE R&B

## 2021-07-31 PROCEDURE — 6360000002 HC RX W HCPCS: Performed by: INTERNAL MEDICINE

## 2021-07-31 PROCEDURE — 80053 COMPREHEN METABOLIC PANEL: CPT

## 2021-07-31 RX ORDER — WARFARIN SODIUM 4 MG/1
4 TABLET ORAL
Status: COMPLETED | OUTPATIENT
Start: 2021-07-31 | End: 2021-07-31

## 2021-07-31 RX ADMIN — LATANOPROST 1 DROP: 50 SOLUTION OPHTHALMIC at 22:33

## 2021-07-31 RX ADMIN — OXYCODONE 5 MG: 5 TABLET ORAL at 09:12

## 2021-07-31 RX ADMIN — INSULIN LISPRO 1 UNITS: 100 INJECTION, SOLUTION INTRAVENOUS; SUBCUTANEOUS at 11:46

## 2021-07-31 RX ADMIN — SENNOSIDES 17.2 MG: 8.6 TABLET, FILM COATED ORAL at 20:29

## 2021-07-31 RX ADMIN — ATORVASTATIN CALCIUM 20 MG: 20 TABLET, FILM COATED ORAL at 09:12

## 2021-07-31 RX ADMIN — DOCUSATE SODIUM 100 MG: 100 CAPSULE ORAL at 09:00

## 2021-07-31 RX ADMIN — WARFARIN SODIUM 4 MG: 4 TABLET ORAL at 17:15

## 2021-07-31 RX ADMIN — OXYCODONE 5 MG: 5 TABLET ORAL at 17:15

## 2021-07-31 RX ADMIN — METOPROLOL TARTRATE 50 MG: 50 TABLET, FILM COATED ORAL at 09:12

## 2021-07-31 RX ADMIN — DOCUSATE SODIUM 100 MG: 100 CAPSULE ORAL at 20:29

## 2021-07-31 RX ADMIN — INSULIN LISPRO 1 UNITS: 100 INJECTION, SOLUTION INTRAVENOUS; SUBCUTANEOUS at 20:28

## 2021-07-31 RX ADMIN — PANTOPRAZOLE SODIUM 40 MG: 40 TABLET, DELAYED RELEASE ORAL at 09:12

## 2021-07-31 RX ADMIN — METOPROLOL TARTRATE 50 MG: 50 TABLET, FILM COATED ORAL at 20:29

## 2021-07-31 RX ADMIN — BRIMONIDINE TARTRATE 1 DROP: 2 SOLUTION OPHTHALMIC at 09:56

## 2021-07-31 RX ADMIN — HEPARIN SODIUM 12.51 UNITS/KG/HR: 10000 INJECTION, SOLUTION INTRAVENOUS at 09:13

## 2021-07-31 ASSESSMENT — PAIN SCALES - GENERAL
PAINLEVEL_OUTOF10: 7
PAINLEVEL_OUTOF10: 2
PAINLEVEL_OUTOF10: 3
PAINLEVEL_OUTOF10: 7

## 2021-07-31 ASSESSMENT — PAIN DESCRIPTION - ORIENTATION
ORIENTATION: LEFT;LOWER
ORIENTATION: LOWER;LEFT
ORIENTATION: LEFT;LOWER
ORIENTATION: LEFT;LOWER

## 2021-07-31 ASSESSMENT — PAIN DESCRIPTION - LOCATION
LOCATION: ABDOMEN

## 2021-07-31 ASSESSMENT — PAIN DESCRIPTION - PAIN TYPE
TYPE: CHRONIC PAIN

## 2021-07-31 NOTE — PROGRESS NOTES
[facility]  [facility] Residency Program  Progress Note - House Team 1    Patient:  Timmy Calwdell 77 y.o. female MRN: [id number]     Date of Service: 7/31/2021   Hospital Day: 10      CHIEF  COMPLAINT:     -had concerns including Abdominal Pain (states it started a few days ago but recently started vomiting). OVERNIGHT  EVENTS:     -None    SUBJECTIVE:     -plan of to d/c home w/ HHC   -feeling well, no complaints  -pain well controlled 5/10  -tolerating diet  -making urine, having BM    OBJECTIVE:     PHYSICAL  EXAM:  · Vitals: BP (!) 142/70   Pulse 85   Temp 97 °F (36.1 °C) (Oral)   Resp 18   Ht 5' 4\" (1.626 m)   Wt 243 lb 6.4 oz (110.4 kg)   SpO2 97%   BMI 41.78 kg/m²     · I & O - 24hr: No intake/output data recorded. · General Appearance: alert, appears stated age and cooperative  · HEENT:  Head: Normal, normocephalic, atraumatic. · Neck: no JVD and supple, symmetrical, trachea midline  · Lung: clear to auscultation bilaterally  · Heart: irregularly irregular. Manual /65 performed by me ~ 08:45  · Abdomen: bowel sounds throughout. soft w/o rebound or gaurding. Mild to moderate pain on palpation of L abd in region of hematoma on CT scan. Extensive yellow/purple and slightly green ecchymosis throughout upper abd and Left lower abd w/ extension into Left flank and paraspinal region b/l resolving today.   · Extremities:  extremities normal, atraumatic, no cyanosis or edema  · Musculokeletal: No joint swelling, no muscle tenderness. ROM normal in all joints of extremities.    · Neurologic: Mental status: Alert, oriented, thought content appropriate    LABS  -&-  IMAGING  STUDIES:     LABORATORY  WORK:    Recent Labs     07/29/21  0634 07/30/21  0804 07/31/21  0500    137 134   K 4.2 4.1 3.9    102 101   CO2 22 22 24   BUN 11 9 11   CREATININE 0.7 0.8 0.9   GLUCOSE 113* 116* 124*   CALCIUM 8.8 8.6 8.9       Recent Labs     07/29/21  0634 07/30/21  0804 07/31/21  0500   WBC 9.4 7.7 6.4   RBC 3.23* 3.23* 3.04*   HGB 9.4* 9.2* 8.9*   HCT 30.1* 30.1* 28.6*   MCV 93.2 93.2 94.1   MCH 29.1 28.5 29.3   MCHC 31.2* 30.6* 31.1*   RDW 15.4* 15.9* 16.3*    287 329   MPV 10.7 11.2 9.9       PT/INR:    Lab Results   Component Value Date    PROTIME 16.1 07/31/2021    PROTIME 21.2 07/21/2021    INR 1.4 07/31/2021     PTT:    Lab Results   Component Value Date    APTT 80.7 07/30/2021         IMAGING  STUDIES:    Echo Complete    Result Date: 7/28/2021  Transthoracic Echocardiography Report (TTE)  Demographics   Patient Name    [address] [place] [address]          Gender            Female                  [facility] Record  [id number]      Room Number       6408  Number   Account #       [de-identified]     Procedure Date    07/28/2021   Corporate ID                  Ordering                                Physician   Accession       [id number]    Referring  Number                        Physician   Date of Birth   1945    Sonographer       Bianca Gilford RCS   Age             77 year(s)    Interpreting      [address]                                Physician         Physician Cardiology                                                  Afshin Bentley MD                                 Any Other  Procedure Type of Study   TTE procedure:Echocardiogram W/Contrast.  Procedure Date Date: 07/28/2021 Start: 11:37 AM Study Location: Portable Technical Quality: Limited visualization due to body habitus. Indications:S/P TAVR and LV function. Patient Status: Routine Contrast Medium: Definity. Amount - 2 ml Contrast Comments: given by the rn. Height: 64 inches Weight: 242 pounds BSA: 2.12 m^2 BMI: 41.54 kg/m^2 Rhythm: Within normal limits HR: 77 bpm BP: 111/63 mmHg Allergies   - Other drug:(Phenobarbital). Findings   Left Ventricle  Normal left ventricular chamber size. Normal left ventricular systolic function. , LVEF is 70%. Mild left ventricular concentric hypertrophy noted. Indeterminate LV diastolic function. Right Ventricle  Normal right ventricle size and function. Left Atrium  Left atrium is severely enlarged. Increased left atrial volume. Interatrial septum not well visualized but appears intact. Right Atrium  Normal right atrium. 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 Mild-to-moderate mitral stenosis - Mean gradient 5,mmHg, peak 19mmHg, MVA  1.2cm2 by continuity. There is trace mitral regurgitation. Tricuspid Valve  Tricuspid was not well visualized. There is mild tricuspid regurgitation. 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 No hemodynamically significant aortic stenosis - mean gradient 6mmHg.  small mason-valvualr regurgitation. Pulmonic Valve  The pulmonic valve was not well visualized. Pericardial Effusion  No evidence of pericardial effusion. Pericardium appears normal.   Pleural Effusion  No evidence of pleural effusion. Aorta  Normal aortic root size and ascending aorta. Miscellaneous  The inferior vena cava not well visualized. Conclusions   Summary  Technically sub-optimal images - Definity Echo contrast used. Normal left ventricular chamber size. Normal left ventricular systolic function. , LVEF is 70%. Mild left ventricular concentric hypertrophy noted. Indeterminate LV diastolic function. Left atrium is severely enlarged. Increased left atrial volume. Interatrial septum not well visualized but appears intact. Normal right ventricle size and function. 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 There is trace mitral regurgitation. 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   Small aortic mason-valvualr regurgitation. There is mild tricuspid regurgitation. Mild pulmoanry hypertension, RVSP 46mmHg  Normal aortic root size. No evidence of pericardial effusion. No intra cardiac mass or thrombus. Compared to prior echo from 7/15/2021.    Signature   ----------------------------------------------------------------  Electronically signed by Letitia Smith MD(Interpreting  physician) on 07/28/2021 06:17 PM  ----------------------------------------------------------------  M-Mode/2D Measurements & Calculations   LV Diastolic    LV Systolic Dimension: 2.9   AV Cusp Separation: 1.4 cmLA  Dimension: 4.9  cm                           Dimension: 6.4 cmAO Root  cm              LV Volume Diastolic: 025 ml  Dimension: 3.4 cm  LV FS:40.8 %    LV Volume Systolic: 50.0 ml  LV PW           LV EDV/LV EDV Index: 032  Diastolic: 1.2  AD/58 UO/N^9DV ESV/LV ESV  cm              Index: 31.7 ml/15ml/ m^2     RV Diastolic Dimension: 2.7  LV PW Systolic: EF Calculated: 89.4 %        cm  1.3 cm          LV Mass Index: 120 l/min*m^2  Septum          LV Length: 6.7 cm            Ascending Aorta: 2.9 cm  Diastolic: 1.4                               LA volume/Index: 172 ml  cm              LVOT: 2.1 cm                 /81.13ml/m^2  Septum                                       RA Area: 35.5 cm^2  Systolic: 1.4  cm  CO: 9.72 l/min  CI: 2.02  l/m*m^2  LV Mass: 253.72  g  Doppler Measurements & Calculations   MV Peak E-Wave: 2.2 AV Peak Velocity:     LVOT Peak Velocity: 0.76 m/s  m/s                 1.65 m/s              LVOT Mean Velocity: 0.55 m/s                      AV Peak Gradient:     LVOT Peak Gradient: 2.3 mmHgLVOT  MV Peak Gradient:   10.84 mmHg            Mean Gradient: 1.3 mmHg  19.3 mmHg           AV Mean Velocity:  MV Mean Gradient: 5 1.21 m/s  mmHg                AV Mean Gradient: 6.4  MV Mean Velocity:   mmHg                  TR Velocity:3.22 m/s  0.98 m/s            AV VTI: 34.7 cm       TR Gradient:41.34 mmHg  MV P1/2t: 60.2 msec AV Area  MVA by PHT:3.65     (Continuity):1.61  cm^2                cm^2  MV Area                                   AL ED Velocity: 0.52 m/s  (continuity): 1.2   LVOT VTI: 16.1 cm  cm^2  MV E' Septal  Velocity: 0.05 m/s  MV E' Lateral  Velocity: 5 m/s  [email or web address] [id number]    Echo Limited    Result Date: 7/16/2021  Transthoracic Echocardiography Report (TTE)  Demographics   Patient Name      Maddie Pereyra Gender              Female                    L   Medical Record    [id number]       Room Number         2643  Number   Account #         [de-identified]      Procedure Date      07/15/2021   Corporate ID                     Ordering Physician  Nithya Wolfe MD   Accession Number  [id number]     Referring Physician   Date of Birth     1945     Sonographer         Eric Torres                                                       [facility]   Age               77 year(s)     Interpreting        Nithya Wolfe MD                                   Physician                                    Any Other  Procedure Type of Study   TTE procedure:Echo Limited Study. Procedure Date Date: 07/15/2021 Start: 09:18 AM Study Location: Portable Technical Quality: Limited visualization due to body habitus. Indications:S/P TAVR. Patient Status: Pending Discharge Height: 64 inches Weight: 240 pounds BSA: 2.11 m^2 BMI: 41.2 kg/m^2 Allergies   - Other drug:(Phenobarbital). Findings   Left Ventricle  Normal left ventricle size. Estimated left ventricle ejection fraction 60+/-5 %. Right Ventricle  Normal right ventricular size and function. Mitral Valve  Well-seated surgical mitral bioprosthesis. No significant mitral  regurgitation. Tricuspid Valve  Moderate tricuspid regurgitation. RVSP is ~37 mmHg. Aortic Valve  There is a well-seated transcatheter valve in the aortic position.  Trace  paravalvular regurgitation. MG 7, DVI 0.67. Pericardial Effusion  No evidence of pericardial effusion. Aorta  Aortic root dimension within normal limits. Miscellaneous  Normal Inferior Vena Cava diameter and respiratory variation. Conclusions   Summary  **POD1 s/p TAVR with 29-mm Evolut PRO+; h/o MVR with #25 Mosaic (2007)**  Normal left ventricle size. Estimated left ventricle ejection fraction  60+/-5 %. Normal right ventricular size and function. Well-seated surgical mitral bioprosthesis. No significant mitral  regurgitation. There is a well-seated transcatheter valve in the aortic position. Trace  paravalvular regurgitation. MG 7, DVI 0.67. No evidence of pericardial effusion. Signature   ----------------------------------------------------------------  Electronically signed by Stephanie Florence MD(Interpreting physician)  on 07/16/2021 12:54 PM  ----------------------------------------------------------------  M-Mode/2D Measurements & Calculations    LVOT: 2.1 cm              Ascending Aorta: 2.8 cm                              IVC Expiration: 1.7 cm  Doppler Measurements & Calculations    AV Peak Velocity: 1.77 m/s LVOT Peak Velocity: 1.21 m/s   AV Peak Gradient: 12.52    LVOT Mean Velocity: 0.76 m/s   mmHg                       LVOT Peak Gradient: 5.9 mmHgLVOT Mean   AV Mean Velocity: 1.13 m/s Gradient: 2.9 mmHg   AV Mean Gradient: 6.1 mmHg   AV VTI: 45.4 cm   AV Area (Continuity):2.33   cm^2                       TR Velocity:2.91 m/s                              TR Gradient:33.8 mmHg   LVOT VTI: 30.5 cm  [email or web address] [id number]    CT ABDOMEN PELVIS WO CONTRAST Additional Contrast? None    Result Date: 7/25/2021  EXAMINATION: CT OF THE ABDOMEN AND PELVIS WITHOUT CONTRAST 7/25/2021 10:27 am TECHNIQUE: CT of the abdomen and pelvis was performed without the administration of intravenous contrast. Multiplanar reformatted images are provided for review. Dose modulation, iterative reconstruction, and/or weight based adjustment of the mA/kV was utilized to reduce the radiation dose to as low as reasonably achievable. COMPARISON: July 23, 2021 HISTORY: ORDERING SYSTEM PROVIDED HISTORY: assess change of abdominal wall hematoma and groin hematoma TECHNOLOGIST PROVIDED HISTORY: Reason for exam:->assess change of abdominal wall hematoma and groin hematoma Additional Contrast?->None What reading provider will be dictating this exam?->CRC FINDINGS: Lower Chest: Heart size is top-normal to mildly enlarged. Postoperative changes related to transfemoral aortic valve replacement are noted. Mild dependent atelectasis is present at both lung bases. There is a small hiatal hernia. Organs: The gallbladder is distended without obvious wall thickening. There is a large peripherally calcified gallstone near the neck of the gallbladder. The unenhanced liver, spleen and adrenal glands are normal in appearance. The pancreas demonstrates diffuse fatty atrophy. The kidneys are without hydronephrosis or radiopaque calculus. There are bilateral indeterminate/intermediate density renal lesions. GI/Bowel: No evidence of a bowel obstruction, free air or pneumatosis. Portions the colon are decompressed, limiting assessment for mucosal based abnormalities. There is diverticulosis without evidence of diverticulitis. The appendix is not confidently visualized. No obvious pericecal inflammatory changes. The stomach and duodenal sweep are unremarkable aside from the hiatal hernia. Pelvis: The urinary bladder is decompressed around a Menendez catheter. No obvious uterine or ovarian abnormality aside from a presumed fibroid at the fundus of the uterus. Small volume of free fluid is present in the pelvis. No pelvic lymphadenopathy. Peritoneum/Retroperitoneum: The abdominal aorta is normal in caliber. No retroperitoneal lymphadenopathy or mass. Bones/Soft Tissues: Again noted is a large hematoma along the left oblique abdominal musculature, slightly decreased in size on today's examination, measuring 12.8 x 9.2 cm (previously 13.2 x 10.7 cm). The 3.7 x 2.2 cm hematoma superficial to the right common femoral artery is stable in size. Suspect there is also a small right-sided rectus sheath hematoma on axial image 93, stable to slightly decreased in size. No acute osseous abnormality or evidence of aggressive osseous lesion. Slight interval decrease in the size of the left oblique abdominal wall intramuscular hematoma with a stable right inguinal hematoma and suspected small right rectus sheath hematoma. Multiple indeterminate bilateral renal lesions for which correlation with renal ultrasound is recommended. Additional, incidental findings as above. CT ABDOMEN PELVIS WO CONTRAST Additional Contrast? None    Result Date: 7/23/2021  EXAMINATION: CT OF THE ABDOMEN AND PELVIS WITHOUT CONTRAST 7/23/2021 3:22 pm TECHNIQUE: CT of the abdomen and pelvis was performed without the administration of intravenous contrast. Multiplanar reformatted images are provided for review. Dose modulation, iterative reconstruction, and/or weight based adjustment of the mA/kV was utilized to reduce the radiation dose to as low as reasonably achievable. COMPARISON: None. HISTORY: ORDERING SYSTEM PROVIDED HISTORY: assess change of abdominal wall hematoma and groin hematoma TECHNOLOGIST PROVIDED HISTORY: Reason for exam:->assess change of abdominal wall hematoma and groin hematoma Additional Contrast?->None What reading provider will be dictating this exam?->CRC FINDINGS: In the left lateral abdominal wall at the level of the oblique/transverse muscles there is a large acute hematoma measuring 11 by 11.6 by 11 cm. On the previous study of a July 22nd it measured 12.5 by 8.5 x 11.8 cm.   The overall size of the hematoma is stable account some differences in measurement, presently there is more uniform moderated hyperdense appearance of the hematoma indicating an acute phase. No longer seen blood/blood level which indicates a hyperacute phase or an active bleeding hematoma. There is significant edema and increased density of the adjacent muscular planes of the left lateral chest wall extending anteriorly, posteriorly, superior inferiorly, and also into the deep subcutaneous soft tissues of the left lateral abdominal wall indicating oozing of a hemorrhage, forming a ill-defined surrounding interstitial hematoma. There is a localized small hematoma in the right inguinal region which is related with the right common femoral artery. Cannot exclude the pseudoaneurysm formation. Correlation with ultrasound of the right inguinal areas seen. The there is a small pocket of air associated. The patient had aortic valve replacement procedure in July 14, 2021. There is no extension of hematoma into the pelvic sidewall. There is a Menendez catheter in the bladder. The bladder is not distended. Uterus and ovaries have unremarkable appearance. There is no free intraperitoneal air. There is no indication for acute intraperitoneal process in the abdomen or in the pelvis accounting the left lateral chest wall hematoma which spreads into adjacent soft tissues including the left pelvic sidewall. Uncomplicated diverticulosis seen in the sigmoid colon. There is no indication for bowel obstruction. Kidneys are preserved size and cortical thickness, parenchymal enhancement and excretion of the contrast.  Presence of a cyst-like structures in both kidneys but they have density higher than simple cyst, they cannot be characterize presently. When patient condition permits consider initial correlation with ultrasound. They could represent hemorrhagic cysts or cyst with high proteinaceous content, but other possibilities are not excluded. There are normal size and the density for the liver and spleen. Pancreas demonstrates some atrophy. Gallbladder is well distended with 3 cm gallstone. There is no dilatation of the biliary tree pancreatic ductal system. There is a mild to moderate size hiatal hernia. Adrenals not enlarged. Calcified atheromatous changes are seen in the abdominal aorta there is no aneurysm formation. There is no midline retro peritoneal adenopathy. Heart has normal size. There is endovascular repair for the aortic valve and there is also prosthesis for the mitral valve. The heart is not fully covered in this study. There are areas of a confluent loss of volume indicate a component of atelectasis in the medial aspect of the right lower lobe and towards the base in the left lower lobe. These appear to be more chronic findings but increase since May 7, 2021.     1.  Overall stable large hematoma of the left lateral abdominal wall with surrounding hemorrhage into adjacent muscle planes and deep subcutaneous soft tissues by oozing. The large dominant hematoma now has a uniform hyperdensity indicating acute phase. No longer seen blood/blood level which indicates a hyperacute active bleeding hematoma as demonstrate on the study of July 22nd. 2.  Hematoma of a small size superficial to the right common femoral artery. Cannot exclude a pseudoaneurysm formation. Can correlate with the ultrasound of the right groin. XR CHEST (2 VW)    Result Date: 7/9/2021  EXAMINATION: TWO XRAY VIEWS OF THE CHEST 7/9/2021 1:00 pm COMPARISON: May 26, 2021 HISTORY: ORDERING SYSTEM PROVIDED HISTORY: Dyspnea, unspecified type TECHNOLOGIST PROVIDED HISTORY: What reading provider will be dictating this exam?->CRC FINDINGS: Moderate cardiomegaly. No airspace opacity or pleural effusion. Median sternotomy wires are present. The heart is normal in size. No pneumothorax. 1. Moderate cardiomegaly. 2. No airspace opacity or pleural effusion.      CT ABDOMEN PELVIS W IV CONTRAST Additional Contrast? None    Result Date: 7/22/2021  EXAMINATION: CT OF THE ABDOMEN AND PELVIS WITH CONTRAST 7/22/2021 3:53 pm TECHNIQUE: CT of the abdomen and pelvis was performed with the administration of intravenous contrast. Multiplanar reformatted images are provided for review. Dose modulation, iterative reconstruction, and/or weight based adjustment of the mA/kV was utilized to reduce the radiation dose to as low as reasonably achievable. COMPARISON: None. HISTORY: ORDERING SYSTEM PROVIDED HISTORY: ab pain s/p TAVR TECHNOLOGIST PROVIDED HISTORY: Reason for exam:->ab pain s/p TAVR Additional Contrast?->None Decision Support Exception - unselect if not a suspected or confirmed emergency medical condition->Emergency Medical Condition ([place]) What reading provider will be dictating this exam?->CRC FINDINGS: Lower Chest: Images through lung bases demonstrate mild cardiomegaly. Note is made of an aortic valve. The left atrium is enlarged. There is no right or left lung base infiltrate or effusion. Organs: The liver, spleen, pancreas and adrenal glands are unremarkable. The kidneys enhance symmetrically without evidence of hydronephrosis. GI/Bowel: There is a large stones seen within the gallbladder lumen measuring 3.4 x 2.4 cm. There are no findings of acute cholecystitis. The stomach is normally distended. There is a small hiatal hernia. There is no large or small bowel obstruction. There are no findings of inflammatory bowel disease. Multiple sigmoid diverticula noted. There is no evidence of diverticulitis. Pelvis:  Bladder is unremarkable in appearance. There is a uterine fibroid measuring approximately 3.3 x 3.4 cm. Peritoneum/Retroperitoneum: There are postsurgical changes seen within the right groin from recent surgical intervention. There is a small hematoma measuring 2.5 x 2.5 cm. The abdominal aorta is normal caliber. There is no dissection of the abdominal aorta or of the iliac arteries.  There is a large left lateral abdominal wall No acute osseous abnormality. Prominence of pulmonary vasculature raising the possibility of mild pulmonary edema. XR CHEST PORTABLE    Result Date: 7/22/2021  EXAMINATION: ONE XRAY VIEW OF THE CHEST 7/22/2021 1:17 pm COMPARISON: March 8, May 26, July 9 HISTORY: ORDERING SYSTEM PROVIDED HISTORY: Shortness of breath TECHNOLOGIST PROVIDED HISTORY: Reason for exam:->Shortness of breath What reading provider will be dictating this exam?->CRC FINDINGS: Status post previous mid sternotomy. More recent endovascular repair for the aortic root. Heart has borderline size. The no acute infiltrates, consolidates or pleural effusions are seen. There is no perihilar vascular congestion. 1.  Status post more remote mid sternotomy. Status post recent endovascular repair for the aortic root. 2.  Borderline size heart. No acute pulmonary process. STUDENT  ASSESSMENT  -&-  PLAN:     Dallin Melton is a 77 y.o. female       1. Abd Hematoma 2/2 lovenox use              -Heparin drip bridge to Coumadin started 7/28/21 per General surgery service              -Restarted coumadin 7/28/21              -trend labs              -cont pain meds w/preferrence for PO meds  2. A-fib, rate controlled              -anticoags as above              -cardiothoracic recommends IV heparin bridge to coumadin  3. Hyperbilirubinemia, likely 2/2 hematoma  4.urinary retention              -d/c guevara & pvr <100cc               -voiding trial today - passed  5.DM, HgbA1c on 7/9/21 = 6.6              -cont LDSS  6. Increase resp Mucus production, resolving              -encourage pulmonary toilet              -nebs prn               -monitor at this time, CXR if pt develops pain, change in sputum color, fevers, leukocytosis, or dypnea  7. H/O HFpEF              -not clinically overloaded currently              -cont to monitor  8. H/O TAVR, mitral valve, and CABG              -anticoag as above  8. H/O CAD              -anticoag as

## 2021-07-31 NOTE — PROGRESS NOTES
Pharmacy Consultation Note  (Anticoagulant Dosing and Monitoring)    Initial consult date: 7/28/21  Consulting physician: Dr. Beatris Rolon    Allergies:  Lactose intolerance (gi) and Phenobarbital    77 y.o. female      Ht Readings from Last 1 Encounters:   07/22/21 5' 4\" (1.626 m)     Wt Readings from Last 1 Encounters:   07/31/21 243 lb 6.4 oz (110.4 kg)         Warfarin Indication Target   INR Range Home   Dose  (if applicable) Diet/Feeding Tube   Bioprosthetic mitral valve replacement  2.0-2.5 3mg [id number]  4mg W Regular diet       Vitamin K or Blood product  Administration Date                 Warfarin drug-drug interactions  Start  Stop Home Med? Comments                                 TSH:    Lab Results   Component Value Date    TSH 2.350 03/08/2021        Hepatic Function Panel:                            Lab Results   Component Value Date    ALKPHOS 85 07/31/2021    ALT 21 07/31/2021    AST 19 07/31/2021    PROT 5.9 07/31/2021    BILITOT 2.3 07/31/2021    BILIDIR <0.2 08/15/2015    IBILI 0.6 08/15/2015    LABALBU 3.1 07/31/2021    LABALBU 4.2 06/06/2012       Date Warfarin Dose INR Heparin or LMWH HBG/  HCT PLT Comment   7/28 3mg 1.4 Heparin gtt 8.9/28.6 292    7/29 3mg 1.3 Heparin gtt 9.4/30.1 338    7/30 4mg 1.4 Heparin gtt 9.2/30.1 287    7/31 <4 mg> 1.4 Heparin gtt 8.9/28.6 329               Assessment:  · 77year old female on chronic anticoagulation with warfarin due to previous bioprosthetic mitral valve replacement. · INR goal: 2.0-2.5; Home dose: 3mg daily except 4mg Wed.    · 7/28: Bridging with heparin vs Lovenox after abdominal hematoma from Lovenox; INR 1.4  · 7/29: INR 1.3  · 7/30: INR 1.4  · 7/31: INR 1.4    Plan:   · Warfarin 4 mg tonight   · Daily PT/INR until the INR is stable within the therapeutic range  · Pharmacist will follow and monitor/adjust dosing as necessary    Courtney BhatiaD 7/31/2021 8:50 AM  [phone]

## 2021-07-31 NOTE — PROGRESS NOTES
[facility]  [facility] Residency Program  Progress Note - House Team     Patient:  Meche Mccloud 77 y.o. female MRN: [id number]     Date of Service: 2021     CC: abdominal pain  Overnight events: None    Subjective     Patient was seen and examined this morning at bedside in no acute distress. Patient states that she is feeling well overall. She notes that she has some minimal pain with movement in the left lower abdominal area. She also states that she has been coughing up some clear \"gunk\". The patient has not been very mobile the last couple of days. No chills/fever/nausea/vomiting no WBC. It was recommended to the patient to use the intensive spirometer more frequently throghout the day as well as increased movement. She denies any chest pain, SOB, palpitations, HA. She has no other complaints at this time. Objective     Physical Exam:  TEMPERATURE:  Current - Temp: 97 °F (36.1 °C); Max - Temp  Av.9 °F (36.6 °C)  Min: 97 °F (36.1 °C)  Max: 98.6 °F (37 °C)  RESPIRATIONS RANGE: Resp  Av.3  Min: 16  Max: 18  PULSE RANGE: Pulse  Av.7  Min: 83  Max: 89  BLOOD PRESSURE RANGE:  Systolic (46PKO), KUP:040 , Min:142 , VHZ:810   ; Diastolic (83LTN), TRO:30, Min:63, Max:74    PULSE OXIMETRY RANGE: SpO2  Av.3 %  Min: 97 %  Max: 98 %    I & O - 24hr:    Intake/Output Summary (Last 24 hours) at 2021 0559  Last data filed at 2021 0050  Gross per 24 hour   Intake 608 ml   Output 800 ml   Net -192 ml     I/O last 3 completed shifts: In: 608 [P.O.:480; I.V.:128]  Out: 300 [Urine:300] I/O this shift:  In: -   Out: 500 [Urine:500]   Weight change:     Physical Exam  Constitutional:       General: She is not in acute distress. Appearance: She is well-developed. She is obese. She is not diaphoretic. HENT:      Head: Normocephalic and atraumatic. Eyes:      General: No scleral icterus. Pupils: Pupils are equal, round, and reactive to light.    Neck: Thyroid: No thyromegaly. Vascular: No JVD. Trachea: No tracheal deviation. Cardiovascular:      Rate and Rhythm: Normal rate and regular rhythm. Heart sounds: Normal heart sounds. No murmur heard. No friction rub. No gallop. Pulmonary:      Effort: Pulmonary effort is normal. No respiratory distress. Breath sounds: Normal breath sounds. No wheezing or rales. Abdominal:      General: Bowel sounds are normal. There is no distension. Palpations: Abdomen is soft. There is no mass. Tenderness: There is no abdominal tenderness. There is no guarding or rebound. Musculoskeletal:         General: Normal range of motion. Skin:     General: Skin is warm and dry. Capillary Refill: Capillary refill takes less than 2 seconds. Coloration: Skin is not pale. Findings: Bruising (Abdominal diffuse) present. No rash. Neurological:      Mental Status: She is alert and oriented to person, place, and time. Cranial Nerves: No cranial nerve deficit. Psychiatric:         Behavior: Behavior normal.         Thought Content:  Thought content normal.         Judgment: Judgment normal.       Subject  Pertinent Labs & Imaging Studies   nuvia  CBC:   Recent Labs     07/29/21  0634 07/30/21  0804 07/31/21  0500   WBC 9.4 7.7 6.4   HGB 9.4* 9.2* 8.9*   HCT 30.1* 30.1* 28.6*   MCV 93.2 93.2 94.1    287 329       BMP:    Recent Labs     07/28/21  0650 07/29/21  0634 07/30/21  0804    138 137   K 4.0 4.2 4.1    102 102   CO2 24 22 22   BUN 13 11 9   CREATININE 0.8 0.7 0.8   GLUCOSE 152* 113* 116*       LIVER PROFILE:   Recent Labs     07/28/21  0650 07/29/21  0634 07/30/21  0804   AST 35* 26 21   ALT 32 28 25   BILITOT 3.0* 2.9* 2.8*   ALKPHOS 103 97 92       PT/INR:   Recent Labs     07/28/21  0650 07/29/21  0634 07/30/21  0804   PROTIME 15.7* 14.6* 15.2*   INR 1.4 1.3 1.4       APTT:   Recent Labs     07/29/21  0634 07/29/21  1225 07/30/21  0804   APTT 74.6* 78.7* 80.7* Fasting Lipid Panel:    Lab Results   Component Value Date    CHOL 129 03/08/2021    TRIG 57 03/08/2021    HDL 53 03/08/2021       Notable Cultures:      Blood cultures   Blood Culture, Routine   Date Value Ref Range Status   07/22/2021 5 Days no growth  Final     Respiratory cultures No results found for: RESPCULTURE No results found for: LABGRAM  Urine   Urine Culture, Routine   Date Value Ref Range Status   07/22/2021 Growth not present  Final     Legionella No results found for: LABLEGI  C Diff PCR No results found for: CDIFPCR  Wound culture/abscess: No results for input(s): WNDABS in the last 72 hours. Tip culture:No results for input(s): CXCATHTIP in the last 72 hours. Echo Complete    Result Date: 7/28/2021  Transthoracic Echocardiography Report (TTE)  Demographics   [facility]          Gender            Female                  [address]  [id number]      Room Number       6408  Number   Account #       [de-identified]     Procedure Date    07/28/2021   Corporate ID                  Ordering                                Physician   Accession       [id number]    Referring  Number                        Physician   Date of Birth   1945    Sonographer       Demetrius ESPINOZA   Age             77 year(s)    Interpreting      [address]                                Physician         Physician Cardiology                                                  Hari Cardona MD                                 Any Other  Procedure Type of Study   TTE procedure:Echocardiogram W/Contrast.  Procedure Date Date: 07/28/2021 Start: 11:37 AM Study Location: Portable Technical Quality: Limited visualization due to body habitus. Indications:S/P TAVR and LV function. Patient Status: Routine Contrast Medium: Definity. Amount - 2 ml Contrast Comments: given by the rn.  Height: 64 inches Weight: 242 pounds BSA: 2.12 m^2 BMI: 41.54 kg/m^2 Rhythm: Within normal limits HR: 77 bpm BP: 111/63 mmHg Allergies   - Other drug:(Phenobarbital). Findings   Left Ventricle  Normal left ventricular chamber size. Normal left ventricular systolic function. , LVEF is 70%. Mild left ventricular concentric hypertrophy noted. Indeterminate LV diastolic function. Right Ventricle  Normal right ventricle size and function. Left Atrium  Left atrium is severely enlarged. Increased left atrial volume. Interatrial septum not well visualized but appears intact. Right Atrium  Normal right atrium. 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 Mild-to-moderate mitral stenosis - Mean gradient 5,mmHg, peak 19mmHg, MVA  1.2cm2 by continuity. There is trace mitral regurgitation. Tricuspid Valve  Tricuspid was not well visualized. There is mild tricuspid regurgitation. 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 No hemodynamically significant aortic stenosis - mean gradient 6mmHg.  small mason-valvualr regurgitation. Pulmonic Valve  The pulmonic valve was not well visualized. Pericardial Effusion  No evidence of pericardial effusion. Pericardium appears normal.   Pleural Effusion  No evidence of pleural effusion. Aorta  Normal aortic root size and ascending aorta. Miscellaneous  The inferior vena cava not well visualized. Conclusions   Summary  Technically sub-optimal images - Definity Echo contrast used. Normal left ventricular chamber size. Normal left ventricular systolic function. , LVEF is 70%. Mild left ventricular concentric hypertrophy noted. Indeterminate LV diastolic function. Left atrium is severely enlarged. Increased left atrial volume. Interatrial septum not well visualized but appears intact. Normal right ventricle size and function. 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 There is trace mitral regurgitation. 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 Small aortic mason-valvualr regurgitation. There is mild tricuspid regurgitation. Mild pulmoanry hypertension, RVSP 46mmHg  Normal aortic root size. No evidence of pericardial effusion. No intra cardiac mass or thrombus. Compared to prior echo from 7/15/2021.    Signature   ----------------------------------------------------------------  Electronically signed by Son Redman MD(Interpreting  physician) on 07/28/2021 06:17 PM  ----------------------------------------------------------------  M-Mode/2D Measurements & Calculations   LV Diastolic    LV Systolic Dimension: 2.9   AV Cusp Separation: 1.4 cmLA  Dimension: 4.9  cm                           Dimension: 6.4 cmAO Root  cm              LV Volume Diastolic: 003 ml  Dimension: 3.4 cm  LV FS:40.8 %    LV Volume Systolic: 03.8 ml  LV PW           LV EDV/LV EDV Index: 803  Diastolic: 1.2  XZ/02 FG/J^8JW ESV/LV ESV  cm              Index: 31.7 ml/15ml/ m^2     RV Diastolic Dimension: 2.7  LV PW Systolic: EF Calculated: 16.3 %        cm  1.3 cm          LV Mass Index: 120 l/min*m^2  Septum          LV Length: 6.7 cm            Ascending Aorta: 2.9 cm  Diastolic: 1.4                               LA volume/Index: 172 ml  cm              LVOT: 2.1 cm                 /81.13ml/m^2  Septum                                       RA Area: 34.4 cm^2  Systolic: 1.4  cm  CO: 0.73 l/min  CI: 2.02  l/m*m^2  LV Mass: 253.72  g  Doppler Measurements & Calculations   MV Peak E-Wave: 2.2 AV Peak Velocity:     LVOT Peak Velocity: 0.76 m/s  m/s                 1.65 m/s              LVOT Mean Velocity: 0.55 m/s                      AV Peak Gradient:     LVOT Peak Gradient: 2.3 mmHgLVOT  MV Peak Gradient:   10.84 mmHg            Mean Gradient: 1.3 mmHg  19.3 mmHg           AV Mean Velocity:  MV Mean Gradient: 5 1.21 m/s  mmHg                AV Mean Gradient: 6.4  MV Mean Velocity:   mmHg                  TR Velocity:3.22 m/s  0.98 m/s            AV VTI: 34.7 cm       TR Gradient:41.34 mmHg  MV P1/2t: 60.2 msec AV Area  MVA by PHT:3.65     (Continuity):1.61  cm^2                cm^2  MV Area                                   MO ED Velocity: 0.52 m/s  (continuity): 1.2   LVOT VTI: 16.1 cm  cm^2  MV E' Septal  Velocity: 0.05 m/s  MV E' Lateral  Velocity: 5 m/s  [email or web address] [id number]    Echo Limited    Result Date: 7/16/2021  Transthoracic Echocardiography Report (TTE)  Demographics   Patient Name      Gus Raffi Gender              Female                    L   Medical Record    [id number]       Room Number         4916  Number   Account #         [de-identified]      Procedure Date      07/15/2021   Corporate ID                     Ordering Physician  Rosalinda Harman MD   Accession Number  [id number]     Referring Physician   Date of Birth     1945     Sonographer         Ariana Ortega RDCS   Age               77 year(s)     Interpreting        Rosalinda Harman MD                                   Physician                                    Any Other  Procedure Type of Study   TTE procedure:Echo Limited Study. Procedure Date Date: 07/15/2021 Start: 09:18 AM Study Location: Portable Technical Quality: Limited visualization due to body habitus. Indications:S/P TAVR. Patient Status: Pending Discharge Height: 64 inches Weight: 240 pounds BSA: 2.11 m^2 BMI: 41.2 kg/m^2 Allergies   - Other drug:(Phenobarbital). Findings   Left Ventricle  Normal left ventricle size. Estimated left ventricle ejection fraction 60+/-5 %. Right Ventricle  Normal right ventricular size and function. Mitral Valve  Well-seated surgical mitral bioprosthesis. No significant mitral  regurgitation. Tricuspid Valve  Moderate tricuspid regurgitation. RVSP is ~37 mmHg. Aortic Valve  There is a well-seated transcatheter valve in the aortic position. Trace  paravalvular regurgitation. MG 7, DVI 0.67. Pericardial Effusion  No evidence of pericardial effusion. Aorta  Aortic root dimension within normal limits. Miscellaneous  Normal Inferior Vena Cava diameter and respiratory variation. Conclusions   Summary  **POD1 s/p TAVR with 29-mm Evolut PRO+; h/o MVR with #25 Mosaic (2007)**  Normal left ventricle size. Estimated left ventricle ejection fraction  60+/-5 %. Normal right ventricular size and function. Well-seated surgical mitral bioprosthesis. No significant mitral  regurgitation. There is a well-seated transcatheter valve in the aortic position. Trace  paravalvular regurgitation. MG 7, DVI 0.67. No evidence of pericardial effusion. Signature   ----------------------------------------------------------------  Electronically signed by Isabel Buck MD(Interpreting physician)  on 07/16/2021 12:54 PM  ----------------------------------------------------------------  M-Mode/2D Measurements & Calculations    LVOT: 2.1 cm              Ascending Aorta: 2.8 cm                              IVC Expiration: 1.7 cm  Doppler Measurements & Calculations    AV Peak Velocity: 1.77 m/s LVOT Peak Velocity: 1.21 m/s   AV Peak Gradient: 12.52    LVOT Mean Velocity: 0.76 m/s   mmHg                       LVOT Peak Gradient: 5.9 mmHgLVOT Mean   AV Mean Velocity: 1.13 m/s Gradient: 2.9 mmHg   AV Mean Gradient: 6.1 mmHg   AV VTI: 45.4 cm   AV Area (Continuity):2.33   cm^2                       TR Velocity:2.91 m/s                              TR Gradient:33.8 mmHg   LVOT VTI: 30.5 cm  [email or web address] [id number]    CT ABDOMEN PELVIS WO CONTRAST Additional Contrast? None    Result Date: 7/25/2021  EXAMINATION: CT OF THE ABDOMEN AND PELVIS WITHOUT CONTRAST 7/25/2021 10:27 am TECHNIQUE: CT of the abdomen and pelvis was performed without the administration of intravenous contrast. Multiplanar reformatted images are provided for review. Dose modulation, iterative reconstruction, and/or weight based adjustment of the mA/kV was utilized to reduce the radiation dose to as low as reasonably achievable. COMPARISON: July 23, 2021 HISTORY: ORDERING SYSTEM PROVIDED HISTORY: assess change of abdominal wall hematoma and groin hematoma TECHNOLOGIST PROVIDED HISTORY: Reason for exam:->assess change of abdominal wall hematoma and groin hematoma Additional Contrast?->None What reading provider will be dictating this exam?->CRC FINDINGS: Lower Chest: Heart size is top-normal to mildly enlarged. Postoperative changes related to transfemoral aortic valve replacement are noted. Mild dependent atelectasis is present at both lung bases. There is a small hiatal hernia. Organs: The gallbladder is distended without obvious wall thickening. There is a large peripherally calcified gallstone near the neck of the gallbladder. The unenhanced liver, spleen and adrenal glands are normal in appearance. The pancreas demonstrates diffuse fatty atrophy. The kidneys are without hydronephrosis or radiopaque calculus. There are bilateral indeterminate/intermediate density renal lesions. GI/Bowel: No evidence of a bowel obstruction, free air or pneumatosis. Portions the colon are decompressed, limiting assessment for mucosal based abnormalities. There is diverticulosis without evidence of diverticulitis. The appendix is not confidently visualized. No obvious pericecal inflammatory changes. The stomach and duodenal sweep are unremarkable aside from the hiatal hernia. Pelvis: The urinary bladder is decompressed around a Menendez catheter.   No obvious uterine or ovarian abnormality aside from a presumed fibroid at the fundus of the uterus. Small volume of free fluid is present in the pelvis. No pelvic lymphadenopathy. Peritoneum/Retroperitoneum: The abdominal aorta is normal in caliber. No retroperitoneal lymphadenopathy or mass. Bones/Soft Tissues: Again noted is a large hematoma along the left oblique abdominal musculature, slightly decreased in size on today's examination, measuring 12.8 x 9.2 cm (previously 13.2 x 10.7 cm). The 3.7 x 2.2 cm hematoma superficial to the right common femoral artery is stable in size. Suspect there is also a small right-sided rectus sheath hematoma on axial image 93, stable to slightly decreased in size. No acute osseous abnormality or evidence of aggressive osseous lesion. Slight interval decrease in the size of the left oblique abdominal wall intramuscular hematoma with a stable right inguinal hematoma and suspected small right rectus sheath hematoma. Multiple indeterminate bilateral renal lesions for which correlation with renal ultrasound is recommended. Additional, incidental findings as above. CT ABDOMEN PELVIS WO CONTRAST Additional Contrast? None    Result Date: 7/23/2021  EXAMINATION: CT OF THE ABDOMEN AND PELVIS WITHOUT CONTRAST 7/23/2021 3:22 pm TECHNIQUE: CT of the abdomen and pelvis was performed without the administration of intravenous contrast. Multiplanar reformatted images are provided for review. Dose modulation, iterative reconstruction, and/or weight based adjustment of the mA/kV was utilized to reduce the radiation dose to as low as reasonably achievable. COMPARISON: None.  HISTORY: ORDERING SYSTEM PROVIDED HISTORY: assess change of abdominal wall hematoma and groin hematoma TECHNOLOGIST PROVIDED HISTORY: Reason for exam:->assess change of abdominal wall hematoma and groin hematoma Additional Contrast?->None What reading provider will be dictating this exam?->CRC FINDINGS: In the left lateral abdominal wall at the level of the oblique/transverse muscles there is a large acute hematoma measuring 11 by 11.6 by 11 cm. On the previous study of a July 22nd it measured 12.5 by 8.5 x 11.8 cm. The overall size of the hematoma is stable account some differences in measurement, presently there is more uniform moderated hyperdense appearance of the hematoma indicating an acute phase. No longer seen blood/blood level which indicates a hyperacute phase or an active bleeding hematoma. There is significant edema and increased density of the adjacent muscular planes of the left lateral chest wall extending anteriorly, posteriorly, superior inferiorly, and also into the deep subcutaneous soft tissues of the left lateral abdominal wall indicating oozing of a hemorrhage, forming a ill-defined surrounding interstitial hematoma. There is a localized small hematoma in the right inguinal region which is related with the right common femoral artery. Cannot exclude the pseudoaneurysm formation. Correlation with ultrasound of the right inguinal areas seen. The there is a small pocket of air associated. The patient had aortic valve replacement procedure in July 14, 2021. There is no extension of hematoma into the pelvic sidewall. There is a Menendez catheter in the bladder. The bladder is not distended. Uterus and ovaries have unremarkable appearance. There is no free intraperitoneal air. There is no indication for acute intraperitoneal process in the abdomen or in the pelvis accounting the left lateral chest wall hematoma which spreads into adjacent soft tissues including the left pelvic sidewall. Uncomplicated diverticulosis seen in the sigmoid colon. There is no indication for bowel obstruction. Kidneys are preserved size and cortical thickness, parenchymal enhancement and excretion of the contrast.  Presence of a cyst-like structures in both kidneys but they have density higher than simple cyst, they cannot be characterize presently.   When patient condition permits consider initial correlation with ultrasound. They could represent hemorrhagic cysts or cyst with high proteinaceous content, but other possibilities are not excluded. There are normal size and the density for the liver and spleen. Pancreas demonstrates some atrophy. Gallbladder is well distended with 3 cm gallstone. There is no dilatation of the biliary tree pancreatic ductal system. There is a mild to moderate size hiatal hernia. Adrenals not enlarged. Calcified atheromatous changes are seen in the abdominal aorta there is no aneurysm formation. There is no midline retro peritoneal adenopathy. Heart has normal size. There is endovascular repair for the aortic valve and there is also prosthesis for the mitral valve. The heart is not fully covered in this study. There are areas of a confluent loss of volume indicate a component of atelectasis in the medial aspect of the right lower lobe and towards the base in the left lower lobe. These appear to be more chronic findings but increase since May 7, 2021.     1.  Overall stable large hematoma of the left lateral abdominal wall with surrounding hemorrhage into adjacent muscle planes and deep subcutaneous soft tissues by oozing. The large dominant hematoma now has a uniform hyperdensity indicating acute phase. No longer seen blood/blood level which indicates a hyperacute active bleeding hematoma as demonstrate on the study of July 22nd. 2.  Hematoma of a small size superficial to the right common femoral artery. Cannot exclude a pseudoaneurysm formation. Can correlate with the ultrasound of the right groin. XR CHEST (2 VW)    Result Date: 7/9/2021  EXAMINATION: TWO XRAY VIEWS OF THE CHEST 7/9/2021 1:00 pm COMPARISON: May 26, 2021 HISTORY: ORDERING SYSTEM PROVIDED HISTORY: Dyspnea, unspecified type TECHNOLOGIST PROVIDED HISTORY: What reading provider will be dictating this exam?->CRC FINDINGS: Moderate cardiomegaly. No airspace opacity or pleural effusion.   Median sternotomy wires are present. The heart is normal in size. No pneumothorax. 1. Moderate cardiomegaly. 2. No airspace opacity or pleural effusion. CT ABDOMEN PELVIS W IV CONTRAST Additional Contrast? None    Result Date: 7/22/2021  EXAMINATION: CT OF THE ABDOMEN AND PELVIS WITH CONTRAST 7/22/2021 3:53 pm TECHNIQUE: CT of the abdomen and pelvis was performed with the administration of intravenous contrast. Multiplanar reformatted images are provided for review. Dose modulation, iterative reconstruction, and/or weight based adjustment of the mA/kV was utilized to reduce the radiation dose to as low as reasonably achievable. COMPARISON: None. HISTORY: ORDERING SYSTEM PROVIDED HISTORY: ab pain s/p TAVR TECHNOLOGIST PROVIDED HISTORY: Reason for exam:->ab pain s/p TAVR Additional Contrast?->None Decision Support Exception - unselect if not a suspected or confirmed emergency medical condition->Emergency Medical Condition (MA) What reading provider will be dictating this exam?->CRC FINDINGS: Lower Chest: Images through lung bases demonstrate mild cardiomegaly. Note is made of an aortic valve. The left atrium is enlarged. There is no right or left lung base infiltrate or effusion. Organs: The liver, spleen, pancreas and adrenal glands are unremarkable. The kidneys enhance symmetrically without evidence of hydronephrosis. GI/Bowel: There is a large stones seen within the gallbladder lumen measuring 3.4 x 2.4 cm. There are no findings of acute cholecystitis. The stomach is normally distended. There is a small hiatal hernia. There is no large or small bowel obstruction. There are no findings of inflammatory bowel disease. Multiple sigmoid diverticula noted. There is no evidence of diverticulitis. Pelvis:  Bladder is unremarkable in appearance. There is a uterine fibroid measuring approximately 3.3 x 3.4 cm. Peritoneum/Retroperitoneum: There are postsurgical changes seen within the right groin from recent surgical intervention. There is a small hematoma measuring 2.5 x 2.5 cm. The abdominal aorta is normal caliber. There is no dissection of the abdominal aorta or of the iliac arteries. There is a large left lateral abdominal wall hematoma measuring 13 cm in maximum craniocaudal dimension by 12 cm in maximum AP diameter by 8 cm in maximum transverse diameter. There is a fluid fluid level seen within the left lateral abdominal wall hematoma consistent with acute on subacute hemorrhage. Along the superior aspect of the fluid fluid level there is minimal dense layering material suggestive of mild active bleeding. Bones/Soft Tissues:  Age related degenerative changes of the visualized osseous structures without focal destructive lesion. 1. Large left lateral abdominal wall hematoma measuring 13 cm x 12 cm x 8 cm. There is a fluid fluid level seen within the abdominal wall hematoma consistent with acute on subacute hemorrhage. There is minimal dense material seen layering on the fluid fluid level centrally consistent with minimal active bleeding. Surrounding intramuscular edema is noted within the oblique muscles. There is also induration within the subcutaneous soft tissues consistent with reactive changes. 2. Postsurgical changes within the right groin from recent intervention and TAVR procedure. There is a 2.5 x 2.5 cm hematoma within the right groin. 3. Cholelithiasis. There is no evidence of acute cholecystitis. 4. There is no aortic or iliac artery injury. XR CHEST PORTABLE    Result Date: 7/25/2021  EXAMINATION: ONE XRAY VIEW OF THE CHEST 7/25/2021 9:09 am COMPARISON: July 22, 2021 HISTORY: ORDERING SYSTEM PROVIDED HISTORY: Wheezing TECHNOLOGIST PROVIDED HISTORY: Reason for exam:->Wheezing What reading provider will be dictating this exam?->CRC FINDINGS: Heart size is unable to be accurately assessed on this single portable view of the chest, but appears to be stable. Sternotomy wires are in place.  There are postprocedural changes related to transfemoral aortic valve replacement. Prominence of the pulmonary vasculature raises possibility of mild pulmonary edema. No evidence of a sizable pleural effusion or pneumothorax. No acute osseous abnormality. Prominence of pulmonary vasculature raising the possibility of mild pulmonary edema. XR CHEST PORTABLE    Result Date: 7/22/2021  EXAMINATION: ONE XRAY VIEW OF THE CHEST 7/22/2021 1:17 pm COMPARISON: March 8, May 26, July 9 HISTORY: ORDERING SYSTEM PROVIDED HISTORY: Shortness of breath TECHNOLOGIST PROVIDED HISTORY: Reason for exam:->Shortness of breath What reading provider will be dictating this exam?->CRC FINDINGS: Status post previous mid sternotomy. More recent endovascular repair for the aortic root. Heart has borderline size. The no acute infiltrates, consolidates or pleural effusions are seen. There is no perihilar vascular congestion. 1.  Status post more remote mid sternotomy. Status post recent endovascular repair for the aortic root. 2.  Borderline size heart. No acute pulmonary process. Resident's Assessment and Plan     Assessment and Plan:    Abdominal hematoma and groin hematoma secondary to heparin injection and TAVR  CT on admission showed a 13 x 12 x 8 lateral abdominal wall and a 2.5 x 2.5 groin hematoma. Follow-up CT is found abdominal and groin hematoma to be stable. Symptoms improved and pain decreasing  Hemoglobin 8.9  Warfarin with heparin bridge started 7/28/2021  INR 1.4 -- goal is 2.0-2.5  Continue to monitor fever leukocytosis  Plan discharge once INR therapeutic home with home health  ALDA likely prerenal secondary to intravascular volume depletion secondary to #1--resolved  Creatinine today 0.8 --baseline creatinine 0.7  History A.  Fib  Continue metoprolol twice daily for rate control  Continue anticoagulation  Urinary retention secondary to #1--currently resolved  History of mitral valve replacement  Continue anticoagulation  History of aortic stenosis status post TAVR  Continue anticoagulation  LA type a--resolved  History of GERD  Continue Protonix  History diabetes  On low-dose sliding scale  Blood sugar 124  History of HFpEF --at baseline  History of glaucoma  Continue latanoprost and brimonidine      PT/OT evaluation: [facility] 16/24  DVT prophylaxis/ GI prophylaxis: protonix/heparin warfarin  Disposition: continue current care    Garcia Caicedo DO, PhD  PGY-1  Attending physician: Dr. Beth Brito  77 y. o. male admitted sp elective TAVR procedure bleeding complications for severe symptomatic AS.     Groin hematoma sp femoral approach TAVR   Then L abdominal hematoma more sypmtomatic one more recently bleed expanded sp lovenox shots  Anemia due to bleed  Low BP due to bleed/volume contraction-- coby out afib and AS complications (reasess valve function)  Sp Fluids reassess bP improving  Vagal?  Pain issues        gen surg  Now s/p 3 CTs  Overnight concerns expanding hematoma  Mobitiliy, PT/OT  Pain control  sp 29mm Evolut PRO+ valve on 7/14/2021 using b/l groin approach. TTE post procedure: Trace paravalvular regurgitation. MG 7, DVI 0.67--   Also afib- high chadsvas     Hematoma-s- repeat CT evolving - slight growth - less hyperacute - likely stopped now hematoma   -- spreading pain around back. Also bc immobile in bed.   sp 3rd CT stable, but looks worse possibly today  Will dfefer reversing anticaoguation though, bc clincially stable  Needs anticoagulation soon  Will continue holding coumadin - but resume very soon  inr 1.6 subtherapeutic  surgergy opinion 7/28 resume anticoagulation  ? bridge if need when INR drops- IV heparin vs off label use eliquis could days - discussed  Then back on coumadin goal INR 2-2. 5?  For now- CVA risk vs bleeds     We Discussed case with CV surgery -at [address] [place] [address]-- he does want bridging coumaidin s- OK with starting in am tomorrow  Start low dose heparin protocol and give first dose coumadin in AM tomorrow (don't wait till evening)- I ordered this  Hematomas at risk for myositis ossificans  Vs infected hematoma-- watching for these  Her case also urine outflow- urethral flow affected     Urinary retention issues, dec detrussor tone, also unwilling to move 2 to pain, unwilling to valsalva   -- urinary catheter  +- bethanacol vs flomax later if need     Today remove urinary catheter - trial check post void- again requesting     Bedside commode   sheeba improving  Roberth, PT/OT  Pain control     Bladder scan 58cc     IV heparin bridge-- start in am +coumadin  She WILL NOT be getting lovenox shots  NO shots through skin     Patient preference for IV heparin and coumadin bridge - will use both and this means she would need to stay in hospital (vs [facility]) for next 4-5 days as anticoaglation builds up. Patient wants to \"play\" it safe, also per request of CV surgery CC- so plan to stay in house over next 5 days or so- medically high risk for rebleed or CVA  surgergy opinion 7/28 resume anticoagulation  ? bridge if need when INR drops- IV heparin vs off label use eliquis could days - discussed  Then back on coumadin goal INR 2-2.5?  At least for now--For now- CVA risk vs bleeds     Discussed w outpatient primary physician  We Discussed case with CV surgery -at [address] [place] [address]-- he does want bridging coumaidin s- OK with starting in am tomorrow  Start low dose heparin IV protocol and give first dose coumadin in AM tomorrow (don't wait till evening)- I ordered this     She WILL NOT be getting lovenox shots  NO shots through skin     Patient preference for IV heparin and coumadin bridge - will use both and this means she would need to stay in hospital (vs [facility]) for next 4-5 days as anticoaglation builds up.   Patient wants to \"play\" it safe, also per request of CV surgery CC- so plan to stay in house over next 5 days or so- medically high risk for rebleed or CVA  Done     I also believes she bleeds excessively related to lovenox above and beyond the trauma of the shots  inr 1.4- no ongoing bleed now  Oral percocet, no other complications,  Plan for home with home health once inc close 2.0

## 2021-08-01 LAB
ALBUMIN SERPL-MCNC: 3.1 G/DL (ref 3.5–5.2)
ALP BLD-CCNC: 82 U/L (ref 35–104)
ALT SERPL-CCNC: 19 U/L (ref 0–32)
ANION GAP SERPL CALCULATED.3IONS-SCNC: 1 MMOL/L (ref 7–16)
APTT: 119.1 SEC (ref 24.5–35.1)
APTT: 60.3 SEC (ref 24.5–35.1)
AST SERPL-CCNC: 17 U/L (ref 0–31)
BASOPHILS ABSOLUTE: 0.02 E9/L (ref 0–0.2)
BASOPHILS RELATIVE PERCENT: 0.3 % (ref 0–2)
BILIRUB SERPL-MCNC: 2.1 MG/DL (ref 0–1.2)
BUN BLDV-MCNC: 8 MG/DL (ref 6–23)
CALCIUM SERPL-MCNC: 9.1 MG/DL (ref 8.6–10.2)
CHLORIDE BLD-SCNC: 105 MMOL/L (ref 98–107)
CO2: 34 MMOL/L (ref 22–29)
CREAT SERPL-MCNC: 0.8 MG/DL (ref 0.5–1)
EOSINOPHILS ABSOLUTE: 0.18 E9/L (ref 0.05–0.5)
EOSINOPHILS RELATIVE PERCENT: 2.8 % (ref 0–6)
GFR AFRICAN AMERICAN: >60
GFR NON-AFRICAN AMERICAN: >60 ML/MIN/1.73
GLUCOSE BLD-MCNC: 122 MG/DL (ref 74–99)
HCT VFR BLD CALC: 30.3 % (ref 34–48)
HEMOGLOBIN: 9.3 G/DL (ref 11.5–15.5)
IMMATURE GRANULOCYTES #: 0.08 E9/L
IMMATURE GRANULOCYTES %: 1.2 % (ref 0–5)
INR BLD: 1.6
LYMPHOCYTES ABSOLUTE: 1.43 E9/L (ref 1.5–4)
LYMPHOCYTES RELATIVE PERCENT: 22 % (ref 20–42)
MCH RBC QN AUTO: 29.4 PG (ref 26–35)
MCHC RBC AUTO-ENTMCNC: 30.7 % (ref 32–34.5)
MCV RBC AUTO: 95.9 FL (ref 80–99.9)
METER GLUCOSE: 116 MG/DL (ref 74–99)
METER GLUCOSE: 123 MG/DL (ref 74–99)
METER GLUCOSE: 137 MG/DL (ref 74–99)
METER GLUCOSE: 157 MG/DL (ref 74–99)
MONOCYTES ABSOLUTE: 0.56 E9/L (ref 0.1–0.95)
MONOCYTES RELATIVE PERCENT: 8.6 % (ref 2–12)
NEUTROPHILS ABSOLUTE: 4.22 E9/L (ref 1.8–7.3)
NEUTROPHILS RELATIVE PERCENT: 65.1 % (ref 43–80)
PDW BLD-RTO: 16.2 FL (ref 11.5–15)
PLATELET # BLD: 341 E9/L (ref 130–450)
PMV BLD AUTO: 10 FL (ref 7–12)
POTASSIUM SERPL-SCNC: 4.5 MMOL/L (ref 3.5–5)
PROTHROMBIN TIME: 17.8 SEC (ref 9.3–12.4)
RBC # BLD: 3.16 E12/L (ref 3.5–5.5)
SODIUM BLD-SCNC: 140 MMOL/L (ref 132–146)
TOTAL PROTEIN: 5.7 G/DL (ref 6.4–8.3)
WBC # BLD: 6.5 E9/L (ref 4.5–11.5)

## 2021-08-01 PROCEDURE — 99232 SBSQ HOSP IP/OBS MODERATE 35: CPT | Performed by: INTERNAL MEDICINE

## 2021-08-01 PROCEDURE — 85610 PROTHROMBIN TIME: CPT

## 2021-08-01 PROCEDURE — 82962 GLUCOSE BLOOD TEST: CPT

## 2021-08-01 PROCEDURE — 6360000002 HC RX W HCPCS: Performed by: INTERNAL MEDICINE

## 2021-08-01 PROCEDURE — 80053 COMPREHEN METABOLIC PANEL: CPT

## 2021-08-01 PROCEDURE — 6370000000 HC RX 637 (ALT 250 FOR IP): Performed by: SURGERY

## 2021-08-01 PROCEDURE — 6370000000 HC RX 637 (ALT 250 FOR IP): Performed by: INTERNAL MEDICINE

## 2021-08-01 PROCEDURE — 2140000000 HC CCU INTERMEDIATE R&B

## 2021-08-01 PROCEDURE — 6370000000 HC RX 637 (ALT 250 FOR IP)

## 2021-08-01 PROCEDURE — 85730 THROMBOPLASTIN TIME PARTIAL: CPT

## 2021-08-01 PROCEDURE — 85025 COMPLETE CBC W/AUTO DIFF WBC: CPT

## 2021-08-01 PROCEDURE — 36415 COLL VENOUS BLD VENIPUNCTURE: CPT

## 2021-08-01 RX ORDER — FLUTICASONE PROPIONATE 50 MCG
1 SPRAY, SUSPENSION (ML) NASAL DAILY PRN
Status: DISCONTINUED | OUTPATIENT
Start: 2021-08-01 | End: 2021-08-02

## 2021-08-01 RX ORDER — SALIVA STIMULANT COMB. NO.3
SPRAY, NON-AEROSOL (ML) MUCOUS MEMBRANE PRN
Status: DISCONTINUED | OUTPATIENT
Start: 2021-08-01 | End: 2021-08-01 | Stop reason: SDUPTHER

## 2021-08-01 RX ORDER — WARFARIN SODIUM 3 MG/1
3 TABLET ORAL
Status: COMPLETED | OUTPATIENT
Start: 2021-08-01 | End: 2021-08-01

## 2021-08-01 RX ORDER — WARFARIN SODIUM 4 MG/1
4 TABLET ORAL
Status: DISCONTINUED | OUTPATIENT
Start: 2021-08-01 | End: 2021-08-01

## 2021-08-01 RX ADMIN — INSULIN LISPRO 1 UNITS: 100 INJECTION, SOLUTION INTRAVENOUS; SUBCUTANEOUS at 17:18

## 2021-08-01 RX ADMIN — HEPARIN SODIUM 9.53 UNITS/KG/HR: 10000 INJECTION, SOLUTION INTRAVENOUS at 21:04

## 2021-08-01 RX ADMIN — OXYCODONE HYDROCHLORIDE 10 MG: 10 TABLET ORAL at 04:47

## 2021-08-01 RX ADMIN — DOCUSATE SODIUM 100 MG: 100 CAPSULE ORAL at 09:11

## 2021-08-01 RX ADMIN — PANTOPRAZOLE SODIUM 40 MG: 40 TABLET, DELAYED RELEASE ORAL at 09:11

## 2021-08-01 RX ADMIN — BRIMONIDINE TARTRATE 1 DROP: 2 SOLUTION OPHTHALMIC at 09:11

## 2021-08-01 RX ADMIN — ATORVASTATIN CALCIUM 20 MG: 20 TABLET, FILM COATED ORAL at 09:11

## 2021-08-01 RX ADMIN — OXYCODONE HYDROCHLORIDE 10 MG: 10 TABLET ORAL at 20:59

## 2021-08-01 RX ADMIN — HEPARIN SODIUM 12.51 UNITS/KG/HR: 10000 INJECTION, SOLUTION INTRAVENOUS at 01:01

## 2021-08-01 RX ADMIN — SENNOSIDES 17.2 MG: 8.6 TABLET, FILM COATED ORAL at 20:59

## 2021-08-01 RX ADMIN — WARFARIN SODIUM 3 MG: 3 TABLET ORAL at 17:17

## 2021-08-01 RX ADMIN — METOPROLOL TARTRATE 50 MG: 50 TABLET, FILM COATED ORAL at 20:59

## 2021-08-01 RX ADMIN — BENZOCAINE AND MENTHOL 1 LOZENGE: 15; 3.6 LOZENGE ORAL at 14:26

## 2021-08-01 RX ADMIN — HEPARIN SODIUM 9.53 UNITS/KG/HR: 10000 INJECTION, SOLUTION INTRAVENOUS at 09:12

## 2021-08-01 RX ADMIN — METOPROLOL TARTRATE 50 MG: 50 TABLET, FILM COATED ORAL at 09:11

## 2021-08-01 RX ADMIN — DOCUSATE SODIUM 100 MG: 100 CAPSULE ORAL at 20:59

## 2021-08-01 RX ADMIN — LATANOPROST 1 DROP: 50 SOLUTION OPHTHALMIC at 20:59

## 2021-08-01 ASSESSMENT — PAIN SCALES - GENERAL
PAINLEVEL_OUTOF10: 7
PAINLEVEL_OUTOF10: 0

## 2021-08-01 ASSESSMENT — PAIN DESCRIPTION - PROGRESSION: CLINICAL_PROGRESSION: GRADUALLY IMPROVING

## 2021-08-01 NOTE — PROGRESS NOTES
Pharmacy Consultation Note  (Anticoagulant Dosing and Monitoring)    Initial consult date: 7/28/21  Consulting physician: Dr. Brigid Stewart    Allergies:  Lactose intolerance (gi) and Phenobarbital    77 y.o. female      Ht Readings from Last 1 Encounters:   07/22/21 5' 4\" (1.626 m)     Wt Readings from Last 1 Encounters:   08/01/21 242 lb (109.8 kg)         Warfarin Indication Target   INR Range Home   Dose  (if applicable) Diet/Feeding Tube   Bioprosthetic mitral valve replacement  2.0-2.5 3mg MTThFSSu  4mg W Regular diet       Vitamin K or Blood product  Administration Date                 Warfarin drug-drug interactions  Start  Stop Home Med? Comments                                 TSH:    Lab Results   Component Value Date    TSH 2.350 03/08/2021        Hepatic Function Panel:                            Lab Results   Component Value Date    ALKPHOS 82 08/01/2021    ALT 19 08/01/2021    AST 17 08/01/2021    PROT 5.7 08/01/2021    BILITOT 2.1 08/01/2021    BILIDIR <0.2 08/15/2015    IBILI 0.6 08/15/2015    LABALBU 3.1 08/01/2021    LABALBU 4.2 06/06/2012       Date Warfarin Dose INR Heparin or LMWH HBG/  HCT PLT Comment   7/28 3mg 1.4 Heparin gtt 8.9/28.6 292    7/29 3mg 1.3 Heparin gtt 9.4/30.1 338    7/30 4mg 1.4 Heparin gtt 9.2/30.1 287    7/31 4 mg 1.4 Heparin gtt 8.9/28.6 329    8/1 3 mg 1.6 Heparin gtt 9.3/30.3 341      Assessment:  · 77year old female on chronic anticoagulation with warfarin due to previous bioprosthetic mitral valve replacement. · INR goal: 2.0-2.5;  Home dose: 3mg daily except 4mg Wed   · Bridging with heparin vs Lovenox after abdominal hematoma from Lovenox  · INR 1.6    Plan:   · Warfarin 3 mg tonight   · Daily PT/INR until the INR is stable within the therapeutic range  · Pharmacist will follow and monitor/adjust dosing as necessary    Ryland Lewis PharmD, BCPS 8/1/2021 10:39 AM

## 2021-08-02 VITALS
HEIGHT: 64 IN | WEIGHT: 239.9 LBS | DIASTOLIC BLOOD PRESSURE: 80 MMHG | HEART RATE: 105 BPM | BODY MASS INDEX: 40.96 KG/M2 | OXYGEN SATURATION: 98 % | TEMPERATURE: 97.1 F | SYSTOLIC BLOOD PRESSURE: 136 MMHG | RESPIRATION RATE: 20 BRPM

## 2021-08-02 PROBLEM — D50.0 BLOOD LOSS ANEMIA: Status: ACTIVE | Noted: 2021-08-02

## 2021-08-02 LAB
ALBUMIN SERPL-MCNC: 3 G/DL (ref 3.5–5.2)
ALP BLD-CCNC: 85 U/L (ref 35–104)
ALT SERPL-CCNC: 18 U/L (ref 0–32)
ANION GAP SERPL CALCULATED.3IONS-SCNC: 12 MMOL/L (ref 7–16)
APTT: 57.2 SEC (ref 24.5–35.1)
AST SERPL-CCNC: 17 U/L (ref 0–31)
BASOPHILS ABSOLUTE: 0.02 E9/L (ref 0–0.2)
BASOPHILS RELATIVE PERCENT: 0.3 % (ref 0–2)
BILIRUB SERPL-MCNC: 2.2 MG/DL (ref 0–1.2)
BUN BLDV-MCNC: 9 MG/DL (ref 6–23)
CALCIUM SERPL-MCNC: 9.1 MG/DL (ref 8.6–10.2)
CHLORIDE BLD-SCNC: 103 MMOL/L (ref 98–107)
CO2: 23 MMOL/L (ref 22–29)
CREAT SERPL-MCNC: 0.9 MG/DL (ref 0.5–1)
EOSINOPHILS ABSOLUTE: 0.13 E9/L (ref 0.05–0.5)
EOSINOPHILS RELATIVE PERCENT: 2.2 % (ref 0–6)
GFR AFRICAN AMERICAN: >60
GFR NON-AFRICAN AMERICAN: >60 ML/MIN/1.73
GLUCOSE BLD-MCNC: 115 MG/DL (ref 74–99)
HCT VFR BLD CALC: 31.4 % (ref 34–48)
HEMOGLOBIN: 9.7 G/DL (ref 11.5–15.5)
IMMATURE GRANULOCYTES #: 0.09 E9/L
IMMATURE GRANULOCYTES %: 1.5 % (ref 0–5)
INR BLD: 2
LYMPHOCYTES ABSOLUTE: 1.29 E9/L (ref 1.5–4)
LYMPHOCYTES RELATIVE PERCENT: 21.5 % (ref 20–42)
MCH RBC QN AUTO: 29.2 PG (ref 26–35)
MCHC RBC AUTO-ENTMCNC: 30.9 % (ref 32–34.5)
MCV RBC AUTO: 94.6 FL (ref 80–99.9)
METER GLUCOSE: 118 MG/DL (ref 74–99)
METER GLUCOSE: 160 MG/DL (ref 74–99)
MONOCYTES ABSOLUTE: 0.53 E9/L (ref 0.1–0.95)
MONOCYTES RELATIVE PERCENT: 8.8 % (ref 2–12)
NEUTROPHILS ABSOLUTE: 3.93 E9/L (ref 1.8–7.3)
NEUTROPHILS RELATIVE PERCENT: 65.7 % (ref 43–80)
PDW BLD-RTO: 16.7 FL (ref 11.5–15)
PLATELET # BLD: 341 E9/L (ref 130–450)
PMV BLD AUTO: 10.2 FL (ref 7–12)
POTASSIUM SERPL-SCNC: 3.9 MMOL/L (ref 3.5–5)
PROTHROMBIN TIME: 22.5 SEC (ref 9.3–12.4)
RBC # BLD: 3.32 E12/L (ref 3.5–5.5)
SODIUM BLD-SCNC: 138 MMOL/L (ref 132–146)
TOTAL PROTEIN: 6.1 G/DL (ref 6.4–8.3)
WBC # BLD: 6 E9/L (ref 4.5–11.5)

## 2021-08-02 PROCEDURE — 99238 HOSP IP/OBS DSCHRG MGMT 30/<: CPT | Performed by: INTERNAL MEDICINE

## 2021-08-02 PROCEDURE — 36415 COLL VENOUS BLD VENIPUNCTURE: CPT

## 2021-08-02 PROCEDURE — 2580000003 HC RX 258: Performed by: INTERNAL MEDICINE

## 2021-08-02 PROCEDURE — 82962 GLUCOSE BLOOD TEST: CPT

## 2021-08-02 PROCEDURE — 6370000000 HC RX 637 (ALT 250 FOR IP): Performed by: SURGERY

## 2021-08-02 PROCEDURE — 80053 COMPREHEN METABOLIC PANEL: CPT

## 2021-08-02 PROCEDURE — 85025 COMPLETE CBC W/AUTO DIFF WBC: CPT

## 2021-08-02 PROCEDURE — 97535 SELF CARE MNGMENT TRAINING: CPT

## 2021-08-02 PROCEDURE — 6370000000 HC RX 637 (ALT 250 FOR IP): Performed by: INTERNAL MEDICINE

## 2021-08-02 PROCEDURE — 85730 THROMBOPLASTIN TIME PARTIAL: CPT

## 2021-08-02 PROCEDURE — 85610 PROTHROMBIN TIME: CPT

## 2021-08-02 RX ORDER — POLYETHYLENE GLYCOL 3350 17 G/17G
17 POWDER, FOR SOLUTION ORAL DAILY PRN
Qty: 527 G | Refills: 1 | Status: SHIPPED | OUTPATIENT
Start: 2021-08-02 | End: 2021-09-01

## 2021-08-02 RX ORDER — BENZONATATE 100 MG/1
100 CAPSULE ORAL 3 TIMES DAILY PRN
Qty: 30 CAPSULE | Refills: 0 | Status: SHIPPED | OUTPATIENT
Start: 2021-08-02 | End: 2021-08-12

## 2021-08-02 RX ORDER — FLUTICASONE PROPIONATE 50 MCG
1 SPRAY, SUSPENSION (ML) NASAL DAILY
Status: DISCONTINUED | OUTPATIENT
Start: 2021-08-02 | End: 2021-08-02 | Stop reason: HOSPADM

## 2021-08-02 RX ORDER — WARFARIN SODIUM 3 MG/1
3 TABLET ORAL
Status: DISCONTINUED | OUTPATIENT
Start: 2021-08-02 | End: 2021-08-02

## 2021-08-02 RX ORDER — OXYCODONE HYDROCHLORIDE 5 MG/1
5 TABLET ORAL EVERY 8 HOURS PRN
Qty: 9 TABLET | Refills: 0 | Status: SHIPPED | OUTPATIENT
Start: 2021-08-02 | End: 2021-08-05

## 2021-08-02 RX ORDER — BENZONATATE 100 MG/1
100 CAPSULE ORAL 3 TIMES DAILY PRN
Status: DISCONTINUED | OUTPATIENT
Start: 2021-08-02 | End: 2021-08-02 | Stop reason: HOSPADM

## 2021-08-02 RX ORDER — FLUTICASONE PROPIONATE 50 MCG
1 SPRAY, SUSPENSION (ML) NASAL DAILY
Qty: 1 BOTTLE | Refills: 0 | Status: SHIPPED
Start: 2021-08-02 | End: 2021-10-13 | Stop reason: SDUPTHER

## 2021-08-02 RX ORDER — WARFARIN SODIUM 3 MG/1
TABLET ORAL
Qty: 30 TABLET | Refills: 6 | Status: CANCELLED | OUTPATIENT
Start: 2021-08-02

## 2021-08-02 RX ORDER — WARFARIN SODIUM 3 MG/1
TABLET ORAL
Qty: 30 TABLET | Refills: 6 | Status: SHIPPED | OUTPATIENT
Start: 2021-08-02 | End: 2021-09-15 | Stop reason: SDUPTHER

## 2021-08-02 RX ORDER — WARFARIN SODIUM 3 MG/1
3 TABLET ORAL
Status: COMPLETED | OUTPATIENT
Start: 2021-08-02 | End: 2021-08-02

## 2021-08-02 RX ADMIN — OXYCODONE 5 MG: 5 TABLET ORAL at 16:12

## 2021-08-02 RX ADMIN — WARFARIN SODIUM 3 MG: 3 TABLET ORAL at 17:01

## 2021-08-02 RX ADMIN — PANTOPRAZOLE SODIUM 40 MG: 40 TABLET, DELAYED RELEASE ORAL at 09:46

## 2021-08-02 RX ADMIN — BRIMONIDINE TARTRATE 1 DROP: 2 SOLUTION OPHTHALMIC at 09:56

## 2021-08-02 RX ADMIN — Medication 10 ML: at 09:45

## 2021-08-02 RX ADMIN — ATORVASTATIN CALCIUM 20 MG: 20 TABLET, FILM COATED ORAL at 09:46

## 2021-08-02 RX ADMIN — DOCUSATE SODIUM 100 MG: 100 CAPSULE ORAL at 09:46

## 2021-08-02 RX ADMIN — INSULIN LISPRO 1 UNITS: 100 INJECTION, SOLUTION INTRAVENOUS; SUBCUTANEOUS at 12:03

## 2021-08-02 RX ADMIN — METOPROLOL TARTRATE 50 MG: 50 TABLET, FILM COATED ORAL at 09:46

## 2021-08-02 ASSESSMENT — PAIN DESCRIPTION - DESCRIPTORS: DESCRIPTORS: DISCOMFORT

## 2021-08-02 ASSESSMENT — PAIN SCALES - GENERAL
PAINLEVEL_OUTOF10: 5
PAINLEVEL_OUTOF10: 10

## 2021-08-02 ASSESSMENT — PAIN - FUNCTIONAL ASSESSMENT: PAIN_FUNCTIONAL_ASSESSMENT: ACTIVITIES ARE NOT PREVENTED

## 2021-08-02 ASSESSMENT — PAIN DESCRIPTION - FREQUENCY: FREQUENCY: CONTINUOUS

## 2021-08-02 ASSESSMENT — PAIN DESCRIPTION - PAIN TYPE: TYPE: CHRONIC PAIN

## 2021-08-02 ASSESSMENT — PAIN DESCRIPTION - ORIENTATION: ORIENTATION: RIGHT;LEFT

## 2021-08-02 ASSESSMENT — PAIN DESCRIPTION - ONSET: ONSET: ON-GOING

## 2021-08-02 ASSESSMENT — PAIN DESCRIPTION - PROGRESSION: CLINICAL_PROGRESSION: NOT CHANGED

## 2021-08-02 ASSESSMENT — PAIN DESCRIPTION - LOCATION: LOCATION: BACK;LEG

## 2021-08-02 NOTE — PROGRESS NOTES
Pharmacy Consultation Note  (Anticoagulant Dosing and Monitoring)    Initial consult date: 7/28/21  Consulting physician: Dr. Beatris Rolon    Allergies:  Lactose intolerance (gi) and Phenobarbital    77 y.o. female      Ht Readings from Last 1 Encounters:   07/22/21 5' 4\" (1.626 m)     Wt Readings from Last 1 Encounters:   08/02/21 239 lb 14.4 oz (108.8 kg)         Warfarin Indication Target   INR Range Home   Dose  (if applicable) Diet/Feeding Tube   Bioprosthetic mitral valve replacement  2.0-2.5 3mg MTThFSSu  4mg W Regular diet       Vitamin K or Blood product  Administration Date                 Warfarin drug-drug interactions  Start  Stop Home Med? Comments                                 TSH:    Lab Results   Component Value Date    TSH 2.350 03/08/2021        Hepatic Function Panel:                            Lab Results   Component Value Date    ALKPHOS 85 08/02/2021    ALT 18 08/02/2021    AST 17 08/02/2021    PROT 6.1 08/02/2021    BILITOT 2.2 08/02/2021    BILIDIR <0.2 08/15/2015    IBILI 0.6 08/15/2015    LABALBU 3.0 08/02/2021    LABALBU 4.2 06/06/2012       Date Warfarin Dose INR Heparin or LMWH HBG/  HCT PLT Comment   7/28 3mg 1.4 Heparin gtt 8.9/28.6 292    7/29 3mg 1.3 Heparin gtt 9.4/30.1 338    7/30 4mg 1.4 Heparin gtt 9.2/30.1 287    7/31 4 mg 1.4 Heparin gtt 8.9/28.6 329    8/1 3 mg 1.6 Heparin gtt 9.3/30.3 341    8/2 <3 mg> 2.0 Hepatin gtt 9.7/31.4 341               Assessment:  · 77year old female on chronic anticoagulation with warfarin due to previous bioprosthetic mitral valve replacement. · INR goal: 2.0-2.5;  Home dose: 3mg daily except 4mg Wed   · Bridging with heparin vs Lovenox after abdominal hematoma from Lovenox  · INR 2.0    Plan:   · Warfarin 3 mg tonight  · Daily PT/INR until the INR is stable within the therapeutic range  · Pharmacist will follow and monitor/adjust dosing as necessary    Deann Srivastava PharmD  Pharmacy Resident   Phone: [phone]   8/2/2021 11:53 AM

## 2021-08-02 NOTE — PROGRESS NOTES
Occupational Therapy  OT BEDSIDE TREATMENT NOTE   [facility] [address]      Date:2021  Patient Name: Cristian Juares  MRN: [id number]  : 1945  Room: [address] [place] [address]     Evaluating OT: Kaela Yan OTR/L #[id number]      Referring Provider:   Specific Provider Orders/Date: OT eval and treat 21     Diagnosis: Abdominal hematoma [S30.1XXA]   Pt admitted to hospital with abdominal pain     Pertinent Medical History:  has a past medical history of Ambulates with cane, Atrial fibrillation ([facility] [address] CAD (coronary artery disease), CHF (congestive heart failure) ([facility] [address] Diverticular disease, Edentulous, Glaucoma, History of blood transfusion, Hyperlipidemia, Hypertension, Restless leg syndrome, and Valvular heart disease.      Precautions:  Fall Risk     Assessment of current deficits    [x]? Functional mobility          [x]? ADLs           [x]? Strength                  []?Cognition    [x]? Functional transfers        [x]? IADLs         [x]? Safety Awareness   [x]? Endurance    []? Fine Coordination                        [x]? Balance      []? Vision/perception   []? Sensation      []? Gross Motor Coordination            []? ROM           []?  Delirium                   []? Motor Control      OT PLAN OF CARE   OT POC based on physician orders, patient diagnosis and results of clinical assessment     Frequency/Duration 1-3 days/wk for 2 weeks PRN   Specific OT Treatment Interventions to include:   * Instruction/training on adapted ADL techniques and AE recommendations to increase functional independence within precautions       * Training on energy conservation strategies, correct breathing pattern and techniques to improve independence/tolerance for self-care routine  * Functional transfer/mobility training/DME recommendations for increased independence, safety, and fall prevention  * Patient/Family education to increase follow through with safety techniques and functional independence  * Recommendation of environmental modifications for increased safety with functional transfers/mobility and ADLs  * Therapeutic exercise to improve motor endurance, ROM, and functional strength for ADLs/functional transfers  * Therapeutic activities to facilitate/challenge dynamic balance, stand tolerance for increased safety and independence with ADLs     Recommended Adaptive Equipment:  TBD      Home Living: Pt lives with granddaughter in a 1 story home with 2+2 BULMARO and 0+1 rails. 1st floor set-up    Bathroom setup: tub/shower combo    Equipment owned: tub bench     Prior Level of Function: mod I with ADLs , mod I with IADLs; ambulated with SPC and w/w   Driving: no   Occupation: na     Pain Level: Pt reports Min pain with movement     Cognition: A&O: 4/4; Follows 2 step directions             Memory:  good             Sequencing:  good             Problem solving:  good             Judgement/safety:  fair               Functional Assessment:  AM-PAC Daily Activity Raw Score: 19/24    Initial Eval Status  Date: 7/27/21 Treatment Status  Date: 8/2/21 STGs = LTGs  Time frame: 10-14 days   Feeding Independent  Ind      Grooming Stand by Assist      Standing at sink SBA  To wash hands standing at sink  Modified [place]    UB Dressing Stand by Assist  SBA  Per last tx  Modified [place]    LB Dressing Maximal Assist  SBA  To don brief seated EOB.  Pt demonstrates ability to don/doff socks with SBA seated EOB  Modified [place]    Bathing Moderate Assist  Min A  simulated Modified [place]    Toileting Moderate Assist  SBA- clothing management  SBA- hygiene  Modified [place]    Bed Mobility  Supine to sit: Minimal Assist   Sit to supine: Minimal Assist  SBA- supine<->sit  Educated pt on technique to increase independence.    Supine to sit: Modified [place]   Sit to supine: Modified [place]    Functional Transfers Minimal Assist  SBA- sit<->stand  Cuing for hand placement  SBA- toilet transfer  Using grab bar  Modified [place]    Functional Mobility Minimal Assist  SBA  To and from bathroom using cane  Modified [place]    Balance Sitting:     Static:  SBA    Dynamic:SBA  Standing: min A Sitting:     Static:  Ind    Dynamic: Ind  Standing: SBA      Activity Tolerance F  Fair+ F+   Visual/  Perceptual Glasses: yes  wfl                        Comments: Upon arrival pt supine in bed. Pt educated on techniques to increase independence and safety during ADL's, bed mobility, and functional transfers. Discussed home set up with pt, giving suggestions to increase safety at discharge. At end of session pt left supine in bed, call light within reach. · Pt has made good progress towards set goals.      · Continue with current plan of care    Treatment Time In: 2:10            Treatment Time Out: 2:25             Treatment Charges: Mins Units   Ther Ex  [id number]     Manual Therapy [id number]     Thera Activities [id number]     ADL/Home Mgt [id number] 15 1   Neuro Re-ed [id number]     Group Therapy      Orthotic manage/training  [id number]     Non-Billable Time     Total Timed Treatment 15 1     Marce 162, Chastity 86

## 2021-08-02 NOTE — PROGRESS NOTES
[facility]  Internal Medicine Residency / [address]    Attending Physician Statement  I have discussed the case, including pertinent history and exam findings with the resident and the team.  I have seen and examined the patient, reviewed meds and pertinent labs and the key elements of the encounter have been performed by me. I agree with the assessment, plan and orders as documented by the resident. Although patient continues to c/o lower abdominal pain, she reports that it continues to improve (she states she has only required analgesics every 12 hours). She also c/o non-productive cough and sinus drainage, stats significant improvement with flonase and anesthetic lozenge last pm allowing her to sleep. INR 2.0 today, Hb 9.7, Ok to d/c with home health. Will tentatively plan on seeing patient in ambulatory clinic next Wednesday August, 11. Also discussed HBA1c 6.2% and need for diet rx (patient upset about getting insulin in hospital and reluctant to consider additional med rx, will repeat A1c in a few months. Remainder of medical problems as per resident note.       Radha Multani DO  Internal Medicine Residency Faculty

## 2021-08-02 NOTE — PROGRESS NOTES
[facility]  Internal Medicine Residency Program  Progress Note - House Team 1    Patient:  Micaela Moore 77 y.o. female MRN: [id number]     Date of Service: 8/2/2021   Hospital Day: 12      CHIEF  COMPLAINT:     -had concerns including Abdominal Pain (states it started a few days ago but recently started vomiting). OVERNIGHT  EVENTS:     -none    SUBJECTIVE:     -feels well, agreeable to d/c home  -denies fevers, chills, increased abd pain, n/v  -tolerating diet but has been eating little do to not liking the food  -no BM for 3 days likely secondary to poor intake due to quality of food, does not feel constipated or distended  -ambulating unassisted to bedside commode   -making urine  -pain is 5/10 on exam    OBJECTIVE:     PHYSICAL  EXAM:  · Vitals: /80   Pulse 105   Temp 97.1 °F (36.2 °C) (Oral)   Resp 20   Ht 5' 4\" (1.626 m)   Wt 239 lb 14.4 oz (108.8 kg)   SpO2 98%   BMI 41.18 kg/m²     · I & O - 24hr: No intake/output data recorded. · General Appearance: alert, appears stated age and cooperative  · HEENT:  Head: Normal, normocephalic, atraumatic. · Neck: no JVD and supple, symmetrical, trachea midline  · Lung: clear to auscultation bilaterally  · Heart: irregularly irregular. · Abdomen: bowel sounds throughout. soft w/o rebound or gaurding. Mild pain on palpation of L abd in region of hematoma on CT scan. Loretta Read slightly green ecchymosis throughout upper abd and Left lower abd w/ extension into Left flank and paraspinal region b/l resolving today.   · Extremities:  extremities normal, atraumatic, no cyanosis or edema  · Musculokeletal: No joint swelling, no muscle tenderness. ROM normal in all joints of extremities.    · Neurologic: Mental status: Alert, oriented, thought content appropriate    LABS  -&-  IMAGING  STUDIES:     LABORATORY  WORK:    Recent Labs     07/31/21  0500 08/01/21  0559 08/02/21  0724    140 138   K 3.9 4.5 3.9   CL 101 105 103   CO2 24 34* 23   BUN 11 8 9   CREATININE 0.9 0.8 0.9   GLUCOSE 124* 122* 115*   CALCIUM 8.9 9.1 9.1       Recent Labs     07/31/21  0500 08/01/21  0559 08/02/21  0724   WBC 6.4 6.5 6.0   RBC 3.04* 3.16* 3.32*   HGB 8.9* 9.3* 9.7*   HCT 28.6* 30.3* 31.4*   MCV 94.1 95.9 94.6   MCH 29.3 29.4 29.2   MCHC 31.1* 30.7* 30.9*   RDW 16.3* 16.2* 16.7*    341 341   MPV 9.9 10.0 10.2       PT/INR:    Lab Results   Component Value Date    PROTIME 22.5 08/02/2021    PROTIME 21.2 07/21/2021    INR 2.0 08/02/2021     PTT:    Lab Results   Component Value Date    APTT 57.2 08/02/2021   [APTT}      IMAGING  STUDIES:    Echo Complete    Result Date: 7/28/2021  Transthoracic Echocardiography Report (TTE)  Demographics   Patient Name    [address] [place] [address]          Gender            Female                  [address] [facility] Record  [id number]      Room Number       5619  Number   Account #       [de-identified]     Procedure Date    07/28/2021   Corporate ID                  Ordering                                Physician   Accession       [id number]    Referring  Number                        Physician   Date of Birth   1945    Sonographer       Zenaida ESPINOZA   Age             77 year(s)    Interpreting      [address]                                Physician         Physician Cardiology                                                  Juve Graham MD                                 Any Other  Procedure Type of Study   TTE procedure:Echocardiogram W/Contrast.  Procedure Date Date: 07/28/2021 Start: 11:37 AM Study Location: Portable Technical Quality: Limited visualization due to body habitus. Indications:S/P TAVR and LV function. Patient Status: Routine Contrast Medium: Definity. Amount - 2 ml Contrast Comments: given by the rn. Height: 64 inches Weight: 242 pounds BSA: 2.12 m^2 BMI: 41.54 kg/m^2 Rhythm: Within normal limits HR: 77 bpm BP: 111/63 mmHg Allergies   - Other drug:(Phenobarbital).   Findings   Left Ventricle  Normal left ventricular chamber size. Normal left ventricular systolic function. , LVEF is 70%. Mild left ventricular concentric hypertrophy noted. Indeterminate LV diastolic function. Right Ventricle  Normal right ventricle size and function. Left Atrium  Left atrium is severely enlarged. Increased left atrial volume. Interatrial septum not well visualized but appears intact. Right Atrium  Normal right atrium. 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 Mild-to-moderate mitral stenosis - Mean gradient 5,mmHg, peak 19mmHg, MVA  1.2cm2 by continuity. There is trace mitral regurgitation. Tricuspid Valve  Tricuspid was not well visualized. There is mild tricuspid regurgitation. 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 No hemodynamically significant aortic stenosis - mean gradient 6mmHg.  small mason-valvualr regurgitation. Pulmonic Valve  The pulmonic valve was not well visualized. Pericardial Effusion  No evidence of pericardial effusion. Pericardium appears normal.   Pleural Effusion  No evidence of pleural effusion. Aorta  Normal aortic root size and ascending aorta. Miscellaneous  The inferior vena cava not well visualized. Conclusions   Summary  Technically sub-optimal images - Definity Echo contrast used. Normal left ventricular chamber size. Normal left ventricular systolic function. , LVEF is 70%. Mild left ventricular concentric hypertrophy noted. Indeterminate LV diastolic function. Left atrium is severely enlarged. Increased left atrial volume. Interatrial septum not well visualized but appears intact. Normal right ventricle size and function. 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 There is trace mitral regurgitation. 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 Small aortic mason-valvualr regurgitation. There is mild tricuspid regurgitation. Mild pulmoanry hypertension, RVSP 46mmHg  Normal aortic root size. No evidence of pericardial effusion. No intra cardiac mass or thrombus. Compared to prior echo from 7/15/2021.    Signature   ----------------------------------------------------------------  Electronically signed by Letitia Smith MD(Interpreting  physician) on 07/28/2021 06:17 PM  ----------------------------------------------------------------  M-Mode/2D Measurements & Calculations   LV Diastolic    LV Systolic Dimension: 2.9   AV Cusp Separation: 1.4 cmLA  Dimension: 4.9  cm                           Dimension: 6.4 cmAO Root  cm              LV Volume Diastolic: 196 ml  Dimension: 3.4 cm  LV FS:40.8 %    LV Volume Systolic: 31.8 ml  LV PW           LV EDV/LV EDV Index: 226  Diastolic: 1.2  TI/88 HJ/F^5NN ESV/LV ESV  cm              Index: 31.7 ml/15ml/ m^2     RV Diastolic Dimension: 2.7  LV PW Systolic: EF Calculated: 25.0 %        cm  1.3 cm          LV Mass Index: 120 l/min*m^2  Septum          LV Length: 6.7 cm            Ascending Aorta: 2.9 cm  Diastolic: 1.4                               LA volume/Index: 172 ml  cm              LVOT: 2.1 cm                 /81.13ml/m^2  Septum                                       RA Area: 34.5 cm^2  Systolic: 1.4  cm  CO: 7.21 l/min  CI: 2.02  l/m*m^2  LV Mass: 253.72  g  Doppler Measurements & Calculations   MV Peak E-Wave: 2.2 AV Peak Velocity:     LVOT Peak Velocity: 0.76 m/s  m/s                 1.65 m/s              LVOT Mean Velocity: 0.55 m/s                      AV Peak Gradient:     LVOT Peak Gradient: 2.3 mmHgLVOT  MV Peak Gradient:   10.84 mmHg            Mean Gradient: 1.3 mmHg  19.3 mmHg           AV Mean Velocity:  MV Mean Gradient: 5 1.21 m/s  mmHg                AV Mean Gradient: 6.4  MV Mean Velocity:   mmHg                  TR Velocity:3.22 m/s  0.98 m/s            AV VTI: 34.7 cm       TR Gradient:41.34 mmHg  MV P1/2t: 60.2 msec AV Area  MVA by PHT:3.65     (Continuity):1.61  cm^2                cm^2  MV Area                                   [place] ED Velocity: 0.52 m/s  (continuity): 1.2   LVOT VTI: 16.1 cm  cm^2  MV E' Septal  Velocity: 0.05 m/s  MV E' Lateral  Velocity: 5 m/s  [email or web address] [id number]    Echo Limited    Result Date: 7/16/2021  Transthoracic Echocardiography Report (TTE)  Demographics   Patient Name      Juanita Sames Gender              Female                    L   Medical Record    [id number]       Room Number         6142  Number   Account #         [de-identified]      Procedure Date      07/15/2021   Corporate ID                     Ordering Physician  Wyatt Butcher MD   Accession Number  [id number]     Referring Physician   Date of Birth     1945     Sonographer         Melvyn Moritz                                                       [facility]   Age               77 year(s)     Interpreting        Wyatt Butcher MD                                   Physician                                    Any Other  Procedure Type of Study   TTE procedure:Echo Limited Study. Procedure Date Date: 07/15/2021 Start: 09:18 AM Study Location: Portable Technical Quality: Limited visualization due to body habitus. Indications:S/P TAVR. Patient Status: Pending Discharge Height: 64 inches Weight: 240 pounds BSA: 2.11 m^2 BMI: 41.2 kg/m^2 Allergies   - Other drug:(Phenobarbital). Findings   Left Ventricle  Normal left ventricle size. Estimated left ventricle ejection fraction 60+/-5 %. Right Ventricle  Normal right ventricular size and function. Mitral Valve  Well-seated surgical mitral bioprosthesis. No significant mitral  regurgitation.    Tricuspid Valve  Moderate tricuspid regurgitation. RVSP is ~37 mmHg. Aortic Valve  There is a well-seated transcatheter valve in the aortic position. Trace  paravalvular regurgitation. MG 7, DVI 0.67. Pericardial Effusion  No evidence of pericardial effusion. Aorta  Aortic root dimension within normal limits. Miscellaneous  Normal Inferior Vena Cava diameter and respiratory variation. Conclusions   Summary  **POD1 s/p TAVR with 29-mm Evolut PRO+; h/o MVR with #25 Mosaic (2007)**  Normal left ventricle size. Estimated left ventricle ejection fraction  60+/-5 %. Normal right ventricular size and function. Well-seated surgical mitral bioprosthesis. No significant mitral  regurgitation. There is a well-seated transcatheter valve in the aortic position. Trace  paravalvular regurgitation. MG 7, DVI 0.67. No evidence of pericardial effusion. Signature   ----------------------------------------------------------------  Electronically signed by Thania Wen MD(Interpreting physician)  on 07/16/2021 12:54 PM  ----------------------------------------------------------------  M-Mode/2D Measurements & Calculations    LVOT: 2.1 cm              Ascending Aorta: 2.8 cm                              IVC Expiration: 1.7 cm  Doppler Measurements & Calculations    AV Peak Velocity: 1.77 m/s LVOT Peak Velocity: 1.21 m/s   AV Peak Gradient: 12.52    LVOT Mean Velocity: 0.76 m/s   mmHg                       LVOT Peak Gradient: 5.9 mmHgLVOT Mean   AV Mean Velocity: 1.13 m/s Gradient: 2.9 mmHg   AV Mean Gradient: 6.1 mmHg   AV VTI: 45.4 cm   AV Area (Continuity):2.33   cm^2                       TR Velocity:2.91 m/s                              TR Gradient:33.8 mmHg   LVOT VTI: 30.5 cm  [email or web address] [id number]%3d    CT ABDOMEN PELVIS WO CONTRAST Additional Contrast? None    Result Date: 7/25/2021  EXAMINATION: CT OF THE ABDOMEN AND PELVIS WITHOUT CONTRAST 7/25/2021 10:27 am TECHNIQUE: CT of the abdomen and pelvis was performed without the administration of intravenous contrast. Multiplanar reformatted images are provided for review. Dose modulation, iterative reconstruction, and/or weight based adjustment of the mA/kV was utilized to reduce the radiation dose to as low as reasonably achievable. COMPARISON: July 23, 2021 HISTORY: ORDERING SYSTEM PROVIDED HISTORY: assess change of abdominal wall hematoma and groin hematoma TECHNOLOGIST PROVIDED HISTORY: Reason for exam:->assess change of abdominal wall hematoma and groin hematoma Additional Contrast?->None What reading provider will be dictating this exam?->CRC FINDINGS: Lower Chest: Heart size is top-normal to mildly enlarged. Postoperative changes related to transfemoral aortic valve replacement are noted. Mild dependent atelectasis is present at both lung bases. There is a small hiatal hernia. Organs: The gallbladder is distended without obvious wall thickening. There is a large peripherally calcified gallstone near the neck of the gallbladder. The unenhanced liver, spleen and adrenal glands are normal in appearance. The pancreas demonstrates diffuse fatty atrophy. The kidneys are without hydronephrosis or radiopaque calculus. There are bilateral indeterminate/intermediate density renal lesions. GI/Bowel: No evidence of a bowel obstruction, free air or pneumatosis. Portions the colon are decompressed, limiting assessment for mucosal based abnormalities. There is diverticulosis without evidence of diverticulitis. The appendix is not confidently visualized. No obvious pericecal inflammatory changes. The stomach and duodenal sweep are unremarkable aside from the hiatal hernia. Pelvis: The urinary bladder is decompressed around a Menendez catheter. No obvious uterine or ovarian abnormality aside from a presumed fibroid at the fundus of the uterus.   Small volume of free fluid is present in the pelvis. No pelvic lymphadenopathy. Peritoneum/Retroperitoneum: The abdominal aorta is normal in caliber. No retroperitoneal lymphadenopathy or mass. Bones/Soft Tissues: Again noted is a large hematoma along the left oblique abdominal musculature, slightly decreased in size on today's examination, measuring 12.8 x 9.2 cm (previously 13.2 x 10.7 cm). The 3.7 x 2.2 cm hematoma superficial to the right common femoral artery is stable in size. Suspect there is also a small right-sided rectus sheath hematoma on axial image 93, stable to slightly decreased in size. No acute osseous abnormality or evidence of aggressive osseous lesion. Slight interval decrease in the size of the left oblique abdominal wall intramuscular hematoma with a stable right inguinal hematoma and suspected small right rectus sheath hematoma. Multiple indeterminate bilateral renal lesions for which correlation with renal ultrasound is recommended. Additional, incidental findings as above. CT ABDOMEN PELVIS WO CONTRAST Additional Contrast? None    Result Date: 7/23/2021  EXAMINATION: CT OF THE ABDOMEN AND PELVIS WITHOUT CONTRAST 7/23/2021 3:22 pm TECHNIQUE: CT of the abdomen and pelvis was performed without the administration of intravenous contrast. Multiplanar reformatted images are provided for review. Dose modulation, iterative reconstruction, and/or weight based adjustment of the mA/kV was utilized to reduce the radiation dose to as low as reasonably achievable. COMPARISON: None. HISTORY: ORDERING SYSTEM PROVIDED HISTORY: assess change of abdominal wall hematoma and groin hematoma TECHNOLOGIST PROVIDED HISTORY: Reason for exam:->assess change of abdominal wall hematoma and groin hematoma Additional Contrast?->None What reading provider will be dictating this exam?->CRC FINDINGS: In the left lateral abdominal wall at the level of the oblique/transverse muscles there is a large acute hematoma measuring 11 by 11.6 by 11 cm.   On the previous study of a July 22nd it measured 12.5 by 8.5 x 11.8 cm. The overall size of the hematoma is stable account some differences in measurement, presently there is more uniform moderated hyperdense appearance of the hematoma indicating an acute phase. No longer seen blood/blood level which indicates a hyperacute phase or an active bleeding hematoma. There is significant edema and increased density of the adjacent muscular planes of the left lateral chest wall extending anteriorly, posteriorly, superior inferiorly, and also into the deep subcutaneous soft tissues of the left lateral abdominal wall indicating oozing of a hemorrhage, forming a ill-defined surrounding interstitial hematoma. There is a localized small hematoma in the right inguinal region which is related with the right common femoral artery. Cannot exclude the pseudoaneurysm formation. Correlation with ultrasound of the right inguinal areas seen. The there is a small pocket of air associated. The patient had aortic valve replacement procedure in July 14, 2021. There is no extension of hematoma into the pelvic sidewall. There is a Menendez catheter in the bladder. The bladder is not distended. Uterus and ovaries have unremarkable appearance. There is no free intraperitoneal air. There is no indication for acute intraperitoneal process in the abdomen or in the pelvis accounting the left lateral chest wall hematoma which spreads into adjacent soft tissues including the left pelvic sidewall. Uncomplicated diverticulosis seen in the sigmoid colon. There is no indication for bowel obstruction. Kidneys are preserved size and cortical thickness, parenchymal enhancement and excretion of the contrast.  Presence of a cyst-like structures in both kidneys but they have density higher than simple cyst, they cannot be characterize presently. When patient condition permits consider initial correlation with ultrasound.   They could represent hemorrhagic cysts or cyst with high proteinaceous content, but other possibilities are not excluded. There are normal size and the density for the liver and spleen. Pancreas demonstrates some atrophy. Gallbladder is well distended with 3 cm gallstone. There is no dilatation of the biliary tree pancreatic ductal system. There is a mild to moderate size hiatal hernia. Adrenals not enlarged. Calcified atheromatous changes are seen in the abdominal aorta there is no aneurysm formation. There is no midline retro peritoneal adenopathy. Heart has normal size. There is endovascular repair for the aortic valve and there is also prosthesis for the mitral valve. The heart is not fully covered in this study. There are areas of a confluent loss of volume indicate a component of atelectasis in the medial aspect of the right lower lobe and towards the base in the left lower lobe. These appear to be more chronic findings but increase since May 7, 2021.     1.  Overall stable large hematoma of the left lateral abdominal wall with surrounding hemorrhage into adjacent muscle planes and deep subcutaneous soft tissues by oozing. The large dominant hematoma now has a uniform hyperdensity indicating acute phase. No longer seen blood/blood level which indicates a hyperacute active bleeding hematoma as demonstrate on the study of July 22nd. 2.  Hematoma of a small size superficial to the right common femoral artery. Cannot exclude a pseudoaneurysm formation. Can correlate with the ultrasound of the right groin. XR CHEST (2 VW)    Result Date: 7/9/2021  EXAMINATION: TWO XRAY VIEWS OF THE CHEST 7/9/2021 1:00 pm COMPARISON: May 26, 2021 HISTORY: ORDERING SYSTEM PROVIDED HISTORY: Dyspnea, unspecified type TECHNOLOGIST PROVIDED HISTORY: What reading provider will be dictating this exam?->CRC FINDINGS: Moderate cardiomegaly. No airspace opacity or pleural effusion. Median sternotomy wires are present. The heart is normal in size.   No pneumothorax. 1. Moderate cardiomegaly. 2. No airspace opacity or pleural effusion. CT ABDOMEN PELVIS W IV CONTRAST Additional Contrast? None    Result Date: 7/22/2021  EXAMINATION: CT OF THE ABDOMEN AND PELVIS WITH CONTRAST 7/22/2021 3:53 pm TECHNIQUE: CT of the abdomen and pelvis was performed with the administration of intravenous contrast. Multiplanar reformatted images are provided for review. Dose modulation, iterative reconstruction, and/or weight based adjustment of the mA/kV was utilized to reduce the radiation dose to as low as reasonably achievable. COMPARISON: None. HISTORY: ORDERING SYSTEM PROVIDED HISTORY: ab pain s/p TAVR TECHNOLOGIST PROVIDED HISTORY: Reason for exam:->ab pain s/p TAVR Additional Contrast?->None Decision Support Exception - unselect if not a suspected or confirmed emergency medical condition->Emergency Medical Condition (MA) What reading provider will be dictating this exam?->CRC FINDINGS: Lower Chest: Images through lung bases demonstrate mild cardiomegaly. Note is made of an aortic valve. The left atrium is enlarged. There is no right or left lung base infiltrate or effusion. Organs: The liver, spleen, pancreas and adrenal glands are unremarkable. The kidneys enhance symmetrically without evidence of hydronephrosis. GI/Bowel: There is a large stones seen within the gallbladder lumen measuring 3.4 x 2.4 cm. There are no findings of acute cholecystitis. The stomach is normally distended. There is a small hiatal hernia. There is no large or small bowel obstruction. There are no findings of inflammatory bowel disease. Multiple sigmoid diverticula noted. There is no evidence of diverticulitis. Pelvis:  Bladder is unremarkable in appearance. There is a uterine fibroid measuring approximately 3.3 x 3.4 cm. Peritoneum/Retroperitoneum: There are postsurgical changes seen within the right groin from recent surgical intervention.   There is a small hematoma measuring 2.5 x 2.5 cm. The abdominal aorta is normal caliber. There is no dissection of the abdominal aorta or of the iliac arteries. There is a large left lateral abdominal wall hematoma measuring 13 cm in maximum craniocaudal dimension by 12 cm in maximum AP diameter by 8 cm in maximum transverse diameter. There is a fluid fluid level seen within the left lateral abdominal wall hematoma consistent with acute on subacute hemorrhage. Along the superior aspect of the fluid fluid level there is minimal dense layering material suggestive of mild active bleeding. Bones/Soft Tissues:  Age related degenerative changes of the visualized osseous structures without focal destructive lesion. 1. Large left lateral abdominal wall hematoma measuring 13 cm x 12 cm x 8 cm. There is a fluid fluid level seen within the abdominal wall hematoma consistent with acute on subacute hemorrhage. There is minimal dense material seen layering on the fluid fluid level centrally consistent with minimal active bleeding. Surrounding intramuscular edema is noted within the oblique muscles. There is also induration within the subcutaneous soft tissues consistent with reactive changes. 2. Postsurgical changes within the right groin from recent intervention and TAVR procedure. There is a 2.5 x 2.5 cm hematoma within the right groin. 3. Cholelithiasis. There is no evidence of acute cholecystitis. 4. There is no aortic or iliac artery injury. XR CHEST PORTABLE    Result Date: 7/25/2021  EXAMINATION: ONE XRAY VIEW OF THE CHEST 7/25/2021 9:09 am COMPARISON: July 22, 2021 HISTORY: ORDERING SYSTEM PROVIDED HISTORY: Wheezing TECHNOLOGIST PROVIDED HISTORY: Reason for exam:->Wheezing What reading provider will be dictating this exam?->CRC FINDINGS: Heart size is unable to be accurately assessed on this single portable view of the chest, but appears to be stable. Sternotomy wires are in place.  There are postprocedural changes related to transfemoral aortic valve replacement. Prominence of the pulmonary vasculature raises possibility of mild pulmonary edema. No evidence of a sizable pleural effusion or pneumothorax. No acute osseous abnormality. Prominence of pulmonary vasculature raising the possibility of mild pulmonary edema. XR CHEST PORTABLE    Result Date: 7/22/2021  EXAMINATION: ONE XRAY VIEW OF THE CHEST 7/22/2021 1:17 pm COMPARISON: March 8, May 26, July 9 HISTORY: ORDERING SYSTEM PROVIDED HISTORY: Shortness of breath TECHNOLOGIST PROVIDED HISTORY: Reason for exam:->Shortness of breath What reading provider will be dictating this exam?->CRC FINDINGS: Status post previous mid sternotomy. More recent endovascular repair for the aortic root. Heart has borderline size. The no acute infiltrates, consolidates or pleural effusions are seen. There is no perihilar vascular congestion. 1.  Status post more remote mid sternotomy. Status post recent endovascular repair for the aortic root. 2.  Borderline size heart. No acute pulmonary process. STUDENT  ASSESSMENT  -&-  PLAN:     Radha Mueller is a 77 y.o. female     1. Abd Hematoma 2/2 lovenox use              -Heparin drip bridge to Coumadin started 7/28/21 per General surgery service              -Restarted coumadin 7/28/21; d/c on coumadin              -[facility] to draw coumadin level              -cont pain meds w/ roxycodone on d/c  2. A-fib, rate controlled              -anticoags as above  3. Hyperbilirubinemia, likely 2/2 hematoma  4.urinary retention, resolved  5.DM, HgbA1c on 7/9/21 = 6.6              -cont LDSS  6. Increase resp Mucus production, resolving              -encourage pulmonary toilet, Flonase and lozenges at d/c  7. H/O HFpEF  8. H/O TAVR, mitral valve, and CABG              -anticoag as above  8. H/O CAD              -anticoag as above  10. H/O GERD  11. H/O gluacoma              -cont Latanaprost & bromonidine     PT / OT  EVALUATION:  evaluating     DVT  PROPHYLAXIS:  SCD     GI  PROPHYLAXIS:  Diet / Protonix     DISPOSITION:  d/c home w/ HHC      JACI Chanel-3/4   ATTENDING  PHYSICIAN: Charity Smiley

## 2021-08-02 NOTE — DISCHARGE INSTR - DIET

## 2021-08-02 NOTE — CARE COORDINATION
Social Work Discharge Planning: On heprin drip plan remains home with [facility] when medically stable, will need home health care orders prior to discharge.   Electronically signed by POONAM Georges on 8/2/2021 at 4:08 PM

## 2021-08-02 NOTE — DISCHARGE INSTR - COC
Continuity of Care Form    Patient Name: Ingrid Car   :    MRN:  [id number]    Admit date:  2021  Discharge date:  ***    Code Status Order: Full Code   Advance Directives:      Admitting Physician:  Blayne Guzman MD  PCP: Kulwinder Haney DO    Discharging Nurse: [facility] Unit/Room#: 7671/8818-U  Discharging Unit Phone Number: ***    Emergency Contact:   Extended Emergency Contact Information  Primary Emergency Contact: [facility]  Address: [address] [place] [address] [address], [address] Phone: [phone]  Work Phone: [phone]  Mobile Phone: [phone]  Relation: [id number] [facility]  Secondary Emergency Contact: [address] [facility] Phone: [phone]  Work Phone: [phone]  Relation: Brother/Sister    Past Surgical History:  Past Surgical History:   Procedure Laterality Date    AORTIC VALVE REPLACEMENT N/A 2021    TRANSCATHETER AORTIC VALVE REPLACEMENT FEMORAL APPROACH performed by Wyatt Butcher MD at [address] [facility]      CARDIAC CATHETERIZATION Right 2021    Right Heart cath no stents Dr Mike Cramer Right 2016    trabulectomy    COLONOSCOPY      CORONARY ARTERY BYPASS GRAFT      TONSILLECTOMY      TRANSESOPHAGEAL ECHOCARDIOGRAM  2021    Dr Parra Done         Immunization History:   Immunization History   Administered Date(s) Administered    Influenza 10/17/2011, 2012, 2013    Influenza Vaccine, unspecified formulation 2013    Pneumococcal Polysaccharide ([id number]) 10/17/2011       Active Problems:  Patient Active Problem List   Diagnosis Code    Coronary artery disease involving native coronary artery of native heart without angina pectoris I25.10    Valvular heart disease I38    Restless leg syndrome G25.81    Diverticular disease K57.90    External otitis H60.90    Hyperlipidemia E78.5    Falls J16.CFHO    S/P CABG (coronary artery bypass graft) Z95.1    Essential hypertension I10    Near syncope R55    Coronary artery disease involving coronary bypass graft of native heart without angina pectoris I25.810    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    Pulmonary HTN (HCC) I27.20    Chronic atrial fibrillation (HCC) I48.20    Primary open angle glaucoma of both eyes, severe stage H40.1133    Cataract H26.9    Depression F32.9    Anxiety F41.9    Fever R50.9    Hx of CABG Z95.1    Mitral valve replaced Z95.2    Mixed hyperlipidemia E78.2    Hypokalemia E87.6    Lactic acidosis E87.2    Calculus of gallbladder without cholecystitis without obstruction K80.20    Diverticulosis of large intestine without hemorrhage K57.30    Cough R05    Chronic anticoagulation Z79.01    Gastroesophageal reflux disease without esophagitis K21.9    Severe aortic stenosis I35.0    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 1XXA    Blood loss anemia D50.0       Isolation/Infection:   Isolation            No Isolation          Patient Infection Status       Infection Onset Added Last Indicated Last Indicated By Review Planned Expiration Resolved Resolved By    None active    Resolved    COVID-19 Rule Out 07/09/21 07/09/21 07/09/21 Covid-19 Ambulatory (Ordered)   07/11/21 Rule-Out Test Resulted    COVID-19 Rule Out 05/26/21 05/26/21 05/27/21 COVID-19 (Ordered)   05/27/21 Rule-Out Test Resulted    COVID-19 Rule Out 03/08/21 03/08/21 03/08/21 Respiratory Panel, Molecular, with COVID-19 (Restricted: peds pts or suitable admitted adults) (Ordered)   03/08/21 Rule-Out Test Resulted            Nurse Assessment:  Last Vital Signs: /80   Pulse 105   Temp 97.1 °F (36.2 °C) (Oral)   Resp 20   Ht 5' 4\" (1.626 m)   Wt 239 lb 14.4 oz (108.8 kg)   SpO2 98% BMI 41.18 kg/m²     Last documented pain score (0-10 scale): Pain Level: 10  Last Weight:   Wt Readings from Last 1 Encounters:   08/02/21 239 lb 14.4 oz (108.8 kg)     Mental Status:  {IP PT MENTAL STATUS:20030:::0}    IV Access:  { SUBHASH IV ACCESS:780223175:::0}    Nursing Mobility/ADLs:  Walking   {CHP DME ADLs:203168108:::0}  Transfer  {CHP DME ADLs:306042240:::0}  Bathing  {CHP DME ADLs:840531240:::0}  Dressing  {CHP DME ADLs:133456261:::0}  Toileting  {CHP DME ADLs:162369268:::0}  Feeding  {CHP DME ADLs:834420008:::0}  Med Admin  {CHP DME ADLs:827603173:::0}  Med Delivery   { SUBHASH MED Delivery:767807196:::0}    Wound Care Documentation and Therapy:  Puncture 07/22/21 Groin Anterior; Left (Active)   Dressing/Treatment Other (comment) 07/14/21 0915   Dressing Status Clean;Dry; Intact 07/14/21 0915   Number of days: 11        Elimination:  Continence: Bowel: {YES / AH:51247}  Bladder: {YES / RE:75950}  Urinary Catheter: {Urinary Catheter:562034182:::0}   Colostomy/Ileostomy/Ileal Conduit: {YES / YH:84096}       Date of Last BM: ***    Intake/Output Summary (Last 24 hours) at 8/2/2021 1717  Last data filed at 8/2/2021 1431  Gross per 24 hour   Intake 360 ml   Output 850 ml   Net -490 ml     I/O last 3 completed shifts:   In: 360 [P.O.:360]  Out: 850 [Urine:850]    Safety Concerns:     [address] Safety Concerns:086567117:::0}    Impairments/Disabilities:      [address] Impairments/Disabilities:414290167:::0}    Nutrition Therapy:  Current Nutrition Therapy:   [address] Diet List:831231354:::0}    Routes of Feeding: {CHP DME Other Feedings:613704247:::0}  Liquids: {Slp liquid thickness:70100}  Daily Fluid Restriction: {CHP DME Yes amt example:424442509:::0}  Last Modified Barium Swallow with Video (Video Swallowing Test): {Done Not Done TGCX:334063940:::8}    Treatments at the Time of Hospital Discharge:   Respiratory Treatments: ***  Oxygen Therapy:  {Therapy; copd oxygen:83988:::0}  Ventilator:    { CC Vent CKFZ:487753247:::2}    Rehab Therapies: {THERAPEUTIC INTERVENTION:2081466564}  Weight Bearing Status/Restrictions: [address] CC Weight Bearin:::0}  Other Medical Equipment (for information only, NOT a DME order):  {EQUIPMENT:433785062}  Other Treatments: ***    Patient's personal belongings (please select all that are sent with patient):  {CHP DME Belongings:474469235:::0}    RN SIGNATURE:  {Esignature:420501529:::0}    CASE MANAGEMENT/SOCIAL WORK SECTION    Inpatient Status Date: ***    Readmission Risk Assessment Score:  Readmission Risk              Risk of Unplanned Readmission:  23           Discharging to Facility/ Agency   Name:   Address:  Phone:  Fax:    Dialysis Facility (if applicable)   Name:  Address:  Dialysis Schedule:  Phone:  Fax:    / signature: {Esignature:132887714:::0}    PHYSICIAN SECTION    Prognosis: Fair    Condition at Discharge: Stable    Rehab Potential (if transferring to Rehab): Good    Recommended Labs or Other Treatments After Discharge: INR daily for 3 to 4 days and then every other day    Physician Certification: I certify the above information and transfer of Jaswant Oh  is necessary for the continuing treatment of the diagnosis listed and that she requires Home Care for greater 30 days.      Update Admission H&P: No change in H&P    PHYSICIAN SIGNATURE:  Electronically signed by Richie Seip, MD on 21 at 5:18 PM EDT

## 2021-08-02 NOTE — DISCHARGE SUMMARY
IV and cardiac monitor discontinued for discharge. Discharge instructions reviewed with patent and family member, questions answered and they verbalize understanding, written copy of discharge instructions and pain med prescription sen t home with patient. Patient discharged via wheelchair to Main Entrance.

## 2021-08-02 NOTE — PROGRESS NOTES
CLINICAL PHARMACY NOTE: MEDS TO BEDS    Total # of Prescriptions Filled: 3   The following medications were delivered to the patient:  · Fluticasone prop 50 susp  · Warfarin 3mg  · Benzonatate 100mg    Additional Documentation:

## 2021-08-03 ENCOUNTER — ANTI-COAG VISIT (OUTPATIENT)
Dept: INTERNAL MEDICINE | Age: 76
End: 2021-08-03

## 2021-08-03 LAB — INR BLD: 2

## 2021-08-03 NOTE — PROGRESS NOTES
Physician Progress Note      Marleen Hernandez  CSN #:                  [id number]  :                       1945  ADMIT DATE:       2021 11:31 AM  DISCH DATE:        2021 5:23 PM  RESPONDING  PROVIDER #:        LELO HSU          QUERY TEXT:    Dear Dr. Bob Rey,    Pt admitted with abdominal and groin hematoma. PT also noted to have   significant changes in blood counts. If possible, please document in the   progress notes and discharge summary if you are evaluating and/or treating any   of the following: The medical record reflects the following:  Risk Factors: Long term anticoagulation  Clinical Indicators: Pt receiving heparin and warfarin, CT abd/pelvis showed:   \"Large left lateral abdominal wall hematoma measuring 13 cm x 12 cm x 8   cm. ..fluid level seen within the abdominal wall hematoma consistent with acute   on subacute hemorrhage. Otf Herrera Postsurgical changes within the right groin from   recent intervention and TAVR procedure. There is a 2.5 x 2.5 cm hematoma within   the right groin\", H/H trend 33.6/10.8 and 24.0/7.5  Treatment: PRBC, serial labs, close pt monitoring, close monitoring of PT/INT   and PTT    Thank you,  Viv PENN, RN  Clinical Documentation Improvement  [email or web address]  Office #: [phone]  Options provided:  -- Acute blood loss anemia  -- Chronic blood loss anemia  -- Acute on chronic blood loss anemia  -- Anemia due to antineoplastic chemotherapy  -- Other - I will add my own diagnosis  -- Disagree - Not applicable / Not valid  -- Disagree - Clinically unable to determine / Unknown  -- Refer to Clinical Documentation Reviewer    PROVIDER RESPONSE TEXT:    This patient has acute blood loss anemia.     Query created by: Isael Sierra on 2021 2:13 PM      Electronically signed by:  LELO HSU 8/3/2021 1:17 PM

## 2021-08-03 NOTE — PROGRESS NOTES
Sheba Zimmerman from [facility] called to report pt's INR. Please see anticoag encounter. Reviewed with Dr. Cathyann Angelucci and orders received. Farrah notified of same. Voicemail message left for patient to notify of same.

## 2021-08-03 NOTE — DISCHARGE SUMMARY
[address]  Discharge Summary    PCP: Tessa Vázquez DO    Admit Date:7/22/2021  Discharge Date: 8/3/2021    Admission Diagnosis:   1. Iatrogenic abdominal hematoma 2/2 anticoagulation, Lovenox injections, on warfarin   2. Groin hematoma most likely 2/2 s/p TAVR   2. ALDA possibly likely prerenal 2/2 intravascular volume depletion 2/2 blood loss vs ATN   3. Hx of Mitral valve replacement  4. Hx of chronic a-fib   5. Hx of aortic stenosis s/p TAVR  6. Lactic acidosis type A   7. Hx of GERD    8. Hx of DMTII  9. Hx of HFpEF  10. Hx of acute angle glaucoma    Discharge Diagnosis:  1. Iatrogenic abdominal hematoma 2/2 anticoagulation, Lovenox injections, on warfarin   2. Groin hematoma most likely 2/2 s/p TAVR   ALDA likely prerenal secondary to intravascular volume depletion secondary to #1--resolved  History A. Fib  Urinary retention secondary to pain from  #1--resolved  History of mitral valve replacement  History of aortic stenosis status post TAVR  LA type A--resolved  History of GERD  History DMTII  History of HFpEF,   History of acute glaucoma    Hospital Course: The patient is a 77 y.o. female with a past medical history of CAD s/p CBG, aortic stenosis s/p TAVR 14th of july, HLD, chronic A-fib, prosthetic mitral valve replacement, HFpEF, essential hypertension, HLD, restless leg, severe bilateral open angle glaucoma, cataract of right eye, depression, and anxiety who presented to the ED due to pain in her abdomen that began 3 days ago, is \"throbby\" in nature, does not radiate, position makes worse, and laying on her side improves. Pain began 3 days ago when when she developed nausea, a fever of 99 degrees, and pain. Patient reports she began eating less because of the pain,and that it got worse slowly until she couldn't take it anymore.  She also reported that earlier today when she stood up out of bed she felt very light headed and dizzy, and this caused her to fall back on her bed. Patient has been receiving Lovenox injections however cannot tell if the pain is over one of the areas where she is injected. In the ED, patient's BP was found to be low: 90/42 and abdominal CT revealed lateral abdominal wall hematoma measuring 13 cm x 12 cm x 8 cm and rt groin hematoma 2.5 x 2.5 cm where TAVR was done. Normal saline was given. Lactic acid was found to be elevated 3.7, createnine 1.2, glucose 219. Pro- (baseline), HGB 10.8, PTT 24.5, INR 2.2. Blood and urine cultures were taken. Chest Xray showed no acute pathologies and only post surgical changes. EKG showed A-fib, RBBB, and T wave abnormalities. Patient was admitted for management of her complex abdominal pain and hematomas in context of her anticoagulation needs to house medicine. On the first night all anticoagulation was stopped. patient's abdominal pain worsened and hgb dropped to 7.2  patient received 2 units of blood and was tachycardic. Post transfusion hgb was 7.8 and pain began spreading into her back. Percocet and fentanyl did not ease her pain, but patient was given dilaudid and this helped immensely. 2nd CT of the abdomen was ordered and was read by radiology as \"Overall stable large hematoma of the left lateral abdominal wall with surrounding hemorrhage \". It was also noted that the abdominal hematoma was now one uniform density indicating the hyper-acute stage of hemmorage had ended. Gen surgery was consulted and they recommended medical management. INR was 2.2 and PTT was 24.5. Creatinine had decreased from 1.2 to 1.1 with addition of NS. Patient also developed a wheeze that did not effect her ability to breathe and was thought to be due to the transfusion he had received and this resolved. Patient had also developed urinary retention and a catheter was placed. However, we believed this was secondary to her hematoma and was due to pain with valsalva maneuver causing impaired micturition.      On the  patient had an INR of 1.9, and patient required 1 more unit of blood due to hgb 7.6 and tachycardia. Post transfusion hgb 8.2. Cr had decreased to 0.8 (baseline=0.7) and fluids were stopped. The pain had stopped radiating to the back and there was a question of wether or not to restart her anticoagulation. Another CT scan was ordered to assess stability of hematomas and showed: Slight interval decrease in the size of the left oblique abdominal wall intramuscular hematoma with a stable right inguinal hematoma and suspected small right rectus sheath hematoma. With discussion with gen surg, CT surg, and patient, we decided to resume her anticoagulation on the  using an IV heparin drip as a bridge. Hgb continued to improve each day,  through  readings bein.0, 8.4, 8.7, 8.9, and 8.9 respectively. Pain was well controlled using dilaudid  And percocet, and guevara was removed with resulting PVR under 100cc's. When pain was controlled patient was able to urinate without issue. First dose of warfarin was given  with INR 1.3. At the same time heparin drip was started. We then followed her INR until therapeutic levels were reached from  to . During this time, the patient developed a dry mouth that was treated with lozenges saline nose spray with great effect. No other problems were seen/occured during this time. On , patient's INR was exactly 2.0, and patient was discharged to home with home health with daughter providing transportation and support. Upon return to home with home health, INR was to be checked every day for 4 days, and then every other day. Patient resumed her home dose warfarin schedule of: 3 mg daily except 4 mg on Wednesday's. Preliminary appointment was made to follow up with PCP on On 2021. Patient was given explicit instructions to call the office on 2021. to verify the appointment and request additional pain medications if needed.  It was also explained that PCP will need repeat hemoglobin A1c in ~3 months due to her pre-diabetic status. Patient was discharged with benzocaine menthol lozenges, oxycodone 5mg Q8 for 3 days, and Glycolax for constipation if needed. All home medications were resumed. Lovenox shots were stopped. Significant findings (history and exam, laboratory, radiological, pathology, other tests):   General Appearance: alert, appears stated age, cooperative and no distress  HEENT:  Head: Normocephalic, no lesions, without obvious abnormality. Neck: no JVD, supple, symmetrical, trachea midline and thyroid not enlarged, symmetric, no tenderness/mass/nodules  Lung: clear to auscultation bilaterally  Heart: irregularly irregular rhythm  Abdomen:  Tenderness to palpation of L sam-abdomen over hematoma. echymosis is yellow, green, purple, improved from first day of admission   Extremities:  extremities normal, atraumatic, no cyanosis or edema  Musculokeletal: No joint swelling, no muscle tenderness. ROM normal in all joints of extremities. Neurologic: Mental status: Alert, oriented, thought content appropriate      Pending test results:   Serial INR's  A1C to be done in future    Consults:  Gen surg    Procedures: Menendez insertion and removal    Condition at discharge: Stable    Disposition: Home with Home health    Discharge Medications:  Discharge Medication List as of 8/2/2021  4:55 PM        START taking these medications    Details   oxyCODONE (ROXICODONE) 5 MG immediate release tablet Take 1 tablet by mouth every 8 hours as needed for Pain for up to 3 days. , Disp-9 tablet, R-0Print      fluticasone (FLONASE) 50 MCG/ACT nasal spray 1 spray by Each Nostril route daily, Disp-1 Bottle, R-0Normal      polyethylene glycol (GLYCOLAX) 17 g packet Take 17 g by mouth daily as needed for Constipation, Disp-527 g, R-1Normal      benzocaine-menthol (CEPACOL SORE THROAT) 15-3.6 MG lozenge Take 1 lozenge by mouth every 2 hours as needed for Other (Dry mouth), Disp-168 lozenge, R-0Normal           CONTINUE these medications which have CHANGED    Details   benzonatate (TESSALON PERLES) 100 MG capsule Take 1 capsule by mouth 3 times daily as needed for Cough, Disp-30 capsule, R-0Normal      !! warfarin (COUMADIN) 3 MG tablet Take one tablet daily, Disp-30 tablet, R-6Normal       !! - Potential duplicate medications found. Please discuss with provider. CONTINUE these medications which have NOT CHANGED    Details   aspirin 81 MG EC tablet Take 1 tablet by mouth daily, Disp-30 tablet, R-3Normal      amLODIPine (NORVASC) 10 MG tablet Take 1 tablet by mouth daily, Disp-30 tablet, R-3Normal      brimonidine (ALPHAGAN) 0.2 % ophthalmic solution INSTILL 1 DROP EVERY DAY INTO LEFT EYE AT 8 AM.Historical Med      latanoprost (XALATAN) 0.005 % ophthalmic solution INSTILL 1 DROP IN THE LEFT EYE EVERY DAY AT BEDTIMEHistorical Med      furosemide (LASIX) 20 MG tablet Take 20mg alternating with 30mg every other day (one tablet alternating with 1 and 1/2 tabs every other day). , Disp-135 tablet, R-3Normal      potassium chloride (KLOR-CON M) 10 MEQ extended release tablet Take 1 tab alternating with 1 and 1/2 tablet every other day (10 cecy alternating with 15 cecy every other day), Disp-135 tablet, R-3Normal      pantoprazole (PROTONIX) 40 MG tablet Take 1 tablet by mouth daily, Disp-90 tablet, R-3Normal      simvastatin (ZOCOR) 20 MG tablet TAKE ONE TABLET BY MOUTH DAILY, Disp-90 tablet, R-0Normal      metoprolol tartrate (LOPRESSOR) 50 MG tablet One tab twice daily, Disp-60 tablet, R-3Normal      !! warfarin (COUMADIN) 1 MG tablet Take 1 mg every wed with coumadin 3 mg to equal 4 mg wed only, Disp-30 tablet,R-0Normal      enalapril (VASOTEC) 10 MG tablet TAKE ONE TABLET BY MOUTH TWO TIMES A DAY, Disp-180 tablet,R-3Normal       !! - Potential duplicate medications found. Please discuss with provider.         STOP taking these medications       enoxaparin (LOVENOX) 120 MG/0.8ML injection Comments:   Reason for Stopping:         amoxicillin (AMOXIL) 500 MG capsule Comments:   Reason for Stopping:             [facility] Internal Medicine Resident Service    Activity as tolerated  Diet: low sodium    Be compliant with your medications and take them as prescribed. Continue your home coumadin regimen of 3 mg daily except 4 mg on Wednesday's. Home health care will draw INR daily, then Q48 hours after 3-4 days. Call Dr. Gabby Messina office on Thursday for pain level so he can assess and dispense more medications if needed. Use tessalon pearls, flonase, and lozenger's as needed. Will need repeat hemoglobin A1c in ~3 months. Special Instructions:     Hospital Follow up: [id number] [address] with House Team   Call to confirm appointment Tel: [phone] ([address]  Internal Medicine Clinic)     Other Follow-Ups:    Future Appointments   Date Time Provider Linh Colon   8/17/2021  8:30 AM [facility] [place] [facility] 1 [facility]   8/17/2021  9:30 AM [facility] STRUCT[facility] HEART SCHEDULE [facility] CARDIO [facility]       Other than any new prescriptions given to you today, the list of home going meds on this After Visit Summary are based on information provided to us from you. This information, including the list, dose, and frequency of medications is only as accurate as the information you provided. If you have any questions or concerns about your home medications, please contact your Primary Care Physician for further clarification. Chasity Martinez MD PGY-1  8/2/2021  2:27 PM        warfarin (oral)  Pronunciation:  WAR far in  Brand:  CoumadinJennifer  What is the most important information I should know about warfarin? You should not take warfarin if you are prone to bleeding because of a medical condition, if you have an upcoming surgery, or if you need a spinal tap or epidural. Do not take warfarin if you cannot take it on time every day.   Warfarin increases your risk of severe or fatal bleeding, especially if you have certain medical conditions, if you are 72 or older, or if you have had a stroke, or bleeding in your stomach or intestines. Seek emergency help if you have any bleeding that will not stop. Call your doctor at once if you have other signs of bleeding such as: swelling, pain, feeling very weak or dizzy, unusual bruising, bleeding gums, nosebleeds, heavy menstrual periods or abnormal vaginal bleeding, blood in your urine, bloody or tarry stools, coughing up blood or vomit that looks like coffee grounds. Many other drugs can increase your risk of bleeding when used with warfarin. Tell your doctor about all medicines you have recently used. Avoid making any changes in your diet without first talking to your doctor. Some foods can make warfarin less effective. What is warfarin? Warfarin is an anticoagulant (blood thinner). Warfarin reduces the formation of blood clots. Warfarin is used to treat or prevent blood clots in veins or arteries, which can reduce the risk of stroke, heart attack, or other serious conditions. Warfarin may also be used for purposes not listed in this medication guide. What should I discuss with my healthcare provider before taking warfarin? You should not take warfarin if you are allergic to it, or if:  you have very high blood pressure;  you recently had or will have surgery on your brain, spine, or eye;  you undergo a spinal tap or spinal anesthesia (epidural); or  you cannot take warfarin on time every day. You also should not take warfarin if you are are prone to bleeding because of a medical condition, such as:   a blood cell disorder (such as low red blood cells or low platelets);  ulcers or bleeding in your stomach, intestines, lungs, or urinary tract;  an aneurysm or bleeding in the brain; or  an infection of the lining of your heart. Do not take warfarin if you are pregnant, unless your doctor tells you to. Warfarin can cause birth defects, but preventing blood clots may outweigh any risks to the baby. If you are not pregnant, use effective birth control to prevent pregnancy while taking warfarin and for at least 1 month after your last dose. Tell your doctor right away if you become pregnant. Warfarin can make you bleed more easily, especially if you have ever had:   high blood pressure or serious heart disease;  kidney disease;  cancer or low blood cell counts;  an accident or surgery;  bleeding in your stomach or intestines;  a stroke; or  if you are 72 or older. To make sure warfarin is safe for you, tell your doctor if you have ever had:  diabetes;  congestive heart failure;  liver disease, kidney disease (or if you are on dialysis);  a hereditary clotting deficiency; or  low blood platelets after receiving heparin. It is not known whether warfarin passes into breast milk. Watch for signs of bruising or bleeding in the baby if you take warfarin while you are breast-feeding a baby. How should I take warfarin? Follow all directions on your prescription label. Your doctor may occasionally change your dose. Do not take warfarin in larger or smaller amounts or for longer than your doctor tells you to. Take warfarin at the same time every day, with or without food. Never take a double dose. Warfarin can make it easier for you to bleed. Seek emergency help if you have any bleeding that will not stop. You will need frequent \"INR\" or prothrombin time tests (to measure your blood-clotting time and determine your warfarin dose). You must remain under the care of a doctor while taking warfarin. If you receive warfarin in a hospital, call or visit your doctor 3 to 7 days after you leave the hospital. Your INR will need to be tested at that time. Do not miss any follow-up appointments. Tell your doctor if you are sick with diarrhea, fever, chills, or flu symptoms, or if your body weight changes.   You may need to stop taking warfarin 5 to 7 days before having any surgery, dental work, or a medical procedure. Call your doctor for instructions. Wear a medical alert tag or carry an ID card stating that you take warfarin. Any medical care provider who treats you should know that you are taking this medicine. Store at room temperature away from heat, moisture, and light. What happens if I miss a dose? Take the missed dose as soon as you remember. Skip the missed dose if it is almost time for your next scheduled dose. Do not take extra medicine to make up the missed dose. What happens if I overdose? Seek emergency medical attention or call the Poison Help line at 1-665.950.8908. An overdose can cause excessive bleeding. What should I avoid while taking warfarin? Avoid activities that may increase your risk of bleeding or injury. Use extra care to prevent bleeding while shaving or brushing your teeth. You may still bleed more easily for several days after you stop taking warfarin. Avoid making any changes in your diet without first talking to your doctor. Foods that are high in vitamin K (liver, leafy green vegetables, or vegetable oils) can make warfarin less effective. If these foods are part of your diet, eat a consistent amount on a weekly basis. Grapefruit juice, cranberry juice, sabrina juice, and pomegranate juice may interact with warfarin and lead to unwanted side effects. Avoid the use of these juice products while taking warfarin. Avoid drinking alcohol. Ask your doctor before using any medicine for pain, arthritis, fever, or swelling. This includes aspirin, ibuprofen (Advil, Motrin), naproxen (Aleve), celecoxib (Celebrex), diclofenac, indomethacin, meloxicam, and others. These medicines may affect blood clotting and may also increase your risk of stomach bleeding. What are the possible side effects of warfarin?   Get emergency medical help if you have signs of an allergic reaction: hives; difficult breathing; swelling of your face, lips, tongue, or throat. Warfarin increases your risk of bleeding, which can be severe or life-threatening. Call your doctor at once if you have any signs of bleeding such as:  sudden headache, feeling very weak or dizzy;  swelling, pain, unusual bruising;  bleeding gums, nosebleeds;  bleeding from wounds or needle injections that will not stop;  heavy menstrual periods or abnormal vaginal bleeding;  blood in your urine, bloody or tarry stools; or  coughing up blood or vomit that looks like coffee grounds. Clots formed by warfarin may block normal blood flow, which could lead to tissue death or amputation of the affected body part. Get medical help at once if you have:  pain, swelling, hot or cold feeling, skin changes, or discoloration anywhere on your body; or  sudden and severe leg or foot pain, foot ulcer, purple toes or fingers. Bleeding is the most common side effect of warfarin. This is not a complete list of side effects and others may occur. Call your doctor for medical advice about side effects. You may report side effects to FDA at 6-314-FDA-4138. What other drugs will affect warfarin? Many drugs (including some over-the-counter medicines and herbal products) can affect your INR and may increase the risk of bleeding if you take them with warfarin. Not all possible drug interactions are listed in this medication guide. It is very important to ask your doctor and pharmacist before you start or stop using any other medicine, especially:  other medicines to prevent blood clots;  an antibiotic or antifungal medicine;  supplements that contain vitamin K; or  herbal (botanical) products --coenzyme Q10, cranberry, echinacea, garlic, ginkgo biloba, ginseng, goldenseal, or [place]'s wort. This list is not complete and many other drugs can interact with warfarin. This includes prescription and over-the-counter medicines, vitamins, and herbal products.  Give a list of all your medicines to any healthcare provider who treats you. Where can I get more information? Your pharmacist can provide more information about warfarin. Remember, keep this and all other medicines out of the reach of children, never share your medicines with others, and use this medication only for the indication prescribed. Every effort has been made to ensure that the information provided by [facility] is accurate, up-to-date, and complete, but no guarantee is made to that effect. Drug information contained herein may be time sensitive. [facility] information has been compiled for use by healthcare practitioners and consumers in the [place] and therefore [facility] does not warrant that uses outside of the [place] are appropriate, unless specifically indicated otherwise. [facility]'s drug information does not endorse drugs, diagnose patients or recommend therapy. [facility]'s drug information is an informational resource designed to assist licensed healthcare practitioners in caring for their patients and/or to serve consumers viewing this service as a supplement to, and not a substitute for, the expertise, skill, knowledge and judgment of healthcare practitioners. The absence of a warning for a given drug or drug combination in no way should be construed to indicate that the drug or drug combination is safe, effective or appropriate for any given patient. [facility] does not assume any responsibility for any aspect of healthcare administered with the aid of information [facility] provides. The information contained herein is not intended to cover all possible uses, directions, precautions, warnings, drug interactions, allergic reactions, or adverse effects. If you have questions about the drugs you are taking, check with your doctor, nurse or pharmacist.  Copyright 6806-0291 [address]: 22.01. Revision date: 9/14/2017. Care instructions adapted under license by [facility] ([facility]).  If you have questions about a medical condition or this instruction, always ask your healthcare professional. [address] any warranty or liability for your use of this information.       Vashti Hong MD  PGY-1   1:17 PM 8/3/2021

## 2021-08-04 ENCOUNTER — ANTI-COAG VISIT (OUTPATIENT)
Dept: INTERNAL MEDICINE | Age: 76
End: 2021-08-04

## 2021-08-04 DIAGNOSIS — I48.20 CHRONIC ATRIAL FIBRILLATION (HCC): Chronic | ICD-10-CM

## 2021-08-04 LAB — INR BLD: 2

## 2021-08-04 RX ORDER — WARFARIN SODIUM 1 MG/1
TABLET ORAL
Qty: 30 TABLET | Refills: 0 | Status: CANCELLED | OUTPATIENT
Start: 2021-08-04

## 2021-08-05 LAB — INR BLD: 2

## 2021-08-06 ENCOUNTER — TELEPHONE (OUTPATIENT)
Dept: INTERNAL MEDICINE | Age: 76
End: 2021-08-06

## 2021-08-07 DIAGNOSIS — E78.2 MIXED HYPERLIPIDEMIA: ICD-10-CM

## 2021-08-09 ENCOUNTER — ANTI-COAG VISIT (OUTPATIENT)
Dept: INTERNAL MEDICINE | Age: 76
End: 2021-08-09

## 2021-08-09 ENCOUNTER — TELEPHONE (OUTPATIENT)
Dept: PHARMACY | Facility: CLINIC | Age: 76
End: 2021-08-09

## 2021-08-09 LAB — INR BLD: 1.9

## 2021-08-09 RX ORDER — SIMVASTATIN 20 MG
20 TABLET ORAL NIGHTLY
Qty: 30 TABLET | Refills: 3 | Status: SHIPPED
Start: 2021-08-09 | End: 2021-10-13 | Stop reason: SDUPTHER

## 2021-08-09 NOTE — PATIENT INSTRUCTIONS
Take 4mg today and resume taking 3mgs daily except Wednesday take 4mg of Coumadin   Recheck INR on Monday August 16th

## 2021-08-09 NOTE — TELEPHONE ENCOUNTER
Last Appointment:  Visit date not found  Future Appointments   Date Time Provider Linh Colon   8/11/2021  2:00 PM DO JEOVANNY Siddiqi [facility]   8/17/2021  8:30 AM  [facility] ECHO RM [address] [facility]   8/17/2021  9:30 AM YX STRUCTURAL HEART SCHEDULE [facility] CARDIO [facility]

## 2021-08-09 NOTE — PROGRESS NOTES
[facility] [address] and given Coumadin instructions for pt and next INR check states will call pt and inform her

## 2021-08-11 ENCOUNTER — OFFICE VISIT (OUTPATIENT)
Dept: INTERNAL MEDICINE | Age: 76
End: 2021-08-11
Payer: MEDICARE

## 2021-08-11 VITALS
HEART RATE: 70 BPM | RESPIRATION RATE: 16 BRPM | DIASTOLIC BLOOD PRESSURE: 56 MMHG | WEIGHT: 235 LBS | SYSTOLIC BLOOD PRESSURE: 110 MMHG | TEMPERATURE: 97.6 F | HEIGHT: 64 IN | BODY MASS INDEX: 40.12 KG/M2

## 2021-08-11 DIAGNOSIS — D50.0 BLOOD LOSS ANEMIA: ICD-10-CM

## 2021-08-11 DIAGNOSIS — Z95.2 MITRAL VALVE REPLACED: ICD-10-CM

## 2021-08-11 DIAGNOSIS — Z95.1 HX OF CABG: ICD-10-CM

## 2021-08-11 DIAGNOSIS — I10 ESSENTIAL HYPERTENSION: Chronic | ICD-10-CM

## 2021-08-11 DIAGNOSIS — I48.20 CHRONIC ATRIAL FIBRILLATION (HCC): Chronic | ICD-10-CM

## 2021-08-11 DIAGNOSIS — S30.1XXS ABDOMINAL WALL HEMATOMA, SEQUELA: Primary | ICD-10-CM

## 2021-08-11 DIAGNOSIS — Z79.01 CHRONIC ANTICOAGULATION: ICD-10-CM

## 2021-08-11 DIAGNOSIS — I50.32 CHRONIC HEART FAILURE WITH PRESERVED EJECTION FRACTION (HCC): ICD-10-CM

## 2021-08-11 DIAGNOSIS — Z95.2 S/P TAVR (TRANSCATHETER AORTIC VALVE REPLACEMENT): ICD-10-CM

## 2021-08-11 PROCEDURE — 99495 TRANSJ CARE MGMT MOD F2F 14D: CPT | Performed by: INTERNAL MEDICINE

## 2021-08-11 PROCEDURE — 99212 OFFICE O/P EST SF 10 MIN: CPT | Performed by: INTERNAL MEDICINE

## 2021-08-11 PROCEDURE — 1111F DSCHRG MED/CURRENT MED MERGE: CPT | Performed by: INTERNAL MEDICINE

## 2021-08-11 RX ORDER — WARFARIN SODIUM 1 MG/1
TABLET ORAL
Qty: 30 TABLET | Refills: 1 | Status: SHIPPED
Start: 2021-08-11 | End: 2021-08-17 | Stop reason: ALTCHOICE

## 2021-08-11 ASSESSMENT — PATIENT HEALTH QUESTIONNAIRE - PHQ9
SUM OF ALL RESPONSES TO PHQ QUESTIONS 1-9: 0
2. FEELING DOWN, DEPRESSED OR HOPELESS: 0
SUM OF ALL RESPONSES TO PHQ9 QUESTIONS 1 & 2: 0
SUM OF ALL RESPONSES TO PHQ QUESTIONS 1-9: 0
SUM OF ALL RESPONSES TO PHQ QUESTIONS 1-9: 0
1. LITTLE INTEREST OR PLEASURE IN DOING THINGS: 0

## 2021-08-11 NOTE — PROGRESS NOTES
Post-Discharge Transitional Care Management Services or Hospital Follow Up      Jessica Ramirez   YOB: 1945    Date of Office Visit:  8/11/2021  Date of Hospital Admission: 7/22/21  Date of Hospital Discharge: 8/2/21  Readmission Risk Score(high >=14%.  Medium >=10%):Readmission Risk Score: 23      Care management risk score Rising risk (score 2-5) and Complex Care (Scores >=6): 6     Non face to face  following discharge, date last encounter closed (first attempt may have been earlier): *No documented post hospital discharge outreach found in the last 14 days *No documented post hospital discharge outreach found in the last 14 days    Call initiated 2 business days of discharge: *No response recorded in the last 14 days     Patient Active Problem List   Diagnosis    Coronary artery disease involving native coronary artery of native heart without angina pectoris    Valvular heart disease    Restless leg syndrome    Diverticular disease    External otitis    Hyperlipidemia    Falls    S/P CABG (coronary artery bypass graft)    Essential hypertension    Near syncope    Coronary artery disease involving coronary bypass graft of native heart without angina pectoris    History of prosthetic mitral valve    Pulmonary HTN ([address]    Chronic atrial fibrillation ([address]    Primary open angle glaucoma of both eyes, severe stage    Cataract    Depression    Anxiety    Fever    Hx of CABG    Mitral valve replaced    Mixed hyperlipidemia    Hypokalemia    Lactic acidosis    Calculus of gallbladder without cholecystitis without obstruction    Diverticulosis of large intestine without hemorrhage    Cough    Chronic anticoagulation    Gastroesophageal reflux disease without esophagitis    Severe aortic stenosis    Stenosis of prosthetic mitral valve    Chronic heart failure with preserved ejection fraction ([address]    COVID-19    S/P TAVR (transcatheter aortic valve replacement)    Abdominal wall hematoma    Blood loss anemia       Allergies   Allergen Reactions    Lactose Intolerance (Gi) Diarrhea and Nausea And Vomiting    Phenobarbital Rash       Medications listed as ordered at the time of discharge from hospital   [place], [facility] Medication Instructions S:[id number]    Printed on:08/11/21 5039   Medication Information                      amLODIPine (NORVASC) 10 MG tablet  Take 1 tablet by mouth daily             aspirin 81 MG EC tablet  Take 1 tablet by mouth daily             benzocaine-menthol (CEPACOL SORE THROAT) 15-3.6 MG lozenge  Take 1 lozenge by mouth every 2 hours as needed for Other (Dry mouth)             benzonatate (TESSALON PERLES) 100 MG capsule  Take 1 capsule by mouth 3 times daily as needed for Cough             brimonidine (ALPHAGAN) 0.2 % ophthalmic solution  INSTILL 1 DROP EVERY DAY INTO LEFT EYE AT 8 AM.             enalapril (VASOTEC) 10 MG tablet  TAKE ONE TABLET BY MOUTH TWO TIMES A DAY             fluticasone (FLONASE) 50 MCG/ACT nasal spray  1 spray by Each Nostril route daily             furosemide (LASIX) 20 MG tablet  Take 20mg alternating with 30mg every other day (one tablet alternating with 1 and 1/2 tabs every other day).              latanoprost (XALATAN) 0.005 % ophthalmic solution  INSTILL 1 DROP IN THE LEFT EYE EVERY DAY AT BEDTIME             metoprolol tartrate (LOPRESSOR) 50 MG tablet  One tab twice daily             pantoprazole (PROTONIX) 40 MG tablet  Take 1 tablet by mouth daily             polyethylene glycol (GLYCOLAX) 17 g packet  Take 17 g by mouth daily as needed for Constipation             potassium chloride (KLOR-CON M) 10 MEQ extended release tablet  Take 1 tab alternating with 1 and 1/2 tablet every other day (10 cecy alternating with 15 cecy every other day)             simvastatin (ZOCOR) 20 MG tablet  Take 1 tablet by mouth nightly             warfarin (COUMADIN) 1 MG tablet  Take 1 mg every wed with coumadin 3 mg to Blood loss anemia, secondary to # 1 resolved      3. S/P TAVR (transcatheter aortic valve replacement), stable      4. Chronic heart failure with preserved ejection fraction ([address] compensated      6. Mitral valve replaced      7. Hx of CABG      8. Essential hypertension, controlled, continue current med rx      9. Chronic atrial fibrillation (HCC)  Chronic anticoagulation, current goal INR 2.5    - warfarin (COUMADIN) 1 MG tablet; Take 1 mg every wed with coumadin 3 mg to equal 4 mg wed only  Dispense: 30 tablet;  Refill: 1        Medical Decision Making: moderate complexity

## 2021-08-11 NOTE — PATIENT INSTRUCTIONS
Patient Education        Preventing Falls: Care Instructions  Your Care Instructions     Getting around your home safely can be a challenge if you have injuries or health problems that make it easy for you to fall. Loose rugs and furniture in walkways are among the dangers for many older people who have problems walking or who have poor eyesight. People who have conditions such as arthritis, osteoporosis, or dementia also have to be careful not to fall. You can make your home safer with a few simple measures. Follow-up care is a key part of your treatment and safety. Be sure to make and go to all appointments, and call your doctor if you are having problems. It's also a good idea to know your test results and keep a list of the medicines you take. How can you care for yourself at home? Taking care of yourself  · You may get dizzy if you do not drink enough water. To prevent dehydration, drink plenty of fluids. Choose water and other caffeine-free clear liquids. If you have kidney, heart, or liver disease and have to limit fluids, talk with your doctor before you increase the amount of fluids you drink. · Exercise regularly to improve your strength, muscle tone, and balance. Walk if you can. Swimming may be a good choice if you cannot walk easily. · Have your vision and hearing checked each year or any time you notice a change. If you have trouble seeing and hearing, you might not be able to avoid objects and could lose your balance. · Know the side effects of the medicines you take. Ask your doctor or pharmacist whether the medicines you take can affect your balance. Sleeping pills or sedatives can affect your balance. · Limit the amount of alcohol you drink. Alcohol can impair your balance and other senses. · Ask your doctor whether calluses or corns on your feet need to be removed. If you wear loose-fitting shoes because of calluses or corns, you can lose your balance and fall.   · Talk to your doctor if you have numbness in your feet. Preventing falls at home  · Remove raised doorway thresholds, throw rugs, and clutter. Repair loose carpet or raised areas in the floor. · Move furniture and electrical cords to keep them out of walking paths. · Use nonskid floor wax, and wipe up spills right away, especially on ceramic tile floors. · If you use a walker or cane, put rubber tips on it. If you use crutches, clean the bottoms of them regularly with an abrasive pad, such as steel wool. · Keep your house well lit, especially [place], and outside walkways. Use night-lights in areas such as hallways and bathrooms. Add extra light switches or use remote switches (such as switches that go on or off when you clap your hands) to make it easier to turn lights on if you have to get up during the night. · Install sturdy handrails on stairways. · Move items in your cabinets so that the things you use a lot are on the lower shelves (about waist level). · Keep a cordless phone and a flashlight with new batteries by your bed. If possible, put a phone in each of the main rooms of your house, or carry a cell phone in case you fall and cannot reach a phone. Or, you can wear a device around your neck or wrist. You push a button that sends a signal for help. · Wear low-heeled shoes that fit well and give your feet good support. Use footwear with nonskid soles. Check the heels and soles of your shoes for wear. Repair or replace worn heels or soles. · Do not wear socks without shoes on wood floors. · Walk on the grass when the sidewalks are slippery. If you live in an area that gets snow and ice in the winter, sprinkle salt on slippery steps and sidewalks. Preventing falls in the bath  · Install grab bars and nonskid mats inside and outside your shower or tub and near the toilet and sinks. · Use shower chairs and bath benches. · Use a hand-held shower head that will allow you to sit while showering.   · Get into a tub or shower by putting the weaker leg in first. Get out of a tub or shower with your strong side first.  · Repair loose toilet seats and consider installing a raised toilet seat to make getting on and off the toilet easier. · Keep your bathroom door unlocked while you are in the shower. Where can you learn more? Go to https://[employer]. org and sign in to your [employer] account. Enter [phone] in the [facility] box to learn more about \"Preventing Falls: Care Instructions. \"     If you do not have an account, please click on the \"Sign Up Now\" link. Current as of: December 7, 2020               Content Version: 12.9  © 2006-2021 [employer]. Care instructions adapted under license by [address] [place] [address]. If you have questions about a medical condition or this instruction, always ask your healthcare professional. [address] any warranty or liability for your use of this information.        repeat INR on Monday 8/16/21 as discussed  Continue coumadin 4 mg wed and 3 mg all other days  continue all medications as instructed  Follow up as scheduled with   Please call if any questions or concerns

## 2021-08-11 NOTE — PROGRESS NOTES
[address]  Internal Medicine Residency Program  [facility] Note      SUBJECTIVE:  CC: had no chief complaint listed for this encounter. HPI:  Ingrid Car is a 77 y. o.female with PMH of *** presenting to [facility] for ***    Review of Systems    No outpatient medications have been marked as taking for the 8/11/21 encounter (Appointment) with Kulwinder Haney DO.       OBJECTIVE:    VS:   There were no vitals taken for this visit. EXAM:  Physical Exam    ASSESSMENT/PLAN:  I have reviewed all pertinent PMH, PSH, FH, SH, medications and allergies and updated history as appropriate. There are no diagnoses linked to this encounter.       RTC:      I have reviewed my findings and recommendations with Ingrid Joceline and Dr Shareen Lent Rodrick Leyden, MD PGY-1  8/10/2021 9:42 PM

## 2021-08-12 NOTE — TELEPHONE ENCOUNTER
Appears patient did attend PCP hospital f/u appt yesterday with med rec completed. Will not outreach at this time. Closing encounter.

## 2021-08-16 ENCOUNTER — ANTI-COAG VISIT (OUTPATIENT)
Dept: INTERNAL MEDICINE | Age: 76
End: 2021-08-16

## 2021-08-16 LAB — INR BLD: 2.8

## 2021-08-16 NOTE — PROGRESS NOTES
Called [facility] and instructed her for pt to change her dose to 3mgs daily and recheck inr in one week  She states that she will call the patient and give her these instructions   printed avs mailed to patient

## 2021-08-17 ENCOUNTER — OFFICE VISIT (OUTPATIENT)
Dept: CARDIOLOGY CLINIC | Age: 76
End: 2021-08-17
Payer: MEDICARE

## 2021-08-17 ENCOUNTER — HOSPITAL ENCOUNTER (OUTPATIENT)
Dept: CARDIOLOGY | Age: 76
Discharge: HOME | End: 2021-08-17
Payer: MEDICARE

## 2021-08-17 VITALS
SYSTOLIC BLOOD PRESSURE: 140 MMHG | BODY MASS INDEX: 39.27 KG/M2 | WEIGHT: 230 LBS | TEMPERATURE: 97.6 F | DIASTOLIC BLOOD PRESSURE: 60 MMHG | HEIGHT: 64 IN | HEART RATE: 80 BPM

## 2021-08-17 DIAGNOSIS — Z95.2 S/P TAVR (TRANSCATHETER AORTIC VALVE REPLACEMENT): Primary | ICD-10-CM

## 2021-08-17 LAB
LV EF: 58 %
LVEF MODALITY: NORMAL

## 2021-08-17 PROCEDURE — 99213 OFFICE O/P EST LOW 20 MIN: CPT | Performed by: PHYSICIAN ASSISTANT

## 2021-08-17 PROCEDURE — 93306 TTE W/DOPPLER COMPLETE: CPT

## 2021-08-17 NOTE — PROGRESS NOTES
Patient name: Charlotte Cody    Reason for visit: TAVR follow up     Primary Care Physician: Sharita Rodriguez DO    Date of service: 8/17/2021    Chief Complaint: TAVR follow up - 30 day echo    HPI: Mrs. Claire Peña presents for follow up s/p TAVR on 7/14/21. She was readmitted post procedure with abdominal wall hematoma. She is doing better since then despite prolonged inpatient stay. She remains dyspneic with exertion, but notes it is slightly better compared to pre TAVR. She denies chest discomfort, sob at rest, orthopnea, PND, palpitations or syncope. Abdominal hematoma continues to resolve. She noted some LE swelling last week, and her diuretic dose was increased (furosemide 20 mg alternating with 30 mg daily). LE edema has since resolved. Allergies: Allergies   Allergen Reactions    Lactose Intolerance (Gi) Diarrhea and Nausea And Vomiting    Phenobarbital Rash       Home medications:    Current Outpatient Medications   Medication Sig Dispense Refill    simvastatin (ZOCOR) 20 MG tablet Take 1 tablet by mouth nightly 30 tablet 3    fluticasone (FLONASE) 50 MCG/ACT nasal spray 1 spray by Each Nostril route daily 1 Bottle 0    polyethylene glycol (GLYCOLAX) 17 g packet Take 17 g by mouth daily as needed for Constipation 527 g 1    warfarin (COUMADIN) 3 MG tablet Take one tablet daily 30 tablet 6    aspirin 81 MG EC tablet Take 1 tablet by mouth daily 30 tablet 3    amLODIPine (NORVASC) 10 MG tablet Take 1 tablet by mouth daily 30 tablet 3    brimonidine (ALPHAGAN) 0.2 % ophthalmic solution INSTILL 1 DROP EVERY DAY INTO LEFT EYE AT 8 AM.      latanoprost (XALATAN) 0.005 % ophthalmic solution INSTILL 1 DROP IN THE LEFT EYE EVERY DAY AT BEDTIME      furosemide (LASIX) 20 MG tablet Take 20mg alternating with 30mg every other day (one tablet alternating with 1 and 1/2 tabs every other day).  (Patient taking differently: Take 20 mg by mouth daily Take 20mg alternating with 30mg every other day (one tablet alternating with 1 and 1/2 tabs every other day). ) 135 tablet 3    potassium chloride (KLOR-CON M) 10 MEQ extended release tablet Take 1 tab alternating with 1 and 1/2 tablet every other day (10 cecy alternating with 15 cecy every other day) (Patient taking differently: Take 10 mEq by mouth daily Take 1 tab alternating with 1 and 1/2 tablet every other day (10 cecy alternating with 15 cecy every other day)) 135 tablet 3    pantoprazole (PROTONIX) 40 MG tablet Take 1 tablet by mouth daily 90 tablet 3    metoprolol tartrate (LOPRESSOR) 50 MG tablet One tab twice daily 60 tablet 3    enalapril (VASOTEC) 10 MG tablet TAKE ONE TABLET BY MOUTH TWO TIMES A DAY (Patient taking differently: Take 10 mg by mouth 2 times daily ) 180 tablet 3     No current facility-administered medications for this visit. Facility-Administered Medications Ordered in Other Visits   Medication Dose Route Frequency Provider Last Rate Last Admin    perflutren lipid microspheres (DEFINITY) injection 1.65 mg  1.5 mL Intravenous ONCE PRN ADRY Alonso           Past Medical History:  Past Medical History:   Diagnosis Date    Ambulates with cane     Atrial fibrillation ([facility] 11/22/2010    CAD (coronary artery disease) 11/22/2010    NO CARDIOLOGIST, F/U W/ pcp STABLE    CHF (congestive heart failure) ([facility] 11/22/2010    Diverticular disease 11/22/2010    Edentulous     does not wear dentures    Glaucoma     History of blood transfusion     once with CABG, once with a severe epistaxis.     Hyperlipidemia     Hypertension     Restless leg syndrome 11/22/2010    Valvular heart disease 11/22/2010       Past Surgical History:  Past Surgical History:   Procedure Laterality Date    AORTIC VALVE REPLACEMENT N/A 07/14/2021    TRANSCATHETER AORTIC VALVE REPLACEMENT FEMORAL APPROACH performed by Hernan Chandler MD at [facility]  2007    CARDIAC CATHETERIZATION Right 05/07/2021    Right Heart cath no stents Dr [place] CATARACT REMOVAL WITH IMPLANT Right 08/24/2016    trabulectomy    COLONOSCOPY      MITRAL VALVE REPLACEMENT  2007    [address]    TONSILLECTOMY      TRANSESOPHAGEAL ECHOCARDIOGRAM  05/07/2021    Dr Ivanna Lambert History:  Social History     Socioeconomic History    Marital status:      Spouse name: Not on file    Number of children: Not on file    Years of education: Not on file    Highest education level: Not on file   Occupational History    Not on file   Tobacco Use    Smoking status: Never Smoker    Smokeless tobacco: Never Used   Substance and Sexual Activity    Alcohol use: No    Drug use: No    Sexual activity: Never   Other Topics Concern    Not on file   Social History Narrative    Not on file     Social Determinants of Health     Financial Resource Strain: Low Risk     Difficulty of Paying Living Expenses: Not very hard   Food Insecurity: No Food Insecurity    Worried About Running Out of Food in the Last Year: Never true    Ashley of Food in the Last Year: Never true   Transportation Needs: No Transportation Needs    Lack of Transportation (Medical): No    Lack of Transportation (Non-Medical): No   Physical Activity:     Days of Exercise per Week:     Minutes of Exercise per Session:    Stress:     Feeling of Stress :    Social Connections:     Frequency of Communication with Friends and Family:     Frequency of Social Gatherings with Friends and Family:     Attends Restorationist Services:     Active Member of Clubs or Organizations:     Attends Club or Organization Meetings:     Marital Status:    Intimate Partner Violence:     Fear of Current or Ex-Partner:     Emotionally Abused:     Physically Abused:     Sexually Abused:        Family History:  Family History   Problem Relation Age of Onset    Heart Disease Mother     Heart Disease Father        Review of Systems:  Constitutional: Denies fevers, chills, or weight loss.   HEENT: Denies visual changes or hearing loss. Heart: As per HPI. Lungs: Denies shortness of breath, cough, or wheezing. Gastrointestinal: Denies nausea, vomiting, constipation, or diarrhea. Genitourinary: dysuria or hematuria. Psychiatric: Patient denies anxiety or depression. Neurologic: Patient denies weakness of the extremities, dizziness, or headaches. All other ROS checked and found to be negative. Objective:  Vitals BP (!) 140/60 (Site: Right Upper Arm, Position: Sitting, Cuff Size: Large Adult)   Pulse 80   Temp 97.6 °F (36.4 °C) (Oral)   Ht 5' 4\" (1.626 m)   Wt 230 lb (104.3 kg)   BMI 39.48 kg/m²   General Appearance: Pleasant 77y.o. year old female who appears stated age. Communicates well, no acute distress. HEENT: Head is normocephalic, atraumatic. EOMs intact, PERRL. Trachea midline. Lungs: Normal respiratory rate and normal effort. She is not in respiratory distress. Breath sounds clear to auscultation. No wheezes. Heart: Normal rate. Regular rhythm. S1 normal and S2 normal. No murmur. Chest: Symmetric chest wall expansion. Extremities: Normal range of motion. No edema bilaterally. Neurological: Patient is alert and oriented to person, place and time. Skin: Warm and dry. Abdomen: Abdomen is soft and non-distended. Bowel sounds are normal.   Pulses: Distal pulses are intact. Skin: Warm and dry without lesions.         Assessment:   Patient Active Problem List   Diagnosis    Coronary artery disease involving native coronary artery of native heart without angina pectoris    Valvular heart disease    Restless leg syndrome    Diverticular disease    External otitis    Hyperlipidemia    Falls    S/P CABG (coronary artery bypass graft)    Essential hypertension    Near syncope    Coronary artery disease involving coronary bypass graft of native heart without angina pectoris    History of prosthetic mitral valve    Pulmonary HTN ([facility]    Chronic atrial fibrillation ([address]    Primary open angle glaucoma of both eyes, severe stage    Cataract    Depression    Anxiety    Fever    Hx of CABG    Mitral valve replaced    Mixed hyperlipidemia    Hypokalemia    Lactic acidosis    Calculus of gallbladder without cholecystitis without obstruction    Diverticulosis of large intestine without hemorrhage    Cough    Chronic anticoagulation    Gastroesophageal reflux disease without esophagitis    Severe aortic stenosis    Stenosis of prosthetic mitral valve    Chronic heart failure with preserved ejection fraction (HCC)    COVID-19    S/P TAVR (transcatheter aortic valve replacement)    Abdominal wall hematoma    Blood loss anemia       Assessment:   1. Severe, symptomatic AS s/p TAVR using #29 Medtronic Evolut PRO+ valve on 7/14/21. Mean gradient estimated at 8-10 mmHg by echo today; formal report pending  2. RBBB  3. HFpEF, NYHA class 3  4. MVR with #25 [address] in 2007 with chronically elevated gradients  5. Morbid obesity  6. Permanent AF  7. HTN  8. Abdominal wall hematoma - improving        Plan:   1. Follow up for one year echo 7/12/22 10:30am; sooner PRN  2. Follow up with [facility] Groves as scheduled  3.  SBE prophylaxis reviewed - patient is edentulous      Electronically signed by Servadno Denise PA-C on 8/17/2021 at 10:53 AM

## 2021-08-23 ENCOUNTER — ANTI-COAG VISIT (OUTPATIENT)
Dept: INTERNAL MEDICINE | Age: 76
End: 2021-08-23

## 2021-08-23 LAB — INR BLD: 2.1

## 2021-08-23 NOTE — PROGRESS NOTES
Patient notified to continue coumadin 3 mg daily and repeat INR in 2 weeks  Bleeding precautions reviewed  AVS mailed    Called [facility] , April at [phone] regarding orders to continue coumadin 3 mg daily and repeat INR in 2 weeks.

## 2021-08-23 NOTE — PATIENT INSTRUCTIONS
continue coumadin 3 mg daily  Repeat INR in 2 weeks  Bleeding precautions as reviewed  Please call if any questions or concerns  [facility] also notified

## 2021-08-29 DIAGNOSIS — I48.20 CHRONIC ATRIAL FIBRILLATION (HCC): ICD-10-CM

## 2021-08-30 RX ORDER — METOPROLOL TARTRATE 50 MG/1
TABLET, FILM COATED ORAL
Qty: 90 TABLET | Refills: 0 | Status: SHIPPED
Start: 2021-08-30 | End: 2021-10-13 | Stop reason: SDUPTHER

## 2021-08-30 NOTE — TELEPHONE ENCOUNTER
Last Appointment:  8/11/2021  Future Appointments   Date Time Provider Linh Colon   10/13/2021  1:00 PM Chandrakant Sanderson, DO [facility]   7/12/2022 10:30 AM  EST[facility]AN[facility]A ECHO RM [address] [facility] [facility]ARDIO [facility]   7/12/2022 11:30 AM YX STRUCTURAL HEART SCHEDULE [facility] CARDIO [facility]

## 2021-09-01 PROBLEM — R05.9 COUGH: Status: RESOLVED | Noted: 2019-09-12 | Resolved: 2021-09-01

## 2021-09-07 ENCOUNTER — TELEPHONE (OUTPATIENT)
Dept: INTERNAL MEDICINE | Age: 76
End: 2021-09-07

## 2021-09-07 ENCOUNTER — ANTI-COAG VISIT (OUTPATIENT)
Dept: INTERNAL MEDICINE | Age: 76
End: 2021-09-07

## 2021-09-07 LAB
INR BLD: 2.5
INR BLD: 2.5

## 2021-09-07 NOTE — TELEPHONE ENCOUNTER
[facility] called to report patient pt/inr    Pt 30.5  INR 2.5  Patient takes Coumadin 3 mg daily     Home care number is [phone]

## 2021-09-07 NOTE — PATIENT INSTRUCTIONS
Continue 3 mg of coumadin daily  Repeat INR in 1 month  Bleeding precautions as reviewed  Please call if any questions or concerns

## 2021-09-07 NOTE — TELEPHONE ENCOUNTER
Patient notified to continue same dose of coumadin 3 mg daily  Repeat INR in 1 month  Bleeding precautions reviewed  Patient verbalizes understanding  AVS mailed    Message left for Gideon at [facility] [[phone]] regarding continuing 3 mg coumadin daily and repeating INR in 1 month. Homecare last day was today. chrissy will follow up INR in our office. Called [address] office at [phone] regarding scheduling appointment for patient. Message left for office to call patient regarding scheduling appointment for follow up. Patient states she was waiting for their office to call her.

## 2021-09-07 NOTE — PROGRESS NOTES
Patient notified to take coumadin 3 mg daily and repeat INR in 1 month  Bleeding precautions reviewed  Patient verbalizes understanding  AVS mailed

## 2021-09-14 ENCOUNTER — OFFICE VISIT (OUTPATIENT)
Dept: CARDIOLOGY CLINIC | Age: 76
End: 2021-09-14
Payer: MEDICARE

## 2021-09-14 VITALS
SYSTOLIC BLOOD PRESSURE: 118 MMHG | WEIGHT: 234.4 LBS | HEIGHT: 64 IN | DIASTOLIC BLOOD PRESSURE: 60 MMHG | HEART RATE: 72 BPM | RESPIRATION RATE: 16 BRPM | BODY MASS INDEX: 40.02 KG/M2

## 2021-09-14 DIAGNOSIS — I38 VALVULAR HEART DISEASE: ICD-10-CM

## 2021-09-14 DIAGNOSIS — I50.22 CHRONIC SYSTOLIC CONGESTIVE HEART FAILURE (HCC): Chronic | ICD-10-CM

## 2021-09-14 DIAGNOSIS — I25.10 CORONARY ARTERY DISEASE INVOLVING NATIVE CORONARY ARTERY OF NATIVE HEART WITHOUT ANGINA PECTORIS: Primary | ICD-10-CM

## 2021-09-14 DIAGNOSIS — I48.20 CHRONIC ATRIAL FIBRILLATION (HCC): ICD-10-CM

## 2021-09-14 DIAGNOSIS — I25.810 CORONARY ARTERY DISEASE INVOLVING CORONARY BYPASS GRAFT OF NATIVE HEART WITHOUT ANGINA PECTORIS: ICD-10-CM

## 2021-09-14 DIAGNOSIS — E78.2 MIXED HYPERLIPIDEMIA: ICD-10-CM

## 2021-09-14 DIAGNOSIS — I10 ESSENTIAL HYPERTENSION: ICD-10-CM

## 2021-09-14 LAB
ANION GAP SERPL CALCULATED.3IONS-SCNC: 19 MMOL/L (ref 7–16)
BUN BLDV-MCNC: 11 MG/DL (ref 6–23)
CALCIUM SERPL-MCNC: 9.6 MG/DL (ref 8.6–10.2)
CHLORIDE BLD-SCNC: 100 MMOL/L (ref 98–107)
CO2: 21 MMOL/L (ref 22–29)
CREAT SERPL-MCNC: 0.8 MG/DL (ref 0.5–1)
GFR AFRICAN AMERICAN: >60
GFR NON-AFRICAN AMERICAN: >60 ML/MIN/1.73
GLUCOSE BLD-MCNC: 101 MG/DL (ref 74–99)
POTASSIUM SERPL-SCNC: 4.1 MMOL/L (ref 3.5–5)
PRO-BNP: 801 PG/ML (ref 0–450)
SODIUM BLD-SCNC: 140 MMOL/L (ref 132–146)

## 2021-09-14 PROCEDURE — 99215 OFFICE O/P EST HI 40 MIN: CPT | Performed by: INTERNAL MEDICINE

## 2021-09-14 PROCEDURE — 93000 ELECTROCARDIOGRAM COMPLETE: CPT | Performed by: INTERNAL MEDICINE

## 2021-09-14 RX ORDER — POTASSIUM CHLORIDE 750 MG/1
15 TABLET, EXTENDED RELEASE ORAL DAILY
Qty: 45 TABLET | Refills: 3 | Status: SHIPPED
Start: 2021-09-14 | End: 2022-01-18 | Stop reason: SDUPTHER

## 2021-09-14 RX ORDER — FUROSEMIDE 20 MG/1
30 TABLET ORAL DAILY
Qty: 45 TABLET | Refills: 3 | Status: SHIPPED
Start: 2021-09-14 | End: 2022-02-02 | Stop reason: SDUPTHER

## 2021-09-14 NOTE — PROGRESS NOTES
OUTPATIENT CARDIOLOGY FOLLOW-UP    Name: Keke Bender    Age: 77 y.o. Primary Care Physician: Terrell Parmar DO    Date of Service: 9/14/2021    Chief Complaint:   Chief Complaint   Patient presents with    Follow-up    Congestive Heart Failure        Interim History:   Here for follow-up. I saw her initial consultation 3/25/2021 after she had been essentially lost to cardiac follow-up since 2015. She has history of bioprosthetic mitral valve in 2007 along with a modified maze. She is maintained on warfarin for permanent atrial fibrillation. Had a hospital admission 3/2021 for CHF at which time an echocardiogram showed severe native aortic stenosis and prosthetic valve dysfunction suggestive of stenosis or patient prosthesis mismatch. I referred her to the valve clinic, she underwent transcatheter aortic valve replacement 7/14/2021. She was subsequently admitted with abdominal wall hematoma. Had follow-up with valve clinic last month. She continues to have dyspnea with exertion, but improved since the TAVR. She has some lower extremity edema. States weight has been stable. Denies chest pain. Review of Systems:   Negative except as described above    Past Medical History:  Past Medical History:   Diagnosis Date    Ambulates with cane     Atrial fibrillation ([facility] [address] 11/22/2010    CAD (coronary artery disease) 11/22/2010    NO CARDIOLOGIST, F/U W/ pcp STABLE    CHF (congestive heart failure) ([address] 11/22/2010    Diverticular disease 11/22/2010    Edentulous     does not wear dentures    Glaucoma     History of blood transfusion     once with CABG, once with a severe epistaxis.     Hyperlipidemia     Hypertension     Restless leg syndrome 11/22/2010    Valvular heart disease 11/22/2010       Past Surgical History:  Past Surgical History:   Procedure Laterality Date    AORTIC VALVE REPLACEMENT N/A 07/14/2021    TRANSCATHETER AORTIC VALVE REPLACEMENT FEMORAL APPROACH performed by Pito Osman MD at [address] [place] [address]  2007    CARDIAC CATHETERIZATION Right 05/07/2021    Right Heart cath no stents Dr Sulema Wiggins Right 08/24/2016    trabulectomy    COLONOSCOPY      MITRAL VALVE REPLACEMENT  2007    #25 Mosaic    TONSILLECTOMY      TRANSESOPHAGEAL ECHOCARDIOGRAM  05/07/2021    Dr Maikol Pereira History:  Family History   Problem Relation Age of Onset    Heart Disease Mother     Heart Disease Father        Social History:  Social History     Tobacco Use    Smoking status: Never Smoker    Smokeless tobacco: Never Used   Substance Use Topics    Alcohol use: No    Drug use: No        Allergies:   Allergies   Allergen Reactions    Lactose Intolerance (Gi) Diarrhea and Nausea And Vomiting    Phenobarbital Rash       Current Medications:    Current Outpatient Medications:     furosemide (LASIX) 20 MG tablet, Take 1.5 tablets by mouth daily, Disp: 45 tablet, Rfl: 3    potassium chloride (KLOR-CON M) 10 MEQ extended release tablet, Take 1.5 tablets by mouth daily, Disp: 45 tablet, Rfl: 3    metoprolol tartrate (LOPRESSOR) 50 MG tablet, TAKE ONE TABLET BY MOUTH TWO TIMES A DAY, Disp: 90 tablet, Rfl: 0    simvastatin (ZOCOR) 20 MG tablet, Take 1 tablet by mouth nightly, Disp: 30 tablet, Rfl: 3    fluticasone (FLONASE) 50 MCG/ACT nasal spray, 1 spray by Each Nostril route daily, Disp: 1 Bottle, Rfl: 0    warfarin (COUMADIN) 3 MG tablet, Take one tablet daily, Disp: 30 tablet, Rfl: 6    aspirin 81 MG EC tablet, Take 1 tablet by mouth daily, Disp: 30 tablet, Rfl: 3    amLODIPine (NORVASC) 10 MG tablet, Take 1 tablet by mouth daily, Disp: 30 tablet, Rfl: 3    brimonidine (ALPHAGAN) 0.2 % ophthalmic solution, INSTILL 1 DROP EVERY DAY INTO LEFT EYE AT 8 AM., Disp: , Rfl:     latanoprost (XALATAN) 0.005 % ophthalmic solution, INSTILL 1 DROP IN THE LEFT EYE EVERY DAY AT BEDTIME, Disp: , Rfl:     pantoprazole (PROTONIX) 40 MG tablet, Take 1 tablet by mouth daily, Disp: 90 tablet, Rfl: 3    enalapril (VASOTEC) 10 MG tablet, TAKE ONE TABLET BY MOUTH TWO TIMES A DAY (Patient taking differently: Take 10 mg by mouth 2 times daily ), Disp: 180 tablet, Rfl: 3    Physical Exam:  /60   Pulse 72   Resp 16   Ht 5' 4\" (1.626 m)   Wt 234 lb 6.4 oz (106.3 kg)   BMI 40.23 kg/m²   Wt Readings from Last 3 Encounters:   09/14/21 234 lb 6.4 oz (106.3 kg)   08/17/21 230 lb (104.3 kg)   08/11/21 235 lb (106.6 kg)     Appearance: Awake, alert and oriented x 3, no acute respiratory distress  Skin: Intact, no rash  Head: Normocephalic, atraumatic  Eyes: EOMI, no conjunctival erythema  ENMT: No pharyngeal erythema, MMM, no rhinorrhea  Neck: Supple, no elevated JVP, no carotid bruits  Lungs: Clear to auscultation bilaterally. No wheezes, rales, or rhonchi. Cardiac: Irregular rhythm with a normal rate.   Soft systolic murmur right sternal border, S1 is somewhat loud, well-healed sternotomy scar  Abdomen: Soft, nontender, +bowel sounds  Extremities: Moves all extremities x 4, trace to 1+ bilateral lower extremity edema, varicose veins  Neurologic: No focal motor deficits apparent, normal mood and affect, alert and oriented x 3  Peripheral Pulses: Intact posterior tibial pulses bilaterally    Laboratory Tests:  Lab Results   Component Value Date    CREATININE 0.9 08/02/2021    BUN 9 08/02/2021     08/02/2021    K 3.9 08/02/2021     08/02/2021    CO2 23 08/02/2021     Lab Results   Component Value Date    MG 2.2 07/24/2021     Lab Results   Component Value Date    WBC 6.0 08/02/2021    HGB 9.7 (L) 08/02/2021    HCT 31.4 (L) 08/02/2021    MCV 94.6 08/02/2021     08/02/2021     Lab Results   Component Value Date    ALT 18 08/02/2021    AST 17 08/02/2021    ALKPHOS 85 08/02/2021    BILITOT 2.2 (H) 08/02/2021     Lab Results   Component Value Date    TROPONINI <0.01 03/08/2021    TROPONINI <0.01 03/08/2021    TROPONINI <0.01 01/03/2019 Lab Results   Component Value Date    INR 2.50 2021    INR 2.50 2021    INR 2.1 2021    PROTIME 22.5 (H) 2021    PROTIME 17.8 (H) 2021    PROTIME 16.1 (H) 2021     Lab Results   Component Value Date    TSH 2.350 2021     Lab Results   Component Value Date    LABA1C 6.2 (H) 2021     No results found for: EAG  Lab Results   Component Value Date    CHOL 129 2021    CHOL 137 2019    CHOL 137 2017     Lab Results   Component Value Date    TRIG 57 2021    TRIG 78 2019    TRIG 80 2017     Lab Results   Component Value Date    HDL 53 2021    HDL 57 2019    HDL 51 2017     Lab Results   Component Value Date    LDLCALC 65 2021    LDLCALC 64 2019    LDLCALC 70 2017     Lab Results   Component Value Date    LABVLDL 11 2021    LABVLDL 16 2019    LABVLDL 16 2017     No results found for: CHOLHDLRATIO  No results for input(s): PROBNP in the last 72 hours. Cardiac Tests:  EK2021: Atrial fibrillation 72 bpm.  Right bundle branch block. QRS duration 152 ms. Echocardiogram:   Transthoracic echo 2021 TAVR follow-up   Summary   Left ventricle is mildly dilated. LVEDD 5.3 cm. LV systolic function is normal.   Ejection fraction is visually estimated at 55-60%. Indeterminate diastolic function. No regional wall motion abnormalities seen. Mild left ventricular concentric hypertrophy noted. Abnormal LV septal motion consistent with post-operative state. Mildly dilated right ventricle with normal function. Massive biatrial dilation. #25 Mosaic bioprosthetic mitral valve present. Doppler parameters suggestive of possible stenosis. Mean gradient 6-7 mmHg at HR in to 80s. 29-mm Evolut PRO+ TAVR valve present (implanted 2021). Mean gradient 10 mmHg. Trace-mild paravalvular leak.    Valvular regurgitation can not be excluded - there appears to be AI which   is not well visualized and the jet is obscured by turbulent mitral inflow. Moderate tricuspid regurgitation. Transesophageal echo 5/7/2021   Summary   Ejection fraction is visually estimated at > 50%. Dilated right ventricle with normal right ventricular function. Severely dilated left atrium and right atrium. Severe spontaneous echo contrast in the left atrium. Moderate tricuspid regurgitation. RV-RA gradient is estimated at 29 mmHg. Bioprosthetic mitral valve (#25 Medtronic Mosaic) with normal leaflet   motion. Prosthesis-patient mismatch. No evidence of hemodynamically significant mitral regurgitation. Average MV mean gradient 8.2 mmHg (HR 85 bpm). Aortic valve with severe fibrocalcific changes. The aortic valve is trileaflet. No evidence of hemodynamically significant aortic regurgitation. Paradoxical low-flow low-gradient severe aortic stenosis. Transthoracic echo 3/2021   Summary   Normal left ventricular size and systolic function.   Ejection fraction is visually estimated at 55-60%.   Indeterminate diastolic function.   No regional wall motion abnormalities seen.   Mild left ventricular concentric hypertrophy noted.   Abnormal LV septal motion consistent with post-operative state.   Normal right ventricular size and function.   Massive biatrial dilation.   Bioprosthetic valve in the mitral position, #27 Medtronic Mosaic implanted   2007.   Doppler parameters suggestive of significant prosthetic stenosis.   Mean gradient 10 mmHg at HR 85 bpm.   Peak E velocity 2.2 m/s.    ms.   EOA 1.0 cm2.   VTI ratio 3.3   Severe aortic stenosis is present.   Peak velocity 4 m/s.   Mean gradient 40 mHg.   MEÑO 0.7 cm2.   DI 0. 19.   Moderate tricuspid regurgitation.     Stress test:       Cardiac catheterization:   Left and right heart cath 5/7/2021  Coronary anatomy:   Dominance: Left   1. Left main: Short and angiographically normal   2.  LAD: Large vessel that supplies the entire apex and has a   large dominant diagonal.  Angiographically normal   3. Left circumflex: Large vessel with 3 OM's, 1 small LPL and a   medium sized L PDA.  Angiographically normal   4. RCA: Does not supply any LV myocardium.  Angiographically   normal       Hemodynamics: Ao: 154/76 with a mean of 113     PA: 51/22 with a mean of 32   PCW: Mean of 22   CO/CI (Mis): 4.3/2.0     Orders Placed This Encounter   Procedures    BASIC METABOLIC PANEL    BRAIN NATRIURETIC PEPTIDE    EKG 12 lead        Requested Prescriptions     Signed Prescriptions Disp Refills    furosemide (LASIX) 20 MG tablet 45 tablet 3     Sig: Take 1.5 tablets by mouth daily    potassium chloride (KLOR-CON M) 10 MEQ extended release tablet 45 tablet 3     Sig: Take 1.5 tablets by mouth daily        ASSESSMENT / PLAN:  1. Mild acute on chronic HFpEF, minimally hypervolemic persistent class III symptoms/ACC stage C  2. MR/MS s/p #25 Medtronic Mosaic bioprosthetic MV 2007, elevated gradients suggestive of bioprosthetic mitral stenosis versus PPM mean gradient 6-7 mmHg  3. Severe native valve aortic stenosis. S/p TAVR #29 Medtronic Evolut Pro+ 7/14/2021, normal function on recent echo with trace perivalvular leak and possible AI not well seen  4. Moderate TR  5. Permanent atrial fibrillation s/p modified maze, AC with warfarin. Rate controlled  6. Essentially normal coronary arteries by cath  7. Right bundle branch block  8. Hypertension, well controlled  9. Hyperlipidemia, on statin  10. Borderline diabetes, A1c 6.2%  11. Depression/anxiety  12.  GERD  13. Obesity, BMI 41.9 kg/m²    Recommendations:    · Check proBNP and BMP today  · She is on an unusual dosing regimen of diuretic with 20 alternating with 30 mg every other day of furosemide  · Recommend increasing furosemide to 30 mg daily for now with concomitant increase in the potassium chloride to 15 mg daily  · Continue metoprolol tartrate 50 mg twice daily  · Continue warfarin anticoagulation goal INR 2.0-3.0  · Continue low-dose aspirin  · Continue current cardiac medications otherwise including enalapril, amlodipine, simvastatin  · Continue to monitor mitral valve, if continues to be symptomatic consideration for redo MVR versus valve in valve transcatheter mitral valve replacement  · Needs bacterial endocarditis prophylaxis for dental cleaning and indicated procedures  · Aggressive risk factor modification  · Follow-up in 3 months or sooner if need arises    Discussed with patient and family member    Greater than 40 minutes spent on this encounter, greater than 50% of the time counseling coordinating care regarding above issues including heart failure    The patient's current medication list, allergies, problem list and results of all previously ordered testing were reviewed at today's visit.     Fredy Camarillo MD, [facility]

## 2021-09-15 ENCOUNTER — TELEPHONE (OUTPATIENT)
Dept: CARDIOLOGY CLINIC | Age: 76
End: 2021-09-15

## 2021-09-15 DIAGNOSIS — I48.20 CHRONIC ATRIAL FIBRILLATION (HCC): ICD-10-CM

## 2021-09-15 RX ORDER — WARFARIN SODIUM 3 MG/1
TABLET ORAL
Qty: 30 TABLET | Refills: 1 | Status: SHIPPED
Start: 2021-09-15 | End: 2021-09-20 | Stop reason: SDUPTHER

## 2021-09-15 NOTE — TELEPHONE ENCOUNTER
----- Message from Theron Thompson MD sent at 9/15/2021  4:14 PM EDT -----  Kidney function and electrolytes normal.  ProBNP slightly elevated. Can continue with lasix and potassium increase we discussed yesterday and f/u as scheduled.

## 2021-09-15 NOTE — TELEPHONE ENCOUNTER
Pt requesting 3 mgs of coumadin to be sent to [facility] on [address]  Pt had 5 refills remaining at the hospital pharmacy  Called there and dcd those refills

## 2021-09-20 DIAGNOSIS — I48.20 CHRONIC ATRIAL FIBRILLATION (HCC): ICD-10-CM

## 2021-09-20 RX ORDER — WARFARIN SODIUM 3 MG/1
TABLET ORAL
Qty: 30 TABLET | Refills: 1 | Status: SHIPPED
Start: 2021-09-20 | End: 2021-10-13 | Stop reason: SDUPTHER

## 2021-09-20 NOTE — PROGRESS NOTES
Patient called , prescription for coumadin 3 mg sent to [facility] instead of [facility].  Prescription cancelled at [facility] and resent to [facility] per patient request.

## 2021-10-13 ENCOUNTER — OFFICE VISIT (OUTPATIENT)
Dept: INTERNAL MEDICINE | Age: 76
End: 2021-10-13
Payer: MEDICARE

## 2021-10-13 VITALS
DIASTOLIC BLOOD PRESSURE: 64 MMHG | RESPIRATION RATE: 16 BRPM | OXYGEN SATURATION: 96 % | HEART RATE: 63 BPM | SYSTOLIC BLOOD PRESSURE: 120 MMHG | HEIGHT: 64 IN | TEMPERATURE: 98.4 F | BODY MASS INDEX: 40.29 KG/M2 | WEIGHT: 236 LBS

## 2021-10-13 DIAGNOSIS — I10 ESSENTIAL HYPERTENSION: Chronic | ICD-10-CM

## 2021-10-13 DIAGNOSIS — I48.20 CHRONIC ATRIAL FIBRILLATION (HCC): Chronic | ICD-10-CM

## 2021-10-13 DIAGNOSIS — I50.22 CHRONIC SYSTOLIC CONGESTIVE HEART FAILURE (HCC): Chronic | ICD-10-CM

## 2021-10-13 DIAGNOSIS — S30.1XXS ABDOMINAL WALL HEMATOMA, SEQUELA: ICD-10-CM

## 2021-10-13 DIAGNOSIS — Z95.2 S/P TAVR (TRANSCATHETER AORTIC VALVE REPLACEMENT): ICD-10-CM

## 2021-10-13 DIAGNOSIS — Z79.01 CHRONIC ANTICOAGULATION: ICD-10-CM

## 2021-10-13 DIAGNOSIS — I38 VALVULAR HEART DISEASE: Chronic | ICD-10-CM

## 2021-10-13 DIAGNOSIS — T82.857S STENOSIS OF PROSTHETIC MITRAL VALVE, SEQUELA: ICD-10-CM

## 2021-10-13 DIAGNOSIS — E78.2 MIXED HYPERLIPIDEMIA: Chronic | ICD-10-CM

## 2021-10-13 DIAGNOSIS — I50.32 CHRONIC HEART FAILURE WITH PRESERVED EJECTION FRACTION (HCC): ICD-10-CM

## 2021-10-13 DIAGNOSIS — I25.10 CORONARY ARTERY DISEASE INVOLVING NATIVE CORONARY ARTERY OF NATIVE HEART WITHOUT ANGINA PECTORIS: Primary | Chronic | ICD-10-CM

## 2021-10-13 DIAGNOSIS — J30.0 VASOMOTOR RHINITIS: ICD-10-CM

## 2021-10-13 LAB
INTERNATIONAL NORMALIZATION RATIO, POC: 2.9
PROTHROMBIN TIME, POC: 34.8

## 2021-10-13 PROCEDURE — 99214 OFFICE O/P EST MOD 30 MIN: CPT | Performed by: INTERNAL MEDICINE

## 2021-10-13 PROCEDURE — 99212 OFFICE O/P EST SF 10 MIN: CPT | Performed by: INTERNAL MEDICINE

## 2021-10-13 PROCEDURE — 85610 PROTHROMBIN TIME: CPT | Performed by: INTERNAL MEDICINE

## 2021-10-13 RX ORDER — METOPROLOL TARTRATE 50 MG/1
TABLET, FILM COATED ORAL
Qty: 180 TABLET | Refills: 3 | Status: SHIPPED
Start: 2021-10-13 | End: 2022-02-02 | Stop reason: SDUPTHER

## 2021-10-13 RX ORDER — WARFARIN SODIUM 3 MG/1
TABLET ORAL
Qty: 90 TABLET | Refills: 0 | Status: SHIPPED
Start: 2021-10-13 | End: 2022-01-18 | Stop reason: SDUPTHER

## 2021-10-13 RX ORDER — SIMVASTATIN 20 MG
20 TABLET ORAL NIGHTLY
Qty: 90 TABLET | Refills: 3 | Status: SHIPPED
Start: 2021-10-13 | End: 2022-02-02 | Stop reason: SDUPTHER

## 2021-10-13 RX ORDER — AMLODIPINE BESYLATE 10 MG/1
10 TABLET ORAL DAILY
Qty: 90 TABLET | Refills: 3 | Status: SHIPPED
Start: 2021-10-13 | End: 2022-02-02 | Stop reason: SDUPTHER

## 2021-10-13 RX ORDER — FLUTICASONE PROPIONATE 50 MCG
1 SPRAY, SUSPENSION (ML) NASAL DAILY
Qty: 1 EACH | Refills: 3 | Status: SHIPPED
Start: 2021-10-13 | End: 2022-02-02 | Stop reason: SDUPTHER

## 2021-10-13 RX ORDER — ENALAPRIL MALEATE 10 MG/1
TABLET ORAL
Qty: 180 TABLET | Refills: 3 | Status: SHIPPED
Start: 2021-10-13 | End: 2022-02-02 | Stop reason: SDUPTHER

## 2021-10-13 ASSESSMENT — ENCOUNTER SYMPTOMS
WHEEZING: 0
SHORTNESS OF BREATH: 1
ABDOMINAL PAIN: 0
COUGH: 0

## 2021-10-13 NOTE — PROGRESS NOTES
HPI:  Patient comes in today for routine follow up for ongoing chronic medical conditions, recent abdominal wall hematoma, HTN, chronic atrial fibrillation, chronic coumadin rx, hyperlipidemia, HFpEF, s/p TAVR, CAD s/p CABG. Overall, patient states she is disappointed that she is not feeling any better since the TAVR. She is able to fold laundry and do dishes but can't walk very long distances I.e. states she can't go to grocery store. She also states that she has episodes at times where she becomes extremely anxious and can't function, usually occurs at night, she gest bad shaking and fearful and has to take 1/2 of 1 mg ativan to calm down. This only happens once in a while but she states she gets so worked up and feels as though she is getting a recurrence of [address] (which eduardo states she had as a child). I reviewed most recent cardiology note. Noted increase dose of furosemide to 30 mg/d and potassium to 15 meq/d. Recent . Review of Systems  Review of Systems   Constitutional: Negative for chills, fatigue and fever. Respiratory: Positive for shortness of breath (with walking longer distances such as grocery shopping). Negative for cough and wheezing. Cardiovascular: Positive for leg swelling. Negative for chest pain and palpitations. Gastrointestinal: Negative for abdominal pain. Psychiatric/Behavioral: The patient is nervous/anxious (states it is debilitating at times and she is unable to function and it always occurs at night). PE:  VS:  /64 (Site: Right Upper Arm, Position: Sitting, Cuff Size: Large Adult)   Pulse 63   Temp 98.4 °F (36.9 °C) (Oral)   Resp 16   Ht 5' 4\" (1.626 m)   Wt 236 lb (107 kg)   SpO2 96%   BMI 40.51 kg/m²   Physical Exam  Constitutional:       General: She is not in acute distress. Appearance: She is not ill-appearing. Cardiovascular:      Rate and Rhythm: Normal rate. Rhythm irregular.       Heart sounds: Murmur (unchanged) heard.   No friction rub. No gallop. Pulmonary:      Effort: No respiratory distress. Breath sounds: No wheezing, rhonchi or rales. Abdominal:      Palpations: Abdomen is soft. Tenderness: There is no abdominal tenderness. Musculoskeletal:         General: Swelling (has slight pitting of distal LE bilateral. ) present. There is no ecchymosis of abdominal wall. POCT INR 2.9. Assessment/Plan:  Diagnoses and all orders for this visit:    Coronary artery disease involving native coronary artery of native heart without angina pectoris, stable    Valvular heart disease, stable  S/P TAVR (transcatheter aortic valve replacement), stable, although patient states she does not feel any improvement compared to how she felt before procedure    Mixed hyperlipidemia, continue statin    Essential hypertension, controlled, continue current med rx    Chronic atrial fibrillation ([address] continue beta blocker and coumadin rx    Chronic anticoagulation with coumadin, therapeutic INRs, continue same dose. Chronic heart failure with preserved ejection fraction ([address] continue increased dose lasix as ordered per cardiology    Abdominal wall hematoma, sequela, resolved      Patient reluctant to get COVID and influenza vaccines. I had detailed conversation about her increased risk and co-morbidities and she states she wants to think about it.

## 2021-10-13 NOTE — PROGRESS NOTES
INR 2.9  Patient discharged per Brian BLACKMON mailed  Patient instructed to take same dose of coumadin, 3 mg daily and repeat INR in 1 month  Bleeding precautions reviewed

## 2021-10-14 ENCOUNTER — ANTI-COAG VISIT (OUTPATIENT)
Dept: INTERNAL MEDICINE | Age: 76
End: 2021-10-14

## 2021-10-29 ENCOUNTER — HOSPITAL ENCOUNTER (EMERGENCY)
Age: 76
Discharge: HOME | End: 2021-10-29
Attending: EMERGENCY MEDICINE
Payer: MEDICARE

## 2021-10-29 VITALS
TEMPERATURE: 97.8 F | DIASTOLIC BLOOD PRESSURE: 76 MMHG | SYSTOLIC BLOOD PRESSURE: 136 MMHG | OXYGEN SATURATION: 95 % | HEART RATE: 99 BPM | RESPIRATION RATE: 16 BRPM

## 2021-10-29 DIAGNOSIS — R04.0 EPISTAXIS: Primary | ICD-10-CM

## 2021-10-29 LAB
APTT: 40.5 SEC (ref 24.5–35.1)
BASOPHILS ABSOLUTE: 0.03 E9/L (ref 0–0.2)
BASOPHILS RELATIVE PERCENT: 0.4 % (ref 0–2)
EOSINOPHILS ABSOLUTE: 0.13 E9/L (ref 0.05–0.5)
EOSINOPHILS RELATIVE PERCENT: 1.8 % (ref 0–6)
HCT VFR BLD CALC: 43.2 % (ref 34–48)
HEMOGLOBIN: 14 G/DL (ref 11.5–15.5)
IMMATURE GRANULOCYTES #: 0.02 E9/L
IMMATURE GRANULOCYTES %: 0.3 % (ref 0–5)
INR BLD: 2.5
LYMPHOCYTES ABSOLUTE: 2.1 E9/L (ref 1.5–4)
LYMPHOCYTES RELATIVE PERCENT: 28.8 % (ref 20–42)
MCH RBC QN AUTO: 29 PG (ref 26–35)
MCHC RBC AUTO-ENTMCNC: 32.4 % (ref 32–34.5)
MCV RBC AUTO: 89.6 FL (ref 80–99.9)
MONOCYTES ABSOLUTE: 0.57 E9/L (ref 0.1–0.95)
MONOCYTES RELATIVE PERCENT: 7.8 % (ref 2–12)
NEUTROPHILS ABSOLUTE: 4.45 E9/L (ref 1.8–7.3)
NEUTROPHILS RELATIVE PERCENT: 60.9 % (ref 43–80)
PDW BLD-RTO: 13.3 FL (ref 11.5–15)
PLATELET # BLD: 274 E9/L (ref 130–450)
PMV BLD AUTO: 9.9 FL (ref 7–12)
PROTHROMBIN TIME: 28.1 SEC (ref 9.3–12.4)
RBC # BLD: 4.82 E12/L (ref 3.5–5.5)
WBC # BLD: 7.3 E9/L (ref 4.5–11.5)

## 2021-10-29 PROCEDURE — 99283 EMERGENCY DEPT VISIT LOW MDM: CPT

## 2021-10-29 PROCEDURE — 85025 COMPLETE CBC W/AUTO DIFF WBC: CPT

## 2021-10-29 PROCEDURE — 6370000000 HC RX 637 (ALT 250 FOR IP): Performed by: STUDENT IN AN ORGANIZED HEALTH CARE EDUCATION/TRAINING PROGRAM

## 2021-10-29 PROCEDURE — 85610 PROTHROMBIN TIME: CPT

## 2021-10-29 PROCEDURE — 85730 THROMBOPLASTIN TIME PARTIAL: CPT

## 2021-10-29 PROCEDURE — 6370000000 HC RX 637 (ALT 250 FOR IP): Performed by: EMERGENCY MEDICINE

## 2021-10-29 RX ORDER — OXYMETAZOLINE HYDROCHLORIDE 0.05 G/100ML
2 SPRAY NASAL ONCE
Status: COMPLETED | OUTPATIENT
Start: 2021-10-29 | End: 2021-10-29

## 2021-10-29 RX ORDER — LORAZEPAM 1 MG/1
0.5 TABLET ORAL ONCE
Status: COMPLETED | OUTPATIENT
Start: 2021-10-29 | End: 2021-10-29

## 2021-10-29 RX ORDER — DIAPER,BRIEF,INFANT-TODD,DISP
EACH MISCELLANEOUS ONCE
Status: COMPLETED | OUTPATIENT
Start: 2021-10-29 | End: 2021-10-29

## 2021-10-29 RX ADMIN — Medication 2 SPRAY: at 21:59

## 2021-10-29 RX ADMIN — LORAZEPAM 0.5 MG: 1 TABLET ORAL at 22:05

## 2021-10-29 RX ADMIN — Medication: at 22:05

## 2021-10-29 ASSESSMENT — ENCOUNTER SYMPTOMS
BACK PAIN: 0
DIARRHEA: 0
SHORTNESS OF BREATH: 0
VOMITING: 0
ABDOMINAL PAIN: 0
RHINORRHEA: 0
COUGH: 0
NAUSEA: 0
SORE THROAT: 0

## 2021-10-29 NOTE — ED NOTES
FIRST PROVIDER CONTACT ASSESSMENT NOTE           Department of Emergency Medicine                 First Provider Note            10/29/21  7:28 PM EDT    Date of Encounter: No admission date for patient encounter. Patient Name: Jian Wiseman  :   MRN: [id number]    Chief Complaint: Epistaxis (Is on coumadin, right nostril has been on and off bleeding today. )      History of Present Illness:   Jian Wiseman is a 77 y.o. female who presents to the ED for nosebleed that started earlier today. Intermittent thru out the day ,, Patient is on Coumadin. Scant bleeding from Right nares     Focused Physical Exam:  VS:    ED Triage Vitals   BP Temp Temp Source Pulse Resp SpO2 Height Weight   10/29/21 1926 10/29/21 1926 10/29/21 1926 10/29/21 1910 10/29/21 1926 10/29/21 1910 -- --   (!) 141/92 97.8 °F (36.6 °C) Oral 111 16 95 %          Physical Ex: Constitutional: Alert and non-toxic. Medical History:  has a past medical history of Ambulates with cane, Atrial fibrillation ([address] CAD (coronary artery disease), CHF (congestive heart failure) ([address] Diverticular disease, Edentulous, Glaucoma, History of blood transfusion, Hyperlipidemia, Hypertension, Restless leg syndrome, and Valvular heart disease. Surgical History:  has a past surgical history that includes Tubal ligation; Tonsillectomy; Cardiac catheterization (); Cataract removal with implant (Right, 2016); transesophageal echocardiogram (2021); Cardiac catheterization (Right, 2021); Colonoscopy; Aortic valve replacement (N/A, 2021); and Mitral valve replacement (). Social History:  reports that she has never smoked. She has never used smokeless tobacco. She reports that she does not drink alcohol and does not use drugs. Family History: family history includes Heart Disease in her father and mother.     Allergies: Lactose intolerance (gi) and Phenobarbital     Initial Plan of Care: Initiate Treatment-Testing, Proceed toTreatment Area When Bed Available for ED Attending/MLP to Continue Care      ---END OF FIRST PROVIDER CONTACT ASSESSMENT NOTE---  Electronically signed by ALBERT Crook CNP   DD: 10/29/21       ALBERT Crook CNP  10/29/21 1922

## 2021-10-30 NOTE — ED PROVIDER NOTES
[facility]  Department of Emergency Medicine     Written by: Chucho Turner MD  Patient Name: Carmen Navarrete  Attending Provider: Chucho Turner MD  Admit Date: 10/29/2021  9:42 PM  MRN: [id number]                   : 1945        Chief Complaint   Patient presents with    Epistaxis     Is on coumadin, right nostril has been on and off bleeding today. - Chief complaint    HPI     Patient is a 78 y/o female presenting to the ED for evaluation of epistaxis. Began shortly prior to arrival. Complaint is moderate in severity, has improved with holding pressure, no specific aggravating factors. Patient is on coumadin for atrial fibrillation. She has had nosebleeds in the past, has not had a severe one for a few years. She does not follow with ENT. Denies any trauma to the area. Denies any lightheadedness or syncope. Denies any pain anywhere. At time of this examination patient's bleeding has essentially stopped since she has been in the waiting room. Denies any recent illnesses, fevers, neck pain or stiffness, cough or sore throat, chest pain or palpitations, shortness of breath, abdominal pain, nausea or vomiting, diarrhea, black or bloody stools, urinary symptoms, numbness or tingling anywhere, lower extremity edema or tenderness. Review of Systems   Constitutional: Negative for chills, fatigue and fever. HENT: Positive for nosebleeds. Negative for congestion, rhinorrhea and sore throat. Eyes: Negative for visual disturbance. Respiratory: Negative for cough and shortness of breath. Cardiovascular: Negative for chest pain and palpitations. Gastrointestinal: Negative for abdominal pain, diarrhea, nausea and vomiting. Endocrine: Negative for polydipsia and polyuria. Genitourinary: Negative for dysuria and frequency. Musculoskeletal: Negative for back pain and myalgias. Skin: Negative for pallor, rash and wound.    Neurological: Negative for weakness and headaches. Psychiatric/Behavioral: Negative for confusion. All other systems reviewed and are negative. Physical Exam  Vitals and nursing note reviewed. Constitutional:       General: She is not in acute distress. Appearance: She is not ill-appearing. HENT:      Head: Normocephalic and atraumatic. Right Ear: External ear normal.      Left Ear: External ear normal.      Nose: No rhinorrhea. Comments: Small superficial slightly oozing cracked/dry mucosa at right medial nasal septum, anterior; no profuse bleeding, no posterior bleeding visualized. Mouth/Throat:      Mouth: Mucous membranes are moist.      Pharynx: Oropharynx is clear. Eyes:      Extraocular Movements: Extraocular movements intact. Conjunctiva/sclera: Conjunctivae normal.      Pupils: Pupils are equal, round, and reactive to light. Cardiovascular:      Rate and Rhythm: Normal rate and regular rhythm. Pulses: Normal pulses. Heart sounds: Normal heart sounds. Pulmonary:      Effort: Pulmonary effort is normal. No respiratory distress. Breath sounds: Normal breath sounds. No wheezing or rales. Abdominal:      General: Bowel sounds are normal.      Palpations: Abdomen is soft. Tenderness: There is no abdominal tenderness. There is no right CVA tenderness, left CVA tenderness or guarding. Musculoskeletal:         General: Normal range of motion. Cervical back: Normal range of motion and neck supple. No rigidity or tenderness. Right lower leg: No edema. Left lower leg: No edema. Skin:     General: Skin is warm and dry. Capillary Refill: Capillary refill takes less than 2 seconds. Coloration: Skin is not jaundiced or pale. Findings: No rash. Neurological:      General: No focal deficit present. Mental Status: She is alert and oriented to person, place, and time.    Psychiatric:         Mood and Affect: Mood normal.         Behavior: Behavior normal. Procedures       MDM       ED Course as of Oct 29 2309   Fri Oct 29, 2021   2200 Nasal speculum exam performed, there does appear to be a tiny area of oozing at the medial septum wall of the right nare; mucosa appears dry and cracked. No profuse bleeding is appreciated and do not suspect any posterior source of bleeding.     [VG]   2252 Patient reassessed, Afrin-soaked cotton was removed and there appears to be no more oozing or blood at all. Inside of patient's right nare was gently coated with bacitracin ointment and she was instructed to do this periodically in order to keep the area moist and keep it from becoming dry and cracked. Discussed with patient her results and for discharge, she voiced understanding and is amenable. Return precautions were discussed. She will follow up outpatient with her primary doctor.    [VG]      ED Course User Index  [VG] Declan Lowery DO       MDM    This is a 78 y/o female on coumadin presenting for evaluation of epistaxis. She is alert and oriented on arrival, vitals stable. For this examiner, patient's bleeding has essentially ceased; there is a small slightly oozing dry/cracked area of mucosa noted on the right nasal septum. She is no distress. No blood noted in oropharynx, no sign of posterior bleed. Labs WNLs, hgb and INR are stable. Small amount of afrin-soaked cotton placed in right nare for about 20 minutes with complete resolution of the small bleed. Mucosa of right nare coated with bactiracin ointment. Patient will be discharged home. She is stable. Discussed results and plan with her, return precautions were discussed. Patient voices understanding and is amenable. I have discussed this patient with my attending, who has seen the patient and agrees with this disposition.     Patient was seen and evaluated by myself and my attending José Manuel Cintron MD. Assessment and Plan discussed with attending provider, please see attestation for final plan of care.          --------------------------------------------- PAST HISTORY ---------------------------------------------  Past Medical History:  has a past medical history of Ambulates with cane, Atrial fibrillation ([facility] CAD (coronary artery disease), CHF (congestive heart failure) ([facility] Diverticular disease, Edentulous, Glaucoma, History of blood transfusion, Hyperlipidemia, Hypertension, Restless leg syndrome, and Valvular heart disease. Past Surgical History:  has a past surgical history that includes Tubal ligation; Tonsillectomy; Cardiac catheterization (2007); Cataract removal with implant (Right, 08/24/2016); transesophageal echocardiogram (05/07/2021); Cardiac catheterization (Right, 05/07/2021); Colonoscopy; Aortic valve replacement (N/A, 07/14/2021); and Mitral valve replacement (2007). Social History:  reports that she has never smoked. She has never used smokeless tobacco. She reports that she does not drink alcohol and does not use drugs. Family History: family history includes Heart Disease in her father and mother. The patients home medications have been reviewed.     Allergies: Lactose intolerance (gi) and Phenobarbital    -------------------------------------------------- RESULTS -------------------------------------------------  Labs:  Results for orders placed or performed during the hospital encounter of 10/29/21   CBC Auto Differential   Result Value Ref Range    WBC 7.3 4.5 - 11.5 E9/L    RBC 4.82 3.50 - 5.50 E12/L    Hemoglobin 14.0 11.5 - 15.5 g/dL    Hematocrit 43.2 34.0 - 48.0 %    MCV 89.6 80.0 - 99.9 fL    MCH 29.0 26.0 - 35.0 pg    MCHC 32.4 32.0 - 34.5 %    RDW 13.3 11.5 - 15.0 fL    Platelets 176 596 - 073 E9/L    MPV 9.9 7.0 - 12.0 fL    Neutrophils % 60.9 43.0 - 80.0 %    Immature Granulocytes % 0.3 0.0 - 5.0 %    Lymphocytes % 28.8 20.0 - 42.0 %    Monocytes % 7.8 2.0 - 12.0 %    Eosinophils % 1.8 0.0 - 6.0 %    Basophils % 0.4 0.0 - 2.0 %    Neutrophils Absolute 4.45 1.80 - 7.30 E9/L    Immature Granulocytes # 0.02 E9/L    Lymphocytes Absolute 2.10 1.50 - 4.00 E9/L    Monocytes Absolute 0.57 0.10 - 0.95 E9/L    Eosinophils Absolute 0.13 0.05 - 0.50 E9/L    Basophils Absolute 0.03 0.00 - 0.20 E9/L   Protime-INR   Result Value Ref Range    Protime 28.1 (H) 9.3 - 12.4 sec    INR 2.5    APTT   Result Value Ref Range    aPTT 40.5 (H) 24.5 - 35.1 sec       Radiology:  No orders to display           ------------------------- NURSING NOTES AND VITALS REVIEWED ---------------------------  Date / Time Roomed:  10/29/2021  9:42 PM  ED Bed Assignment:  [facility]/ST-4    The nursing notes within the ED encounter and vital signs as below have been reviewed. /76   Pulse 99   Temp 97.8 °F (36.6 °C) (Oral)   Resp 16   SpO2 95%   Oxygen Saturation Interpretation: Normal      ------------------------------------------ PROGRESS NOTES ------------------------------------------  10:56 PM EDT  I have spoken with the patient and discussed todays results, in addition to providing specific details for the plan of care and counseling regarding the diagnosis and prognosis. Their questions are answered at this time and they are agreeable with the plan. I discussed at length with them reasons for immediate return here for re evaluation. They will followup with their primary care physician by calling their office on Monday.      --------------------------------- ADDITIONAL PROVIDER NOTES ---------------------------------  At this time the patient is without objective evidence of an acute process requiring hospitalization or inpatient management. They have remained hemodynamically stable throughout their entire ED visit and are stable for discharge with outpatient follow-up. The plan has been discussed in detail and they are aware of the specific conditions for emergent return, as well as the importance of follow-up. New Prescriptions    No medications on file       Diagnosis:  1.  Epistaxis Disposition:  Patient's disposition: Discharge to home  Patient's condition is stable. Fabian Dandy, DO  Resident  10/29/21 1535    ATTENDING PROVIDER ATTESTATION:     I have personally performed and/or participated in the history, exam, medical decision making, and procedures and agree with all pertinent clinical information. I have also reviewed and agree with the past medical, family and social history unless otherwise noted. I have discussed this patient in detail with the resident, and provided the instruction and education regarding nosebleed. My findings/Plan: I was the primary provider for patient. Patient presenting of days of nosebleeds over the past day intermittent. Patient reports coming out of the right side of her nose. Patient is on Coumadin. Patient reporting no physical complaints of chest pain she reports no new shortness of breath she reports no abdominal pain or vomiting there is no diarrhea. Patient having no active bleeding at present time. Patient is awake alert oriented lungs are clear abdomen soft nontender. Patient posterior pharynx is clear. Patient has noted scabbing to anterior area. But there is no active bleeding. Patient labs noted reviewed. Patient rechecked having no active bleeding on recheck. Patient made aware of plan patient to return anytime for any further problems. Patient comfortable plan.   Family at bedside with her         Simone Lancaster MD  10/29/21 0578       Simone Lancaster MD  10/29/21 3846

## 2021-11-01 ENCOUNTER — TELEPHONE (OUTPATIENT)
Dept: INTERNAL MEDICINE | Age: 76
End: 2021-11-01

## 2021-11-01 ENCOUNTER — TELEPHONE (OUTPATIENT)
Dept: ENT CLINIC | Age: 76
End: 2021-11-01

## 2021-11-01 NOTE — TELEPHONE ENCOUNTER
Pt called in to schedule an ED F/U for epistaxis. Loral Said can be reached at [phone]. Thank you.

## 2021-11-01 NOTE — TELEPHONE ENCOUNTER
MA spoke with patient, scheduled 11/3 @ 2:30pm. Patient has had a few nose bleeds since 10/29 none lasting more than 10 mins. Patient advised to go back to the ED if one persists longer than 30 mins. Patient understood.     Electronically signed by Grzegorz Gallego MA on 11/1/21 at 3:19 PM EDT

## 2021-11-03 ENCOUNTER — OFFICE VISIT (OUTPATIENT)
Dept: ENT CLINIC | Age: 76
End: 2021-11-03
Payer: MEDICARE

## 2021-11-03 VITALS
BODY MASS INDEX: 39.95 KG/M2 | WEIGHT: 234 LBS | HEIGHT: 64 IN | OXYGEN SATURATION: 99 % | TEMPERATURE: 97.2 F | SYSTOLIC BLOOD PRESSURE: 147 MMHG | DIASTOLIC BLOOD PRESSURE: 69 MMHG | HEART RATE: 65 BPM

## 2021-11-03 DIAGNOSIS — R04.0 EPISTAXIS: Primary | ICD-10-CM

## 2021-11-03 DIAGNOSIS — J30.2 SEASONAL ALLERGIES: ICD-10-CM

## 2021-11-03 PROCEDURE — 99204 OFFICE O/P NEW MOD 45 MIN: CPT | Performed by: OTOLARYNGOLOGY

## 2021-11-03 RX ORDER — OXYMETAZOLINE HYDROCHLORIDE 0.05 G/100ML
2 SPRAY NASAL 2 TIMES DAILY
COMMUNITY
End: 2022-02-02 | Stop reason: ALTCHOICE

## 2021-11-03 ASSESSMENT — ENCOUNTER SYMPTOMS
BACK PAIN: 0
NAUSEA: 0
SORE THROAT: 0
EYE PAIN: 0
ABDOMINAL PAIN: 0
SHORTNESS OF BREATH: 0
VOMITING: 0

## 2021-11-03 NOTE — PROGRESS NOTES
Subjective:      Patient ID:  Aubrey Echavarria is a 77 y.o. female. HPI:  Recurrent Epistaxis  The patient is a 77 y.o. female who presents with epistaxis. The patient reports nosebleeds for 10 days. They usually occur on the right. Pt was was in the ER    was not cauterized on the right side   was packed on the right side. This is not the patients first event of epistaxis. Prior therapy has included nothing additional, . Chronic O2? no    There is  history of easy bruising or bleeding. Pt is  on anticoagulation therapy - warfarin          Other epistaxis risk factors: upper respiratory infection, dry air, coagulation disorder    Past Medical History:   Diagnosis Date    Ambulates with cane     Atrial fibrillation ([facility] 11/22/2010    CAD (coronary artery disease) 11/22/2010    NO CARDIOLOGIST, F/U W/ pcp STABLE    CHF (congestive heart failure) ([facility] 11/22/2010    Diverticular disease 11/22/2010    Edentulous     does not wear dentures    Glaucoma     History of blood transfusion     once with CABG, once with a severe epistaxis.  Hyperlipidemia     Hypertension     Restless leg syndrome 11/22/2010    Valvular heart disease 11/22/2010     Past Surgical History:   Procedure Laterality Date    AORTIC VALVE REPLACEMENT N/A 07/14/2021    TRANSCATHETER AORTIC VALVE REPLACEMENT FEMORAL APPROACH performed by Yomi Vicente MD at [address] [facility]  2007    CARDIAC CATHETERIZATION Right 05/07/2021    Right Heart cath no stents Dr Beryl Pacheco Right 08/24/2016    trabulectomy   [facility] COLONOSCOPY      MITRAL VALVE REPLACEMENT  2007    [address]    TONSILLECTOMY      TRANSESOPHAGEAL ECHOCARDIOGRAM  05/07/2021    Dr Erasto Gillespie       Family History   Problem Relation Age of Onset    Heart Disease Mother     Heart Disease Father      Social History     Socioeconomic History    Marital status:       Spouse name: None    Number of children: None    Years of education: None    Highest education level: None   Occupational History    None   Tobacco Use    Smoking status: Never Smoker    Smokeless tobacco: Never Used   Substance and Sexual Activity    Alcohol use: No    Drug use: No    Sexual activity: Never   Other Topics Concern    None   Social History Narrative    None     Social Determinants of Health     Financial Resource Strain:     Difficulty of Paying Living Expenses:    Food Insecurity:     Worried About Running Out of Food in the Last Year:     Ran Out of Food in the Last Year:    Transportation Needs: Unknown    Lack of Transportation (Medical): No    Lack of Transportation (Non-Medical): Not on file   Physical Activity:     Days of Exercise per Week:     Minutes of Exercise per Session:    Stress:     Feeling of Stress :    Social Connections:     Frequency of Communication with Friends and Family:     Frequency of Social Gatherings with Friends and Family:     Attends Druze Services:     Active Member of Clubs or Organizations:     Attends Club or Organization Meetings:     Marital Status:    Intimate Partner Violence:     Fear of Current or Ex-Partner:     Emotionally Abused:     Physically Abused:     Sexually Abused: Allergies   Allergen Reactions    Lactose Intolerance (Gi) Diarrhea and Nausea And Vomiting    Phenobarbital Rash       Review of Systems   Constitutional: Negative for chills and fever. HENT: Positive for nosebleeds. Negative for ear pain and sore throat. Eyes: Negative for pain. Respiratory: Negative for shortness of breath. Cardiovascular: Negative for chest pain. Gastrointestinal: Negative for abdominal pain, nausea and vomiting. Genitourinary: Negative for flank pain and pelvic pain. Musculoskeletal: Negative for back pain and neck pain. Skin: Negative for rash. Neurological: Negative for headaches.                Objective:     Vitals:    11/03/21 1459 BP: (!) 147/69   Pulse: 65   Temp: 97.2 °F (36.2 °C)   SpO2: 99%       Physical Exam  Vitals and nursing note reviewed. Constitutional:       General: She is not in acute distress. Appearance: She is well-developed. She is obese. She is not ill-appearing. HENT:      Head: Normocephalic and atraumatic. Right Ear: External ear normal.      Left Ear: External ear normal.      Nose: Rhinorrhea present. Mouth/Throat:      Mouth: Mucous membranes are moist.      Pharynx: Oropharynx is clear. Posterior oropharyngeal erythema present. Eyes:      Pupils: Pupils are equal, round, and reactive to light. Cardiovascular:      Rate and Rhythm: Normal rate and regular rhythm. Heart sounds: Normal heart sounds. Pulmonary:      Effort: Pulmonary effort is normal. No respiratory distress. Breath sounds: Normal breath sounds. Abdominal:      Palpations: Abdomen is soft. Tenderness: There is no abdominal tenderness. Musculoskeletal:         General: No swelling or tenderness. Cervical back: Normal range of motion and neck supple. Skin:     General: Skin is warm and dry. Findings: No rash. Neurological:      Mental Status: She is alert and oriented to person, place, and time. Cranial Nerves: No cranial nerve deficit. Assessment:       Diagnosis Orders   1. Epistaxis     2. Seasonal allergies                Plan:      vaseline application to the anterior septum twice daily, normal saline solution.     · Open Mouth and cover when sneezing, coughing  · No nose blowing for 2 weeks  · Nasal saline spray in each nostril 4-5 times a day      Follow up in 2 weeks

## 2021-11-10 ENCOUNTER — TELEPHONE (OUTPATIENT)
Dept: ENT CLINIC | Age: 76
End: 2021-11-10

## 2021-11-10 NOTE — TELEPHONE ENCOUNTER
Spoke to Dr Ronny Carvajal patient to continue doing all his recommendations (saline, ointment, etc). Patient still blowing nose. Informed patient no heavy lifting, bending over and avoid nose blowing. Patient aware.

## 2021-11-18 ENCOUNTER — OFFICE VISIT (OUTPATIENT)
Dept: ENT CLINIC | Age: 76
End: 2021-11-18
Payer: MEDICARE

## 2021-11-18 VITALS
HEART RATE: 70 BPM | BODY MASS INDEX: 39.95 KG/M2 | WEIGHT: 234 LBS | SYSTOLIC BLOOD PRESSURE: 142 MMHG | TEMPERATURE: 96.4 F | DIASTOLIC BLOOD PRESSURE: 63 MMHG | OXYGEN SATURATION: 97 % | HEIGHT: 64 IN

## 2021-11-18 DIAGNOSIS — R04.0 EPISTAXIS: Primary | ICD-10-CM

## 2021-11-18 PROCEDURE — 99213 OFFICE O/P EST LOW 20 MIN: CPT | Performed by: OTOLARYNGOLOGY

## 2021-11-18 ASSESSMENT — ENCOUNTER SYMPTOMS
NAUSEA: 0
ABDOMINAL PAIN: 0
BACK PAIN: 0
SORE THROAT: 0
SHORTNESS OF BREATH: 0
VOMITING: 0
EYE PAIN: 0

## 2021-11-18 NOTE — PROGRESS NOTES
Subjective:      Patient ID:  Kalli Bolton is a 77 y.o. female. HPI:  Recurrent Epistaxis  The patient is a 77 y.o. female who presents for follow up in epistaxis. Has not had any nose bleed for 8 days. Has been using nasal saline sprays. Chronic O2? no    There is  history of easy bruising or bleeding. Pt is  on anticoagulation therapy - warfarin and ASA      Other epistaxis risk factors: upper respiratory infection, dry air, coagulation disorder    Past Medical History:   Diagnosis Date    Ambulates with cane     Atrial fibrillation ([facility] 11/22/2010    CAD (coronary artery disease) 11/22/2010    NO CARDIOLOGIST, F/U W/ pcp STABLE    CHF (congestive heart failure) ([facility] 11/22/2010    Diverticular disease 11/22/2010    Edentulous     does not wear dentures    Glaucoma     History of blood transfusion     once with CABG, once with a severe epistaxis.  Hyperlipidemia     Hypertension     Restless leg syndrome 11/22/2010    Valvular heart disease 11/22/2010     Past Surgical History:   Procedure Laterality Date    AORTIC VALVE REPLACEMENT N/A 07/14/2021    TRANSCATHETER AORTIC VALVE REPLACEMENT FEMORAL APPROACH performed by Cheo Henderson MD at [address]  2007    CARDIAC CATHETERIZATION Right 05/07/2021    Right Heart cath no stents Dr Jake Fulton Right 08/24/2016    trabulectomy   [facility]      MITRAL VALVE REPLACEMENT  2007    [address]    TONSILLECTOMY      TRANSESOPHAGEAL ECHOCARDIOGRAM  05/07/2021    Dr Orly Kramer       Family History   Problem Relation Age of Onset    Heart Disease Mother     Heart Disease Father      Social History     Socioeconomic History    Marital status:       Spouse name: None    Number of children: None    Years of education: None    Highest education level: None   Occupational History    None   Tobacco Use    Smoking status: Never Smoker    Smokeless tobacco: Never Used Substance and Sexual Activity    Alcohol use: No    Drug use: No    Sexual activity: Never   Other Topics Concern    None   Social History Narrative    None     Social Determinants of Health     Financial Resource Strain:     Difficulty of Paying Living Expenses: Not on file   Food Insecurity:     Worried About Running Out of Food in the Last Year: Not on file    Ashley of Food in the Last Year: Not on file   Transportation Needs: Unknown    Lack of Transportation (Medical): No    Lack of Transportation (Non-Medical): Not on file   Physical Activity:     Days of Exercise per Week: Not on file    Minutes of Exercise per Session: Not on file   Stress:     Feeling of Stress : Not on file   Social Connections:     Frequency of Communication with Friends and Family: Not on file    Frequency of Social Gatherings with Friends and Family: Not on file    Attends Yarsani Services: Not on file    Active Member of [address] [place] [address] [employer] or Organizations: Not on file    Attends Club or Organization Meetings: Not on file    Marital Status: Not on file   Intimate Partner Violence:     Fear of Current or Ex-Partner: Not on file    Emotionally Abused: Not on file    Physically Abused: Not on file    Sexually Abused: Not on file   Housing Stability:     Unable to Pay for Housing in the Last Year: Not on file    Number of [place] in the Last Year: Not on file    Unstable Housing in the Last Year: Not on file     Allergies   Allergen Reactions    Lactose Intolerance (Gi) Diarrhea and Nausea And Vomiting    Phenobarbital Rash       Review of Systems   Constitutional: Negative for chills and fever. HENT: Positive for nosebleeds. Negative for ear pain and sore throat. Eyes: Negative for pain. Respiratory: Negative for shortness of breath. Cardiovascular: Negative for chest pain. Gastrointestinal: Negative for abdominal pain, nausea and vomiting. Genitourinary: Negative for flank pain and pelvic pain. Musculoskeletal: Negative for back pain and neck pain. Skin: Negative for rash. Neurological: Negative for headaches. Objective:     Vitals:    11/18/21 1537   BP: (!) 142/63   Pulse: 70   Temp: 96.4 °F (35.8 °C)   SpO2: 97%       Physical Exam  Vitals and nursing note reviewed. Constitutional:       General: She is not in acute distress. Appearance: She is well-developed. She is obese. She is not ill-appearing. HENT:      Head: Normocephalic and atraumatic. Right Ear: External ear normal.      Left Ear: External ear normal.      Nose: Rhinorrhea present. Mouth/Throat:      Mouth: Mucous membranes are moist.      Pharynx: Oropharynx is clear. Posterior oropharyngeal erythema present. Eyes:      Pupils: Pupils are equal, round, and reactive to light. Cardiovascular:      Rate and Rhythm: Normal rate and regular rhythm. Heart sounds: Normal heart sounds. Pulmonary:      Effort: Pulmonary effort is normal. No respiratory distress. Breath sounds: Normal breath sounds. Abdominal:      Palpations: Abdomen is soft. Tenderness: There is no abdominal tenderness. Musculoskeletal:         General: No swelling or tenderness. Cervical back: Normal range of motion and neck supple. Skin:     General: Skin is warm and dry. Findings: No rash. Neurological:      Mental Status: She is alert and oriented to person, place, and time. Cranial Nerves: No cranial nerve deficit. Assessment:       Diagnosis Orders   1. Epistaxis                Plan:     · Open Mouth and cover when sneezing, coughing  · No nose blowing for 2 weeks  · Nasal saline spray in each nostril 4-5 times a day    Follow up in 2 weeks               Monika King  1945    I have discussed the case, including pertinent history and exam findings with the resident. I have seen and examined the patient and the key elements of the encounter have been performed by me.  I agree with the assessment, plan and orders as documented by the  resident              Remainder of medical problems as per  resident note. Patient seen and examined. Agree with above exam, assessment and plan.       Electronically signed by Laveta Soulier, DO on 11/18/21 at 4:33 PM EST

## 2021-12-14 ENCOUNTER — CARE COORDINATION (OUTPATIENT)
Dept: CARE COORDINATION | Age: 76
End: 2021-12-14

## 2021-12-14 NOTE — CARE COORDINATION
ACM contacted patient by telephone to offer enrollment into Care Coordination. ACM explained the Care Coordination program and patient was agreeable. Patient requested a call back to begin enrollment d/t waiting for her ride to pick her up to go to the pharmacy and the grocery store. Patient and ACM agreed upon a date to begin the enrollment. PLAN:  -ACM will contact patient on agreed upon date to begin the enrollment into Care Coordination.

## 2021-12-15 ENCOUNTER — CARE COORDINATION (OUTPATIENT)
Dept: CARE COORDINATION | Age: 76
End: 2021-12-15

## 2021-12-15 SDOH — ECONOMIC STABILITY: FOOD INSECURITY: WITHIN THE PAST 12 MONTHS, THE FOOD YOU BOUGHT JUST DIDN'T LAST AND YOU DIDN'T HAVE MONEY TO GET MORE.: NEVER TRUE

## 2021-12-15 SDOH — HEALTH STABILITY: PHYSICAL HEALTH: ON AVERAGE, HOW MANY MINUTES DO YOU ENGAGE IN EXERCISE AT THIS LEVEL?: 0 MIN

## 2021-12-15 SDOH — ECONOMIC STABILITY: FOOD INSECURITY: WITHIN THE PAST 12 MONTHS, YOU WORRIED THAT YOUR FOOD WOULD RUN OUT BEFORE YOU GOT MONEY TO BUY MORE.: NEVER TRUE

## 2021-12-15 SDOH — HEALTH STABILITY: PHYSICAL HEALTH: ON AVERAGE, HOW MANY DAYS PER WEEK DO YOU ENGAGE IN MODERATE TO STRENUOUS EXERCISE (LIKE A BRISK WALK)?: 0 DAYS

## 2021-12-15 ASSESSMENT — SOCIAL DETERMINANTS OF HEALTH (SDOH)
HOW HARD IS IT FOR YOU TO PAY FOR THE VERY BASICS LIKE FOOD, HOUSING, MEDICAL CARE, AND HEATING?: SOMEWHAT HARD
IN A TYPICAL WEEK, HOW MANY TIMES DO YOU TALK ON THE PHONE WITH FAMILY, FRIENDS, OR NEIGHBORS?: MORE THAN THREE TIMES A WEEK
HOW OFTEN DO YOU ATTENT MEETINGS OF THE CLUB OR ORGANIZATION YOU BELONG TO?: NEVER
DO YOU BELONG TO ANY CLUBS OR ORGANIZATIONS SUCH AS CHURCH GROUPS UNIONS, FRATERNAL OR ATHLETIC GROUPS, OR SCHOOL GROUPS?: NO
HOW OFTEN DO YOU ATTEND CHURCH OR RELIGIOUS SERVICES?: NEVER
HOW OFTEN DO YOU GET TOGETHER WITH FRIENDS OR RELATIVES?: ONCE A WEEK

## 2021-12-15 ASSESSMENT — LIFESTYLE VARIABLES: HOW OFTEN DO YOU HAVE A DRINK CONTAINING ALCOHOL: NEVER

## 2021-12-15 NOTE — CARE COORDINATION
ACM attempted to reach patient to begin enrollment into the Care Coordination program.  Left HIPAA compliant message asking for a return call.     PLAN:  -ACM will attempt to reach patient again

## 2021-12-15 NOTE — CARE COORDINATION
ACM attempted to reach patient to begin enrollment into the Care Coordination program.  There was no answer.     PLAN:  -ACM will attempt to reach patient again

## 2021-12-15 NOTE — LETTER
12/15/2021    Coreen Ramon  [address] [facility] [place] [address]    Dear Coreen Ramon,    I enjoyed speaking with you and wanted to send some additional information. Dr.Thomas TAMARA Dias, DO and I will work together to ensure your needs are met and help you achieve your health goals. We are committed to walk with you on this journey and look forward to working with you. Please feel free to contact me with any questions or concerns. I am available by phone. You can reach me at [phone].       In good health,     Trey Obregon RN

## 2021-12-15 NOTE — CARE COORDINATION
Ambulatory Care Coordination Note  12/15/2021  CM Risk Score: 6  Charlson 10 Year Mortality Risk Score: 47%     ACC: Trey Obregon, CADY    PLAN:  -confirm that patient scheduled an appointment with Dr. Andie Presley:    -needs to have INR level checked    -Patient would also like to discuss the possibility of becoming established    with a different cardiologist.  -Social work referral for transportation needs  -ask patient about advance directives  -review MyChart login  -review HF zone tool and review the importance of weighing herself daily and creating a log  -review medications  -continue Care Coordination         Summary Note:   -ACM contacted patient to explain the Care Coordination program and to offer enrollment. Patient was agreeable to enrollment. Patient has a history of CVD, CHF, Atrial fibrillation, HLD, Depression, and hypertension.  -See CC protocol for enrollment details.  -Patient reports that she has a lot of stress at times. Patient states that 28 years ago her daughter was murdered and ever since then she has been raising her granddaughter. Patient's granddaughter still lives with her and is now 27years old. The granddaughter helps the patient with transportation and when needed, she helps financially. -Reviewed medications. Patient takes her medications as prescribed. Patient denies having any barriers that prevent her from taking her medications as prescribed.   -Patient follows:          Dr. Lillian Mckeon, cardiology    Dr. Sandi Joaquin, ENT      **Glaucoma:  -Patient states that she has glaucoma in her left eye and she describes her vision as \"looking through a straw. \"  Patient states that the glaucoma prevents her from being able to cook and go up and down stairs.   Patient reports that her granddaughter helps with the cooking and she does all of the laundry because the laundry room is in the basement.  -Patient instills one drop of brimonidine into the left eye every day  -Patient instills one drop of latanoprost into the left eye every night      **CHF:  -Reviewed HF zone tool with patient. Patient states that she is in the \"green\" zone. Patient denies worsening of her shortness of breath, denies an increase of swelling in her lower extremities, and denies chest pain.  -Patient does not weigh herself. ACM discussed the importance to be weighing herself and patient commented that she \"hates weighing herself. \"   -Patient states that she understands that she should be following a low sodium diet. Patient also states that she is \"well aware of what she should and shouldn't be eating. \"  -Patient is not fluid restricted. -Patient declined a RD referral.    **HTN:  -Patient does not monitor her blood pressure at home.  -Patient's hypertension is controlled at this time with current medication    **Chronic atrial fibrillation - chronic coumadin rx  -Patient takes 3 mg of coumadin daily.  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 Patient has trouble being active and does become short of breath with little exertion.   -Patient states that she would like to become established with a different cardiologist.        Ambulatory Care Coordination Assessment    Care Coordination Protocol  Program Enrollment: Complex Care  Referral from Primary Care Provider: No  Week 1 - Initial Assessment     Do you have all of your prescriptions and are they filled?: Yes  Barriers to medication adherence: None  Are you able to afford your medications?: Yes  How often do you have trouble taking your medications the way you have been told to take them?: I always take them as prescribed. Do you have Home O2 Therapy?: No      Ability to seek help/take action for Emergent Urgent situations i.e. fire, crime, inclement weather or health crisis. : Independent  Ability to other): Some general dissatisfaction but no concern   How would you rate their social network (family, work, friends)?: Adequate participation with social networks   How would you rate their financial resources (including ability to afford all required medical care)?: Financially secure, some resource challenges   How wells does the patient now understand their health and well-being (symptoms, signs or risk factors) and what they need to do to manage their health?: Reasonable to good understanding but do not feel able to engage with advice at this time   How well do you think your patient can engage in healthcare discussions? (Barriers include language, deafness, aphasia, alcohol or drug problems, learning difficulties, concentration): Clear and open communication, no identified barriers   Do other services need to be involved to help this patient?: Other care/services not in place and required   Are current services involved with this patient well-coordinated? (Include coordination with other services you are now recommendation): Required care/services in place with some coordination barriers   Suggested Interventions and Community Resources  Fall Risk Prevention: In Process Pharmacist: Kody   Registered Dietician: [address]: In Process   Zone Management Tools: In Process         Set up/Review an Education Plan, Set up/Review Goals              Prior to Admission medications    Medication Sig Start Date End Date Taking? Authorizing Provider   mupirocin (BACTROBAN) 2 % ointment Apply topically 3 times daily Apply topically 3 times daily.    Yes Historical Provider, MD   metoprolol tartrate (LOPRESSOR) 50 MG tablet TAKE ONE TABLET BY MOUTH TWO TIMES A DAY 10/13/21  Yes Francisco Dias, DO   simvastatin (ZOCOR) 20 MG tablet Take 1 tablet by mouth nightly 10/13/21  Yes Francisco Dias, DO   fluticasone (FLONASE) 50 MCG/ACT nasal spray 1 spray by Each Nostril route daily 10/13/21  Yes Francisco Dias, DO   amLODIPine (NORVASC) 10 MG tablet Take 1 tablet by mouth daily 10/13/21  Yes Francisco Dias DO   enalapril (VASOTEC) 10 MG tablet TAKE ONE TABLET BY MOUTH TWO TIMES A DAY 10/13/21  Yes Regis Dias DO   warfarin (COUMADIN) 3 MG tablet Take one tablet daily 10/13/21  Yes Francisco Dias DO   potassium chloride (KLOR-CON M) 10 MEQ extended release tablet Take 1.5 tablets by mouth daily 9/14/21  Yes Xochilt Elliott MD   aspirin 81 MG EC tablet Take 1 tablet by mouth daily 7/17/21  Yes Isauro Mares MD   brimonidine (ALPHAGAN) 0.2 % ophthalmic solution INSTILL 1 DROP EVERY DAY INTO LEFT EYE AT 8 AM. 4/15/21  Yes Historical Provider, MD   latanoprost (XALATAN) 0.005 % ophthalmic solution INSTILL 1 DROP IN THE LEFT EYE EVERY DAY AT BEDTIME 4/15/21  Yes Historical Provider, MD   pantoprazole (PROTONIX) 40 MG tablet Take 1 tablet by mouth daily 4/28/21  Yes Francisco Dias DO   oxymetazoline (VICKS SINEX) 0.05 % nasal spray 2 sprays by Nasal route 2 times daily  Patient not taking: Reported on 12/15/2021    Historical Provider, MD   furosemide (LASIX) 20 MG tablet Take 1.5 tablets by mouth daily 9/14/21 10/14/21  Xochilt Elliott MD       Future Appointments   Date Time Provider Linh Colon   2/2/2022  1:00 PM Cherrie De La Rosa DO ACC [facility]   7/12/2022 10:30 AM [facility] E[facility] RM 1 SEYZ [facility]   7/12/2022 11:30 AM [facility] STRUCTURAL HEART SCHEDULE [facility] CARDIO [facility]     ,   Congestive Heart Failure Assessment    Are you currently restricting fluids?: No Restriction  Do you understand a low sodium diet?: Yes  Do you understand how to read food labels?: Yes  How many restaurant meals do you eat per week?: 1-2  Do you salt your food before tasting it?: No     No patient-reported symptoms      Symptoms:  CHF associated dyspnea on exertion: Pos, CHF associated leg swelling: Pos, CHF associated shortness of breath: Pos      Symptom course: no change  Patient-reported weight (lb):  (Comment: 12/15:patient not weighing herself)      and   General Assessment    Do you have any symptoms that are causing concern?: Yes  Progression since Onset: Gradually Worsening  Reported Symptoms: Other (Comment: Glaucoma)

## 2021-12-23 ENCOUNTER — CARE COORDINATION (OUTPATIENT)
Dept: CARE COORDINATION | Age: 76
End: 2021-12-23

## 2021-12-23 NOTE — CARE COORDINATION
ACM attempted to reach patient by telephone for a Care Coordination outreach and to \"Tuck In\" patient for the holiday. Unable to leave a message d/t no VM. PLAN:  -ACM will attempt to reach patient again. Graft Donor Site Will Heal By Secondary Intention: No

## 2022-01-06 ENCOUNTER — CARE COORDINATION (OUTPATIENT)
Dept: CARE COORDINATION | Age: 77
End: 2022-01-06

## 2022-01-06 NOTE — CARE COORDINATION
ACM attempted to reach patient for a Care Coordination outreach. Unable to leave a message d/t no voicemail.     PLAN:  -ACM will attempt to reach patient again

## 2022-01-07 ENCOUNTER — CARE COORDINATION (OUTPATIENT)
Dept: CARE COORDINATION | Age: 77
End: 2022-01-07

## 2022-01-07 NOTE — CARE COORDINATION
Ambulatory Care Coordination Note  1/7/2022  CM Risk Score: 6  Charlson 10 Year Mortality Risk Score: 47%     ACC: Rosie Carver RN    Summary Note:   -ACM contacted patient for a Care Coordination outreach. Patient reports she is feeling good. She commented that she is nervous about getting COVID so she has been staying home. -Reviewed medications. Patient taking all of her medications as prescribed.  -Patient is not vaccinated for COVID. Patient is concerned about her health issues and getting the vaccine.  -Patient agrees to discuss the COVID vaccine with her PCP, Dr. Ruthann Daniel.  -Patient states that she is agreeable to having a  reach out about her transportation needs. ACM will send a referral for transportation. **GLAUCOMA:  -No changes with her vision. The vision in the left eye remains the same.  -Patient continues to use the brimonidine and latanoprost eye drops as prescribed. **CHF:  -see assessment  -Reviewed HF zone tool with the patinet. Patient states that she is in the \"green\" zone. Patient denies worsening of her shortness of breath, denies an increase of swelling in her lower extremities, and denies chest pain.  -Patient continues to not weigh herself daily. Patient dislikes weighing herself. -Reviewed low sodium diet. -ACM discussed with patient that weighing herself daily is important to keep track of her CHF status. Patient verbalized understanding and agrees to consider weighing herself.       **HTN:  -controlled with current medication    **Chronic Atrial fibrillation:  -Patient continues to take 3 mg Coumadin daily.  -Last INR check was on 10/29/1021 and it was 2.5  -Patient agrees to call Dr. Ruthann Daniel to ask about having her INR checked    PLAN:  -send referral for transportation needs / follow up with patient  -confirm that patient contacted Dr. Ruthann Daniel about having INR checked and a referral for a cardiologist  -discuss daily weights / did patient start weighing herself?  -ask about advance directives  -review medications  -continue Care Coordination              Care Coordination Interventions    Program Enrollment: Complex Care  Referral from Primary Care Provider: No  Suggested Interventions and Community Resources  Fall Risk Prevention: In Process (Comment: 12/15, 1/7)  Pharmacist: Samira Schroeder (Comment: 12/15)  Registered Dietician: Declined (Comment: 12/15)  Transportation Support: In Process  Zone Management Tools: In Process (Comment: 12/15-CHF, 1/7)         Goals Addressed                 This Visit's Progress     Conditions and Symptoms   On track     I will schedule office visits, as directed by my provider. I will keep my appointment or reschedule if I have to cancel. I will notify my provider of any barriers to my plan of care. I will follow my Zone Management tool to seek urgent or emergent care. I will notify my provider of any symptoms that indicate a worsening of my condition. Barriers: lack of motivation and lack of education  Plan for overcoming my barriers: Support from Care Coordination  Confidence: 8/10  Anticipated Goal Completion Date: 3/15/2022         Medication Management   On track     I will take my medication as directed. I will notify my provider of any problems with medications, like adverse effects or side effects. I will notify my provider/Care Coordinator if I am unable to afford my medications. I will notify my provider for advice before I stop taking any of my medication. Barriers: overwhelmed by complexity of regimen and lack of education  Plan for overcoming my barriers: Support from Care Coordination  Confidence: 8/10  Anticipated Goal Completion Date: 3/15/2021        Self Monitoring   No change     Daily Weights - I will weight myself as directed - Daily and write down weights  I will notify my provider of any increase in weight by 3 or more pounds in 2 days OR 5 or more pounds in a week.     Patient Reported Weight No flowsheet data found. Barriers: lack of motivation  Plan for overcoming my barriers: Support from Care Coordination and use of HF Zone Tool  Confidence: 7/10  Anticipated Goal Completion Date: 3/15/2022              Prior to Admission medications    Medication Sig Start Date End Date Taking? Authorizing Provider   simvastatin (ZOCOR) 20 MG tablet Take 1 tablet by mouth nightly 10/13/21  Yes Francisco Dias DO   amLODIPine (NORVASC) 10 MG tablet Take 1 tablet by mouth daily 10/13/21  Yes Francisco Dias DO   enalapril (VASOTEC) 10 MG tablet TAKE ONE TABLET BY MOUTH TWO TIMES A DAY 10/13/21  Yes Joyce Dias DO   warfarin (COUMADIN) 3 MG tablet Take one tablet daily 10/13/21  Yes Francisco Dias DO   aspirin 81 MG EC tablet Take 1 tablet by mouth daily 7/17/21  Yes Ankita Bourgeois MD   oxymetazoline (VICKS SINEX) 0.05 % nasal spray 2 sprays by Nasal route 2 times daily  Patient not taking: Reported on 12/15/2021    Historical Provider, MD   mupirocin (BACTROBAN) 2 % ointment Apply topically 3 times daily Apply topically 3 times daily.     Historical Provider, MD   metoprolol tartrate (LOPRESSOR) 50 MG tablet TAKE ONE TABLET BY MOUTH TWO TIMES A DAY 10/13/21   [facility], DO   fluticasone Amanda Ouch) 50 MCG/ACT nasal spray 1 spray by Each Nostril route daily 10/13/21   [facility], DO   furosemide (LASIX) 20 MG tablet Take 1.5 tablets by mouth daily 9/14/21 10/14/21  Conni Bernheim, MD   potassium chloride (KLOR-CON M) 10 MEQ extended release tablet Take 1.5 tablets by mouth daily 9/14/21   Conni Bernheim, MD   brimonidine (ALPHAGAN) 0.2 % ophthalmic solution INSTILL 1 DROP EVERY DAY INTO LEFT EYE AT 8 AM. 4/15/21   Historical Provider, MD   latanoprost (XALATAN) 0.005 % ophthalmic solution INSTILL 1 DROP IN THE LEFT EYE EVERY DAY AT BEDTIME 4/15/21   Historical Provider, MD   pantoprazole (PROTONIX) 40 MG tablet Take 1 tablet by mouth daily 4/28/21   [facility], DO

## 2022-01-10 ENCOUNTER — CARE COORDINATION (OUTPATIENT)
Dept: CARE COORDINATION | Age: 77
End: 2022-01-10

## 2022-01-10 NOTE — CARE COORDINATION
Initial Contact Social Work Note - Ambulatory  1/10/2022      Date of referral: 1/7/22  Referral received from: Asad Adams RN ACM  Reason for referral: transportation    Previous  referral: No  If yes, brief summary of outcome: NA    Two Identifiers Verified: Yes    Insurance Provider: Rosales Orellana ESSENTIAL/PLUS    Support System: Adult Child/Children and Sibling(s) (adult granddaughter that lives with her; 2 sons in the area; sister in the area)    [place] Status: NA    Community Providers: NA    ADL Assistance Needed: NA    Housing/Living Concerns or Home Modification Needs: NA     Transportation Concern: Coleen Beth doesn't drive; granddaughter was doing transportation but works during the day; issues with getting to/from medical appointments    Medication Cost Concern: NA  Medication Adherence Concern: NA    Financial Concern(s): NA    Income (only if applicable): NA    Ability to Read/Write: Yes    Advance Care Plan:  N/A    Other: NA    Identified Needs:   transportation       Social Work Plan:   Process WR and [facility] Next [address]: update AC    Method of Communication With Provider (if appropriate): N/a      Goals Addressed    None        [facility] spoke with Coleen Beth. Coleen Beth was very uncomfortable at first talking with [facility] and needed proof of [facility] and connection to [facility]. After sufficient proof given Coleen Beth was willing to complete assessment. Reviewed reason for call being help with transportation. Coleen Beth states that she does have a granddaughter that lives with her, but due to her work hours it has made getting to appointments difficult. Coleen Beth states her sister lives near by but doesn't drive in the snow. That limits the help she can get from her. Coleen Beth does not drive herself. [facility] attempted to process and explain the MH-van and how to use it as well as the [address] system and comfort care a van. Coleen Beth states [address] doesn't come into her area;  Does not want to use [facility] and states it should be left for people that \"really need it\". She was not interested in comfort care a van either. She reports that \"this all seems like more of a hassle than I'm willing to deal with\". [facility] inquired about the 2/2/22 PCP OV that Nuvia Alvarenga has and if she has secured transportation to that appointment. Nuvia Alvarenga stated that with the week of decent weather we are having now she is going to call and see if she can move her appointment up and have her sister take her. Nuvia Alvarenga was very nice on the phone but declined help with transportation at this time. She is not interested in [facility] assistance at this time. [facility] will update the ACM that made the referral.    [facility] did talk about AD's and ACP but Nuvia Alvarenga reports that she is not ready for any of that at this time. States she has multiple children she will need to speak with first and has too much going on at the moment. No ACP referral will be made.

## 2022-01-18 DIAGNOSIS — I50.22 CHRONIC SYSTOLIC CONGESTIVE HEART FAILURE (HCC): Chronic | ICD-10-CM

## 2022-01-18 DIAGNOSIS — I48.20 CHRONIC ATRIAL FIBRILLATION (HCC): Chronic | ICD-10-CM

## 2022-01-18 RX ORDER — POTASSIUM CHLORIDE 750 MG/1
15 TABLET, EXTENDED RELEASE ORAL DAILY
Qty: 45 TABLET | Refills: 3 | Status: SHIPPED
Start: 2022-01-18 | End: 2022-02-02 | Stop reason: SDUPTHER

## 2022-01-18 RX ORDER — WARFARIN SODIUM 3 MG/1
TABLET ORAL
Qty: 90 TABLET | Refills: 0 | Status: SHIPPED
Start: 2022-01-18 | End: 2022-02-02 | Stop reason: SDUPTHER

## 2022-01-18 NOTE — TELEPHONE ENCOUNTER
Last Appointment:  10/13/2021  Future Appointments   Date Time Provider Linh Colon   2/2/2022  1:00 PM DO JEOVANNY [facility]   7/12/2022 10:30 AM [facility] ECHO RM 1  CARD[address] [facility]   7/12/2022 11:30 AM [address] [facility]        Patient need Warfarin today she is out of medication.  Please Advise

## 2022-01-21 ENCOUNTER — CARE COORDINATION (OUTPATIENT)
Dept: CARE COORDINATION | Age: 77
End: 2022-01-21

## 2022-01-21 NOTE — CARE COORDINATION
Ambulatory Care Coordination Note  1/21/2022  CM Risk Score: 6  Charlson 10 Year Mortality Risk Score: 47%     ACC: Giovanni Rodriguez RN    Summary Note:   -ACM reached patient by telephone for a Care Coordination outreach.  -Patient states that she has been feeling \"pretty good. \"  -Reviewed medications - patient taking all of her medications as prescribed.  -Patient declined transportation assistance at this time.  -Patient accomplished 2 goals since last CC outreach.    -Patient made an appointment with PCP. -Patient got a new digital scale and is weighing herself daily. **Glaucoma:  -No changes with her vision. The vision in the left eye remains the same.  -Patient continues to use the brimonidine and latanoprost eye drops as prescribed. **CHF:  -See assessment  -Reviewed HF zone tool with the patient. Patient states that she is in the \"green\" zone. Patient denies worsening of her shortness of breath, denies an increase of swelling in her lower extremities, and denies chest pain. -Reviewed low sodium diet.  -Patient accomplished a goal - patient got a new digital scale and is weighing herself daily. Patient still not comfortable reporting her weight to AC but is recording it herself and keeping track. Patient states that her Yvonne Mackintosh has been very consistent. \"  -ACM reviewed with patient that if she were to gain or lose more than 3 pounds within 1-7 days, she is to report this to her PCP. Patient verbalized understanding.     **HTN:  -controlled with current medication    **Chronic A. Fib:  -Patient continues to take 3 mg Coumadin daily.  -Last INR - 2.5 (10/29/2021)  -Patient has appointment scheduled with Dr. Zully Reilly on 2/2/2022    PLAN:  -review medications, CHF zone tool, and low sodium diet  -daily weights  -review notes from office visit (Dr. Zully Reilly, / 2/2/2022) - INR level  -continue Care Coordination        Care Coordination Interventions    Program Enrollment: Complex Care  Referral from Primary Care Provider: No  Suggested Interventions and Community Resources  Fall Risk Prevention: In Process (Comment: 12/15, 1/7, 1/21/22)  Pharmacist: Enrique Stanley (Comment: 12/15)  Registered Dietician: Enrique Stanley (Comment: 12/15)  Social Work: Declined (Comment: 1/21/2022 - AD)  Transportation Support: Declined  Zone Management Tools: In Process (Comment: 12/15-CHF, 1/7,  1/21/22 - CHF)         Goals Addressed                 This Visit's Progress     Conditions and Symptoms   Improving     I will schedule office visits, as directed by my provider. I will keep my appointment or reschedule if I have to cancel. I will notify my provider of any barriers to my plan of care. I will follow my Zone Management tool to seek urgent or emergent care. I will notify my provider of any symptoms that indicate a worsening of my condition. Barriers: lack of motivation and lack of education  Plan for overcoming my barriers: Support from Care Coordination  Confidence: 8/10  Anticipated Goal Completion Date: 3/15/2022         Medication Management   On track     I will take my medication as directed. I will notify my provider of any problems with medications, like adverse effects or side effects. I will notify my provider/Care Coordinator if I am unable to afford my medications. I will notify my provider for advice before I stop taking any of my medication. Barriers: overwhelmed by complexity of regimen and lack of education  Plan for overcoming my barriers: Support from Care Coordination  Confidence: 8/10  Anticipated Goal Completion Date: 3/15/2021       Charanjit Bird Self Monitoring   Improving     Daily Weights - I will weight myself as directed - Daily and write down weights  I will notify my provider of any increase in weight by 3 or more pounds in 2 days OR 5 or more pounds in a week. Patient Reported Weight No flowsheet data found.     Barriers: lack of motivation  Plan for overcoming my barriers: Support from Care Coordination and use of HF Zone Tool  Confidence: 7/10  Anticipated Goal Completion Date: 3/15/2022              Prior to Admission medications    Medication Sig Start Date End Date Taking? Authorizing Provider   warfarin (COUMADIN) 3 MG tablet Take one tablet daily 1/18/22  Yes Francisco Dias DO   potassium chloride (KLOR-CON M) 10 MEQ extended release tablet Take 1.5 tablets by mouth daily 1/18/22  Yes Francisco Dias DO   metoprolol tartrate (LOPRESSOR) 50 MG tablet TAKE ONE TABLET BY MOUTH TWO TIMES A DAY 10/13/21  Yes Francisco Dias DO   simvastatin (ZOCOR) 20 MG tablet Take 1 tablet by mouth nightly 10/13/21  Yes Francisco Dias DO   fluticasone (FLONASE) 50 MCG/ACT nasal spray 1 spray by Each Nostril route daily 10/13/21  Yes Francisco Dias DO   amLODIPine (NORVASC) 10 MG tablet Take 1 tablet by mouth daily 10/13/21  Yes Francisco Dias DO   enalapril (VASOTEC) 10 MG tablet TAKE ONE TABLET BY MOUTH TWO TIMES A DAY 10/13/21  Yes Francisco Dias DO   aspirin 81 MG EC tablet Take 1 tablet by mouth daily 7/17/21  Yes Thompson Mohan MD   brimonidine (ALPHAGAN) 0.2 % ophthalmic solution INSTILL 1 DROP EVERY DAY INTO LEFT EYE AT 8 AM. 4/15/21  Yes Historical Provider, MD   latanoprost (XALATAN) 0.005 % ophthalmic solution INSTILL 1 DROP IN THE LEFT EYE EVERY DAY AT BEDTIME 4/15/21  Yes Historical Provider, MD   pantoprazole (PROTONIX) 40 MG tablet Take 1 tablet by mouth daily 4/28/21  Yes Francisco Dias DO   oxymetazoline (VICKS SINEX) 0.05 % nasal spray 2 sprays by Nasal route 2 times daily  Patient not taking: Reported on 12/15/2021    Historical Provider, MD   mupirocin (BACTROBAN) 2 % ointment Apply topically 3 times daily Apply topically 3 times daily.   Patient not taking: Reported on 1/7/2022    Historical Provider, MD   furosemide (LASIX) 20 MG tablet Take 1.5 tablets by mouth daily 9/14/21 10/14/21  Severiano Gilmore, MD       Future Appointments   Date Time Provider Linh Colon 2/2/2022  1:00 PM Felix Reyes, DO ACC IM [facility]   [facility] [place]   7/12/2022 11:30 AM [facility] STRUCT[facility] HEART SCHEDULE [place] CARDIO [facility]     ,   Congestive Heart Failure Assessment    Are you currently restricting fluids?: No Restriction  Do you understand a low sodium diet?: Yes  Do you understand how to read food labels?: Yes  How many restaurant meals do you eat per week?: 1-2  Do you salt your food before tasting it?: No     No patient-reported symptoms      Symptoms:  None: Yes      Symptom course: stable  Patient-reported weight (lb):  (Comment: 1/21/2022 - patient reports no change in weight)  Weight trend: stable  Salt intake watch compared to last visit: stable      and   General Assessment    Do you have any symptoms that are causing concern?: Yes  Progression since Onset: Unchanged  Reported Symptoms:  (Comment: 1/21/22 -Glaucoma)

## 2022-02-02 ENCOUNTER — OFFICE VISIT (OUTPATIENT)
Dept: INTERNAL MEDICINE | Age: 77
End: 2022-02-02
Payer: MEDICARE

## 2022-02-02 VITALS
SYSTOLIC BLOOD PRESSURE: 126 MMHG | HEART RATE: 65 BPM | RESPIRATION RATE: 16 BRPM | WEIGHT: 238 LBS | DIASTOLIC BLOOD PRESSURE: 50 MMHG | HEIGHT: 64 IN | TEMPERATURE: 97.8 F | BODY MASS INDEX: 40.63 KG/M2 | OXYGEN SATURATION: 98 %

## 2022-02-02 DIAGNOSIS — Z95.2 S/P TAVR (TRANSCATHETER AORTIC VALVE REPLACEMENT): ICD-10-CM

## 2022-02-02 DIAGNOSIS — E78.2 MIXED HYPERLIPIDEMIA: ICD-10-CM

## 2022-02-02 DIAGNOSIS — J30.0 VASOMOTOR RHINITIS: ICD-10-CM

## 2022-02-02 DIAGNOSIS — I25.10 CORONARY ARTERY DISEASE INVOLVING NATIVE CORONARY ARTERY OF NATIVE HEART WITHOUT ANGINA PECTORIS: Primary | Chronic | ICD-10-CM

## 2022-02-02 DIAGNOSIS — K21.9 GASTROESOPHAGEAL REFLUX DISEASE WITHOUT ESOPHAGITIS: ICD-10-CM

## 2022-02-02 DIAGNOSIS — Z95.1 S/P CABG (CORONARY ARTERY BYPASS GRAFT): ICD-10-CM

## 2022-02-02 DIAGNOSIS — I50.32 CHRONIC HEART FAILURE WITH PRESERVED EJECTION FRACTION (HCC): ICD-10-CM

## 2022-02-02 DIAGNOSIS — Z95.2 MITRAL VALVE REPLACED: ICD-10-CM

## 2022-02-02 DIAGNOSIS — I38 VALVULAR HEART DISEASE: Chronic | ICD-10-CM

## 2022-02-02 DIAGNOSIS — E88.09 HYPOALBUMINEMIA: ICD-10-CM

## 2022-02-02 DIAGNOSIS — Z79.01 CHRONIC ANTICOAGULATION: ICD-10-CM

## 2022-02-02 DIAGNOSIS — I10 ESSENTIAL HYPERTENSION: Chronic | ICD-10-CM

## 2022-02-02 DIAGNOSIS — I50.22 CHRONIC SYSTOLIC CONGESTIVE HEART FAILURE (HCC): Chronic | ICD-10-CM

## 2022-02-02 DIAGNOSIS — F41.9 ANXIETY AND DEPRESSION: ICD-10-CM

## 2022-02-02 DIAGNOSIS — I48.20 CHRONIC ATRIAL FIBRILLATION (HCC): Chronic | ICD-10-CM

## 2022-02-02 DIAGNOSIS — F32.A ANXIETY AND DEPRESSION: ICD-10-CM

## 2022-02-02 LAB
INTERNATIONAL NORMALIZATION RATIO, POC: 2.3
PROTHROMBIN TIME, POC: 27.6

## 2022-02-02 PROCEDURE — 85610 PROTHROMBIN TIME: CPT | Performed by: INTERNAL MEDICINE

## 2022-02-02 PROCEDURE — 99212 OFFICE O/P EST SF 10 MIN: CPT | Performed by: INTERNAL MEDICINE

## 2022-02-02 PROCEDURE — 99214 OFFICE O/P EST MOD 30 MIN: CPT | Performed by: INTERNAL MEDICINE

## 2022-02-02 RX ORDER — ENALAPRIL MALEATE 10 MG/1
TABLET ORAL
Qty: 180 TABLET | Refills: 3 | Status: SHIPPED
Start: 2022-02-02 | End: 2022-10-12 | Stop reason: SDUPTHER

## 2022-02-02 RX ORDER — FUROSEMIDE 20 MG/1
20 TABLET ORAL 2 TIMES DAILY
Qty: 60 TABLET | Refills: 0 | Status: SHIPPED | OUTPATIENT
Start: 2022-02-02 | End: 2022-10-12 | Stop reason: SDUPTHER

## 2022-02-02 RX ORDER — AMLODIPINE BESYLATE 10 MG/1
10 TABLET ORAL DAILY
Qty: 90 TABLET | Refills: 3 | Status: SHIPPED
Start: 2022-02-02 | End: 2022-10-12 | Stop reason: SDUPTHER

## 2022-02-02 RX ORDER — FLUTICASONE PROPIONATE 50 MCG
1 SPRAY, SUSPENSION (ML) NASAL DAILY
Qty: 2 EACH | Refills: 3 | Status: SHIPPED
Start: 2022-02-02 | End: 2022-10-12 | Stop reason: SDUPTHER

## 2022-02-02 RX ORDER — SIMVASTATIN 20 MG
20 TABLET ORAL NIGHTLY
Qty: 90 TABLET | Refills: 3 | Status: SHIPPED
Start: 2022-02-02 | End: 2022-10-12 | Stop reason: SDUPTHER

## 2022-02-02 RX ORDER — METOPROLOL TARTRATE 50 MG/1
TABLET, FILM COATED ORAL
Qty: 180 TABLET | Refills: 3 | Status: SHIPPED
Start: 2022-02-02 | End: 2022-10-12 | Stop reason: SDUPTHER

## 2022-02-02 RX ORDER — FUROSEMIDE 20 MG/1
20 TABLET ORAL 2 TIMES DAILY
Qty: 180 TABLET | Refills: 3 | Status: SHIPPED | OUTPATIENT
Start: 2022-02-02 | End: 2022-07-26

## 2022-02-02 RX ORDER — WARFARIN SODIUM 3 MG/1
TABLET ORAL
Qty: 90 TABLET | Refills: 0 | Status: SHIPPED
Start: 2022-02-02 | End: 2022-03-16 | Stop reason: SDUPTHER

## 2022-02-02 RX ORDER — CITALOPRAM 10 MG/1
10 TABLET ORAL DAILY
Qty: 30 TABLET | Refills: 1 | Status: SHIPPED | OUTPATIENT
Start: 2022-02-02 | End: 2022-05-04

## 2022-02-02 RX ORDER — ASPIRIN 81 MG/1
81 TABLET ORAL DAILY
Qty: 90 TABLET | Refills: 3 | Status: SHIPPED
Start: 2022-02-02 | End: 2022-10-12 | Stop reason: SDUPTHER

## 2022-02-02 RX ORDER — POTASSIUM CHLORIDE 750 MG/1
10 TABLET, EXTENDED RELEASE ORAL 2 TIMES DAILY
Qty: 180 TABLET | Refills: 3 | Status: SHIPPED
Start: 2022-02-02 | End: 2022-10-12 | Stop reason: SDUPTHER

## 2022-02-02 ASSESSMENT — ENCOUNTER SYMPTOMS
NAUSEA: 0
COUGH: 0
DIARRHEA: 0
SHORTNESS OF BREATH: 1
ABDOMINAL DISTENTION: 0
BLOOD IN STOOL: 0
CONSTIPATION: 0
ABDOMINAL PAIN: 0
VOMITING: 0

## 2022-02-02 NOTE — PROGRESS NOTES
HPI:  Patient comes in today for routine follow u for HTN, CAD, s/p TAVR, chronic atrial fibrillation, chronic anticoagulation on coumadin,  Chronic systolic heart failure, and hyperlipidemia. Patient states that her anxiety is getting worse. She states that she still gets very anxious about going out anywhere but also has had anxiety on a more regular basis and almost daily. She states that when her anxiety gets so bad she then has feelings of depressed mood. She said that she only takes ativan when she becomes paralyzed by the anxiety and has visible shaking, . She has also been c/o afternoon dizziness ot lightheadedness, about an hour after she takes her second dose of enalapril and feels that she has increased lower extremity edema. She only gets short of breath when she ambulates > 100 feet continuously and if she breaks it up into 50 foot increments she is OK. She has not had any chest pain or palpitations. I reviewed medical, surg histories, meds and allergies. Review of Systems  Review of Systems   Constitutional: Positive for fatigue (intermittently). Negative for chills, diaphoresis and fever. Respiratory: Positive for shortness of breath (see HPI). Negative for cough. Cardiovascular: Positive for leg swelling (see hpi). Negative for chest pain and palpitations. Gastrointestinal: Negative for abdominal distention, abdominal pain, blood in stool, constipation, diarrhea, nausea and vomiting. Genitourinary: Negative for difficulty urinating, frequency, hematuria and urgency. Neurological: Positive for dizziness (see hpi) and light-headedness (see hpi). Negative for headaches. PE:  VS:  BP (!) 126/50   Pulse 65   Temp 97.8 °F (36.6 °C) (Oral)   Resp 16   Ht 5' 4\" (1.626 m)   Wt 238 lb (108 kg)   SpO2 98%   BMI 40.85 kg/m²   Physical Exam  Constitutional:       General: She is not in acute distress. Appearance: She is not ill-appearing.    Cardiovascular:      Rate and Rhythm: Normal rate. Rhythm irregular. Heart sounds: Murmur (grade 2/6 CYNDY LSB) heard. No friction rub. No gallop. Comments: Appears to have widely split S2   Pulmonary:      Effort: Pulmonary effort is normal. No respiratory distress. Breath sounds: Normal breath sounds. No wheezing or rales. Abdominal:      General: Bowel sounds are normal.      Palpations: Abdomen is soft. Tenderness: There is no abdominal tenderness. Musculoskeletal:         General: Swelling (she has edema to lower pretibial areas bilaterally) present. POCT INR 2.3    Assessment/Plan:      Diagnoses and all orders for this visit:    Coronary artery disease involving native coronary artery of native heart without angina pectoris  -     aspirin 81 MG EC tablet; Take 1 tablet by mouth daily      Essential hypertension, decrease enalapril to 10 mg in am and 5 mg in pm  -     amLODIPine (NORVASC) 10 MG tablet; Take 1 tablet by mouth daily  -     enalapril (VASOTEC) 10 MG tablet; TAKE ONE TABLET BY MOUTH in am and 1/2 tab 5mg in pm    Chronic atrial fibrillation (HCC)  -     warfarin (COUMADIN) 3 MG tablet; Take one tablet daily  -     metoprolol tartrate (LOPRESSOR) 50 MG tablet; TAKE ONE TABLET BY MOUTH TWO TIMES A DAY  -     POCT INR    S/P CABG (coronary artery bypass graft)    Mitral valve replaced    Mixed hyperlipidemia  -     simvastatin (ZOCOR) 20 MG tablet; Take 1 tablet by mouth nightly    Chronic anticoagulation, continue coumadin      S/P TAVR (transcatheter aortic valve replacement)    Chronic systolic congestive heart failure ([address] increase lasix to 20 mg bid  -     potassium chloride (KLOR-CON M) 10 MEQ extended release tablet; Take 1 tablet by mouth 2 times daily  -     enalapril (VASOTEC) 10 MG tablet; TAKE ONE TABLET BY MOUTH in am and 1/2 tab 5 mg in pm  -     furosemide (LASIX) 20 MG tablet;  Take 1 tablet by mouth 2 times daily    Vasomotor rhinitis  -     fluticasone (FLONASE) 50 MCG/ACT nasal spray; 1 spray by Each Nostril route daily    Gastroesophageal reflux disease without esophagitis, continue protonix as needed    Hypoalbuminemia  -     COMPREHENSIVE METABOLIC PANEL; Future    Anxiety and depression  celexa 10 mg/d, discussed possible side effects    RTc 4-6 weeks, check labs in 2 weeks.

## 2022-02-02 NOTE — PROGRESS NOTES
[facility]  [facility]  Printed prescription for lab work mailed  Printed prescriptions for 1 month supply of lasix and celexa faxed to [employer] at [phone]    Printed prescription for 90 day supply of lasix faxed to [place] at [phone]

## 2022-02-02 NOTE — PATIENT INSTRUCTIONS
Continue coumadin 3 mg daily   Repeat INR on next visit in 1 month  Bleeding precautions as reviewed  Decrease enalapril 10 mg in am and 5 mg in pm  Increase lasix to 20 mg twice daily  Take potassium 10 meq one tablet twice daily  Start celexa 10 mg daily as discussed  Continue all other medications as instructed  Have blood work done in two weeks as discussed  Please call if any questions or concerns

## 2022-02-04 ENCOUNTER — CARE COORDINATION (OUTPATIENT)
Dept: CARE COORDINATION | Age: 77
End: 2022-02-04

## 2022-02-04 NOTE — CARE COORDINATION
Ambulatory Care Coordination Note  2/4/2022  CM Risk Score: 6  Charlson 10 Year Mortality Risk Score: 47%     ACC: Giovanni Rodriguez RN    Summary Note:   -ACM reached patient by phone for a care coordination outreach. Patient was in good spirits and reports that she \"feeling good. \"  -Patient states that she had a good appointment with Dr. Zully Reilly on 2/2/2022.      -MEDICATIONS: reviewed medications and patient is taking her medications as prescribed.   -Patient reports that she took the first dose of Celexa last night and woke up early in the morning feeling very anxious and dizzy. Patient states that she felt \"terrible\" and feels as though she cannot tolerate this medication and does not want to take it again. ACM encouraged patient to reach out to her PCP if her anxiety worsens. Patient verbalized understanding. Patient states that she will talk to Dr. Zully Reilly about this at her next appointment (3/16/2022). -CHF:  -See assessment  -Reviewed HF zone tool with the patient. Ric Mcknight states that she is in the \"green\" zone.  Patient denies worsening of her shortness of breath, denies an increase of swelling in her lower extremities, and denies chest pain. -Reviewed low sodium diet.  -Patient has been weighing herself Radha Ruff couple times a week. \"  ACM encouraged patient to increase the amount of times she weighs herself in a week. Patient verbalizes understanding that the goal is for her to weigh herself daily.  -Patient reports that her most recent weight was 238 lbs. -ACM reviewed with patient that if she were to gain or lose more than 3 pounds within 1-7 days, she is to report this to her PCP.  Patient verbalized understanding.    -Chronic A. Fib:  -Patient continues to take 3 mg of Coumadin daily  -INR level:  2.3 (2/2/2022)  -Next INR will be checked on 3/16/2022      PLAN:  -review medications, HF zone tool, low sodium diet  -ask patient for daily weights  -review most recent blood work (to be completed by 2/16/2022)  -continue care coordination                 Care Coordination Interventions    Program Enrollment: Complex Care  Referral from Primary Care Provider: No  Suggested Interventions and Community Resources  Fall Risk Prevention: In Process (Comment: 12/15, 1/7, 1/21/22)  Pharmacist: Paola Domínguez (Comment: 12/15)  Registered Dietician: Paola Domínguez (Comment: 12/15)  Social Work: Declined (Comment: 1/21/2022 - AD)  Transportation Support: Declined  Zone Management Tools: In Process (Comment: 12/15-CHF, 1/7,  1/21/22 - CHF)         Goals Addressed                 This Visit's Progress     Conditions and Symptoms   On track     I will schedule office visits, as directed by my provider. I will keep my appointment or reschedule if I have to cancel. I will notify my provider of any barriers to my plan of care. I will follow my Zone Management tool to seek urgent or emergent care. I will notify my provider of any symptoms that indicate a worsening of my condition. Barriers: lack of motivation and lack of education  Plan for overcoming my barriers: Support from Care Coordination  Confidence: 8/10  Anticipated Goal Completion Date: 3/15/2022         Medication Management   On track     I will take my medication as directed. I will notify my provider of any problems with medications, like adverse effects or side effects. I will notify my provider/Care Coordinator if I am unable to afford my medications. I will notify my provider for advice before I stop taking any of my medication.     Barriers: overwhelmed by complexity of regimen and lack of education  Plan for overcoming my barriers: Support from Care Coordination  Confidence: 8/10  Anticipated Goal Completion Date: 3/15/2021       Perez Self Monitoring   Improving     Daily Weights - I will weight myself as directed - Daily and write down weights  I will notify my provider of any increase in weight by 3 or more pounds in 2 days OR 5 or more pounds in a week.    Patient Reported Weight No flowsheet data found. Barriers: lack of motivation  Plan for overcoming my barriers: Support from Care Coordination and use of HF Zone Tool  Confidence: 7/10  Anticipated Goal Completion Date: 3/15/2022              Prior to Admission medications    Medication Sig Start Date End Date Taking?  Authorizing Provider   warfarin (COUMADIN) 3 MG tablet Take one tablet daily 2/2/22  Yes Francisco Dias DO   potassium chloride (KLOR-CON M) 10 MEQ extended release tablet Take 1 tablet by mouth 2 times daily 2/2/22  Yes Francisco Dias DO   metoprolol tartrate (LOPRESSOR) 50 MG tablet TAKE ONE TABLET BY MOUTH TWO TIMES A DAY 2/2/22  Yes Francisco Dias DO   simvastatin (ZOCOR) 20 MG tablet Take 1 tablet by mouth nightly 2/2/22  Yes Francisco Dias DO   fluticasone (FLONASE) 50 MCG/ACT nasal spray 1 spray by Each Nostril route daily 2/2/22  Yes Francisco Dias DO   amLODIPine (NORVASC) 10 MG tablet Take 1 tablet by mouth daily 2/2/22  Yes Francisco Dias DO   enalapril (VASOTEC) 10 MG tablet TAKE ONE TABLET BY MOUTH TWO TIMES A DAY 2/2/22  Yes Francisco Dias DO   furosemide (LASIX) 20 MG tablet Take 1 tablet by mouth 2 times daily 2/2/22 3/4/22 Gabbie Dias DO   aspirin 81 MG EC tablet Take 1 tablet by mouth daily 2/2/22  Yes Francisco Dias DO   brimonidine (ALPHAGAN) 0.2 % ophthalmic solution INSTILL 1 DROP EVERY DAY INTO LEFT EYE AT 8 AM. 4/15/21  Yes Historical Provider, MD   latanoprost (XALATAN) 0.005 % ophthalmic solution INSTILL 1 DROP IN THE LEFT EYE EVERY DAY AT BEDTIME 4/15/21  Yes Historical Provider, MD   pantoprazole (PROTONIX) 40 MG tablet Take 1 tablet by mouth daily 4/28/21  Yes Francisco Dias DO   citalopram (CELEXA) 10 MG tablet Take 1 tablet by mouth daily  Patient not taking: Reported on 2/4/2022 2/2/22   Vera Burden,    furosemide (LASIX) 20 MG tablet Take 1 tablet by mouth 2 times daily 2/2/22   Vera Burden DO Future Appointments   Date Time Provider Linh Colon   3/16/2022  1:00 PM Victor M Childress DO ACC IM [facility]   7/12/2022 10:30 AM [facility] ECHO RM 1 SEYZ CARDIO [facility]   7/12/2022 11:30 AM [facility] STRUCTURAL HEART SCHEDULE [place] CARDIO [facility]     ,   Congestive Heart Failure Assessment    Are you currently restricting fluids?: No Restriction  Do you understand a low sodium diet?: Yes  Do you understand how to read food labels?: Yes  How many restaurant meals do you eat per week?: 1-2  Do you salt your food before tasting it?: No     No patient-reported symptoms      Symptoms:  None: Yes      Symptom course: stable  Patient-reported weight (lb): 238  Weight trend: stable  Salt intake watch compared to last visit: stable      and   General Assessment    Do you have any symptoms that are causing concern?: No

## 2022-02-16 ENCOUNTER — HOSPITAL ENCOUNTER (OUTPATIENT)
Age: 77
Discharge: HOME | End: 2022-02-16
Payer: MEDICARE

## 2022-02-16 DIAGNOSIS — E88.09 HYPOALBUMINEMIA: ICD-10-CM

## 2022-02-16 LAB
ALBUMIN SERPL-MCNC: 4 G/DL (ref 3.5–5.2)
ALP BLD-CCNC: 88 U/L (ref 35–104)
ALT SERPL-CCNC: 10 U/L (ref 0–32)
ANION GAP SERPL CALCULATED.3IONS-SCNC: 12 MMOL/L (ref 7–16)
AST SERPL-CCNC: 13 U/L (ref 0–31)
BILIRUB SERPL-MCNC: 0.8 MG/DL (ref 0–1.2)
BUN BLDV-MCNC: 18 MG/DL (ref 6–23)
CALCIUM SERPL-MCNC: 9.2 MG/DL (ref 8.6–10.2)
CHLORIDE BLD-SCNC: 100 MMOL/L (ref 98–107)
CO2: 26 MMOL/L (ref 22–29)
CREAT SERPL-MCNC: 1 MG/DL (ref 0.5–1)
GFR AFRICAN AMERICAN: >60
GFR NON-AFRICAN AMERICAN: 54 ML/MIN/1.73
GLUCOSE BLD-MCNC: 108 MG/DL (ref 74–99)
POTASSIUM SERPL-SCNC: 4.2 MMOL/L (ref 3.5–5)
SODIUM BLD-SCNC: 138 MMOL/L (ref 132–146)
TOTAL PROTEIN: 7 G/DL (ref 6.4–8.3)

## 2022-02-16 PROCEDURE — 80053 COMPREHEN METABOLIC PANEL: CPT

## 2022-02-16 PROCEDURE — 36415 COLL VENOUS BLD VENIPUNCTURE: CPT

## 2022-02-18 ENCOUNTER — CARE COORDINATION (OUTPATIENT)
Dept: CARE COORDINATION | Age: 77
End: 2022-02-18

## 2022-02-18 NOTE — CARE COORDINATION
ACM attempted to contact patient by phone x2  for a Care Coordination outreach. Unable to leave a message due to no voicemail.     PLAN:  -ACM will attempt to reach patient again

## 2022-02-25 ENCOUNTER — CARE COORDINATION (OUTPATIENT)
Dept: CARE COORDINATION | Age: 77
End: 2022-02-25

## 2022-02-25 NOTE — CARE COORDINATION
ACM attempted to reach patient for a CC outreach. This is the second attempt to reach patient. Unable to leave a VM, phone just rings with no answer. PLAN:  -ACM will attempt to reach patient one more time by phone. If patient cannot be reached, [facility] will send the \"unable to be reached\" letter via the mail.

## 2022-03-03 ENCOUNTER — CARE COORDINATION (OUTPATIENT)
Dept: CARE COORDINATION | Age: 77
End: 2022-03-03

## 2022-03-03 NOTE — LETTER
3/3/2022    Erasmo Carlin  [address]     I have enjoyed working with you to improve your health. I would like to continue to provide you support. However, I have been unable to reach you at [phone]. If you would like continued access to your Nurse Care Coordinator, Jackson Motta RN, you can reach me at [phone]. I will wait to hear from you and will no longer reach out to you. Zuleika Gomez DO and his team will continue to provide care and be available for questions.       In good health,     Evangelist Franklin RN

## 2022-03-03 NOTE — CARE COORDINATION
ACM attempted to reach patient for a CC outreach. This is the third attempt to reach patient. Unable to leave a VM, phone just rings with no answer. PLAN:  -AC will send the \"unable to be reached\" letter via mail. If no response, AC will notify PCP, take name off care team, resolve the episode, and place patient in a temporary exclusion from Care Coordination due to unable to reach.

## 2022-03-16 ENCOUNTER — OFFICE VISIT (OUTPATIENT)
Dept: INTERNAL MEDICINE | Age: 77
End: 2022-03-16
Payer: MEDICARE

## 2022-03-16 ENCOUNTER — CARE COORDINATION (OUTPATIENT)
Dept: CARE COORDINATION | Age: 77
End: 2022-03-16

## 2022-03-16 VITALS
HEIGHT: 64 IN | HEART RATE: 78 BPM | RESPIRATION RATE: 18 BRPM | TEMPERATURE: 97.5 F | DIASTOLIC BLOOD PRESSURE: 60 MMHG | OXYGEN SATURATION: 93 % | WEIGHT: 234.4 LBS | SYSTOLIC BLOOD PRESSURE: 120 MMHG | BODY MASS INDEX: 40.02 KG/M2

## 2022-03-16 DIAGNOSIS — Z95.2 MITRAL VALVE REPLACED: ICD-10-CM

## 2022-03-16 DIAGNOSIS — K21.9 GASTROESOPHAGEAL REFLUX DISEASE WITHOUT ESOPHAGITIS: ICD-10-CM

## 2022-03-16 DIAGNOSIS — I10 ESSENTIAL HYPERTENSION: Chronic | ICD-10-CM

## 2022-03-16 DIAGNOSIS — Z95.1 S/P CABG (CORONARY ARTERY BYPASS GRAFT): ICD-10-CM

## 2022-03-16 DIAGNOSIS — I48.20 CHRONIC ATRIAL FIBRILLATION (HCC): Primary | Chronic | ICD-10-CM

## 2022-03-16 DIAGNOSIS — F41.9 ANXIETY AND DEPRESSION: ICD-10-CM

## 2022-03-16 DIAGNOSIS — F32.A ANXIETY AND DEPRESSION: ICD-10-CM

## 2022-03-16 DIAGNOSIS — Z95.2 S/P TAVR (TRANSCATHETER AORTIC VALVE REPLACEMENT): ICD-10-CM

## 2022-03-16 DIAGNOSIS — I25.10 CORONARY ARTERY DISEASE INVOLVING NATIVE CORONARY ARTERY OF NATIVE HEART WITHOUT ANGINA PECTORIS: Chronic | ICD-10-CM

## 2022-03-16 DIAGNOSIS — E78.2 MIXED HYPERLIPIDEMIA: Chronic | ICD-10-CM

## 2022-03-16 DIAGNOSIS — I38 VALVULAR HEART DISEASE: Chronic | ICD-10-CM

## 2022-03-16 DIAGNOSIS — Z79.01 CHRONIC ANTICOAGULATION: ICD-10-CM

## 2022-03-16 LAB
INTERNATIONAL NORMALIZATION RATIO, POC: 1.6
PROTHROMBIN TIME, POC: 19.5

## 2022-03-16 PROCEDURE — 99212 OFFICE O/P EST SF 10 MIN: CPT | Performed by: INTERNAL MEDICINE

## 2022-03-16 PROCEDURE — 85610 PROTHROMBIN TIME: CPT | Performed by: INTERNAL MEDICINE

## 2022-03-16 PROCEDURE — 99214 OFFICE O/P EST MOD 30 MIN: CPT | Performed by: INTERNAL MEDICINE

## 2022-03-16 RX ORDER — PANTOPRAZOLE SODIUM 40 MG/1
40 TABLET, DELAYED RELEASE ORAL DAILY
Qty: 90 TABLET | Refills: 3 | Status: SHIPPED
Start: 2022-03-16 | End: 2022-10-12 | Stop reason: SDUPTHER

## 2022-03-16 RX ORDER — WARFARIN SODIUM 3 MG/1
TABLET ORAL
Qty: 90 TABLET | Refills: 0 | Status: SHIPPED | OUTPATIENT
Start: 2022-03-16 | End: 2022-07-19 | Stop reason: SDUPTHER

## 2022-03-16 RX ORDER — WARFARIN SODIUM 1 MG/1
1 TABLET ORAL DAILY
Qty: 30 TABLET | Refills: 3 | Status: SHIPPED | OUTPATIENT
Start: 2022-03-16 | End: 2022-07-19 | Stop reason: SDUPTHER

## 2022-03-16 ASSESSMENT — ENCOUNTER SYMPTOMS
NAUSEA: 0
COUGH: 0
SHORTNESS OF BREATH: 0
ABDOMINAL PAIN: 0
VOMITING: 0
DIARRHEA: 0

## 2022-03-16 NOTE — PROGRESS NOTES
HPI:  Patient comes in today for routine follow up. Overall, she states she feels Ok. The only c/o she has is that her lower extremities are swollen and she feels they are a little more swollen than previously. She has no change in her chronic dyspnea on exertion, no chest pain, palpitations, orthopnea, PND. She denies cough or sputum. Patient states she has been taking 2 tylenol every night due to restlest legs and tightness in her feet, which she stated helped. Patient reports that she really doesn't keep her legs elevated when sitting most of the time. She also couldn't tolerate celexa, states it made her \"freak out\", she had increased anxiety and was \"paralyzed\". She just stopped taking it. Patient states that this month is really the worst for her emotionally and she feels that she will be much better after this month. I reviewed medical, surg, soc histories, meds and allergies. Review of Systems  Review of Systems   Constitutional: Negative for chills and fever. Respiratory: Negative for cough and shortness of breath. Cardiovascular: Positive for leg swelling (see HPI). Negative for chest pain and palpitations. Gastrointestinal: Negative for abdominal pain, diarrhea, nausea and vomiting. Genitourinary: Negative for difficulty urinating. Neurological: Negative for dizziness and light-headedness. PE:  VS:  /60 (Site: Left Upper Arm, Position: Sitting, Cuff Size: Large Adult)   Pulse 78   Temp 97.5 °F (36.4 °C) (Temporal)   Resp 18   Ht 5' 4\" (1.626 m)   Wt 234 lb 6.4 oz (106.3 kg)   SpO2 93% Comment: room air  BMI 40.23 kg/m²   Physical Exam  Constitutional:       General: She is not in acute distress. Appearance: She is not ill-appearing. Cardiovascular:      Rate and Rhythm: Normal rate. Rhythm irregular. Heart sounds: Murmur (systolic murmur grade 2/6 unchanged ) heard. No friction rub. No gallop.     Pulmonary:      Effort: Pulmonary effort is normal. No respiratory distress. Breath sounds: Normal breath sounds. No wheezing, rhonchi or rales. Abdominal:      General: Bowel sounds are normal.      Palpations: Abdomen is soft. Musculoskeletal:         General: Swelling (patient has bilateral lower extremity edema to the mid-pretibial areas) present. No tenderness. Comments: There is no erythema of lower extremities. POCT INR 1.6. Assessment/Plan:    Coronary artery disease involving native coronary artery of native heart without angina pectoris, stable, S/P CABG (coronary artery bypass graft)      Mixed hyperlipidemia, continue zocor    Essential hypertension, controlled, continue current rx    Chronic atrial fibrillation ([facility] [address] rate controlled  -     warfarin (COUMADIN) 3 MG tablet; Take one tablet daily  -     warfarin (COUMADIN) 1 MG tablet; Take 1 tablet by mouth daily    Chronic anticoagulation, INR 1.6 today (has been therapeutic over the past many months (? Effect of taking tylenol daily over the past few weeks), will have patient take 4 mg today then resume 3 mg/d. Patient states she will try not to take tylenol, repeat INR ~ a month  -     Rx warfarin (COUMADIN) 1 MG tablet; Take 1 tablet by mouth daily    Valvular heart disease  S/P TAVR (transcatheter aortic valve replacement)    Anxiety and depression, chronic, patient states she can deal with it without medication at this time    Gastroesophageal reflux disease without esophagitis  -     pantoprazole (PROTONIX) 40 MG tablet; Take 1 tablet by mouth daily      RTC 2 months.

## 2022-03-16 NOTE — PATIENT INSTRUCTIONS
Please take all medications as prescribed. INR will be recheck at next appointment. Please call with any bleeding or bruising. Please call with all issues or concerns.  Thank you for coming in today

## 2022-03-16 NOTE — PROGRESS NOTES
AVS printed with follow up appointment and discharge instructions and mailed to patient with order that INR will be drawn at  Next visit. INR completed today.  INR 1.6

## 2022-03-16 NOTE — CARE COORDINATION
[facility] has made numerous attempts to reach patient for Care Coordination. Due to unable to contact patient, ACM will remove name from the care team and resolve the Care Coordination episode. PLAN:  -ACM will inform PCP that the Care Coordination episode with be resolved d/t unable to reach patient.

## 2022-04-06 ENCOUNTER — CARE COORDINATION (OUTPATIENT)
Dept: CARE COORDINATION | Age: 77
End: 2022-04-06

## 2022-04-06 NOTE — CARE COORDINATION
Patient left ACM a voicemail asking for a return call. ACM attempted to contact patient by phone. Unable to leave a message d/t no voicemail - phone just rings. PLAN:  -If no return call, ACM will attempt to contact patient again.

## 2022-04-07 NOTE — CARE COORDINATION
ACM returned patient's phone call. Patient was calling due to concerns about her PCP, Dr. Cy Canales, retiring the end of June. Patient worried about who her PCP will be after June. [facility] encouraged patient to discuss this with Dr. Cy Canales during her appointment with him on 5/4/22. Patient verbalized understanding. [facility] offered to enroll patient back into Care Coordination. Patient declined at this time. Patient has [facility] contact information and was encouraged to reach out if any needs arise.     PLAN:  -ACM will remove name from care team

## 2022-05-04 ENCOUNTER — OFFICE VISIT (OUTPATIENT)
Dept: INTERNAL MEDICINE | Age: 77
End: 2022-05-04
Payer: MEDICARE

## 2022-05-04 VITALS
RESPIRATION RATE: 18 BRPM | BODY MASS INDEX: 42.07 KG/M2 | TEMPERATURE: 97.2 F | OXYGEN SATURATION: 98 % | SYSTOLIC BLOOD PRESSURE: 110 MMHG | HEIGHT: 64 IN | HEART RATE: 52 BPM | WEIGHT: 246.4 LBS | DIASTOLIC BLOOD PRESSURE: 60 MMHG

## 2022-05-04 DIAGNOSIS — F41.9 ANXIETY: ICD-10-CM

## 2022-05-04 DIAGNOSIS — E78.2 MIXED HYPERLIPIDEMIA: ICD-10-CM

## 2022-05-04 DIAGNOSIS — Z95.2 S/P TAVR (TRANSCATHETER AORTIC VALVE REPLACEMENT): ICD-10-CM

## 2022-05-04 DIAGNOSIS — Z95.2 HISTORY OF PROSTHETIC MITRAL VALVE: ICD-10-CM

## 2022-05-04 DIAGNOSIS — Z79.01 CHRONIC ANTICOAGULATION: ICD-10-CM

## 2022-05-04 DIAGNOSIS — I50.32 CHRONIC HEART FAILURE WITH PRESERVED EJECTION FRACTION (HCC): ICD-10-CM

## 2022-05-04 DIAGNOSIS — I10 ESSENTIAL HYPERTENSION: Chronic | ICD-10-CM

## 2022-05-04 DIAGNOSIS — I48.20 CHRONIC ATRIAL FIBRILLATION (HCC): Chronic | ICD-10-CM

## 2022-05-04 DIAGNOSIS — Z95.1 S/P CABG (CORONARY ARTERY BYPASS GRAFT): ICD-10-CM

## 2022-05-04 DIAGNOSIS — Z95.1 HX OF CABG: ICD-10-CM

## 2022-05-04 DIAGNOSIS — I25.10 CORONARY ARTERY DISEASE INVOLVING NATIVE CORONARY ARTERY OF NATIVE HEART WITHOUT ANGINA PECTORIS: Primary | Chronic | ICD-10-CM

## 2022-05-04 LAB
INTERNATIONAL NORMALIZATION RATIO, POC: 1.7
PROTHROMBIN TIME, POC: 21

## 2022-05-04 PROCEDURE — 85610 PROTHROMBIN TIME: CPT | Performed by: INTERNAL MEDICINE

## 2022-05-04 PROCEDURE — 99214 OFFICE O/P EST MOD 30 MIN: CPT | Performed by: INTERNAL MEDICINE

## 2022-05-04 PROCEDURE — 99212 OFFICE O/P EST SF 10 MIN: CPT | Performed by: INTERNAL MEDICINE

## 2022-05-04 RX ORDER — LORAZEPAM 1 MG/1
1 TABLET ORAL DAILY PRN
Qty: 20 TABLET | Refills: 0 | Status: SHIPPED | OUTPATIENT
Start: 2022-05-04 | End: 2022-06-03

## 2022-05-04 ASSESSMENT — ENCOUNTER SYMPTOMS
SHORTNESS OF BREATH: 0
COUGH: 0
CONSTIPATION: 0
ABDOMINAL PAIN: 0
DIARRHEA: 0
BLOOD IN STOOL: 0

## 2022-05-04 NOTE — PROGRESS NOTES
HPI:  Patient comes in today for routine follow up for HTN, chronic atrial fibrillation, chronic coumadin rx, S/P TAVR, hx MVR, CAD/CABG, hyperlipidemia, HFpEF, anxiety, and depression. Patient states she still occasionally gets severe debilitating anxiety and is due for refill on ativan (last filled 12/17/2020 # 20 0 RF). Patient is here today with her grand daughter. From an emotional depression standpoint she states she is doing better than when seen last. She still c/o bilateral lower extremity edema which is no worse than previously. However, her grand daughter states that she ingests a lot of salt and routinely adds salt to her food. She also does not keep her legs elevated. She does not have any cardiovascular or respiratory c/o. I reviewed medical, surg, soc histories, meds and allergies. Review of Systems  Review of Systems   Constitutional: Negative for chills and fever. Respiratory: Negative for cough and shortness of breath. Cardiovascular: Positive for leg swelling (see HPI). Negative for chest pain and palpitations. Gastrointestinal: Negative for abdominal pain, blood in stool, constipation and diarrhea. Genitourinary: Negative for difficulty urinating, dysuria, hematuria and urgency. Neurological: Negative for dizziness and light-headedness. PE:  VS:  /60 (Site: Right Upper Arm, Position: Sitting, Cuff Size: Large Adult)   Pulse 52   Temp 97.2 °F (36.2 °C) (Temporal)   Resp 18   Ht 5' 4\" (1.626 m)   Wt 246 lb 6.4 oz (111.8 kg)   SpO2 98%   BMI 42.29 kg/m²    Repeat pulse 63 beats/min. Physical Exam  Constitutional:       General: She is not in acute distress. Appearance: She is not ill-appearing. Cardiovascular:      Rate and Rhythm: Normal rate. Rhythm irregular. Heart sounds: Murmur (unchanged) heard. No friction rub. No gallop. Pulmonary:      Effort: Pulmonary effort is normal. No respiratory distress.       Breath sounds: Normal breath sounds. No wheezing, rhonchi or rales. Abdominal:      General: Bowel sounds are normal.      Palpations: Abdomen is soft. Musculoskeletal:         General: Swelling (she has pitting edema to upper mid-pretibial areas bilaterally, appears unchanged from prior visit) present. POCT INR 1.7    Assessment/Plan:    Diagnoses and all orders for this visit:      Essential hypertension, controlled    Bilateral lower extremity edema, likely multifactorial, secondary to increased salt intake, amlodipine and HF. Patient instructed to significantly limit sodium to < 2 gram, keep legs elevated as much as possible    Chronic atrial fibrillation ([address] rate controlled    Chronic anticoagulation, INR 1.7, patient will increase dose from 3 mg/d to take 4 mg on Tuesday and Thursday and 3 mg all other days, repeat INR 2-3 weeks     Coronary artery disease involving native coronary artery of native heart without angina pectoris, stable    S/P CABG (coronary artery bypass graft)    History of prosthetic mitral valve  S/P TAVR (transcatheter aortic valve replacement)    Mixed hyperlipidemia, continue zocor, repeat fasting lipid panel    Chronic heart failure with preserved ejection fraction ([address] compensated from symptom standpoint     Chronic depression and anxiety, rx ativan # 20 0 RF, one tab only as need for severe debilitating anxiety      Patient once again refuses COVID and influenza vaccines, mammogram, and colonoscopy. RTC 6 months to see Dr. Vivi Humphries. Sooner if needed.

## 2022-05-04 NOTE — PROGRESS NOTES
Patient verbalized understanding of office instructions. She will call with questions or concerns. She was given discharge instructions,along with Coumadin instructions  All questions were fully answered. Printed AVS and script for lab work was given to pt.

## 2022-05-04 NOTE — PATIENT INSTRUCTIONS
Will take 4mg of Coumadin on Tuesday and Thursday and 3mg the rest of the week   Recheck INR in 3 weeks   Return in 6 months as scheduled with Dr. Charlynn Bloch new PCP   Get Fasting  Lab work done  in 6 weeks

## 2022-05-26 ENCOUNTER — NURSE ONLY (OUTPATIENT)
Dept: INTERNAL MEDICINE | Age: 77
End: 2022-05-26
Payer: MEDICARE

## 2022-05-26 VITALS — TEMPERATURE: 97.4 F

## 2022-05-26 DIAGNOSIS — I48.20 CHRONIC ATRIAL FIBRILLATION (HCC): Primary | ICD-10-CM

## 2022-05-26 DIAGNOSIS — E78.2 MIXED HYPERLIPIDEMIA: ICD-10-CM

## 2022-05-26 LAB
CHOLESTEROL, TOTAL: 143 MG/DL (ref 0–199)
HDLC SERPL-MCNC: 61 MG/DL
INTERNATIONAL NORMALIZATION RATIO, POC: 2.1
LDL CHOLESTEROL CALCULATED: 67 MG/DL (ref 0–99)
PROTHROMBIN TIME, POC: 25.7
TRIGL SERPL-MCNC: 77 MG/DL (ref 0–149)
VLDLC SERPL CALC-MCNC: 15 MG/DL

## 2022-05-26 PROCEDURE — 85610 PROTHROMBIN TIME: CPT

## 2022-05-26 PROCEDURE — 36415 COLL VENOUS BLD VENIPUNCTURE: CPT | Performed by: INTERNAL MEDICINE

## 2022-05-26 NOTE — PATIENT INSTRUCTIONS
Please Continue taking Coumadin 4mg Tues and Thursday. And Coumadin 3mg the rest of the days. Please call the office at [phone] to schedule next INR for 1 month. Thank you.

## 2022-05-26 NOTE — PROGRESS NOTES
INR results reviewed with Dr. Victorino Magana and orders received. Patient is to Take Coumadin 4mg Tues and Thurs and Coumdin 3mg the rest of the days. Repeat INR in 1 month . Pt notified via phone of same. Left Voicemail message per patient request. AVS mailed to patient.

## 2022-05-31 ENCOUNTER — TELEPHONE (OUTPATIENT)
Dept: INTERNAL MEDICINE | Age: 77
End: 2022-05-31

## 2022-06-28 ENCOUNTER — NURSE ONLY (OUTPATIENT)
Dept: INTERNAL MEDICINE | Age: 77
End: 2022-06-28
Payer: MEDICARE

## 2022-06-28 DIAGNOSIS — I48.20 CHRONIC ATRIAL FIBRILLATION (HCC): Primary | ICD-10-CM

## 2022-06-28 LAB
INTERNATIONAL NORMALIZATION RATIO, POC: 3
PROTHROMBIN TIME, POC: 36.3

## 2022-06-28 PROCEDURE — 99211 OFF/OP EST MAY X REQ PHY/QHP: CPT

## 2022-06-28 PROCEDURE — 85610 PROTHROMBIN TIME: CPT

## 2022-06-28 ASSESSMENT — PATIENT HEALTH QUESTIONNAIRE - PHQ9
9. THOUGHTS THAT YOU WOULD BE BETTER OFF DEAD, OR OF HURTING YOURSELF: 0
SUM OF ALL RESPONSES TO PHQ QUESTIONS 1-9: 0
4. FEELING TIRED OR HAVING LITTLE ENERGY: 0
SUM OF ALL RESPONSES TO PHQ QUESTIONS 1-9: 0
6. FEELING BAD ABOUT YOURSELF - OR THAT YOU ARE A FAILURE OR HAVE LET YOURSELF OR YOUR FAMILY DOWN: 0
1. LITTLE INTEREST OR PLEASURE IN DOING THINGS: 0
5. POOR APPETITE OR OVEREATING: 0
SUM OF ALL RESPONSES TO PHQ9 QUESTIONS 1 & 2: 0
3. TROUBLE FALLING OR STAYING ASLEEP: 0
SUM OF ALL RESPONSES TO PHQ QUESTIONS 1-9: 0
SUM OF ALL RESPONSES TO PHQ QUESTIONS 1-9: 0
2. FEELING DOWN, DEPRESSED OR HOPELESS: 0
7. TROUBLE CONCENTRATING ON THINGS, SUCH AS READING THE NEWSPAPER OR WATCHING TELEVISION: 0
8. MOVING OR SPEAKING SO SLOWLY THAT OTHER PEOPLE COULD HAVE NOTICED. OR THE OPPOSITE, BEING SO FIGETY OR RESTLESS THAT YOU HAVE BEEN MOVING AROUND A LOT MORE THAN USUAL: 0

## 2022-06-28 NOTE — PATIENT INSTRUCTIONS
Thank you for coming in today to have your INR checked. Please take your Coumadin as follows:  Coumadin 4 mg on Tuesday & Thursday  Coumadin 3 mg on Sunday, Monday, Wednesday, Friday and Saturday  We will recheck your INR in 3 weeks  Your appointment is July 19, 2022, at 1:00 pm  Please call if you have any unusual bleeding or any concerns. Thanks!      Jimena Solorzano LPN :)

## 2022-06-28 NOTE — PROGRESS NOTES
Patient denies forgetting any doses. States current dose is     Coumadin 4 mg on Tuesday & Thursday  Coumadin 3 mg on Sunday, Monday, Wednesday, Friday and Saturday    Denies any new medications or drug therapies, no other non-prescription drugs not already know to doctor, no changes to eating habits or drinking habits, no unusual bleeding or bruising. A full discussion of the nature of anticoagulants has been carried out. A benefit risk analysis has been presented to the patient, so that they understand the justification for choosing anticoagulation at this time. The need for frequent and regular monitoring, precise dosage adjustment and compliance is stressed. Side effects of potential bleeding are discussed. The patient should avoid any OTC items containing aspirin or ibuprofen, and should avoid great swings in general diet. Avoid alcohol consumption. Call if any signs of abnormal bleeding. Denise Carvajal LPN     Spoke with patient personally. Patient states understanding and compliance with Coumadin orders and follow up. AVS printed and mailed, along with appointment card.   Denise Carvajal LPN

## 2022-07-19 ENCOUNTER — NURSE ONLY (OUTPATIENT)
Dept: INTERNAL MEDICINE | Age: 77
End: 2022-07-19
Payer: MEDICARE

## 2022-07-19 VITALS — TEMPERATURE: 97.1 F

## 2022-07-19 DIAGNOSIS — Z79.01 CHRONIC ANTICOAGULATION: ICD-10-CM

## 2022-07-19 DIAGNOSIS — I48.20 CHRONIC ATRIAL FIBRILLATION (HCC): Primary | ICD-10-CM

## 2022-07-19 LAB
INTERNATIONAL NORMALIZATION RATIO, POC: 3
PROTHROMBIN TIME, POC: 35.8

## 2022-07-19 PROCEDURE — 85610 PROTHROMBIN TIME: CPT

## 2022-07-19 RX ORDER — WARFARIN SODIUM 1 MG/1
1 TABLET ORAL DAILY
Qty: 14 TABLET | Refills: 0 | Status: SHIPPED
Start: 2022-07-19 | End: 2022-07-19 | Stop reason: SDUPTHER

## 2022-07-19 RX ORDER — WARFARIN SODIUM 3 MG/1
TABLET ORAL
Qty: 30 TABLET | Refills: 0 | Status: SHIPPED
Start: 2022-07-19 | End: 2022-10-12 | Stop reason: SDUPTHER

## 2022-07-19 RX ORDER — WARFARIN SODIUM 3 MG/1
TABLET ORAL
Qty: 14 TABLET | Refills: 0 | Status: SHIPPED
Start: 2022-07-19 | End: 2022-07-19 | Stop reason: SDUPTHER

## 2022-07-19 RX ORDER — WARFARIN SODIUM 1 MG/1
1 TABLET ORAL DAILY
Qty: 30 TABLET | Refills: 0 | Status: SHIPPED
Start: 2022-07-19 | End: 2022-10-12 | Stop reason: SDUPTHER

## 2022-07-19 NOTE — PATIENT INSTRUCTIONS
INR today 3.0  Please continue your coumadin as before,  Coumadin 4 mg Tuesday and Thursday and 3 mg of Coumadin all other days.   We will repeat your INR on 8/16/2022 at 1:30 pm  Thank you for coming in today and please call with all issues

## 2022-07-26 ENCOUNTER — OFFICE VISIT (OUTPATIENT)
Dept: INTERNAL MEDICINE | Age: 77
End: 2022-07-26
Payer: MEDICARE

## 2022-07-26 VITALS
HEIGHT: 64 IN | HEART RATE: 75 BPM | RESPIRATION RATE: 16 BRPM | DIASTOLIC BLOOD PRESSURE: 64 MMHG | WEIGHT: 242 LBS | SYSTOLIC BLOOD PRESSURE: 128 MMHG | OXYGEN SATURATION: 98 % | TEMPERATURE: 97.5 F | BODY MASS INDEX: 41.32 KG/M2

## 2022-07-26 DIAGNOSIS — R14.0 ABDOMINAL DISTENSION: Primary | ICD-10-CM

## 2022-07-26 PROCEDURE — 1123F ACP DISCUSS/DSCN MKR DOCD: CPT | Performed by: INTERNAL MEDICINE

## 2022-07-26 PROCEDURE — 99213 OFFICE O/P EST LOW 20 MIN: CPT | Performed by: INTERNAL MEDICINE

## 2022-07-26 ASSESSMENT — ENCOUNTER SYMPTOMS
CONSTIPATION: 0
BLOOD IN STOOL: 0
SHORTNESS OF BREATH: 0
ABDOMINAL DISTENTION: 1
ABDOMINAL PAIN: 0
COUGH: 0

## 2022-07-26 NOTE — PROGRESS NOTES
[facility]    Attending Physician Statement  I have discussed the case, including pertinent history and exam findings with the resident physician. I agree with the assessment, plan and orders as documented by the resident. I have reviewed all pertinent PMHx, PSHx, FamHx, SocialHx, medications, and allergies and updated history as appropriate. Patient here for routine follow up of medical problems. Abdominal Swelling/Bloating: completely resolved; no diarrhea; no constipation; occurred a few days ago and now assymptomatic; no pain; no discomfort    Remainder of medical problems as per resident note.     Adonis Mcconnell DO  7/26/2022 3:03 PM    Encounter time including independent chart review, discussion with patient, interpreting test results and/or external communications: 30'

## 2022-07-26 NOTE — PROGRESS NOTES
[facility] Internal Medicine      SUBJECTIVE:  Jose Paige (:  1945) is a 77 y.o. female here for evaluation of the following chief complaint(s):  Bloated (C/o bloating of the stomach to the point where she felt she had a balloon in her stomach. Has increased her water intake due to heat so she might be retaining fluids in her body. Stomach is not painful at the moment. Bloating started in stomach on Thursday.) and Foot Swelling (Lower leg and feet swelling that has been ongoing since her surgery last year on 21 for an aortic valve replacement. Post surgery she was placed Amlodipine and a baby Aspirin and thinks it might be related to those meds. Is currently on Warfarin and was told she should not be on Aspirin while on Warfarin, please advise.)    Ms Ivory Helton brenda 77year old female with Pmhx of CAD s/p CABG , HTN, Prosthetic mitral valve, Aortic stenosis s/p TAVR, GERD, HLD she is coming in for bloating. She is accompanied by her granddaughter who she lives with. She had a 3 days history of abdominal swelling, it's not causing any pain, any nausea, constipation. She has no history of liver disease or ascites. Denies any symptoms of heart failure. She has swelling of both her legs which has been chronic. The abdominal swelling resolved on its own without other intervention. She feels better on this visit and is now asymptomatic. Review of Systems   Constitutional:  Negative for appetite change, fatigue and fever. Respiratory:  Negative for cough and shortness of breath. Gastrointestinal:  Positive for abdominal distention. Negative for abdominal pain, blood in stool and constipation. Endocrine: Positive for cold intolerance. Genitourinary:  Negative for difficulty urinating and frequency. Musculoskeletal:  Negative for arthralgias. Neurological:  Negative for dizziness and headaches. Psychiatric/Behavioral:  Negative for dysphoric mood. Current Outpatient Medications on File Prior to Visit   Medication Sig Dispense Refill    warfarin (COUMADIN) 3 MG tablet Take one tablet daily 30 tablet 0    warfarin (COUMADIN) 1 MG tablet Take 1 tablet by mouth in the morning. 30 tablet 0    pantoprazole (PROTONIX) 40 MG tablet Take 1 tablet by mouth daily 90 tablet 3    potassium chloride (KLOR-CON M) 10 MEQ extended release tablet Take 1 tablet by mouth 2 times daily 180 tablet 3    metoprolol tartrate (LOPRESSOR) 50 MG tablet TAKE ONE TABLET BY MOUTH TWO TIMES A  tablet 3    simvastatin (ZOCOR) 20 MG tablet Take 1 tablet by mouth nightly 90 tablet 3    fluticasone (FLONASE) 50 MCG/ACT nasal spray 1 spray by Each Nostril route daily 2 each 3    amLODIPine (NORVASC) 10 MG tablet Take 1 tablet by mouth daily 90 tablet 3    enalapril (VASOTEC) 10 MG tablet TAKE ONE TABLET BY MOUTH TWO TIMES A  tablet 3    aspirin 81 MG EC tablet Take 1 tablet by mouth daily 90 tablet 3    furosemide (LASIX) 20 MG tablet Take 1 tablet by mouth 2 times daily 60 tablet 0    brimonidine (ALPHAGAN) 0.2 % ophthalmic solution INSTILL 1 DROP EVERY DAY INTO LEFT EYE AT 8 AM.      latanoprost (XALATAN) 0.005 % ophthalmic solution INSTILL 1 DROP IN THE LEFT EYE EVERY DAY AT BEDTIME       No current facility-administered medications on file prior to visit. OBJECTIVE:    VS:   Vitals:    07/26/22 1413   BP: 128/64   Site: Left Upper Arm   Position: Sitting   Cuff Size: Large Adult   Pulse: 75   Resp: 16   Temp: 97.5 °F (36.4 °C)   TempSrc: Temporal   SpO2: 98%   Weight: 242 lb (109.8 kg)   Height: 5' 4\" (1.626 m)     Physical Exam  Constitutional:       General: She is not in acute distress. Appearance: She is obese. HENT:      Head: Normocephalic. Cardiovascular:      Rate and Rhythm: Normal rate and regular rhythm. Pulses: Normal pulses. Heart sounds: Murmur heard.    Pulmonary:      Effort: Pulmonary effort is normal.   Abdominal:      General: Bowel

## 2022-08-15 ENCOUNTER — TELEPHONE (OUTPATIENT)
Dept: CARDIOLOGY CLINIC | Age: 77
End: 2022-08-15

## 2022-08-15 NOTE — TELEPHONE ENCOUNTER
Patient called to cancel appointment stating she could not afford the amount of co pay required for the ECHO . I offered to mail financial aid papers she declined at this time . I reiterated how important her follow up appointments are and office number given for and assistance.       Patient will call to reschedule

## 2022-08-23 ENCOUNTER — TELEPHONE (OUTPATIENT)
Dept: ADMINISTRATIVE | Age: 77
End: 2022-08-23

## 2022-08-23 NOTE — TELEPHONE ENCOUNTER
I called Taylor Zendejas to do the 1 Year TAVR KCCQ-12  over the phone since she is unable to come to the office for her visit d/t financial issues. The form will be scanned into media.

## 2022-08-29 ENCOUNTER — TELEPHONE (OUTPATIENT)
Dept: INTERNAL MEDICINE | Age: 77
End: 2022-08-29

## 2022-08-29 NOTE — TELEPHONE ENCOUNTER
Spoke to patient who states she was here 8/16,at 130pm for an INR , and was told she did not have an appt today, and per pt \"and I was dismissed. \". I loooked over schedule and it shows patient cancelled the day before on the 15 th. But patient rescheduled for next week.

## 2022-08-29 NOTE — TELEPHONE ENCOUNTER
----- Message from Tamika Thomas DO sent at 8/29/2022  3:12 PM EDT -----  Regarding: INR follow up  Please contact patient -- she was on coumadin with INR checks, not done recently -- is she doing this with another provider?     Thanks

## 2022-08-30 ENCOUNTER — TELEPHONE (OUTPATIENT)
Dept: OTHER | Age: 77
End: 2022-08-30

## 2022-08-30 NOTE — TELEPHONE ENCOUNTER
CHF CHW Initial Call  8/30/2022  1:48 PM    CHW received referral from ALBERT Caba CNP. Patient need: Financial assistance   Notes: CHW called patient to assist with financial constraints. Patient did not answer the phone and CHW left a voicemail. Follow up plan: Call patient to offer financial assistance application. Next anticipated outreach:          [x] 1-2 Days  [] weeks   [] months     CHW informed patient of the services and resources that can be provided to them. CHW instructed patient to call CHF CHW at [phone] for any future assistance.      TAMMY Jones  Congestive Heart Failure [employer]

## 2022-08-31 NOTE — TELEPHONE ENCOUNTER
CHF CHW Follow up Call  8/31/2022  10:10 AM     Notes: CHW called patient to assist with financial constraints. Patient stated the following during telephone call with CHW:   -Income is limited (social security)   -Received medical bills that she cannot afford at the moment   -Unsure if she wants help   -Needs time to process everything happening in her life right now and to call back in a few weeks. -Wants to figure out how to pay for medical expenses on her own before asking for help                    Follow up plan: Reevaluate if patient is willing to accept assistance. Next anticipated outreach:       []  days(s)     [x]  2-3 week(s)   []  Month(s)  Patient in agreement with plan and further assistance. [x] Agreed    [] Declined      CHW instructed patient to call CHF CHW at [phone] for further assistance.     Jamir Bentley, TAMMY  Congestive Heart Failure [employer]

## 2022-09-08 ENCOUNTER — NURSE ONLY (OUTPATIENT)
Dept: INTERNAL MEDICINE | Age: 77
End: 2022-09-08
Payer: MEDICARE

## 2022-09-08 VITALS — TEMPERATURE: 98 F

## 2022-09-08 DIAGNOSIS — I38 VALVULAR HEART DISEASE: Chronic | ICD-10-CM

## 2022-09-08 DIAGNOSIS — I48.20 CHRONIC ATRIAL FIBRILLATION (HCC): Primary | Chronic | ICD-10-CM

## 2022-09-08 LAB — INTERNATIONAL NORMALIZATION RATIO, POC: 2

## 2022-09-08 PROCEDURE — 93793 ANTICOAG MGMT PT WARFARIN: CPT | Performed by: INTERNAL MEDICINE

## 2022-09-08 PROCEDURE — 85610 PROTHROMBIN TIME: CPT | Performed by: INTERNAL MEDICINE

## 2022-09-08 NOTE — PROGRESS NOTES
INR goal 2.0-3.0. Afib/ prosthetic mitral valve, INR 2.0. Continue same dose, repeat INR  4 weeks.      Electronically signed by [address], DO on 9/8/2022 at 2:11 PM

## 2022-09-14 ENCOUNTER — TELEPHONE (OUTPATIENT)
Dept: OTHER | Age: 77
End: 2022-09-14

## 2022-09-14 NOTE — TELEPHONE ENCOUNTER
CHF CHW Follow up Call  9/14/2022  11:34 AM     Notes:  CHW called patient to assist with financial constraints. CHW services were declined at this time. CHW informed patient of the services and resources that can be provided to them. CHW instructed patient to call CHW for any future assistance.      Learta Mcburney, CHW  Congestive Heart Failure [facility]

## 2022-10-12 ENCOUNTER — OFFICE VISIT (OUTPATIENT)
Dept: INTERNAL MEDICINE | Age: 77
End: 2022-10-12
Payer: MEDICARE

## 2022-10-12 VITALS
OXYGEN SATURATION: 98 % | DIASTOLIC BLOOD PRESSURE: 76 MMHG | TEMPERATURE: 97.1 F | SYSTOLIC BLOOD PRESSURE: 142 MMHG | HEIGHT: 64 IN | BODY MASS INDEX: 43.19 KG/M2 | WEIGHT: 253 LBS | RESPIRATION RATE: 18 BRPM | HEART RATE: 74 BPM

## 2022-10-12 DIAGNOSIS — E78.2 MIXED HYPERLIPIDEMIA: ICD-10-CM

## 2022-10-12 DIAGNOSIS — R68.89 COLD INTOLERANCE: ICD-10-CM

## 2022-10-12 DIAGNOSIS — Z79.01 CHRONIC ANTICOAGULATION: ICD-10-CM

## 2022-10-12 DIAGNOSIS — K21.9 GASTROESOPHAGEAL REFLUX DISEASE WITHOUT ESOPHAGITIS: ICD-10-CM

## 2022-10-12 DIAGNOSIS — I25.10 CORONARY ARTERY DISEASE INVOLVING NATIVE CORONARY ARTERY OF NATIVE HEART WITHOUT ANGINA PECTORIS: Chronic | ICD-10-CM

## 2022-10-12 DIAGNOSIS — J30.0 VASOMOTOR RHINITIS: ICD-10-CM

## 2022-10-12 DIAGNOSIS — I10 ESSENTIAL HYPERTENSION: Chronic | ICD-10-CM

## 2022-10-12 DIAGNOSIS — I50.22 CHRONIC SYSTOLIC CONGESTIVE HEART FAILURE (HCC): Chronic | ICD-10-CM

## 2022-10-12 DIAGNOSIS — I48.20 CHRONIC ATRIAL FIBRILLATION (HCC): Primary | Chronic | ICD-10-CM

## 2022-10-12 DIAGNOSIS — I25.810 CORONARY ARTERY DISEASE INVOLVING CORONARY BYPASS GRAFT OF NATIVE HEART WITHOUT ANGINA PECTORIS: Chronic | ICD-10-CM

## 2022-10-12 DIAGNOSIS — I50.32 CHRONIC HEART FAILURE WITH PRESERVED EJECTION FRACTION (HCC): ICD-10-CM

## 2022-10-12 DIAGNOSIS — E11.9 TYPE 2 DIABETES MELLITUS WITHOUT COMPLICATION, WITHOUT LONG-TERM CURRENT USE OF INSULIN (HCC): ICD-10-CM

## 2022-10-12 DIAGNOSIS — Z00.00 MEDICARE ANNUAL WELLNESS VISIT, SUBSEQUENT: ICD-10-CM

## 2022-10-12 DIAGNOSIS — Z87.898 HISTORY OF PREDIABETES: ICD-10-CM

## 2022-10-12 DIAGNOSIS — Z95.2 S/P TAVR (TRANSCATHETER AORTIC VALVE REPLACEMENT): ICD-10-CM

## 2022-10-12 PROBLEM — I35.0 SEVERE AORTIC STENOSIS: Status: RESOLVED | Noted: 2021-04-15 | Resolved: 2022-10-12

## 2022-10-12 LAB
HBA1C MFR BLD: 6.9 %
INTERNATIONAL NORMALIZATION RATIO, POC: 2.5
PROTHROMBIN TIME, POC: 30.2

## 2022-10-12 PROCEDURE — 1123F ACP DISCUSS/DSCN MKR DOCD: CPT | Performed by: INTERNAL MEDICINE

## 2022-10-12 PROCEDURE — 3044F HG A1C LEVEL LT 7.0%: CPT | Performed by: INTERNAL MEDICINE

## 2022-10-12 PROCEDURE — G0439 PPPS, SUBSEQ VISIT: HCPCS | Performed by: INTERNAL MEDICINE

## 2022-10-12 PROCEDURE — 83036 HEMOGLOBIN GLYCOSYLATED A1C: CPT | Performed by: INTERNAL MEDICINE

## 2022-10-12 PROCEDURE — 99212 OFFICE O/P EST SF 10 MIN: CPT | Performed by: INTERNAL MEDICINE

## 2022-10-12 PROCEDURE — 85610 PROTHROMBIN TIME: CPT | Performed by: INTERNAL MEDICINE

## 2022-10-12 RX ORDER — ENALAPRIL MALEATE 10 MG/1
TABLET ORAL
Qty: 180 TABLET | Refills: 3 | Status: SHIPPED
Start: 2022-10-12 | End: 2022-10-18 | Stop reason: SDUPTHER

## 2022-10-12 RX ORDER — SIMVASTATIN 20 MG
20 TABLET ORAL NIGHTLY
Qty: 90 TABLET | Refills: 3 | Status: SHIPPED
Start: 2022-10-12 | End: 2022-10-18 | Stop reason: SDUPTHER

## 2022-10-12 RX ORDER — ASPIRIN 81 MG/1
81 TABLET ORAL DAILY
Qty: 90 TABLET | Refills: 3 | Status: SHIPPED
Start: 2022-10-12 | End: 2022-10-18 | Stop reason: SDUPTHER

## 2022-10-12 RX ORDER — WARFARIN SODIUM 1 MG/1
1 TABLET ORAL DAILY
Qty: 30 TABLET | Refills: 0 | Status: SHIPPED
Start: 2022-10-12 | End: 2022-10-18 | Stop reason: SDUPTHER

## 2022-10-12 RX ORDER — POTASSIUM CHLORIDE 750 MG/1
10 TABLET, EXTENDED RELEASE ORAL 2 TIMES DAILY
Qty: 180 TABLET | Refills: 3 | Status: SHIPPED
Start: 2022-10-12 | End: 2022-10-18 | Stop reason: SDUPTHER

## 2022-10-12 RX ORDER — AMLODIPINE BESYLATE 5 MG/1
5 TABLET ORAL DAILY
Qty: 90 TABLET | Refills: 1 | Status: SHIPPED
Start: 2022-10-12 | End: 2022-10-18 | Stop reason: SDUPTHER

## 2022-10-12 RX ORDER — METOPROLOL TARTRATE 50 MG/1
TABLET, FILM COATED ORAL
Qty: 180 TABLET | Refills: 3 | Status: SHIPPED
Start: 2022-10-12 | End: 2022-10-18 | Stop reason: SDUPTHER

## 2022-10-12 RX ORDER — FUROSEMIDE 20 MG/1
20 TABLET ORAL 2 TIMES DAILY
Qty: 60 TABLET | Refills: 0 | Status: SHIPPED
Start: 2022-10-12 | End: 2022-10-18 | Stop reason: SDUPTHER

## 2022-10-12 RX ORDER — FLUTICASONE PROPIONATE 50 MCG
1 SPRAY, SUSPENSION (ML) NASAL DAILY
Qty: 2 EACH | Refills: 3 | Status: SHIPPED
Start: 2022-10-12 | End: 2022-10-18 | Stop reason: SDUPTHER

## 2022-10-12 RX ORDER — PANTOPRAZOLE SODIUM 20 MG/1
40 TABLET, DELAYED RELEASE ORAL DAILY
Qty: 90 TABLET | Refills: 1 | Status: SHIPPED
Start: 2022-10-12 | End: 2022-10-18 | Stop reason: SDUPTHER

## 2022-10-12 RX ORDER — WARFARIN SODIUM 3 MG/1
TABLET ORAL
Qty: 30 TABLET | Refills: 0 | Status: SHIPPED
Start: 2022-10-12 | End: 2022-10-18 | Stop reason: SDUPTHER

## 2022-10-12 SDOH — ECONOMIC STABILITY: TRANSPORTATION INSECURITY
IN THE PAST 12 MONTHS, HAS THE LACK OF TRANSPORTATION KEPT YOU FROM MEDICAL APPOINTMENTS OR FROM GETTING MEDICATIONS?: NO

## 2022-10-12 SDOH — ECONOMIC STABILITY: HOUSING INSECURITY: IN THE LAST 12 MONTHS, HOW MANY PLACES HAVE YOU LIVED?: 1

## 2022-10-12 SDOH — ECONOMIC STABILITY: FOOD INSECURITY: WITHIN THE PAST 12 MONTHS, THE FOOD YOU BOUGHT JUST DIDN'T LAST AND YOU DIDN'T HAVE MONEY TO GET MORE.: NEVER TRUE

## 2022-10-12 SDOH — ECONOMIC STABILITY: INCOME INSECURITY: IN THE LAST 12 MONTHS, WAS THERE A TIME WHEN YOU WERE NOT ABLE TO PAY THE MORTGAGE OR RENT ON TIME?: NO

## 2022-10-12 SDOH — ECONOMIC STABILITY: HOUSING INSECURITY
IN THE LAST 12 MONTHS, WAS THERE A TIME WHEN YOU DID NOT HAVE A STEADY PLACE TO SLEEP OR SLEPT IN A SHELTER (INCLUDING NOW)?: NO

## 2022-10-12 SDOH — ECONOMIC STABILITY: FOOD INSECURITY: WITHIN THE PAST 12 MONTHS, YOU WORRIED THAT YOUR FOOD WOULD RUN OUT BEFORE YOU GOT MONEY TO BUY MORE.: NEVER TRUE

## 2022-10-12 ASSESSMENT — PATIENT HEALTH QUESTIONNAIRE - PHQ9
4. FEELING TIRED OR HAVING LITTLE ENERGY: 0
1. LITTLE INTEREST OR PLEASURE IN DOING THINGS: 0
SUM OF ALL RESPONSES TO PHQ QUESTIONS 1-9: 1
7. TROUBLE CONCENTRATING ON THINGS, SUCH AS READING THE NEWSPAPER OR WATCHING TELEVISION: 0
8. MOVING OR SPEAKING SO SLOWLY THAT OTHER PEOPLE COULD HAVE NOTICED. OR THE OPPOSITE, BEING SO FIGETY OR RESTLESS THAT YOU HAVE BEEN MOVING AROUND A LOT MORE THAN USUAL: 0
10. IF YOU CHECKED OFF ANY PROBLEMS, HOW DIFFICULT HAVE THESE PROBLEMS MADE IT FOR YOU TO DO YOUR WORK, TAKE CARE OF THINGS AT HOME, OR GET ALONG WITH OTHER PEOPLE: 0
SUM OF ALL RESPONSES TO PHQ9 QUESTIONS 1 & 2: 0
2. FEELING DOWN, DEPRESSED OR HOPELESS: 0
SUM OF ALL RESPONSES TO PHQ QUESTIONS 1-9: 1
9. THOUGHTS THAT YOU WOULD BE BETTER OFF DEAD, OR OF HURTING YOURSELF: 0
SUM OF ALL RESPONSES TO PHQ QUESTIONS 1-9: 1
3. TROUBLE FALLING OR STAYING ASLEEP: 1
6. FEELING BAD ABOUT YOURSELF - OR THAT YOU ARE A FAILURE OR HAVE LET YOURSELF OR YOUR FAMILY DOWN: 0
5. POOR APPETITE OR OVEREATING: 0
SUM OF ALL RESPONSES TO PHQ QUESTIONS 1-9: 1

## 2022-10-12 ASSESSMENT — SOCIAL DETERMINANTS OF HEALTH (SDOH): HOW HARD IS IT FOR YOU TO PAY FOR THE VERY BASICS LIKE FOOD, HOUSING, MEDICAL CARE, AND HEATING?: NOT VERY HARD

## 2022-10-12 ASSESSMENT — LIFESTYLE VARIABLES: HOW OFTEN DO YOU HAVE A DRINK CONTAINING ALCOHOL: NEVER

## 2022-10-12 NOTE — PATIENT INSTRUCTIONS
We have reduced amlodipine to 5 mg daily, please call if you notice any changes in how you are feeling. This may improve your lower extremity swelling      Protonix has been reduced to 20 mg daily, please call us if you have symptoms of heartburn. Please get lab work done as ordered prior to next visit in 6 months. Personalized Preventive Plan for Justin Mcgill - 10/12/2022  Medicare offers a range of preventive health benefits. Some of the tests and screenings are paid in full while other may be subject to a deductible, co-insurance, and/or copay. Some of these benefits include a comprehensive review of your medical history including lifestyle, illnesses that may run in your family, and various assessments and screenings as appropriate. After reviewing your medical record and screening and assessments performed today your provider may have ordered immunizations, labs, imaging, and/or referrals for you. A list of these orders (if applicable) as well as your Preventive Care list are included within your After Visit Summary for your review. Other Preventive Recommendations:    A preventive eye exam performed by an eye specialist is recommended every 1-2 years to screen for glaucoma; cataracts, macular degeneration, and other eye disorders. A preventive dental visit is recommended every 6 months. Try to get at least 150 minutes of exercise per week or 10,000 steps per day on a pedometer . Order or download the FREE \"Exercise & Physical Activity: Your Everyday Guide\" from [employer]. Call 9-754.412.7178 or search [employer] online. You need 1290-0841 mg of calcium and 9615-9047 IU of vitamin D per day. It is possible to meet your calcium requirement with diet alone, but a vitamin D supplement is usually necessary to meet this goal.  When exposed to the sun, use a sunscreen that protects against both UVA and UVB radiation with an SPF of 30 or greater. Reapply every 2 to 3 hours or after sweating, drying off with a towel, or swimming. Always wear a seat belt when traveling in a car. Always wear a helmet when riding a bicycle or motorcycle. Personalized Preventive Plan for Marichuy Power - 10/12/2022  Medicare offers a range of preventive health benefits. Some of the tests and screenings are paid in full while other may be subject to a deductible, co-insurance, and/or copay. Some of these benefits include a comprehensive review of your medical history including lifestyle, illnesses that may run in your family, and various assessments and screenings as appropriate. After reviewing your medical record and screening and assessments performed today your provider may have ordered immunizations, labs, imaging, and/or referrals for you. A list of these orders (if applicable) as well as your Preventive Care list are included within your After Visit Summary for your review. Other Preventive Recommendations:    A preventive eye exam performed by an eye specialist is recommended every 1-2 years to screen for glaucoma; cataracts, macular degeneration, and other eye disorders. A preventive dental visit is recommended every 6 months. Try to get at least 150 minutes of exercise per week or 10,000 steps per day on a pedometer . Order or download the FREE \"Exercise & Physical Activity: Your Everyday Guide\" from [employer]. Call 9-530.790.3083 or search [employer] online. You need 7625-6574 mg of calcium and 1988-0799 IU of vitamin D per day. It is possible to meet your calcium requirement with diet alone, but a vitamin D supplement is usually necessary to meet this goal.  When exposed to the sun, use a sunscreen that protects against both UVA and UVB radiation with an SPF of 30 or greater. Reapply every 2 to 3 hours or after sweating, drying off with a towel, or swimming. Always wear a seat belt when traveling in a car.

## 2022-10-12 NOTE — PROGRESS NOTES
[facility] Annual Wellness Visit    Phu Augustine is here for Medicare AWV and Office Visit for Anticoagulation Management    Assessment & Plan   Chronic atrial fibrillation (HCC)  -     warfarin (COUMADIN) 3 MG tablet; Take one tablet daily, Disp-30 tablet, R-0Normal  -     warfarin (COUMADIN) 1 MG tablet; Take 1 tablet by mouth daily, Disp-30 tablet, R-0Normal  -     metoprolol tartrate (LOPRESSOR) 50 MG tablet; TAKE ONE TABLET BY MOUTH TWO TIMES A DAY, Disp-180 tablet, R-3Normal  -     POCT INR  -     CBC with Auto Differential; Future  -     Comprehensive Metabolic Panel; Future  Chronic heart failure with preserved ejection fraction (HCC)  -     furosemide (LASIX) 20 MG tablet; Take 1 tablet by mouth 2 times daily, Disp-60 tablet, R-0Normal  Coronary artery disease involving coronary bypass graft of native heart without angina pectoris  -     CBC with Auto Differential; Future  -     Comprehensive Metabolic Panel; Future  Essential hypertension  -     amLODIPine (NORVASC) 5 MG tablet; Take 1 tablet by mouth daily, Disp-90 tablet, R-1Normal  -     enalapril (VASOTEC) 10 MG tablet; TAKE ONE TABLET BY MOUTH TWO TIMES A DAY, Disp-180 tablet, R-3Normal  S/P TAVR (transcatheter aortic valve replacement)  Chronic anticoagulation  -     warfarin (COUMADIN) 1 MG tablet; Take 1 tablet by mouth daily, Disp-30 tablet, [id number]  Chronic systolic congestive heart failure (HCC)  -     potassium chloride (KLOR-CON M) 10 MEQ extended release tablet; Take 1 tablet by mouth 2 times daily, Disp-180 tablet, R-3Normal  -     enalapril (VASOTEC) 10 MG tablet; TAKE ONE TABLET BY MOUTH TWO TIMES A DAY, Disp-180 tablet, R-3Normal  Gastroesophageal reflux disease without esophagitis  -     pantoprazole (PROTONIX) 20 MG tablet; Take 2 tablets by mouth daily, Disp-90 tablet, R-1Normal  Mixed hyperlipidemia  -     simvastatin (ZOCOR) 20 MG tablet;  Take 1 tablet by mouth nightly, Disp-90 tablet, R-3Normal  Vasomotor rhinitis  - fluticasone (FLONASE) 50 MCG/ACT nasal spray; 1 spray by Each Nostril route daily, Disp-2 each, R-3Normal  Coronary artery disease involving native coronary artery of native heart without angina pectoris  -     aspirin 81 MG EC tablet; Take 1 tablet by mouth daily, Disp-90 tablet, R-3Normal    DM 2   A1c 6.9%. She reports 2 to 3 cans of Pepsi daily. Advised elimination of this and repeat A1c in 3 months  Consideration medication initiation at next visit if lifestyle changes prove inadequate    Cold intolerance  -     TSH; Future    Recommendations for Preventive Services Due: see orders and patient instructions/AVS.      Recommended screening schedule for the next 5-10 years is provided to the patient in written form: see Patient Instructions/AVS.       Plan:  Reduce amlodipine to 5 mg --given her lower extremity edema and BP in decent control we will see if this improves her symptoms    Reduce protonix to 20 mg --asymptomatic of GERD    Check CBC, CMP, TSH    Return in about 6 months (around 4/12/2023). Subjective   Here for transition of care, previously patient of Dr. Gerardo Elizondo. Concerns today for lower extremity edema, appears dependent usually less in the morning after legs have been elevated. 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 Reports drinking significant amount of Pepsi, 3 cans daily at times. Follows with opth for glaucoma, currently is on eyedrops. Mini-cog 5/5 --denies concerns about her memory. Patient's complete Health Risk Assessment and screening values have been reviewed and are found in Flowsheets. The following problems were reviewed today and where indicated follow up appointments were made and/or referrals ordered.     Positive Risk Factor Screenings with Interventions:    Fall Risk:  Do you feel unsteady or are you worried about falling? : (!) yes  2 or more falls in past year?: no  Fall with injury in past year?: no General Health and ACP:  General  In general, how would you say your health is?: Fair  In the past 7 days, have you experienced any of the following: New or Increased Pain, New or Increased Fatigue, Loneliness, Social Isolation, Stress or Anger?: No  Do you get the social and emotional support that you need?: Yes  Do you have a Living Will?: (!) No    Advance Directives       Power of Joana Shay Shoup Will ACP-Advance Directive ACP-Power of     Not on File Filed on 02/23/12 Filed Not on File          Health Habits/Nutrition:  Physical Activity: Inactive    Days of Exercise per Week: 0 days    Minutes of Exercise per Session: 0 min     Have you lost any weight without trying in the past 3 months?: No  Body mass index: (!) 43.42  Have you seen the dentist within the past year?: N/A - wear dentures  Health Habits/Nutrition Interventions:  Counseled on eating healthy    Hearing/Vision:  Do you or your family notice any trouble with your hearing that hasn't been managed with hearing aids?: No  Do you have difficulty driving, watching TV, or doing any of your daily activities because of your eyesight?: (!) Yes  Have you had an eye exam within the past year?: Yes  No results found.   Safety:  Do you have working smoke detectors?: (!) No  Do you have any tripping hazards - loose or unsecured carpets or rugs?: No  Do you have any tripping hazards - clutter in doorways, halls, or stairs?: No  Do you have either shower bars, grab bars, non-slip mats or non-slip surfaces in your shower or bathtub?: Yes  Do all of your stairways have a railing or banister?: Yes  Do you always fasten your seatbelt when you are in a car?: Yes         Objective   Vitals:    10/12/22 1344   BP: (!) 142/76   Site: Left Upper Arm   Position: Sitting   Cuff Size: Large Adult   Pulse: 74   Resp: 18   Temp: 97.1 °F (36.2 °C)   TempSrc: Temporal   SpO2: 98%   Weight: 253 lb (114.8 kg)   Height: 5' 4\" (1.626 m)      Body mass index is 43.43 kg/m². General Appearance: alert and oriented to person, place and time, well developed and well- nourished, in no acute distress; morbidly obese  Skin: warm and dry, no rash or erythema  Head: normocephalic and atraumatic  Eyes: pupils equal, round, and reactive to light, extraocular eye movements intact, conjunctivae normal  ENT: tympanic membrane, external ear and ear canal normal bilaterally, nose without deformity, nasal mucosa and turbinates normal without polyps  Neck: supple and non-tender without mass, no thyromegaly or thyroid nodules, no cervical lymphadenopathy  Pulmonary/Chest: clear to auscultation bilaterally- no wheezes, rales or rhonchi, normal air movement, no respiratory distress  Cardiovascular: normal rate, regular rhythm, normal S1 and S2, no murmurs, rubs, clicks, or gallops, distal pulses intact, no carotid bruits  Abdomen: soft, non-tender, non-distended, normal bowel sounds, no masses or organomegaly  Extremities: 2+ pitting edema up to the knees bilaterall  Musculoskeletal: normal range of motion, no joint swelling, deformity or tenderness  Neurologic: reflexes normal and symmetric, no cranial nerve deficit, gait, coordination and speech normal       Allergies   Allergen Reactions    Lactose Intolerance (Gi) Diarrhea and Nausea And Vomiting    Phenobarbital Rash     Prior to Visit Medications    Medication Sig Taking?  Authorizing Provider   warfarin (COUMADIN) 3 MG tablet Take one tablet daily Yes Jonnie Wilkerson DO   warfarin (COUMADIN) 1 MG tablet Take 1 tablet by mouth daily Yes Jonnie Wilkerson DO   potassium chloride (KLOR-CON M) 10 MEQ extended release tablet Take 1 tablet by mouth 2 times daily Yes Jonnie Wilkerson DO   pantoprazole (PROTONIX) 20 MG tablet Take 2 tablets by mouth daily Yes Jonnie Wilkerson DO   metoprolol tartrate (LOPRESSOR) 50 MG tablet TAKE ONE TABLET BY MOUTH TWO TIMES A DAY Yes Jonnie Wilkerson DO   simvastatin (ZOCOR) 20 MG tablet Take 1 tablet by mouth nightly Yes Maximilian Rudd DO   fluticasone (FLONASE) 50 MCG/ACT nasal spray 1 spray by Each Nostril route daily Yes Jonnie Wilkerson DO   amLODIPine (NORVASC) 5 MG tablet Take 1 tablet by mouth daily Yes Jonnie Wilkerson DO   enalapril (VASOTEC) 10 MG tablet TAKE ONE TABLET BY MOUTH TWO TIMES A DAY Yes Jonnie Wilkerson DO   aspirin 81 MG EC tablet Take 1 tablet by mouth daily Yes Jonnie Wilkerson DO   furosemide (LASIX) 20 MG tablet Take 1 tablet by mouth 2 times daily Yes Jonnie Wilkerson DO   brimonidine (ALPHAGAN) 0.2 % ophthalmic solution INSTILL 1 DROP EVERY DAY INTO LEFT EYE AT 8 AM. Yes Historical Provider, MD   latanoprost (XALATAN) 0.005 % ophthalmic solution INSTILL 1 DROP IN THE LEFT EYE EVERY DAY AT BEDTIME Yes Historical Provider, MD Aguirre (Including outside providers/suppliers regularly involved in providing care):   Patient Care Team:  Maximilian Rudd DO as PCP - General (Internal Medicine)  Maximilian Rudd DO as PCP - [facility] Empaneled Provider     Reviewed and updated this visit:  Tobacco  Allergies  Meds  Med Hx  Surg Hx  Soc Hx  Fam Hx

## 2022-10-17 DIAGNOSIS — I10 ESSENTIAL HYPERTENSION: Chronic | ICD-10-CM

## 2022-10-17 DIAGNOSIS — J30.0 VASOMOTOR RHINITIS: ICD-10-CM

## 2022-10-17 DIAGNOSIS — I50.32 CHRONIC HEART FAILURE WITH PRESERVED EJECTION FRACTION (HCC): ICD-10-CM

## 2022-10-17 DIAGNOSIS — E78.2 MIXED HYPERLIPIDEMIA: ICD-10-CM

## 2022-10-17 DIAGNOSIS — I48.20 CHRONIC ATRIAL FIBRILLATION (HCC): Chronic | ICD-10-CM

## 2022-10-17 DIAGNOSIS — Z79.01 CHRONIC ANTICOAGULATION: ICD-10-CM

## 2022-10-17 DIAGNOSIS — K21.9 GASTROESOPHAGEAL REFLUX DISEASE WITHOUT ESOPHAGITIS: ICD-10-CM

## 2022-10-17 DIAGNOSIS — I25.10 CORONARY ARTERY DISEASE INVOLVING NATIVE CORONARY ARTERY OF NATIVE HEART WITHOUT ANGINA PECTORIS: Chronic | ICD-10-CM

## 2022-10-17 DIAGNOSIS — I50.22 CHRONIC SYSTOLIC CONGESTIVE HEART FAILURE (HCC): Chronic | ICD-10-CM

## 2022-10-17 NOTE — TELEPHONE ENCOUNTER
Patient's medication was sent to wrong pharmacy, medications should be sent to [facility], medications requeued

## 2022-10-18 RX ORDER — SIMVASTATIN 20 MG
20 TABLET ORAL NIGHTLY
Qty: 90 TABLET | Refills: 3 | Status: SHIPPED | OUTPATIENT
Start: 2022-10-18

## 2022-10-18 RX ORDER — FLUTICASONE PROPIONATE 50 MCG
1 SPRAY, SUSPENSION (ML) NASAL DAILY
Qty: 2 EACH | Refills: 3 | Status: SHIPPED | OUTPATIENT
Start: 2022-10-18

## 2022-10-18 RX ORDER — POTASSIUM CHLORIDE 750 MG/1
10 TABLET, EXTENDED RELEASE ORAL 2 TIMES DAILY
Qty: 180 TABLET | Refills: 3 | Status: SHIPPED | OUTPATIENT
Start: 2022-10-18

## 2022-10-18 RX ORDER — ENALAPRIL MALEATE 10 MG/1
TABLET ORAL
Qty: 180 TABLET | Refills: 3 | Status: SHIPPED | OUTPATIENT
Start: 2022-10-18

## 2022-10-18 RX ORDER — WARFARIN SODIUM 3 MG/1
TABLET ORAL
Qty: 30 TABLET | Refills: 0 | Status: SHIPPED
Start: 2022-10-18 | End: 2022-11-02 | Stop reason: SDUPTHER

## 2022-10-18 RX ORDER — FUROSEMIDE 20 MG/1
20 TABLET ORAL 2 TIMES DAILY
Qty: 60 TABLET | Refills: 0 | Status: SHIPPED | OUTPATIENT
Start: 2022-10-18

## 2022-10-18 RX ORDER — METOPROLOL TARTRATE 50 MG/1
TABLET, FILM COATED ORAL
Qty: 180 TABLET | Refills: 3 | Status: SHIPPED | OUTPATIENT
Start: 2022-10-18

## 2022-10-18 RX ORDER — PANTOPRAZOLE SODIUM 20 MG/1
40 TABLET, DELAYED RELEASE ORAL DAILY
Qty: 90 TABLET | Refills: 1 | Status: SHIPPED | OUTPATIENT
Start: 2022-10-18

## 2022-10-18 RX ORDER — WARFARIN SODIUM 1 MG/1
1 TABLET ORAL DAILY
Qty: 30 TABLET | Refills: 0 | Status: SHIPPED
Start: 2022-10-18 | End: 2022-11-02 | Stop reason: SDUPTHER

## 2022-10-18 RX ORDER — ASPIRIN 81 MG/1
81 TABLET ORAL DAILY
Qty: 90 TABLET | Refills: 3 | Status: SHIPPED | OUTPATIENT
Start: 2022-10-18

## 2022-10-18 RX ORDER — AMLODIPINE BESYLATE 5 MG/1
5 TABLET ORAL DAILY
Qty: 90 TABLET | Refills: 1 | Status: SHIPPED | OUTPATIENT
Start: 2022-10-18

## 2022-11-02 ENCOUNTER — NURSE ONLY (OUTPATIENT)
Dept: INTERNAL MEDICINE | Age: 77
End: 2022-11-02
Payer: MEDICARE

## 2022-11-02 DIAGNOSIS — R68.89 COLD INTOLERANCE: ICD-10-CM

## 2022-11-02 DIAGNOSIS — I10 ESSENTIAL HYPERTENSION: Chronic | ICD-10-CM

## 2022-11-02 DIAGNOSIS — Z79.01 CHRONIC ANTICOAGULATION: ICD-10-CM

## 2022-11-02 DIAGNOSIS — I48.20 CHRONIC ATRIAL FIBRILLATION (HCC): Chronic | ICD-10-CM

## 2022-11-02 DIAGNOSIS — I48.20 CHRONIC ATRIAL FIBRILLATION (HCC): Primary | ICD-10-CM

## 2022-11-02 DIAGNOSIS — I25.810 CORONARY ARTERY DISEASE INVOLVING CORONARY BYPASS GRAFT OF NATIVE HEART WITHOUT ANGINA PECTORIS: Chronic | ICD-10-CM

## 2022-11-02 LAB
ALBUMIN SERPL-MCNC: 4 G/DL (ref 3.5–5.2)
ALP BLD-CCNC: 92 U/L (ref 35–104)
ALT SERPL-CCNC: 10 U/L (ref 0–32)
ANION GAP SERPL CALCULATED.3IONS-SCNC: 15 MMOL/L (ref 7–16)
AST SERPL-CCNC: 14 U/L (ref 0–31)
BASOPHILS ABSOLUTE: 0.04 E9/L (ref 0–0.2)
BASOPHILS RELATIVE PERCENT: 0.7 % (ref 0–2)
BILIRUB SERPL-MCNC: 0.7 MG/DL (ref 0–1.2)
BUN BLDV-MCNC: 17 MG/DL (ref 6–23)
CALCIUM SERPL-MCNC: 9.7 MG/DL (ref 8.6–10.2)
CHLORIDE BLD-SCNC: 103 MMOL/L (ref 98–107)
CO2: 22 MMOL/L (ref 22–29)
CREAT SERPL-MCNC: 1 MG/DL (ref 0.5–1)
EOSINOPHILS ABSOLUTE: 0.09 E9/L (ref 0.05–0.5)
EOSINOPHILS RELATIVE PERCENT: 1.6 % (ref 0–6)
GFR SERPL CREATININE-BSD FRML MDRD: 58 ML/MIN/1.73
GLUCOSE BLD-MCNC: 157 MG/DL (ref 74–99)
HCT VFR BLD CALC: 43.2 % (ref 34–48)
HEMOGLOBIN: 14 G/DL (ref 11.5–15.5)
IMMATURE GRANULOCYTES #: 0.01 E9/L
IMMATURE GRANULOCYTES %: 0.2 % (ref 0–5)
INTERNATIONAL NORMALIZATION RATIO, POC: 2.6
LYMPHOCYTES ABSOLUTE: 1.46 E9/L (ref 1.5–4)
LYMPHOCYTES RELATIVE PERCENT: 25.4 % (ref 20–42)
MCH RBC QN AUTO: 29.4 PG (ref 26–35)
MCHC RBC AUTO-ENTMCNC: 32.4 % (ref 32–34.5)
MCV RBC AUTO: 90.8 FL (ref 80–99.9)
MONOCYTES ABSOLUTE: 0.42 E9/L (ref 0.1–0.95)
MONOCYTES RELATIVE PERCENT: 7.3 % (ref 2–12)
NEUTROPHILS ABSOLUTE: 3.72 E9/L (ref 1.8–7.3)
NEUTROPHILS RELATIVE PERCENT: 64.8 % (ref 43–80)
PDW BLD-RTO: 13.4 FL (ref 11.5–15)
PLATELET # BLD: 275 E9/L (ref 130–450)
PMV BLD AUTO: 10.6 FL (ref 7–12)
POTASSIUM SERPL-SCNC: 4.1 MMOL/L (ref 3.5–5)
PROTHROMBIN TIME, POC: 31.3
RBC # BLD: 4.76 E12/L (ref 3.5–5.5)
SODIUM BLD-SCNC: 140 MMOL/L (ref 132–146)
TOTAL PROTEIN: 7 G/DL (ref 6.4–8.3)
TSH SERPL DL<=0.05 MIU/L-ACNC: 3.73 UIU/ML (ref 0.27–4.2)
WBC # BLD: 5.7 E9/L (ref 4.5–11.5)

## 2022-11-02 PROCEDURE — 36415 COLL VENOUS BLD VENIPUNCTURE: CPT

## 2022-11-02 PROCEDURE — 99211 OFF/OP EST MAY X REQ PHY/QHP: CPT

## 2022-11-02 PROCEDURE — 85610 PROTHROMBIN TIME: CPT

## 2022-11-02 RX ORDER — WARFARIN SODIUM 3 MG/1
TABLET ORAL
Qty: 90 TABLET | Refills: 0 | Status: SHIPPED | OUTPATIENT
Start: 2022-11-02

## 2022-11-02 RX ORDER — WARFARIN SODIUM 1 MG/1
1 TABLET ORAL DAILY
Qty: 90 TABLET | Refills: 0 | Status: SHIPPED | OUTPATIENT
Start: 2022-11-02

## 2022-11-02 NOTE — PATIENT INSTRUCTIONS
Thank you for coming in today to have your INR checked. Please take your Coumadin as follows:  Coumadin 4 mg on Tues & Thurs  Coumadin 3 mg on Sun, Mon, Wed, Fri & Sat  We will recheck your INR in 4 weeks  Your appointment is Wednesday, 11/30/22 at 1:00 om  Please call if you have any unusual bleeding or any concerns at [phone]. Thanks!      Laurie Abdi LPN :)

## 2022-11-02 NOTE — PROGRESS NOTES
Patient denies forgetting any doses. States current dose is     Coumadin 4 mg on Tues & Thurs  Coumadin 3 mg on Sun, Mon, Wed, Fri & Sat    Denies any new medications or drug therapies, no other non-prescription drugs not already know to doctor, no changes to eating habits or drinking habits, no unusual bleeding or bruising. A full discussion of the nature of anticoagulants has been carried out. A benefit risk analysis has been presented to the patient, so that they understand the justification for choosing anticoagulation at this time. The need for frequent and regular monitoring, precise dosage adjustment and compliance is stressed. Side effects of potential bleeding are discussed. The patient should avoid any OTC items containing aspirin or ibuprofen, and should avoid great swings in general diet. Avoid alcohol consumption. Call if any signs of abnormal bleeding. Binu Castano     3 tubes of blood drawn from rh   (2 green, 1 lavender sent via tube system    Binu Balboa LPN    1117 - Verbal instruction of Dr. Neil Norris are to continue same dosing schedule, repeat in 4 weeks. Madiha Galindo LPN'    5145 - Message left on voicemail, instructing patient to continue her Coumadin as she has been    Coumadin 4 mg on Tues & Thurs  Coumadin 3 mg on Sun, Mon, Wed, Fri & Sat    Patient informed of next appointment on Wednesday, 11-30-22 at 1:00 pm. Informed to call back if she had any questions, concerns or issues. Office number left.   Madiha Galindo LPN

## 2022-11-03 ENCOUNTER — TELEPHONE (OUTPATIENT)
Dept: INTERNAL MEDICINE CLINIC | Age: 77
End: 2022-11-03

## 2022-11-03 NOTE — TELEPHONE ENCOUNTER
Patient notified of Bw results. Also pt states she is unable to take BP at home because she can not use the automatic cuff, informed her we will take BP at next INR visit , per Dr Asuncion Smallwood.

## 2022-11-03 NOTE — TELEPHONE ENCOUNTER
Please inform patient labs reviewed and all normal.  Can you ask if she has been able to check blood pressure since we reduced dose of amlodipine last visit? If not on next INR visit, can we also check vital signs?     Thanks    Electronically signed by Miguel Martinez DO on 11/3/2022 at 3:14 PM

## 2022-11-30 ENCOUNTER — NURSE ONLY (OUTPATIENT)
Dept: INTERNAL MEDICINE | Age: 77
End: 2022-11-30
Payer: MEDICARE

## 2022-11-30 VITALS — SYSTOLIC BLOOD PRESSURE: 162 MMHG | TEMPERATURE: 97.2 F | DIASTOLIC BLOOD PRESSURE: 82 MMHG

## 2022-11-30 DIAGNOSIS — I48.20 CHRONIC ATRIAL FIBRILLATION (HCC): Primary | ICD-10-CM

## 2022-11-30 LAB
INTERNATIONAL NORMALIZATION RATIO, POC: 3.6
PROTHROMBIN TIME, POC: 42.8

## 2022-11-30 PROCEDURE — 93793 ANTICOAG MGMT PT WARFARIN: CPT | Performed by: INTERNAL MEDICINE

## 2022-11-30 PROCEDURE — 85610 PROTHROMBIN TIME: CPT | Performed by: INTERNAL MEDICINE

## 2022-11-30 NOTE — PROGRESS NOTES
INR results reviewed with Dr. Marda Osler and orders received. Bp reviewed by Dr. Marda Osler. Pt notified via phone of same. AVS mailed to patient.

## 2022-11-30 NOTE — PATIENT INSTRUCTIONS
Please hold today, then continue plan starting tomorrow. Coumadin 4 mg Tues and thurs, 3mg the rest of week. Recheck 12/14/2022.

## 2022-12-12 ENCOUNTER — TELEPHONE (OUTPATIENT)
Dept: INTERNAL MEDICINE | Age: 77
End: 2022-12-12

## 2022-12-12 NOTE — TELEPHONE ENCOUNTER
Patient called  asking if she can be seen Wed when she comes in for her INR/BP check. She states she is having neck pain x several days she thought she slept wrong but it has not gone away.

## 2022-12-14 ENCOUNTER — OFFICE VISIT (OUTPATIENT)
Dept: INTERNAL MEDICINE | Age: 77
End: 2022-12-14
Payer: MEDICARE

## 2022-12-14 VITALS
OXYGEN SATURATION: 98 % | BODY MASS INDEX: 42.68 KG/M2 | HEART RATE: 78 BPM | RESPIRATION RATE: 18 BRPM | DIASTOLIC BLOOD PRESSURE: 74 MMHG | HEIGHT: 64 IN | TEMPERATURE: 97.2 F | WEIGHT: 250 LBS | SYSTOLIC BLOOD PRESSURE: 138 MMHG

## 2022-12-14 DIAGNOSIS — I50.32 CHRONIC HEART FAILURE WITH PRESERVED EJECTION FRACTION (HCC): ICD-10-CM

## 2022-12-14 DIAGNOSIS — I48.20 CHRONIC ATRIAL FIBRILLATION (HCC): Primary | ICD-10-CM

## 2022-12-14 DIAGNOSIS — I10 ESSENTIAL HYPERTENSION: Chronic | ICD-10-CM

## 2022-12-14 DIAGNOSIS — I87.8 CHRONIC VENOUS STASIS: ICD-10-CM

## 2022-12-14 DIAGNOSIS — F41.9 ANXIETY: ICD-10-CM

## 2022-12-14 DIAGNOSIS — M99.01 CERVICAL (NECK) REGION SOMATIC DYSFUNCTION: ICD-10-CM

## 2022-12-14 DIAGNOSIS — E66.01 OBESITY, CLASS III, BMI 40-49.9 (MORBID OBESITY) (HCC): ICD-10-CM

## 2022-12-14 LAB
INTERNATIONAL NORMALIZATION RATIO, POC: 2.9
PROTHROMBIN TIME, POC: 35.2

## 2022-12-14 PROCEDURE — 99212 OFFICE O/P EST SF 10 MIN: CPT | Performed by: INTERNAL MEDICINE

## 2022-12-14 PROCEDURE — 3078F DIAST BP <80 MM HG: CPT | Performed by: INTERNAL MEDICINE

## 2022-12-14 PROCEDURE — 3074F SYST BP LT 130 MM HG: CPT | Performed by: INTERNAL MEDICINE

## 2022-12-14 PROCEDURE — 1123F ACP DISCUSS/DSCN MKR DOCD: CPT | Performed by: INTERNAL MEDICINE

## 2022-12-14 PROCEDURE — 85610 PROTHROMBIN TIME: CPT | Performed by: INTERNAL MEDICINE

## 2022-12-14 PROCEDURE — 99214 OFFICE O/P EST MOD 30 MIN: CPT | Performed by: INTERNAL MEDICINE

## 2022-12-14 RX ORDER — LORAZEPAM 1 MG/1
1 TABLET ORAL NIGHTLY PRN
Qty: 20 TABLET | Refills: 0 | Status: SHIPPED | OUTPATIENT
Start: 2022-12-14 | End: 2023-01-13

## 2022-12-14 RX ORDER — AMLODIPINE BESYLATE 10 MG/1
10 TABLET ORAL DAILY
Qty: 90 TABLET | Refills: 1 | Status: SHIPPED | OUTPATIENT
Start: 2022-12-14

## 2022-12-14 RX ORDER — LORAZEPAM 1 MG/1
1 TABLET ORAL NIGHTLY PRN
Qty: 20 TABLET | Refills: 0 | Status: SHIPPED | OUTPATIENT
Start: 2022-12-14 | End: 2022-12-14

## 2022-12-14 RX ORDER — FUROSEMIDE 20 MG/1
20 TABLET ORAL 2 TIMES DAILY
Qty: 180 TABLET | Refills: 1 | Status: SHIPPED | OUTPATIENT
Start: 2022-12-14

## 2022-12-14 ASSESSMENT — ENCOUNTER SYMPTOMS
SHORTNESS OF BREATH: 0
ABDOMINAL PAIN: 0
COLOR CHANGE: 0
BACK PAIN: 0
RHINORRHEA: 0
DIARRHEA: 0
COUGH: 0
VOMITING: 0
CONSTIPATION: 0

## 2022-12-14 NOTE — PATIENT INSTRUCTIONS
Continue current medications. Start compression stockings. Take tylenol 500 mg 4 times daily and then decrease as neck pain improves. Do some neck stretching (exercises attached).      Chito Yang, DO

## 2022-12-14 NOTE — PROGRESS NOTES
Karen Yeung (:  1945) is a 69 y.o. female,Established patient, here for evaluation of the following chief complaint(s):  Neck Pain (X 5 days, is getting slightly better)       ASSESSMENT/PLAN:  1. Chronic atrial fibrillation (HCC)  -     POCT INR  2. Chronic heart failure with preserved ejection fraction (HCC)  -     furosemide (LASIX) 20 MG tablet; Take 1 tablet by mouth 2 times daily, Disp-180 tablet, R-1Normal  3. Chronic venous stasis  -     Misc. Devices MISC; Disp-1 each, R-0, PrintCompression stocking 20 - 30 mmHg knee high compression  4. Obesity, Class III, BMI 40-49.9 (morbid obesity) (HCC)  5. Cervical (neck) region somatic dysfunction  6. Anxiety  -     LORazepam (ATIVAN) 1 MG tablet; Take 1 tablet by mouth nightly as needed for Anxiety for up to 30 days. , Disp-20 tablet, R-0Normal  7. Essential hypertension  -     amLODIPine (NORVASC) 10 MG tablet; Take 1 tablet by mouth daily, Disp-90 tablet, R-1Normal    Patient provided verbal consent for OMM intervention for cervical somatic dysfunction using direct myofascial technique. Continue scheduled Tylenol, add heat and local massage. Okay to refill Ativan 1 mg nightly as needed. Her last fill was in May 2022 she reports does use very infrequently and is aware of the risk of overusing this medication. A printed prescription was destroyed during this visit and I did send an E prescription to her pharmacy. Lasix refilled, recommend compression stockings for lower extremity swelling. Hypertension is well controlled on current regimen of Lasix 20 mg twice daily, enalapril 10 mg    Continue current dose of Coumadin recheck in 1 month    Return for 1 month INR. Subjective   SUBJECTIVE/OBJECTIVE:  HPI    Patient reports she did get a puppy recently. She reports neck strain happened 6 days ago taking Tylenol with some improvement and today actually feels significant sleep better.   Usually this pain is worse at night some radiation into the left trapezius. INR is therapeutic, we will plan to continue current doses. She denies any bleeding issues. She did resume amlodipine 10 mg. She does have some lower extremity edema and is on Lasix. She is interested in compression stockings. Her granddaughter is living with her and would be able to help her put these on. Review of Systems   Constitutional:  Negative for chills, fatigue, fever and unexpected weight change. HENT:  Negative for congestion and rhinorrhea. Respiratory:  Negative for cough and shortness of breath. Cardiovascular:  Positive for leg swelling (Bilateral symmetric). Negative for chest pain and palpitations. Gastrointestinal:  Negative for abdominal pain, constipation, diarrhea and vomiting. Genitourinary:  Negative for dysuria and frequency. Musculoskeletal:  Positive for arthralgias (neck pain). Negative for back pain. Skin:  Negative for color change, pallor and wound. Neurological:  Negative for syncope and light-headedness. Psychiatric/Behavioral:  Negative for dysphoric mood. The patient is not nervous/anxious. Objective   Physical Exam  Constitutional:       General: She is not in acute distress. Appearance: She is well-developed. She is obese. Comments: Ambulates with cane   HENT:      Head: Normocephalic and atraumatic. Eyes:      General: No scleral icterus. Conjunctiva/sclera: Conjunctivae normal.   Neck:      Trachea: No tracheal deviation. Comments: Hypertonicity bilateral trapezius, decreased active range of motion. Cardiovascular:      Rate and Rhythm: Normal rate and regular rhythm. Heart sounds: Normal heart sounds. No murmur heard. Pulmonary:      Effort: Pulmonary effort is normal. No respiratory distress. Breath sounds: Normal breath sounds. Abdominal:      General: Bowel sounds are normal. There is no distension. Palpations: Abdomen is soft. Tenderness:  There is no abdominal tenderness. Musculoskeletal:         General: Normal range of motion. Cervical back: Normal range of motion. Right lower leg: Edema (1+ pitting to the knee) present. Left lower leg: Edema (1+ pitting to the knee) present. Skin:     General: Skin is warm and dry. Neurological:      Mental Status: She is alert and oriented to person, place, and time. Psychiatric:         Behavior: Behavior normal.         Thought Content: Thought content normal.         Judgment: Judgment normal.                An electronic signature was used to authenticate this note.     --Lena Primrose, DO

## 2023-01-16 ENCOUNTER — NURSE ONLY (OUTPATIENT)
Dept: INTERNAL MEDICINE | Age: 78
End: 2023-01-16
Payer: MEDICARE

## 2023-01-16 DIAGNOSIS — I48.20 CHRONIC ATRIAL FIBRILLATION (HCC): Primary | ICD-10-CM

## 2023-01-16 LAB
INTERNATIONAL NORMALIZATION RATIO, POC: 3
PROTHROMBIN TIME, POC: 35.5

## 2023-01-16 PROCEDURE — 85610 PROTHROMBIN TIME: CPT | Performed by: INTERNAL MEDICINE

## 2023-01-16 NOTE — PROGRESS NOTES
INR results reviewed with Dr. Michael Garcia and orders received. Pt notified via phone of same. SARAHS with fu appt mailed to patient.

## 2023-02-02 DIAGNOSIS — I10 ESSENTIAL HYPERTENSION: Chronic | ICD-10-CM

## 2023-02-02 DIAGNOSIS — I50.22 CHRONIC SYSTOLIC CONGESTIVE HEART FAILURE (HCC): Chronic | ICD-10-CM

## 2023-02-02 DIAGNOSIS — J30.0 VASOMOTOR RHINITIS: ICD-10-CM

## 2023-02-02 RX ORDER — ENALAPRIL MALEATE 10 MG/1
TABLET ORAL
Qty: 180 TABLET | Refills: 3 | Status: SHIPPED | OUTPATIENT
Start: 2023-02-02

## 2023-02-02 RX ORDER — POTASSIUM CHLORIDE 750 MG/1
TABLET, EXTENDED RELEASE ORAL
Qty: 180 TABLET | Refills: 3 | Status: SHIPPED | OUTPATIENT
Start: 2023-02-02

## 2023-02-02 RX ORDER — FLUTICASONE PROPIONATE 50 MCG
SPRAY, SUSPENSION (ML) NASAL
Qty: 32 G | Refills: 3 | Status: SHIPPED | OUTPATIENT
Start: 2023-02-02

## 2023-02-16 ENCOUNTER — NURSE ONLY (OUTPATIENT)
Dept: INTERNAL MEDICINE | Age: 78
End: 2023-02-16
Payer: MEDICARE

## 2023-02-16 DIAGNOSIS — I50.22 CHRONIC SYSTOLIC CONGESTIVE HEART FAILURE (HCC): Primary | ICD-10-CM

## 2023-02-16 DIAGNOSIS — Z79.01 CHRONIC ANTICOAGULATION: ICD-10-CM

## 2023-02-16 DIAGNOSIS — I48.20 CHRONIC ATRIAL FIBRILLATION (HCC): ICD-10-CM

## 2023-02-16 LAB
INTERNATIONAL NORMALIZATION RATIO, POC: 3.4
PROTHROMBIN TIME, POC: 40.2

## 2023-02-16 PROCEDURE — 93793 ANTICOAG MGMT PT WARFARIN: CPT | Performed by: INTERNAL MEDICINE

## 2023-02-16 PROCEDURE — 85610 PROTHROMBIN TIME: CPT | Performed by: INTERNAL MEDICINE

## 2023-02-16 RX ORDER — WARFARIN SODIUM 3 MG/1
TABLET ORAL
Qty: 90 TABLET | Refills: 0 | Status: SHIPPED | OUTPATIENT
Start: 2023-02-16

## 2023-02-16 RX ORDER — WARFARIN SODIUM 1 MG/1
1 TABLET ORAL DAILY
Qty: 90 TABLET | Refills: 0 | Status: SHIPPED | OUTPATIENT
Start: 2023-02-16

## 2023-02-16 ASSESSMENT — PATIENT HEALTH QUESTIONNAIRE - PHQ9
SUM OF ALL RESPONSES TO PHQ QUESTIONS 1-9: 0
9. THOUGHTS THAT YOU WOULD BE BETTER OFF DEAD, OR OF HURTING YOURSELF: 0
SUM OF ALL RESPONSES TO PHQ QUESTIONS 1-9: 0
2. FEELING DOWN, DEPRESSED OR HOPELESS: 0
SUM OF ALL RESPONSES TO PHQ9 QUESTIONS 1 & 2: 0
8. MOVING OR SPEAKING SO SLOWLY THAT OTHER PEOPLE COULD HAVE NOTICED. OR THE OPPOSITE, BEING SO FIGETY OR RESTLESS THAT YOU HAVE BEEN MOVING AROUND A LOT MORE THAN USUAL: 0
5. POOR APPETITE OR OVEREATING: 0
SUM OF ALL RESPONSES TO PHQ QUESTIONS 1-9: 0
4. FEELING TIRED OR HAVING LITTLE ENERGY: 0
1. LITTLE INTEREST OR PLEASURE IN DOING THINGS: 0
7. TROUBLE CONCENTRATING ON THINGS, SUCH AS READING THE NEWSPAPER OR WATCHING TELEVISION: 0
3. TROUBLE FALLING OR STAYING ASLEEP: 0
6. FEELING BAD ABOUT YOURSELF - OR THAT YOU ARE A FAILURE OR HAVE LET YOURSELF OR YOUR FAMILY DOWN: 0
SUM OF ALL RESPONSES TO PHQ QUESTIONS 1-9: 0
10. IF YOU CHECKED OFF ANY PROBLEMS, HOW DIFFICULT HAVE THESE PROBLEMS MADE IT FOR YOU TO DO YOUR WORK, TAKE CARE OF THINGS AT HOME, OR GET ALONG WITH OTHER PEOPLE: 0

## 2023-02-16 NOTE — PROGRESS NOTES
INR results reviewed with Dr. Aldean Ahumada. Orders received. Patient notified via voicemail of these orders. Detailed message left with new orders. Patient was instructed to call with any questions. AVS printed and mailed to patient with appointment card for next INR check.

## 2023-02-16 NOTE — PATIENT INSTRUCTIONS
INR 3.4  Please take Coumadin 4 mg every Tuesday and  Coumadin 3 mg all other days  We will recheck your INR in 1 week.   Thank you for coming in today

## 2023-02-16 NOTE — PROGRESS NOTES
Lab Results       Component                Value               Date                       INR                      3.4                 02/16/2023              Patient Findings     Negatives:  Signs/symptoms of thrombosis, Signs/symptoms of bleeding,   Laboratory test error suspected, Change in health, Change in alcohol use,   Change in activity, Upcoming invasive procedure, Emergency department   visit, Upcoming dental procedure, Missed doses, Extra doses, Change in   medications, Change in diet/appetite, Hospital admission, Bruising, Other   complaints    Comments:  States taking Coumadin 4 mg on Tuesday and Thursday and   Coumadin 3 mg all other days. Denies missed doses        Plan: Supratherapeutic today; plan to decrease weekly dosage by 5 %; regimen will be as follows: 4 mg every Tue; 3 mg all other days.   Return to Clinic in 1 week    Return date  As of 2/16/2023    TTR:  64.2 % (9.8 y)   Next INR check:          Electronically signed by Estella Hill DO on 2/16/2023 at 1:39 PM

## 2023-02-23 ENCOUNTER — NURSE ONLY (OUTPATIENT)
Dept: INTERNAL MEDICINE | Age: 78
End: 2023-02-23
Payer: MEDICARE

## 2023-02-23 DIAGNOSIS — I48.20 CHRONIC ATRIAL FIBRILLATION (HCC): Primary | ICD-10-CM

## 2023-02-23 LAB
INTERNATIONAL NORMALIZATION RATIO, POC: 2.9
PROTHROMBIN TIME, POC: 34.9

## 2023-02-23 PROCEDURE — 85610 PROTHROMBIN TIME: CPT | Performed by: INTERNAL MEDICINE

## 2023-02-23 PROCEDURE — 93793 ANTICOAG MGMT PT WARFARIN: CPT | Performed by: INTERNAL MEDICINE

## 2023-02-23 NOTE — PROGRESS NOTES
Lab Results       Component                Value               Date                       INR                      2.9                 02/23/2023              Patient Findings     Negatives:  Signs/symptoms of thrombosis, Signs/symptoms of bleeding,   Laboratory test error suspected, Change in health, Change in alcohol use,   Change in activity, Upcoming invasive procedure, Emergency department   visit, Upcoming dental procedure, Missed doses, Extra doses, Change in   medications, Change in diet/appetite, Hospital admission, Bruising, Other   complaints    Comments:  Patient states she took 4 mg  of Coumadin on Thursday and 3 mg   all other  days. States got mixed up. Denies missed doses        Plan: Continue 4 mg every Tue; 3 mg all other days.      Return date  As of 2/23/2023    TTR:  64.1 % (9.9 y)   Next INR check:  3/2/2023        Electronically signed by Patel Smith DO on 2/23/2023 at 1:30 PM

## 2023-02-23 NOTE — PROGRESS NOTES
INR results reviewed with Dr. Mónica Rudd. Orders received. Patient notified via phone of these orders. Patient voices understanding of the information and follow-up. AVS printed and mailed to patient with appointment card for next INR check.

## 2023-02-23 NOTE — PATIENT INSTRUCTIONS
INR today 2.9  Please  take Coumadin 4 mg every Tuesday and 3 mg of Coumadin all other days.   Thank you for coming in today and call with all issues

## 2023-03-21 ENCOUNTER — TELEPHONE (OUTPATIENT)
Dept: INTERNAL MEDICINE | Age: 78
End: 2023-03-21

## 2023-03-21 NOTE — TELEPHONE ENCOUNTER
Called patient and reviewed symptoms which are mild. Denies dyspnea, cough or fever. Appetite returning. Continue conservative care, encouraged fluid intake and call if symptom progression for consideration of anti-viral. She is not vaccinated and prefers to hold on anti-viral at this time. Recommend INR check on 3/27/23. Reports some pinkish color post wiping, since last BM this has resolved and denies any orthostatic symptoms. Advised if returns or worse to call.     Electronically signed by Gina Oconnor DO on 3/21/2023 at 4:21 PM

## 2023-03-21 NOTE — TELEPHONE ENCOUNTER
Pt called in to state she tested positive for Covid on Sunday, she cancelled her appt 3/3 for INr but today she went to wipe she note a small amount of light pinkish color on toilet paper , pt also stated she did noteat or drink much Sunday or Monday  . Last INR 2/23  was 2.9 . Please advise. No Fever.  No other bleeding noted

## 2023-03-27 DIAGNOSIS — I50.32 CHRONIC HEART FAILURE WITH PRESERVED EJECTION FRACTION (HCC): ICD-10-CM

## 2023-03-27 RX ORDER — FUROSEMIDE 20 MG/1
20 TABLET ORAL 2 TIMES DAILY
Qty: 60 TABLET | Refills: 1 | Status: SHIPPED | OUTPATIENT
Start: 2023-03-27

## 2023-03-27 NOTE — TELEPHONE ENCOUNTER
Pt phoned in needs Lasix refilled requesting it go to [facility] and [facility] instead of mail order due to needing it right now has an upcoming appt.  On 4/26/23 at that time will request meds be mail ordered

## 2023-03-30 ENCOUNTER — NURSE ONLY (OUTPATIENT)
Dept: INTERNAL MEDICINE | Age: 78
End: 2023-03-30
Payer: MEDICARE

## 2023-03-30 ENCOUNTER — TELEPHONE (OUTPATIENT)
Dept: INTERNAL MEDICINE | Age: 78
End: 2023-03-30

## 2023-03-30 DIAGNOSIS — I48.20 CHRONIC ATRIAL FIBRILLATION (HCC): ICD-10-CM

## 2023-03-30 DIAGNOSIS — I48.20 CHRONIC ATRIAL FIBRILLATION (HCC): Primary | ICD-10-CM

## 2023-03-30 LAB
INR BLD: 5.2
INTERNATIONAL NORMALIZATION RATIO, POC: 5
PROTHROMBIN TIME, POC: 59.7
PROTHROMBIN TIME: 56.2 SEC (ref 9.3–12.4)

## 2023-03-30 PROCEDURE — 85610 PROTHROMBIN TIME: CPT | Performed by: INTERNAL MEDICINE

## 2023-03-30 NOTE — PATIENT INSTRUCTIONS
Please hold Coumadin 3/30/2023 and 3/31/2023. Start Coumadin 2 mg on Monday and Thursday and Coumadin 3 mg on all other days. Recheck INR on Monday 4/3/2023 at 115 pm.  Please call the office with any concerns at [phone].       Thank you, MIGUE Johnson

## 2023-03-30 NOTE — TELEPHONE ENCOUNTER
called with critical lab. STAT PT/INR was 5.2. Informed Dr Declan Velasco of lab result. Coumadin order given to patient in office and AVS mailed to patient.

## 2023-03-30 NOTE — PROGRESS NOTES
INR results reviewed with Dr. Mia Jean-Baptiste. INR was 5.0 today. Lab drawn and sent STAT. Orders received. Patient is to hold Coumadin 3/30/2023 and 3/31/2023, take Coumadin 2 mg on Monday and Thursday and Coumadin 3 mg on all other days. Recheck INR on 4/3/2023. Patient notified in office of these orders. Patient voices understanding of the information and follow-up. AVS printed and given to patient with appointment card for next INR check.

## 2023-04-03 ENCOUNTER — NURSE ONLY (OUTPATIENT)
Dept: INTERNAL MEDICINE | Age: 78
End: 2023-04-03
Payer: MEDICARE

## 2023-04-03 DIAGNOSIS — Z79.01 CHRONIC ANTICOAGULATION: ICD-10-CM

## 2023-04-03 DIAGNOSIS — I48.20 CHRONIC ATRIAL FIBRILLATION (HCC): Primary | ICD-10-CM

## 2023-04-03 PROCEDURE — 85610 PROTHROMBIN TIME: CPT | Performed by: INTERNAL MEDICINE

## 2023-04-03 PROCEDURE — 93793 ANTICOAG MGMT PT WARFARIN: CPT | Performed by: INTERNAL MEDICINE

## 2023-04-03 RX ORDER — WARFARIN SODIUM 1 MG/1
1 TABLET ORAL DAILY
Qty: 30 TABLET | Refills: 0 | Status: SHIPPED | OUTPATIENT
Start: 2023-04-03

## 2023-04-03 RX ORDER — WARFARIN SODIUM 1 MG/1
1 TABLET ORAL DAILY
Qty: 30 TABLET | Refills: 0 | Status: SHIPPED
Start: 2023-04-03 | End: 2023-04-03 | Stop reason: CLARIF

## 2023-04-03 NOTE — PROGRESS NOTES
INR results reviewed with Dr. Jules Beltran. Orders received. Patient left request to be notified . Patient voices understanding of the information and follow-up. AVS printed and mailed  to patient with appointment card for next INR check.

## 2023-04-03 NOTE — PATIENT INSTRUCTIONS
Take 2mg of Coumadin on Monday and Thursday   3mgs  on Tuesday, Wednesday , Friday, Saturday and Sunday  Recheck INR in 1 week

## 2023-04-03 NOTE — PROGRESS NOTES
INR today 1.8    MTh - 2 mg  Other days - 3 mg    Rpt INR in 1 week    30 mg tabs of warfarin 1mg sent to [facility].     Electronically signed by Ara Mendoza MD on 4/3/2023 at 1:40 PM

## 2023-04-18 ENCOUNTER — NURSE ONLY (OUTPATIENT)
Dept: INTERNAL MEDICINE | Age: 78
End: 2023-04-18
Payer: MEDICARE

## 2023-04-18 DIAGNOSIS — I48.20 CHRONIC ATRIAL FIBRILLATION (HCC): Primary | ICD-10-CM

## 2023-04-18 LAB
INR BLD: 2.6
INTERNATIONAL NORMALIZATION RATIO, POC: 2.6
PROTHROMBIN TIME, POC: 31.4

## 2023-04-18 PROCEDURE — 93793 ANTICOAG MGMT PT WARFARIN: CPT | Performed by: INTERNAL MEDICINE

## 2023-04-18 PROCEDURE — 85610 PROTHROMBIN TIME: CPT | Performed by: INTERNAL MEDICINE

## 2023-04-18 NOTE — PATIENT INSTRUCTIONS
INR was 2.6 today in office. Please continue Warfarin 2 mg on Monday and Thursday and 3 mg on all other days. Recheck INR on 4/26/2023 at appointment. If you have any questions please call the office at [phone].      Thank you  Have a great day  MIGUE Johnson

## 2023-04-18 NOTE — PROGRESS NOTES
INR results reviewed with Dr. Monik Hobbs. Orders received. Patient is to continue Warfarin 2 mg on Monday and Thursday and Warfarin 3 mg on all other days. Recheck INR at [facility] appointment on 4/26/2023. Patient notified via voicemail of these orders per request.  AVS printed and mailed to patient with appointment card for next INR check.

## 2023-04-26 ENCOUNTER — OFFICE VISIT (OUTPATIENT)
Dept: INTERNAL MEDICINE | Age: 78
End: 2023-04-26
Payer: MEDICARE

## 2023-04-26 VITALS
SYSTOLIC BLOOD PRESSURE: 142 MMHG | DIASTOLIC BLOOD PRESSURE: 76 MMHG | TEMPERATURE: 97.8 F | RESPIRATION RATE: 18 BRPM | HEART RATE: 70 BPM | OXYGEN SATURATION: 97 % | WEIGHT: 251 LBS | HEIGHT: 64 IN | BODY MASS INDEX: 42.85 KG/M2

## 2023-04-26 DIAGNOSIS — E66.01 CLASS 3 OBESITY (HCC): Chronic | ICD-10-CM

## 2023-04-26 DIAGNOSIS — I10 ESSENTIAL HYPERTENSION: Chronic | ICD-10-CM

## 2023-04-26 DIAGNOSIS — K21.9 GASTROESOPHAGEAL REFLUX DISEASE WITHOUT ESOPHAGITIS: ICD-10-CM

## 2023-04-26 DIAGNOSIS — E11.9 TYPE 2 DIABETES MELLITUS WITHOUT COMPLICATION, WITHOUT LONG-TERM CURRENT USE OF INSULIN (HCC): ICD-10-CM

## 2023-04-26 DIAGNOSIS — I48.20 CHRONIC ATRIAL FIBRILLATION (HCC): Primary | ICD-10-CM

## 2023-04-26 DIAGNOSIS — I38 VALVULAR HEART DISEASE: Chronic | ICD-10-CM

## 2023-04-26 DIAGNOSIS — I50.32 CHRONIC HEART FAILURE WITH PRESERVED EJECTION FRACTION (HCC): ICD-10-CM

## 2023-04-26 DIAGNOSIS — Z95.1 HX OF CABG: ICD-10-CM

## 2023-04-26 LAB
INTERNATIONAL NORMALIZATION RATIO, POC: 2
PROTHROMBIN TIME, POC: 24.5

## 2023-04-26 PROCEDURE — 3078F DIAST BP <80 MM HG: CPT | Performed by: INTERNAL MEDICINE

## 2023-04-26 PROCEDURE — 99212 OFFICE O/P EST SF 10 MIN: CPT | Performed by: INTERNAL MEDICINE

## 2023-04-26 PROCEDURE — 1123F ACP DISCUSS/DSCN MKR DOCD: CPT | Performed by: INTERNAL MEDICINE

## 2023-04-26 PROCEDURE — 85610 PROTHROMBIN TIME: CPT | Performed by: INTERNAL MEDICINE

## 2023-04-26 PROCEDURE — 99214 OFFICE O/P EST MOD 30 MIN: CPT | Performed by: INTERNAL MEDICINE

## 2023-04-26 PROCEDURE — 3074F SYST BP LT 130 MM HG: CPT | Performed by: INTERNAL MEDICINE

## 2023-04-26 RX ORDER — ZOSTER VACCINE RECOMBINANT, ADJUVANTED 50 MCG/0.5
0.5 KIT INTRAMUSCULAR SEE ADMIN INSTRUCTIONS
Qty: 0.5 ML | Refills: 0 | Status: CANCELLED | OUTPATIENT
Start: 2023-04-26 | End: 2023-10-23

## 2023-04-26 RX ORDER — AMLODIPINE BESYLATE 10 MG/1
10 TABLET ORAL DAILY
Qty: 90 TABLET | Refills: 1 | Status: SHIPPED | OUTPATIENT
Start: 2023-04-26

## 2023-04-26 RX ORDER — FUROSEMIDE 20 MG/1
20 TABLET ORAL 2 TIMES DAILY
Qty: 180 TABLET | Refills: 1 | Status: SHIPPED | OUTPATIENT
Start: 2023-04-26

## 2023-04-26 RX ORDER — PANTOPRAZOLE SODIUM 20 MG/1
40 TABLET, DELAYED RELEASE ORAL DAILY
Qty: 90 TABLET | Refills: 1 | Status: SHIPPED | OUTPATIENT
Start: 2023-04-26

## 2023-04-26 NOTE — PROGRESS NOTES
Lisandra Diaz (:  1945) is a 69 y.o. female,Established patient, here for evaluation of the following chief complaint(s):  Office Visit for Anticoagulation Management, Hypertension, and Follow-up (Pt went to urgent care - she states Dr there informed her she was in CHF and increased her potassium and lasix x 2 days)         ASSESSMENT/PLAN:  1. Chronic atrial fibrillation (HCC)  -     POCT INR; Lopressor 50 mg twice daily   2. Essential hypertension   -Controlled with amlodipine  3. Gastroesophageal reflux disease without esophagitis  -     pantoprazole (PROTONIX) 20 MG tablet; Take 2 tablets by mouth daily, Disp-90 tablet, R-1Normal  4. Chronic heart failure with preserved ejection fraction (HCC)  -     furosemide (LASIX) 20 MG tablet; Take 1 tablet by mouth 2 times daily, Disp-180 tablet, R-1Normal  -     Brain Natriuretic Peptide; Future  -     Comprehensive Metabolic Panel; Future  -     CBC with Auto Differential; Future  5. Class 3 obesity ([facility])   Counseled lifestyle modifications  6. Type 2 diabetes mellitus without complication, without long-term current use of insulin ([facility])  7. Valvular heart disease   Status post bioprosthetic mitral and aortic valves  8. Hx of CABG   Continues on aspirin, beta-blocker, statin    Continue compression stockings and instructed to call if any new or worsening leg swelling or dyspnea. Check labs in  including CBC, CMP and BNP. Return for 3 weeks for inr same dose and if on low-normal side plan to increase weekly dose of coumadin. Return in about 4 months (around 2023). Subjective   SUBJECTIVE/OBJECTIVE:  HPI    She reports last week being seen at an urgent care for right lower extremity swelling. She reports after increasing Lasix for 2 days this resolved and the skin no longer has any drainage. Denies any fever chills or erythema at the site. Denies any trauma. Also denies any shortness of breath.     She reports feeling her usual self

## 2023-04-26 NOTE — PATIENT INSTRUCTIONS
Continue current medications. Get labs completed in Jun 2023. Call office if you have any increased swelling or develop any shortness of breath.     Have a great Spring,    Johnny Canseco, DO

## 2023-04-28 PROBLEM — D50.0 BLOOD LOSS ANEMIA: Status: RESOLVED | Noted: 2021-08-02 | Resolved: 2023-04-28

## 2023-04-28 PROBLEM — S30.1XXA ABDOMINAL WALL HEMATOMA: Status: RESOLVED | Noted: 2021-07-22 | Resolved: 2023-04-28

## 2023-04-28 PROBLEM — E66.01 CLASS 3 OBESITY (HCC): Chronic | Status: ACTIVE | Noted: 2023-04-28

## 2023-04-28 PROBLEM — E66.813 CLASS 3 OBESITY: Chronic | Status: ACTIVE | Noted: 2023-04-28

## 2023-04-28 ASSESSMENT — ENCOUNTER SYMPTOMS
SHORTNESS OF BREATH: 0
COUGH: 0
COLOR CHANGE: 0
ABDOMINAL PAIN: 0
DIARRHEA: 0
VOMITING: 0
CONSTIPATION: 0
RHINORRHEA: 0
BACK PAIN: 0

## 2023-05-17 ENCOUNTER — NURSE ONLY (OUTPATIENT)
Dept: INTERNAL MEDICINE | Age: 78
End: 2023-05-17
Payer: MEDICARE

## 2023-05-17 DIAGNOSIS — I48.20 CHRONIC ATRIAL FIBRILLATION (HCC): Primary | ICD-10-CM

## 2023-05-17 DIAGNOSIS — Z79.01 CHRONIC ANTICOAGULATION: ICD-10-CM

## 2023-05-17 LAB
INTERNATIONAL NORMALIZATION RATIO, POC: 1.7
PROTHROMBIN TIME, POC: 20.2

## 2023-05-17 PROCEDURE — 85610 PROTHROMBIN TIME: CPT | Performed by: INTERNAL MEDICINE

## 2023-05-17 PROCEDURE — 93793 ANTICOAG MGMT PT WARFARIN: CPT | Performed by: INTERNAL MEDICINE

## 2023-05-17 RX ORDER — WARFARIN SODIUM 1 MG/1
1 TABLET ORAL DAILY
Qty: 30 TABLET | Refills: 0 | Status: CANCELLED | OUTPATIENT
Start: 2023-05-17

## 2023-05-17 RX ORDER — WARFARIN SODIUM 3 MG/1
TABLET ORAL
Qty: 90 TABLET | Refills: 0 | Status: SHIPPED | OUTPATIENT
Start: 2023-05-17

## 2023-05-17 NOTE — PROGRESS NOTES
INR results reviewed with Dr. Navarro Adrian. Orders received to have pt take Coumadin 3 mg daily. And repeat INr end of next week. Patient notified via phone of these orders. Patient voices understanding of the information and follow-up on next Fri 5/26. AVS printed and mailed to patient with appointment card for next INR check.

## 2023-05-17 NOTE — PATIENT INSTRUCTIONS
Please start taking Coumadin 3 mg daily.  And repeat INr end of next week , pt scheduled 5/26/23 at 1 pm.

## 2023-07-12 ENCOUNTER — NURSE ONLY (OUTPATIENT)
Dept: INTERNAL MEDICINE | Age: 78
End: 2023-07-12
Payer: MEDICARE

## 2023-07-12 DIAGNOSIS — Z79.01 LONG TERM (CURRENT) USE OF ANTICOAGULANTS: ICD-10-CM

## 2023-07-12 DIAGNOSIS — I48.20 CHRONIC ATRIAL FIBRILLATION (HCC): Primary | Chronic | ICD-10-CM

## 2023-07-12 LAB
INTERNATIONAL NORMALIZATION RATIO, POC: 2.3
PROTHROMBIN TIME, POC: 27.8

## 2023-07-12 PROCEDURE — 85610 PROTHROMBIN TIME: CPT | Performed by: INTERNAL MEDICINE

## 2023-07-12 PROCEDURE — 93793 ANTICOAG MGMT PT WARFARIN: CPT | Performed by: INTERNAL MEDICINE

## 2023-07-12 PROCEDURE — 99211 OFF/OP EST MAY X REQ PHY/QHP: CPT | Performed by: INTERNAL MEDICINE

## 2023-07-25 NOTE — PROGRESS NOTES
Called and spoke with pt via  phone. Pt notified to continue current Coumadin medication plan and repeat INR on 8/9/23 at 1 PM. Pt states she did call in to check on follow up appt date.

## 2023-08-09 ENCOUNTER — NURSE ONLY (OUTPATIENT)
Dept: INTERNAL MEDICINE | Age: 78
End: 2023-08-09
Payer: MEDICARE

## 2023-08-09 VITALS — WEIGHT: 249 LBS | TEMPERATURE: 98.4 F | BODY MASS INDEX: 42.74 KG/M2

## 2023-08-09 DIAGNOSIS — Z79.01 CHRONIC ANTICOAGULATION: Primary | ICD-10-CM

## 2023-08-09 DIAGNOSIS — I48.20 CHRONIC ATRIAL FIBRILLATION (HCC): ICD-10-CM

## 2023-08-09 LAB — INTERNATIONAL NORMALIZATION RATIO, POC: 2

## 2023-08-09 PROCEDURE — 93793 ANTICOAG MGMT PT WARFARIN: CPT | Performed by: INTERNAL MEDICINE

## 2023-08-09 PROCEDURE — 85610 PROTHROMBIN TIME: CPT

## 2023-08-09 RX ORDER — WARFARIN SODIUM 3 MG/1
TABLET ORAL
Qty: 30 TABLET | Refills: 0 | Status: SHIPPED | OUTPATIENT
Start: 2023-08-09

## 2023-08-09 RX ORDER — DORZOLAMIDE HCL 20 MG/ML
SOLUTION/ DROPS OPHTHALMIC
COMMUNITY
Start: 2023-07-25

## 2023-08-09 NOTE — PROGRESS NOTES
INR results reviewed with Dr. Keerthi Quezada. Orders received. Patient notified via phone of these orders. Patient voices understanding of the information and follow-up. AVS printed and mailed to patient with appointment card for next INR check.

## 2023-08-22 ENCOUNTER — TELEPHONE (OUTPATIENT)
Dept: INTERNAL MEDICINE | Age: 78
End: 2023-08-22

## 2023-08-22 NOTE — TELEPHONE ENCOUNTER
Called pt to inform her we rescheduled her appt to 10/11 at 330 pm, and that Dr  would like INR done no later than 8/29 but no answer and vm did not  this time.

## 2023-08-22 NOTE — TELEPHONE ENCOUNTER
----- Message from Nini Swanson sent at 8/22/2023 10:26 AM EDT -----  Subject: Appointment Request    Reason for Call: Established Patient Appointment needed: Routine Existing   Condition Follow Up    QUESTIONS    Reason for appointment request? Other - [facility] unable to schedule appointment     Additional Information for Provider? Patient has to cancel appointment on   8/23 and reschedule .  Please call patient.  ---------------------------------------------------------------------------  --------------  Angelika ESTRADA  [phone]; OK to leave message on voicemail  ---------------------------------------------------------------------------  --------------  SCRIPT ANSWERS

## 2023-08-22 NOTE — TELEPHONE ENCOUNTER
Left message for pt to inform her we will cancel her appt  tomorrow and that I will have to ask Dr. Earline Zabala where I can put her in at due to needing an INR prior to next open appt in October.

## 2023-08-22 NOTE — TELEPHONE ENCOUNTER
Reschedule INR visit at patients earliest convenvience, needs to be by 08/29/23.   Ok to reschedule for 10/11 at 3:30 pm.

## 2023-09-13 ENCOUNTER — TELEPHONE (OUTPATIENT)
Dept: INTERNAL MEDICINE | Age: 78
End: 2023-09-13

## 2023-09-13 NOTE — TELEPHONE ENCOUNTER
----- Message from Marion Mullins sent at 9/13/2023  3:21 PM EDT -----  Subject: Appointment Request    Reason for Call: Established Patient Appointment needed: Routine Existing   Condition Follow Up    QUESTIONS    Reason for appointment request? Other - [facility] not able to schedule   appointment     Additional Information for Provider? Please call patient.  She needs to get   an appointment prior to October 1 to do a follow up visit for her heart   condition.  ---------------------------------------------------------------------------  --------------  Haris ESTRADA  [phone]; OK to leave message on voicemail  ---------------------------------------------------------------------------  --------------  SCRIPT ANSWERS

## 2023-09-15 NOTE — TELEPHONE ENCOUNTER
Spoke with pt and she has appt to speak to someone regarding INS. Pt has no acute issues at this time.

## 2023-09-18 ENCOUNTER — NURSE ONLY (OUTPATIENT)
Dept: INTERNAL MEDICINE | Age: 78
End: 2023-09-18
Payer: MEDICARE

## 2023-09-18 VITALS — TEMPERATURE: 97.9 F

## 2023-09-18 DIAGNOSIS — Z79.01 CHRONIC ANTICOAGULATION: Primary | ICD-10-CM

## 2023-09-18 DIAGNOSIS — I50.32 CHRONIC HEART FAILURE WITH PRESERVED EJECTION FRACTION (HCC): ICD-10-CM

## 2023-09-18 LAB
ABSOLUTE IMMATURE GRANULOCYTE: <0.03 K/UL (ref 0–0.58)
ALBUMIN SERPL-MCNC: 4.2 G/DL (ref 3.5–5.2)
ALP BLD-CCNC: 102 U/L (ref 35–104)
ALT SERPL-CCNC: 10 U/L (ref 0–32)
ANION GAP SERPL CALCULATED.3IONS-SCNC: 16 MMOL/L (ref 7–16)
AST SERPL-CCNC: 13 U/L (ref 0–31)
BASOPHILS ABSOLUTE: 0.03 K/UL (ref 0–0.2)
BASOPHILS RELATIVE PERCENT: 1 % (ref 0–2)
BILIRUB SERPL-MCNC: 0.8 MG/DL (ref 0–1.2)
BUN BLDV-MCNC: 18 MG/DL (ref 6–23)
CALCIUM SERPL-MCNC: 9.4 MG/DL (ref 8.6–10.2)
CHLORIDE BLD-SCNC: 104 MMOL/L (ref 98–107)
CO2: 22 MMOL/L (ref 22–29)
CREAT SERPL-MCNC: 0.9 MG/DL (ref 0.5–1)
EOSINOPHILS ABSOLUTE: 0.12 K/UL (ref 0.05–0.5)
EOSINOPHILS RELATIVE PERCENT: 2 % (ref 0–6)
GFR SERPL CREATININE-BSD FRML MDRD: >60 ML/MIN/1.73M2
GLUCOSE BLD-MCNC: 157 MG/DL (ref 74–99)
HCT VFR BLD CALC: 41.4 % (ref 34–48)
HEMOGLOBIN: 13.3 G/DL (ref 11.5–15.5)
IMMATURE GRANULOCYTES: 0 % (ref 0–5)
INTERNATIONAL NORMALIZATION RATIO, POC: 1.9
LYMPHOCYTES ABSOLUTE: 1.65 K/UL (ref 1.5–4)
LYMPHOCYTES RELATIVE PERCENT: 30 % (ref 20–42)
MCH RBC QN AUTO: 29.5 PG (ref 26–35)
MCHC RBC AUTO-ENTMCNC: 32.1 G/DL (ref 32–34.5)
MCV RBC AUTO: 91.8 FL (ref 80–99.9)
MONOCYTES ABSOLUTE: 0.4 K/UL (ref 0.1–0.95)
MONOCYTES RELATIVE PERCENT: 7 % (ref 2–12)
NEUTROPHILS ABSOLUTE: 3.29 K/UL (ref 1.8–7.3)
NEUTROPHILS RELATIVE PERCENT: 60 % (ref 43–80)
PDW BLD-RTO: 13.9 % (ref 11.5–15)
PLATELET # BLD: 262 K/UL (ref 130–450)
PMV BLD AUTO: 10.9 FL (ref 7–12)
POTASSIUM SERPL-SCNC: 4.2 MMOL/L (ref 3.5–5)
PRO-BNP: 1095 PG/ML (ref 0–450)
PROTHROMBIN TIME, POC: 22.5
RBC # BLD: 4.51 M/UL (ref 3.5–5.5)
SODIUM BLD-SCNC: 142 MMOL/L (ref 132–146)
TOTAL PROTEIN: 7.3 G/DL (ref 6.4–8.3)
WBC # BLD: 5.5 K/UL (ref 4.5–11.5)

## 2023-09-18 PROCEDURE — 85610 PROTHROMBIN TIME: CPT | Performed by: INTERNAL MEDICINE

## 2023-09-18 PROCEDURE — 93793 ANTICOAG MGMT PT WARFARIN: CPT | Performed by: INTERNAL MEDICINE

## 2023-09-18 PROCEDURE — 36415 COLL VENOUS BLD VENIPUNCTURE: CPT | Performed by: INTERNAL MEDICINE

## 2023-09-18 RX ORDER — WARFARIN SODIUM 3 MG/1
TABLET ORAL
Qty: 30 TABLET | Refills: 0 | Status: SHIPPED | OUTPATIENT
Start: 2023-09-18

## 2023-09-18 NOTE — PROGRESS NOTES
Lab work sent to the lab via the tub system. 2 green and 1 lavender topped tubes sent at 256 pm.     INR results reviewed with Dr. Latanya Quiros. Patient will take Coumadin 3.5 mg on Mondays and Fridays also Coumadin 3 mg all other days. Recheck PT/INR in 2 weeks on 10/2 at 1 pm.  Orders received. Patient notified via phone of these orders. Patient voices understanding of the information and follow-up. AVS printed and mailed to patient with appointment card for next INR check.

## 2023-09-18 NOTE — PATIENT INSTRUCTIONS
Please take Coumadin 3.5 mg today(Mondays) and Fridays also Coumadin 3 mg all other days. Recheck PT/INR in 2 weeks on 10/2 at 1 pm. Please call the office with any problems and concerns at [phone].      Thank you     Roula Escamilla LPN

## 2023-09-27 ENCOUNTER — TELEPHONE (OUTPATIENT)
Dept: INTERNAL MEDICINE | Age: 78
End: 2023-09-27

## 2023-09-27 NOTE — TELEPHONE ENCOUNTER
Patient has an appointment with [facility]) Internal Medicine on 10-2-23. Patient has [employer]. As of 10-1-23 [facility] no longer accepts this insurance. Tried calling patient both numbers rang , unable to leave voicemail.

## 2023-10-02 ENCOUNTER — NURSE ONLY (OUTPATIENT)
Dept: INTERNAL MEDICINE | Age: 78
End: 2023-10-02
Payer: MEDICARE

## 2023-10-02 DIAGNOSIS — Z79.01 CHRONIC ANTICOAGULATION: Primary | ICD-10-CM

## 2023-10-02 LAB
INTERNATIONAL NORMALIZATION RATIO, POC: 2.3
PROTHROMBIN TIME, POC: 27.7

## 2023-10-02 PROCEDURE — 85610 PROTHROMBIN TIME: CPT | Performed by: INTERNAL MEDICINE

## 2023-10-02 PROCEDURE — 93793 ANTICOAG MGMT PT WARFARIN: CPT | Performed by: INTERNAL MEDICINE

## 2023-10-02 RX ORDER — WARFARIN SODIUM 3 MG/1
TABLET ORAL
Qty: 30 TABLET | Refills: 0 | Status: SHIPPED | OUTPATIENT
Start: 2023-10-02

## 2023-10-02 NOTE — PATIENT INSTRUCTIONS
Called pt and reviewed new instructions for 3 mg Coumadin daily. She verbalized understanding of new instructions. Pt is to keep scheduled appt for 10/11/2023. Coumadin refill called in. Printed AVS. Mailed to pt.

## 2023-10-02 NOTE — PROGRESS NOTES
INR results reviewed with . Orders received. Patient notified via phone of these orders. Patient voices understanding of the information and follow-up. AVS printed and mailed to patient with appointment card for next INR check.

## 2023-10-03 DIAGNOSIS — I50.32 CHRONIC HEART FAILURE WITH PRESERVED EJECTION FRACTION (HCC): ICD-10-CM

## 2023-10-03 DIAGNOSIS — I10 ESSENTIAL HYPERTENSION: Chronic | ICD-10-CM

## 2023-10-03 RX ORDER — FUROSEMIDE 20 MG/1
20 TABLET ORAL 2 TIMES DAILY
Qty: 180 TABLET | Refills: 1 | Status: SHIPPED | OUTPATIENT
Start: 2023-10-03

## 2023-10-03 RX ORDER — AMLODIPINE BESYLATE 10 MG/1
10 TABLET ORAL DAILY
Qty: 90 TABLET | Refills: 1 | Status: SHIPPED | OUTPATIENT
Start: 2023-10-03

## 2023-10-11 ENCOUNTER — OFFICE VISIT (OUTPATIENT)
Dept: INTERNAL MEDICINE | Age: 78
End: 2023-10-11
Payer: MEDICARE

## 2023-10-11 VITALS
HEART RATE: 53 BPM | WEIGHT: 246 LBS | BODY MASS INDEX: 42 KG/M2 | TEMPERATURE: 97.2 F | HEIGHT: 64 IN | OXYGEN SATURATION: 96 % | RESPIRATION RATE: 18 BRPM | SYSTOLIC BLOOD PRESSURE: 132 MMHG | DIASTOLIC BLOOD PRESSURE: 64 MMHG

## 2023-10-11 DIAGNOSIS — I25.810 CORONARY ARTERY DISEASE INVOLVING CORONARY BYPASS GRAFT OF NATIVE HEART WITHOUT ANGINA PECTORIS: Chronic | ICD-10-CM

## 2023-10-11 DIAGNOSIS — R06.09 DOE (DYSPNEA ON EXERTION): ICD-10-CM

## 2023-10-11 DIAGNOSIS — I25.10 CORONARY ARTERY DISEASE INVOLVING NATIVE CORONARY ARTERY OF NATIVE HEART WITHOUT ANGINA PECTORIS: Chronic | ICD-10-CM

## 2023-10-11 DIAGNOSIS — Z95.2 S/P TAVR (TRANSCATHETER AORTIC VALVE REPLACEMENT): Primary | ICD-10-CM

## 2023-10-11 DIAGNOSIS — R73.9 HYPERGLYCEMIA: ICD-10-CM

## 2023-10-11 DIAGNOSIS — Z79.01 CHRONIC ANTICOAGULATION: ICD-10-CM

## 2023-10-11 DIAGNOSIS — Z95.2 HISTORY OF PROSTHETIC MITRAL VALVE: ICD-10-CM

## 2023-10-11 DIAGNOSIS — E55.9 VITAMIN D DEFICIENCY: ICD-10-CM

## 2023-10-11 DIAGNOSIS — I10 ESSENTIAL HYPERTENSION: Chronic | ICD-10-CM

## 2023-10-11 DIAGNOSIS — K21.9 GASTROESOPHAGEAL REFLUX DISEASE WITHOUT ESOPHAGITIS: ICD-10-CM

## 2023-10-11 DIAGNOSIS — I48.20 CHRONIC ATRIAL FIBRILLATION (HCC): Chronic | ICD-10-CM

## 2023-10-11 DIAGNOSIS — I27.20 PULMONARY HTN (HCC): Chronic | ICD-10-CM

## 2023-10-11 DIAGNOSIS — E78.2 MIXED HYPERLIPIDEMIA: ICD-10-CM

## 2023-10-11 DIAGNOSIS — I50.22 CHRONIC SYSTOLIC CONGESTIVE HEART FAILURE (HCC): Chronic | ICD-10-CM

## 2023-10-11 DIAGNOSIS — E66.01 CLASS 3 SEVERE OBESITY DUE TO EXCESS CALORIES WITH SERIOUS COMORBIDITY AND BODY MASS INDEX (BMI) OF 40.0 TO 44.9 IN ADULT (HCC): ICD-10-CM

## 2023-10-11 DIAGNOSIS — E11.69 TYPE 2 DIABETES MELLITUS WITH OTHER SPECIFIED COMPLICATION, WITHOUT LONG-TERM CURRENT USE OF INSULIN (HCC): ICD-10-CM

## 2023-10-11 PROBLEM — E11.9 TYPE 2 DIABETES MELLITUS (HCC): Status: ACTIVE | Noted: 2023-10-11

## 2023-10-11 LAB
ALBUMIN SERPL-MCNC: 4.5 G/DL (ref 3.5–5.2)
ALP BLD-CCNC: 107 U/L (ref 35–104)
ALT SERPL-CCNC: 11 U/L (ref 0–32)
ANION GAP SERPL CALCULATED.3IONS-SCNC: 19 MMOL/L (ref 7–16)
AST SERPL-CCNC: 15 U/L (ref 0–31)
BILIRUB SERPL-MCNC: 0.7 MG/DL (ref 0–1.2)
BUN BLDV-MCNC: 18 MG/DL (ref 6–23)
CALCIUM SERPL-MCNC: 9.6 MG/DL (ref 8.6–10.2)
CHLORIDE BLD-SCNC: 102 MMOL/L (ref 98–107)
CO2: 19 MMOL/L (ref 22–29)
CREAT SERPL-MCNC: 1 MG/DL (ref 0.5–1)
GFR SERPL CREATININE-BSD FRML MDRD: >60 ML/MIN/1.73M2
GLUCOSE BLD-MCNC: 132 MG/DL (ref 74–99)
HBA1C MFR BLD: 7.1 % (ref 4–5.6)
INTERNATIONAL NORMALIZATION RATIO, POC: 2.2
POTASSIUM SERPL-SCNC: 4.2 MMOL/L (ref 3.5–5)
PRO-BNP: 1093 PG/ML (ref 0–450)
PROTHROMBIN TIME, POC: 26.1
SODIUM BLD-SCNC: 140 MMOL/L (ref 132–146)
TOTAL PROTEIN: 7.6 G/DL (ref 6.4–8.3)
VITAMIN D 25-HYDROXY: 6.2 NG/ML (ref 30–100)

## 2023-10-11 PROCEDURE — 99214 OFFICE O/P EST MOD 30 MIN: CPT | Performed by: INTERNAL MEDICINE

## 2023-10-11 PROCEDURE — 85610 PROTHROMBIN TIME: CPT | Performed by: INTERNAL MEDICINE

## 2023-10-11 PROCEDURE — 1123F ACP DISCUSS/DSCN MKR DOCD: CPT | Performed by: INTERNAL MEDICINE

## 2023-10-11 PROCEDURE — 3051F HG A1C>EQUAL 7.0%<8.0%: CPT | Performed by: INTERNAL MEDICINE

## 2023-10-11 PROCEDURE — 3074F SYST BP LT 130 MM HG: CPT | Performed by: INTERNAL MEDICINE

## 2023-10-11 PROCEDURE — 3078F DIAST BP <80 MM HG: CPT | Performed by: INTERNAL MEDICINE

## 2023-10-11 PROCEDURE — 93010 ELECTROCARDIOGRAM REPORT: CPT | Performed by: INTERNAL MEDICINE

## 2023-10-11 PROCEDURE — 99212 OFFICE O/P EST SF 10 MIN: CPT | Performed by: INTERNAL MEDICINE

## 2023-10-11 PROCEDURE — 93005 ELECTROCARDIOGRAM TRACING: CPT | Performed by: INTERNAL MEDICINE

## 2023-10-11 PROCEDURE — 36415 COLL VENOUS BLD VENIPUNCTURE: CPT | Performed by: INTERNAL MEDICINE

## 2023-10-11 RX ORDER — METOPROLOL TARTRATE 50 MG/1
TABLET, FILM COATED ORAL
Qty: 180 TABLET | Refills: 3 | Status: SHIPPED | OUTPATIENT
Start: 2023-10-11

## 2023-10-11 RX ORDER — ENALAPRIL MALEATE 10 MG/1
TABLET ORAL
Qty: 180 TABLET | Refills: 3 | Status: SHIPPED | OUTPATIENT
Start: 2023-10-11

## 2023-10-11 RX ORDER — PANTOPRAZOLE SODIUM 20 MG/1
40 TABLET, DELAYED RELEASE ORAL DAILY
Qty: 90 TABLET | Refills: 1 | Status: SHIPPED | OUTPATIENT
Start: 2023-10-11

## 2023-10-11 RX ORDER — SIMVASTATIN 20 MG
20 TABLET ORAL NIGHTLY
Qty: 90 TABLET | Refills: 3 | Status: SHIPPED | OUTPATIENT
Start: 2023-10-11

## 2023-10-11 RX ORDER — POTASSIUM CHLORIDE 750 MG/1
10 TABLET, EXTENDED RELEASE ORAL 2 TIMES DAILY
Qty: 180 TABLET | Refills: 3 | Status: SHIPPED | OUTPATIENT
Start: 2023-10-11

## 2023-10-11 RX ORDER — WARFARIN SODIUM 3 MG/1
TABLET ORAL
Qty: 30 TABLET | Refills: 0 | Status: SHIPPED | OUTPATIENT
Start: 2023-10-11

## 2023-10-11 RX ORDER — ASPIRIN 81 MG/1
81 TABLET ORAL DAILY
Qty: 90 TABLET | Refills: 3 | Status: SHIPPED | OUTPATIENT
Start: 2023-10-11

## 2023-10-11 NOTE — PROGRESS NOTES
Vamshi North (:  1945) is a 69 y.o. female,Established patient, here for evaluation of the following chief complaint(s):  Atrial Fibrillation and Hypertension       ASSESSMENT/PLAN:    DEL CID, chronic but reports worse over past couple months   Hx HFpEF, VHD s/p bioprosthetic MV , s/p TAVR  with permanent AF and moderate TR and PH (RVSP 47 mmhg). EKG in office atrial fibrillation with RBBB, controlled rate 64     Does elevated JVD, no rales and extremity edema appears baseline. check BNP, CMP, continue lasix 20 mg BID and monitor daily weights. Recommend start Jardiance 10 mg  Continues on aspirin and Coumain, INR 2.2, re-ceheck 1 month. Check echo and referral to Dr. Isael Mejía to reestablish (last ). DM 2 controlled A1c 7.2% --   lifestyle mods and start Jardiance 10 mg daily. Obesity class 3 BMI 42 --counseled lifestyle modifications    HLD - continues on simvastatin    High risk AMMON -- recommend sleep study, especially considering her cardiac co-morbidities, patient defers and would not like to pursue this, we reviewed risks/benefits    Hypertension -- continue enalapril    Vit d def -- level 6, bolus dose x 4 months    Return in about 4 months (around 2024) for AWV. Subjective   SUBJECTIVE/OBJECTIVE:  HPI  Patient here with her granddaughter whom she lives. Patient reports feeling \"okay. \"  Upon further questioning she reports decreased functional status and relates this to dyspnea on exertion and generalized fatigue that really limits her activities. She lives predominantly sedentary life. Granddaughter also confirms this and notes decreased activity level as of the past couple months. Patient denies any chest pain with activity or palpitations. She feels her swelling is less than usual for her. She has been applying the medication and takes Lasix  BID. She denies any issues with sleep but does report dozing off during the day at times.     Review of Systems

## 2023-10-11 NOTE — PATIENT INSTRUCTIONS
Dear Dorian Orozco,    Thank you for coming to your appointment today. I hope we have addressed all of your needs. Please make sure to do the following:  - Continue your medications as listed. - Get labs drawn before our next follow up. - Referrals have been made to Cardiology and for Echo: If you do not hear from the office in 1 week, please call the number listed. - We will see each other again in 4 months. Recheck INR in month. Call for a sooner appointment if you develop any new or worsening symptoms. Have a great day!     Sincerely,  Candy Peter, DO  10/11/2023  4:58 PM

## 2023-10-11 NOTE — PROGRESS NOTES
INR results reviewed with Dr. Charli Gomez. Orders received. Patient notified in office of these orders. Patient voices understanding of the information and follow-up. AVS printed and given to patient with appointment card for next INR check.

## 2023-10-12 ENCOUNTER — TELEPHONE (OUTPATIENT)
Dept: ADMINISTRATIVE | Age: 78
End: 2023-10-12

## 2023-10-12 RX ORDER — ERGOCALCIFEROL 1.25 MG/1
50000 CAPSULE ORAL WEEKLY
Qty: 16 CAPSULE | Refills: 0 | Status: SHIPPED | OUTPATIENT
Start: 2023-10-12

## 2023-10-12 ASSESSMENT — ENCOUNTER SYMPTOMS
DIARRHEA: 0
COUGH: 0
RHINORRHEA: 0
SHORTNESS OF BREATH: 1
BACK PAIN: 0
ABDOMINAL PAIN: 0
VOMITING: 0
CONSTIPATION: 0
COLOR CHANGE: 0

## 2023-10-12 NOTE — TELEPHONE ENCOUNTER
Pt has referrel in [facility]  DX Pulmonary HTN ([address])  LOV Zay 9/14/21  please advise pt

## 2023-10-31 DIAGNOSIS — I50.32 CHRONIC HEART FAILURE WITH PRESERVED EJECTION FRACTION (HCC): Primary | ICD-10-CM

## 2023-10-31 DIAGNOSIS — E55.9 VITAMIN D DEFICIENCY: ICD-10-CM

## 2023-10-31 DIAGNOSIS — E66.01 CLASS 3 OBESITY (HCC): Chronic | ICD-10-CM

## 2023-10-31 NOTE — TELEPHONE ENCOUNTER
Patient want new Vid D prescription sent to [address] [place] [address] [facility] [address] [facility]. It will cost her approximately $66  out of pocket for this patient. Patent's echo is scheduled for 11/29. She also needs a referral for Cardiology.

## 2023-10-31 NOTE — TELEPHONE ENCOUNTER
Called in to say she is willing to go to [facility] for PT. Do you want to discontinue to outside referral place a new one for [facility]? Please put in she only has transportation on Monday and Wednesday. Further states her insurance will not pay for the large dose of Vitamin D. What do you want her to do? Also, she had not heard from [facility]. I am going to call them to see what is going on and I will update you.   Kerry Lane LPN

## 2023-10-31 NOTE — TELEPHONE ENCOUNTER
Attempted to get Vit D clarification through patient's pharmacy. Would not give office nurse any information stating that the nurse needed to be authorized. Spoke to patient who stated that she will try [facility] using [facility]. Tresa Rivera would not  tell the patient how much her Vit D 50, 000 units would cost the patient.

## 2023-11-01 RX ORDER — ERGOCALCIFEROL 1.25 MG/1
50000 CAPSULE ORAL WEEKLY
Qty: 16 CAPSULE | Refills: 0 | Status: SHIPPED | OUTPATIENT
Start: 2023-11-01

## 2023-11-08 ENCOUNTER — TELEMEDICINE (OUTPATIENT)
Dept: INTERNAL MEDICINE | Age: 78
End: 2023-11-08
Payer: MEDICARE

## 2023-11-08 DIAGNOSIS — I10 ESSENTIAL HYPERTENSION: Chronic | ICD-10-CM

## 2023-11-08 DIAGNOSIS — Z95.1 HX OF CABG: ICD-10-CM

## 2023-11-08 DIAGNOSIS — I50.32 CHRONIC HEART FAILURE WITH PRESERVED EJECTION FRACTION (HCC): ICD-10-CM

## 2023-11-08 DIAGNOSIS — I48.20 CHRONIC ATRIAL FIBRILLATION (HCC): Chronic | ICD-10-CM

## 2023-11-08 DIAGNOSIS — K57.90 DIVERTICULAR DISEASE: Chronic | ICD-10-CM

## 2023-11-08 DIAGNOSIS — Z00.00 MEDICARE ANNUAL WELLNESS VISIT, SUBSEQUENT: Primary | ICD-10-CM

## 2023-11-08 DIAGNOSIS — E11.69 TYPE 2 DIABETES MELLITUS WITH OTHER SPECIFIED COMPLICATION, WITHOUT LONG-TERM CURRENT USE OF INSULIN (HCC): ICD-10-CM

## 2023-11-08 DIAGNOSIS — E66.01 CLASS 3 OBESITY (HCC): Chronic | ICD-10-CM

## 2023-11-08 PROCEDURE — 3051F HG A1C>EQUAL 7.0%<8.0%: CPT | Performed by: INTERNAL MEDICINE

## 2023-11-08 PROCEDURE — 1123F ACP DISCUSS/DSCN MKR DOCD: CPT | Performed by: INTERNAL MEDICINE

## 2023-11-08 PROCEDURE — G0439 PPPS, SUBSEQ VISIT: HCPCS | Performed by: INTERNAL MEDICINE

## 2023-11-08 SDOH — ECONOMIC STABILITY: FOOD INSECURITY: WITHIN THE PAST 12 MONTHS, YOU WORRIED THAT YOUR FOOD WOULD RUN OUT BEFORE YOU GOT MONEY TO BUY MORE.: NEVER TRUE

## 2023-11-08 SDOH — ECONOMIC STABILITY: INCOME INSECURITY: HOW HARD IS IT FOR YOU TO PAY FOR THE VERY BASICS LIKE FOOD, HOUSING, MEDICAL CARE, AND HEATING?: NOT HARD AT ALL

## 2023-11-08 SDOH — ECONOMIC STABILITY: FOOD INSECURITY: WITHIN THE PAST 12 MONTHS, THE FOOD YOU BOUGHT JUST DIDN'T LAST AND YOU DIDN'T HAVE MONEY TO GET MORE.: NEVER TRUE

## 2023-11-08 ASSESSMENT — PATIENT HEALTH QUESTIONNAIRE - PHQ9
7. TROUBLE CONCENTRATING ON THINGS, SUCH AS READING THE NEWSPAPER OR WATCHING TELEVISION: 3
2. FEELING DOWN, DEPRESSED OR HOPELESS: 0
SUM OF ALL RESPONSES TO PHQ QUESTIONS 1-9: 3
5. POOR APPETITE OR OVEREATING: 0
SUM OF ALL RESPONSES TO PHQ QUESTIONS 1-9: 3
3. TROUBLE FALLING OR STAYING ASLEEP: 0
SUM OF ALL RESPONSES TO PHQ QUESTIONS 1-9: 3
8. MOVING OR SPEAKING SO SLOWLY THAT OTHER PEOPLE COULD HAVE NOTICED. OR THE OPPOSITE, BEING SO FIGETY OR RESTLESS THAT YOU HAVE BEEN MOVING AROUND A LOT MORE THAN USUAL: 0
1. LITTLE INTEREST OR PLEASURE IN DOING THINGS: 0
10. IF YOU CHECKED OFF ANY PROBLEMS, HOW DIFFICULT HAVE THESE PROBLEMS MADE IT FOR YOU TO DO YOUR WORK, TAKE CARE OF THINGS AT HOME, OR GET ALONG WITH OTHER PEOPLE: 0
SUM OF ALL RESPONSES TO PHQ QUESTIONS 1-9: 3
4. FEELING TIRED OR HAVING LITTLE ENERGY: 0
6. FEELING BAD ABOUT YOURSELF - OR THAT YOU ARE A FAILURE OR HAVE LET YOURSELF OR YOUR FAMILY DOWN: 0
9. THOUGHTS THAT YOU WOULD BE BETTER OFF DEAD, OR OF HURTING YOURSELF: 0
SUM OF ALL RESPONSES TO PHQ9 QUESTIONS 1 & 2: 0

## 2023-11-08 ASSESSMENT — LIFESTYLE VARIABLES: HOW OFTEN DO YOU HAVE A DRINK CONTAINING ALCOHOL: NEVER

## 2023-11-08 NOTE — PROGRESS NOTES
Called Dr Erik Drake office regarding referral for Cardiology, they state they are waiting for response from the  on how to schedule pt, and will contact pt this week.

## 2023-11-08 NOTE — PROGRESS NOTES
Medicare Annual Wellness Visit    Shakir Zhou is here for Medicare AWV  Subjective   Assessment & Plan   Medicare annual wellness visit, subsequent  Essential hypertension  Chronic heart failure with preserved ejection fraction (HCC)  -     Lipid, Fasting; Future  -     External Referral To Physical Therapy  Chronic atrial fibrillation (HCC)  Diverticular disease  Type 2 diabetes mellitus with other specified complication, without long-term current use of insulin (HCC)  Hx of CABG  Class 3 obesity ([address])  -     External Referral To Physical Therapy    Continue current meds; echo ordered last visit and needs to be scheduled along with follow up with cardiology    She is interested in PT referral and this has been placed. Patient defers ACP planning discussion at this time. Discussed titration of jardiance, she would like to continue current dose. Recommendations for Preventive Services Due: see orders and patient instructions/AVS.  Recommended screening schedule for the next 5-10 years is provided to the patient in written form: see Patient Instructions/AVS.     Return for Medicare Annual Wellness Visit in 1 year. Patient's complete Health Risk Assessment and screening values have been reviewed and are found in Flowsheets. The following problems were reviewed today and where indicated follow up appointments were made and/or referrals ordered. Positive Risk Factor Screenings with Interventions:                 Weight and Activity:  Physical Activity: Inactive (11/8/2023)    Exercise Vital Sign     Days of Exercise per Week: 0 days     Minutes of Exercise per Session: 0 min     On average, how many days per week do you engage in moderate to strenuous exercise (like a brisk walk)?: 0 days  Have you lost any weight without trying in the past 3 months?: No  There is no height or weight on file to calculate BMI.  (!) Abnormal    Inactivity Interventions:  Recommendations: patient agrees to increase

## 2023-11-08 NOTE — TELEPHONE ENCOUNTER
Sharmila/Dr Wilkerson's offc at [facility] IM called for status of cardio appt; informed her we are pending scheduling advice from Dr Edelmira Mark.    Please advise

## 2023-11-15 ENCOUNTER — NURSE ONLY (OUTPATIENT)
Dept: INTERNAL MEDICINE | Age: 78
End: 2023-11-15
Payer: MEDICARE

## 2023-11-15 VITALS — TEMPERATURE: 97.3 F

## 2023-11-15 DIAGNOSIS — Z79.01 CHRONIC ANTICOAGULATION: ICD-10-CM

## 2023-11-15 DIAGNOSIS — I48.20 CHRONIC ATRIAL FIBRILLATION (HCC): Primary | ICD-10-CM

## 2023-11-15 LAB
INTERNATIONAL NORMALIZATION RATIO, POC: 1.8
PROTHROMBIN TIME, POC: 21.3

## 2023-11-15 PROCEDURE — 85610 PROTHROMBIN TIME: CPT | Performed by: INTERNAL MEDICINE

## 2023-11-15 RX ORDER — WARFARIN SODIUM 3 MG/1
TABLET ORAL
Qty: 30 TABLET | Refills: 0 | Status: SHIPPED | OUTPATIENT
Start: 2023-11-15 | End: 2023-11-16 | Stop reason: SDUPTHER

## 2023-11-15 NOTE — PATIENT INSTRUCTIONS
Please start taking Coumadin 4 mg on Wednesday, 3 mg the rest of the week .  Next INR is 11/30/23  at 115 pm.

## 2023-11-16 RX ORDER — WARFARIN SODIUM 3 MG/1
TABLET ORAL
Qty: 30 TABLET | Refills: 1 | Status: SHIPPED | OUTPATIENT
Start: 2023-11-16

## 2023-11-29 ENCOUNTER — HOSPITAL ENCOUNTER (OUTPATIENT)
Age: 78
Discharge: HOME | End: 2023-12-01
Payer: MEDICARE

## 2023-11-29 VITALS
BODY MASS INDEX: 41.83 KG/M2 | DIASTOLIC BLOOD PRESSURE: 78 MMHG | HEART RATE: 65 BPM | WEIGHT: 245 LBS | SYSTOLIC BLOOD PRESSURE: 140 MMHG | HEIGHT: 64 IN

## 2023-11-29 DIAGNOSIS — I27.20 PULMONARY HTN (HCC): Chronic | ICD-10-CM

## 2023-11-29 DIAGNOSIS — Z95.2 HISTORY OF PROSTHETIC MITRAL VALVE: ICD-10-CM

## 2023-11-29 DIAGNOSIS — R06.09 DOE (DYSPNEA ON EXERTION): ICD-10-CM

## 2023-11-29 DIAGNOSIS — Z95.2 S/P TAVR (TRANSCATHETER AORTIC VALVE REPLACEMENT): ICD-10-CM

## 2023-11-29 LAB
ECHO AO ASC DIAM: 2.4 CM
ECHO AO ASCENDING AORTA INDEX: 1.13 CM/M2
ECHO AV ACCELERATION TIME: 53.06 MS
ECHO AV AREA PEAK VELOCITY: 1 CM2
ECHO AV AREA VTI: 1.1 CM2
ECHO AV AREA/BSA PEAK VELOCITY: 0.5 CM2/M2
ECHO AV AREA/BSA VTI: 0.5 CM2/M2
ECHO AV CUSP MM: 1.5 CM
ECHO AV MEAN GRADIENT: 14 MMHG
ECHO AV MEAN VELOCITY: 1.8 M/S
ECHO AV PEAK GRADIENT: 24 MMHG
ECHO AV PEAK VELOCITY: 2.5 M/S
ECHO AV VELOCITY RATIO: 0.4
ECHO AV VTI: 57.8 CM
ECHO BSA: 2.24 M2
ECHO LA DIAMETER INDEX: 2.72 CM/M2
ECHO LA DIAMETER: 5.8 CM
ECHO LA VOL A-L A2C: 174 ML (ref 22–52)
ECHO LA VOL A-L A4C: 258 ML (ref 22–52)
ECHO LA VOL MOD A2C: 161 ML (ref 22–52)
ECHO LA VOL MOD A4C: 226 ML (ref 22–52)
ECHO LA VOLUME AREA LENGTH: 214 ML
ECHO LA VOLUME INDEX A-L A2C: 82 ML/M2 (ref 16–34)
ECHO LA VOLUME INDEX A-L A4C: 121 ML/M2 (ref 16–34)
ECHO LA VOLUME INDEX AREA LENGTH: 100 ML/M2 (ref 16–34)
ECHO LA VOLUME INDEX MOD A2C: 76 ML/M2 (ref 16–34)
ECHO LA VOLUME INDEX MOD A4C: 106 ML/M2 (ref 16–34)
ECHO LV EDV A2C: 98 ML
ECHO LV EDV A4C: 55 ML
ECHO LV EDV BP: 75 ML (ref 56–104)
ECHO LV EDV INDEX A4C: 26 ML/M2
ECHO LV EDV INDEX BP: 35 ML/M2
ECHO LV EDV NDEX A2C: 46 ML/M2
ECHO LV EJECTION FRACTION A2C: 52 %
ECHO LV EJECTION FRACTION A4C: 57 %
ECHO LV EJECTION FRACTION BIPLANE: 53 % (ref 55–100)
ECHO LV ESV A2C: 47 ML
ECHO LV ESV A4C: 24 ML
ECHO LV ESV BP: 35 ML (ref 19–49)
ECHO LV ESV INDEX A2C: 22 ML/M2
ECHO LV ESV INDEX A4C: 11 ML/M2
ECHO LV ESV INDEX BP: 16 ML/M2
ECHO LV FRACTIONAL SHORTENING: 49 % (ref 28–44)
ECHO LV INTERNAL DIMENSION DIASTOLE INDEX: 2.3 CM/M2
ECHO LV INTERNAL DIMENSION DIASTOLIC: 4.9 CM (ref 3.9–5.3)
ECHO LV INTERNAL DIMENSION SYSTOLIC INDEX: 1.17 CM/M2
ECHO LV INTERNAL DIMENSION SYSTOLIC: 2.5 CM
ECHO LV ISOVOLUMETRIC RELAXATION TIME (IVRT): 66.9 MS
ECHO LV IVSD: 1 CM (ref 0.6–0.9)
ECHO LV IVSS: 1.3 CM
ECHO LV MASS 2D: 188.1 G (ref 67–162)
ECHO LV MASS INDEX 2D: 88.3 G/M2 (ref 43–95)
ECHO LV POSTERIOR WALL DIASTOLIC: 1.1 CM (ref 0.6–0.9)
ECHO LV POSTERIOR WALL SYSTOLIC: 1.6 CM
ECHO LV RELATIVE WALL THICKNESS RATIO: 0.45
ECHO LVOT AREA: 2.5 CM2
ECHO LVOT AV VTI INDEX: 0.43
ECHO LVOT DIAM: 1.8 CM
ECHO LVOT MEAN GRADIENT: 2 MMHG
ECHO LVOT PEAK GRADIENT: 4 MMHG
ECHO LVOT PEAK VELOCITY: 1 M/S
ECHO LVOT STROKE VOLUME INDEX: 29.7 ML/M2
ECHO LVOT SV: 63.3 ML
ECHO LVOT VTI: 24.9 CM
ECHO MV AREA PHT: 1.8 CM2
ECHO MV AREA VTI: 1.2 CM2
ECHO MV LVOT VTI INDEX: 2.1
ECHO MV MAX VELOCITY: 1.8 M/S
ECHO MV MEAN GRADIENT: 5 MMHG
ECHO MV MEAN VELOCITY: 1 M/S
ECHO MV PEAK GRADIENT: 13 MMHG
ECHO MV PRESSURE HALF TIME (PHT): 123.6 MS
ECHO MV VTI: 52.3 CM
ECHO PULMONARY ARTERY END DIASTOLIC PRESSURE: 2 MMHG
ECHO PV REGURGITANT MAX VELOCITY: 0.6 M/S
ECHO RV INTERNAL DIMENSION: 3.1 CM
ECHO TV REGURGITANT MAX VELOCITY: 2.86 M/S
ECHO TV REGURGITANT PEAK GRADIENT: 33 MMHG

## 2023-11-29 PROCEDURE — 93306 TTE W/DOPPLER COMPLETE: CPT | Performed by: INTERNAL MEDICINE

## 2023-11-29 PROCEDURE — 93306 TTE W/DOPPLER COMPLETE: CPT

## 2023-12-05 ENCOUNTER — TELEPHONE (OUTPATIENT)
Dept: INTERNAL MEDICINE | Age: 78
End: 2023-12-05

## 2023-12-05 NOTE — TELEPHONE ENCOUNTER
Per [facility] unable to reach patient since 11/15/23 to schedule physical therapy.  Please Advise

## 2023-12-05 NOTE — TELEPHONE ENCOUNTER
Spoke with patient and umm sakew she will call them tomorrow. I did inform her the other number we have listed has been ringing busy. no known allergies

## 2023-12-06 ENCOUNTER — NURSE ONLY (OUTPATIENT)
Dept: INTERNAL MEDICINE | Age: 78
End: 2023-12-06
Payer: MEDICARE

## 2023-12-06 DIAGNOSIS — Z79.01 CHRONIC ANTICOAGULATION: Primary | ICD-10-CM

## 2023-12-06 LAB
INTERNATIONAL NORMALIZATION RATIO, POC: 1.8
PROTHROMBIN TIME, POC: 22.1

## 2023-12-06 PROCEDURE — 93793 ANTICOAG MGMT PT WARFARIN: CPT | Performed by: INTERNAL MEDICINE

## 2023-12-06 PROCEDURE — 85610 PROTHROMBIN TIME: CPT | Performed by: INTERNAL MEDICINE

## 2023-12-06 RX ORDER — WARFARIN SODIUM 3 MG/1
TABLET ORAL
Qty: 90 TABLET | Refills: 0 | Status: SHIPPED | OUTPATIENT
Start: 2023-12-06 | End: 2023-12-06 | Stop reason: SDUPTHER

## 2023-12-06 RX ORDER — WARFARIN SODIUM 3 MG/1
TABLET ORAL
Qty: 90 TABLET | Refills: 0 | Status: SHIPPED | OUTPATIENT
Start: 2023-12-06

## 2023-12-06 NOTE — PROGRESS NOTES
INR results reviewed with Dr. Jorge Garcia. Orders received. Patient notified via phone of these orders. Patient voices understanding of the information and follow-up. AVS printed and mailed to patient with appointment card for next INR check.

## 2023-12-06 NOTE — PATIENT INSTRUCTIONS
Please start taking coumadin 4 mg MWF, And coumadin 3 mg the rest of the week.  Please return on 12/20 at 1 pm for recheck of INR

## 2023-12-26 DIAGNOSIS — K21.9 GASTROESOPHAGEAL REFLUX DISEASE WITHOUT ESOPHAGITIS: ICD-10-CM

## 2023-12-26 RX ORDER — PANTOPRAZOLE SODIUM 20 MG/1
40 TABLET, DELAYED RELEASE ORAL DAILY
Qty: 90 TABLET | Refills: 1 | Status: SHIPPED | OUTPATIENT
Start: 2023-12-26

## 2023-12-26 NOTE — TELEPHONE ENCOUNTER
Last Appointment:  11/8/2023  Future Appointments   Date Time Provider [address]   12/27/2023  9:00 AM SCHEDULE, [facility]   1/24/2024  9:00 AM Herman Patrick MD [address] [place] [address]   2/14/2024  2:30 PM Afia Pereyra,  [facility]

## 2023-12-27 ENCOUNTER — NURSE ONLY (OUTPATIENT)
Dept: INTERNAL MEDICINE | Age: 78
End: 2023-12-27
Payer: MEDICARE

## 2023-12-27 VITALS — TEMPERATURE: 97.1 F

## 2023-12-27 DIAGNOSIS — Z79.01 CHRONIC ANTICOAGULATION: Primary | ICD-10-CM

## 2023-12-27 LAB
INTERNATIONAL NORMALIZATION RATIO, POC: 2.3
PROTHROMBIN TIME, POC: 27.3

## 2023-12-27 PROCEDURE — 93793 ANTICOAG MGMT PT WARFARIN: CPT | Performed by: INTERNAL MEDICINE

## 2023-12-27 PROCEDURE — 85610 PROTHROMBIN TIME: CPT | Performed by: INTERNAL MEDICINE

## 2023-12-27 NOTE — PATIENT INSTRUCTIONS
Please continue taking Coumadin 4 mg on Mondays, Wednesdays and Fridays along Coumadin 3 mg all other days. Your next PT/INR in 1 month on 1/24/23 at 1 pm. Please call the office with any problems or concerns at [phone].     Thank you    Heydi Anna LPN

## 2023-12-27 NOTE — PROGRESS NOTES
INR results reviewed with Dr. Ricardo Cedeno. Orders received. Patient will continue on the dame dose of Coumadin 4 mg on Mondays, Wednesday and Fridays along with Coumadin 3 mg all other days. Check PT/INR in 1 month on 1/24/23 at 1 pm.   Patient notified via phone of these orders. Patient voices understanding of the information and follow-up. AVS printed and mailed to patient with appointment card for next INR check. Immature thermoregulation

## 2024-01-24 ENCOUNTER — NURSE ONLY (OUTPATIENT)
Dept: INTERNAL MEDICINE | Age: 79
End: 2024-01-24
Payer: MEDICARE

## 2024-01-24 ENCOUNTER — OFFICE VISIT (OUTPATIENT)
Dept: CARDIOLOGY CLINIC | Age: 79
End: 2024-01-24
Payer: MEDICARE

## 2024-01-24 VITALS
BODY MASS INDEX: 41.62 KG/M2 | SYSTOLIC BLOOD PRESSURE: 126 MMHG | HEIGHT: 64 IN | DIASTOLIC BLOOD PRESSURE: 66 MMHG | RESPIRATION RATE: 18 BRPM | WEIGHT: 243.8 LBS | HEART RATE: 79 BPM

## 2024-01-24 DIAGNOSIS — Z79.01 WARFARIN ANTICOAGULATION: ICD-10-CM

## 2024-01-24 DIAGNOSIS — I45.10 RBBB: ICD-10-CM

## 2024-01-24 DIAGNOSIS — I48.21 PERMANENT ATRIAL FIBRILLATION (HCC): ICD-10-CM

## 2024-01-24 DIAGNOSIS — Z95.2 S/P TAVR (TRANSCATHETER AORTIC VALVE REPLACEMENT): ICD-10-CM

## 2024-01-24 DIAGNOSIS — Z95.2 S/P MVR (MITRAL VALVE REPLACEMENT): ICD-10-CM

## 2024-01-24 DIAGNOSIS — I10 ESSENTIAL HYPERTENSION: Chronic | ICD-10-CM

## 2024-01-24 DIAGNOSIS — Z79.01 CHRONIC ANTICOAGULATION: Primary | ICD-10-CM

## 2024-01-24 DIAGNOSIS — I27.20 PULMONARY HYPERTENSION (HCC): ICD-10-CM

## 2024-01-24 DIAGNOSIS — E66.01 CLASS 3 SEVERE OBESITY DUE TO EXCESS CALORIES WITH SERIOUS COMORBIDITY AND BODY MASS INDEX (BMI) OF 40.0 TO 44.9 IN ADULT (HCC): ICD-10-CM

## 2024-01-24 DIAGNOSIS — I50.33 ACUTE ON CHRONIC DIASTOLIC CONGESTIVE HEART FAILURE (HCC): Primary | ICD-10-CM

## 2024-01-24 LAB
INTERNATIONAL NORMALIZATION RATIO, POC: 3.2
PROTHROMBIN TIME, POC: 38.4

## 2024-01-24 PROCEDURE — 3078F DIAST BP <80 MM HG: CPT | Performed by: INTERNAL MEDICINE

## 2024-01-24 PROCEDURE — 93000 ELECTROCARDIOGRAM COMPLETE: CPT | Performed by: INTERNAL MEDICINE

## 2024-01-24 PROCEDURE — 3074F SYST BP LT 130 MM HG: CPT | Performed by: INTERNAL MEDICINE

## 2024-01-24 PROCEDURE — 99215 OFFICE O/P EST HI 40 MIN: CPT | Performed by: INTERNAL MEDICINE

## 2024-01-24 PROCEDURE — 1123F ACP DISCUSS/DSCN MKR DOCD: CPT | Performed by: INTERNAL MEDICINE

## 2024-01-24 PROCEDURE — 93793 ANTICOAG MGMT PT WARFARIN: CPT | Performed by: INTERNAL MEDICINE

## 2024-01-24 PROCEDURE — 85610 PROTHROMBIN TIME: CPT | Performed by: INTERNAL MEDICINE

## 2024-01-24 RX ORDER — WARFARIN SODIUM 3 MG/1
TABLET ORAL
Qty: 90 TABLET | Refills: 0 | Status: SHIPPED | OUTPATIENT
Start: 2024-01-24

## 2024-01-24 NOTE — PATIENT INSTRUCTIONS
Please start taking 3 mg of Coumadin daily except on Wednesdays take 4 mg of Coumadin. Return in one week for a recheck on 1/31/24 at 1 pm. Call the office at [phone] with any questions or concerns.     Thank You,    Niki FORD LPN   VERONICA Carrillo states "Resident sent from, [facility] for bilateral lower extremity swelling and the family is requesting a oro".

## 2024-01-24 NOTE — PROGRESS NOTES
Lab Results       Component                Value               Date                       INR                      3.2                 01/24/2024              Patient Findings     Negatives:  Signs/symptoms of thrombosis, Signs/symptoms of bleeding,   Laboratory test error suspected, Change in health, Change in alcohol use,   Change in activity, Upcoming invasive procedure, Emergency department   visit, Upcoming dental procedure, Missed doses, Extra doses, Change in   medications, Change in diet/appetite, Hospital admission, Bruising, Other   complaints    Comments:  Pt states she takes 4 mg on Mon,Wed,and Fri and 3 mg all other   days.        Plan: Supratherapeutic; reduce dosage by 8% for week; return in 1 week for recheck     Return date  As of 1/24/2024    TTR:  60.7 % (10.8 y)   Next INR check:  2/7/2024          Electronically signed by Dann Dailey Jr, DO on 1/24/2024 at 10:42 AM

## 2024-01-24 NOTE — PROGRESS NOTES
INR results reviewed with Dr. Dailey.  Orders received. Start taking 3 mg daily except on Wednesdays take 4 mg.Return in one week for a recheck. Patient notified via phone of these orders.  Patient voices understanding of the information and follow-up.  AVS printed and mailed to patient with appointment card for next INR check.

## 2024-01-24 NOTE — PROGRESS NOTES
valve with severe fibrocalcific changes.   The aortic valve is trileaflet.   No evidence of hemodynamically significant aortic regurgitation.   Paradoxical low-flow low-gradient severe aortic stenosis.    Transthoracic echo 3/2021   Summary   Normal left ventricular size and systolic function.   Ejection fraction is visually estimated at 55-60%.   Indeterminate diastolic function.   No regional wall motion abnormalities seen.   Mild left ventricular concentric hypertrophy noted.   Abnormal LV septal motion consistent with post-operative state.   Normal right ventricular size and function.   Massive biatrial dilation.   Bioprosthetic valve in the mitral position, #27 [employer]onic Mosaic implanted   2007.   Doppler parameters suggestive of significant prosthetic stenosis.   Mean gradient 10 mmHg at HR 85 bpm.   Peak E velocity 2.2 m/s.    ms.   EOA 1.0 cm2.   VTI ratio 3.3   Severe aortic stenosis is present.   Peak velocity 4 m/s.   Mean gradient 40 mHg.   MEÑO 0.7 cm2.   DI 0.19.   Moderate tricuspid regurgitation.     Stress test:       Cardiac catheterization:   Left and right heart cath 5/7/2021  Coronary anatomy:   Dominance: Left   1. Left main: Short and angiographically normal   2. LAD: Large vessel that supplies the entire apex and has a   large dominant diagonal.  Angiographically normal   3. Left circumflex: Large vessel with 3 OM's, 1 small LPL and a   medium sized L PDA.  Angiographically normal   4. RCA: Does not supply any LV myocardium.  Angiographically   normal       Hemodynamics:   Ao: 154/76 with a mean of 113     PA: 51/22 with a mean of 32   PCW: Mean of 22   CO/CI (Mis): 4.3/2.0     Orders Placed This Encounter   Procedures    EKG 12 lead        Requested Prescriptions      No prescriptions requested or ordered in this encounter        ASSESSMENT / PLAN:  Mild acute on chronic HFpEF, minimally hypervolemic persistent class III symptoms/ACC stage C proBNP 1000 10/2023  MR/MS s/p #25

## 2024-02-05 DIAGNOSIS — K21.9 GASTROESOPHAGEAL REFLUX DISEASE WITHOUT ESOPHAGITIS: ICD-10-CM

## 2024-02-05 DIAGNOSIS — I50.22 CHRONIC SYSTOLIC CONGESTIVE HEART FAILURE (HCC): Chronic | ICD-10-CM

## 2024-02-05 RX ORDER — PANTOPRAZOLE SODIUM 20 MG/1
40 TABLET, DELAYED RELEASE ORAL DAILY
Qty: 90 TABLET | Refills: 1 | Status: SHIPPED
Start: 2024-02-05 | End: 2024-02-05

## 2024-02-05 RX ORDER — POTASSIUM CHLORIDE 750 MG/1
10 TABLET, EXTENDED RELEASE ORAL 2 TIMES DAILY
Qty: 28 TABLET | Refills: 0 | Status: SHIPPED | OUTPATIENT
Start: 2024-02-05 | End: 2024-02-19

## 2024-02-05 RX ORDER — PANTOPRAZOLE SODIUM 20 MG/1
40 TABLET, DELAYED RELEASE ORAL DAILY
Qty: 28 TABLET | Refills: 0 | Status: SHIPPED | OUTPATIENT
Start: 2024-02-05 | End: 2024-02-19

## 2024-02-05 RX ORDER — POTASSIUM CHLORIDE 750 MG/1
10 TABLET, EXTENDED RELEASE ORAL 2 TIMES DAILY
Qty: 180 TABLET | Refills: 3 | Status: SHIPPED
Start: 2024-02-05 | End: 2024-02-05

## 2024-02-05 NOTE — TELEPHONE ENCOUNTER
Pt called in stating she did not receive her Klor con or pantoprazole from the pharmacy and that per pharmacy they do not have the new scripts. I called [facility] and the number listed states they need all scripts to now go to [facility] in [place], [place] and the other pharmacy is not available. . Also pt will need a 2 wk supply sent to  on [address] for both medications until refill come in.

## 2024-02-14 ENCOUNTER — OFFICE VISIT (OUTPATIENT)
Dept: INTERNAL MEDICINE | Age: 79
End: 2024-02-14
Payer: MEDICARE

## 2024-02-14 VITALS
WEIGHT: 241 LBS | RESPIRATION RATE: 20 BRPM | HEART RATE: 72 BPM | DIASTOLIC BLOOD PRESSURE: 68 MMHG | OXYGEN SATURATION: 97 % | HEIGHT: 64 IN | SYSTOLIC BLOOD PRESSURE: 110 MMHG | BODY MASS INDEX: 41.15 KG/M2 | TEMPERATURE: 97 F

## 2024-02-14 DIAGNOSIS — E78.2 MIXED HYPERLIPIDEMIA: ICD-10-CM

## 2024-02-14 DIAGNOSIS — J30.0 VASOMOTOR RHINITIS: ICD-10-CM

## 2024-02-14 DIAGNOSIS — E11.69 TYPE 2 DIABETES MELLITUS WITH OTHER SPECIFIED COMPLICATION, WITHOUT LONG-TERM CURRENT USE OF INSULIN (HCC): ICD-10-CM

## 2024-02-14 DIAGNOSIS — I48.20 CHRONIC ATRIAL FIBRILLATION (HCC): Chronic | ICD-10-CM

## 2024-02-14 DIAGNOSIS — Z79.01 CHRONIC ANTICOAGULATION: Primary | ICD-10-CM

## 2024-02-14 DIAGNOSIS — I25.10 CORONARY ARTERY DISEASE INVOLVING NATIVE CORONARY ARTERY OF NATIVE HEART WITHOUT ANGINA PECTORIS: Chronic | ICD-10-CM

## 2024-02-14 DIAGNOSIS — I50.22 CHRONIC SYSTOLIC CONGESTIVE HEART FAILURE (HCC): Chronic | ICD-10-CM

## 2024-02-14 DIAGNOSIS — K21.9 GASTROESOPHAGEAL REFLUX DISEASE WITHOUT ESOPHAGITIS: ICD-10-CM

## 2024-02-14 DIAGNOSIS — E55.9 VITAMIN D DEFICIENCY: ICD-10-CM

## 2024-02-14 DIAGNOSIS — I50.32 CHRONIC HEART FAILURE WITH PRESERVED EJECTION FRACTION (HCC): ICD-10-CM

## 2024-02-14 DIAGNOSIS — I10 ESSENTIAL HYPERTENSION: Chronic | ICD-10-CM

## 2024-02-14 LAB
INTERNATIONAL NORMALIZATION RATIO, POC: 2.6
PROTHROMBIN TIME, POC: 30.9

## 2024-02-14 PROCEDURE — 3078F DIAST BP <80 MM HG: CPT | Performed by: INTERNAL MEDICINE

## 2024-02-14 PROCEDURE — 99214 OFFICE O/P EST MOD 30 MIN: CPT | Performed by: INTERNAL MEDICINE

## 2024-02-14 PROCEDURE — 1123F ACP DISCUSS/DSCN MKR DOCD: CPT | Performed by: INTERNAL MEDICINE

## 2024-02-14 PROCEDURE — 85610 PROTHROMBIN TIME: CPT | Performed by: INTERNAL MEDICINE

## 2024-02-14 PROCEDURE — 3074F SYST BP LT 130 MM HG: CPT | Performed by: INTERNAL MEDICINE

## 2024-02-14 RX ORDER — FUROSEMIDE 20 MG/1
20 TABLET ORAL 2 TIMES DAILY
Qty: 180 TABLET | Refills: 1 | Status: SHIPPED | OUTPATIENT
Start: 2024-02-14

## 2024-02-14 RX ORDER — AMLODIPINE BESYLATE 10 MG/1
10 TABLET ORAL DAILY
Qty: 90 TABLET | Refills: 1 | Status: SHIPPED | OUTPATIENT
Start: 2024-02-14

## 2024-02-14 RX ORDER — ASPIRIN 81 MG/1
81 TABLET ORAL DAILY
Qty: 90 TABLET | Refills: 3 | Status: SHIPPED | OUTPATIENT
Start: 2024-02-14

## 2024-02-14 RX ORDER — FLUTICASONE PROPIONATE 50 MCG
SPRAY, SUSPENSION (ML) NASAL
Qty: 32 G | Refills: 3 | Status: SHIPPED | OUTPATIENT
Start: 2024-02-14

## 2024-02-14 RX ORDER — SIMVASTATIN 20 MG
20 TABLET ORAL NIGHTLY
Qty: 90 TABLET | Refills: 3 | Status: SHIPPED | OUTPATIENT
Start: 2024-02-14

## 2024-02-14 RX ORDER — ERGOCALCIFEROL 1.25 MG/1
50000 CAPSULE ORAL WEEKLY
Qty: 16 CAPSULE | Refills: 0 | Status: SHIPPED | OUTPATIENT
Start: 2024-02-14

## 2024-02-14 RX ORDER — ENALAPRIL MALEATE 10 MG/1
TABLET ORAL
Qty: 180 TABLET | Refills: 3 | Status: SHIPPED | OUTPATIENT
Start: 2024-02-14

## 2024-02-14 RX ORDER — METOPROLOL TARTRATE 50 MG/1
TABLET, FILM COATED ORAL
Qty: 180 TABLET | Refills: 3 | Status: SHIPPED | OUTPATIENT
Start: 2024-02-14

## 2024-02-14 RX ORDER — PANTOPRAZOLE SODIUM 20 MG/1
20 TABLET, DELAYED RELEASE ORAL DAILY
Qty: 90 TABLET | Refills: 1
Start: 2024-02-14 | End: 2024-08-12

## 2024-02-14 NOTE — PATIENT INSTRUCTIONS
Dear Neida Wood,    Thank you for coming to your appointment today. I hope we have addressed all of your needs.     Please make sure to do the following:    - Continue your medications as listed.    - We will draw labs at next office visit.    Call for a sooner appointment if you develop any new or worsening symptoms.    Have a great day!    Sincerely,  Jonnie Wilkerson, DO  2/14/2024  3:09 PM

## 2024-02-14 NOTE — PROGRESS NOTES
racing, she then took a piece of candy that improved her symptoms.  She relates this to Jardiance.  She is going to continue the current dose of medicine as these episodes have occurred very infrequently.    She is planning to have her left cataract removed with [facility] evaluated in March     She is compliant with her medicines, she finds enjoyment watching TV and enjoys game shows to keep her mind active.      Review of Systems   Constitutional:  Negative for chills, fatigue, fever and unexpected weight change.   HENT:  Negative for congestion and rhinorrhea.    Respiratory:  Positive for shortness of breath (chronic). Negative for cough.    Cardiovascular:  Negative for chest pain, palpitations and leg swelling (improved).   Gastrointestinal:  Negative for abdominal pain, constipation, diarrhea and vomiting.   Genitourinary:  Negative for dysuria and frequency.   Musculoskeletal:  Negative for arthralgias and back pain.   Skin:  Negative for color change, pallor and wound.   Neurological:  Negative for syncope and light-headedness.   Psychiatric/Behavioral:  Negative for dysphoric mood. The patient is not nervous/anxious.           Objective   Physical Exam  Constitutional:       General: She is not in acute distress.     Appearance: She is well-developed. She is obese.      Comments: Uses cane for ambulation   HENT:      Head: Normocephalic and atraumatic.      Mouth/Throat:      Comments: edentulous  Eyes:      General: No scleral icterus.     Conjunctiva/sclera: Conjunctivae normal.   Neck:      Trachea: No tracheal deviation.   Cardiovascular:      Rate and Rhythm: Normal rate. Rhythm irregular.      Heart sounds: Murmur (Systolic ejection murmur across the precordium, loudest over LLSB,) heard.      Comments: + JVD  Pulmonary:      Effort: Pulmonary effort is normal. No respiratory distress.      Breath sounds: No rales.   Abdominal:      General: Bowel sounds are normal. There is distension (obese with

## 2024-03-05 ENCOUNTER — TELEPHONE (OUTPATIENT)
Dept: INTERNAL MEDICINE | Age: 79
End: 2024-03-05

## 2024-03-05 NOTE — TELEPHONE ENCOUNTER
Need appointment for Pre-Op Surgery for Cataract surgery, and glaucoma procedure of left eye on 3-27-24. Please Advise. Dr. RODAS/[facility] can be reached [phone]

## 2024-03-05 NOTE — TELEPHONE ENCOUNTER
Attempted to call pt to schedule appt, no answer and no vm.   Pt A/O x 4. VSS. Ortho has been done with his procedure.  Discharge instruction discussed with thoroughly with the patient and S.O  All questions were addressed. Patient and patient's S.O were both informed that once they are ready one of the ED staff will bring them to [facility] machine.  Reiterated to the patient the importance of following up with Ortho with 3-7 days and explain that he needs to be seen.  Patient was also informed to watch for s/sx of infections re: fever, chills - can go to UC or can go to any ED.  Reiterated to complete his oral antibiotics and encouraged to take pain med RTC.

## 2024-03-06 NOTE — TELEPHONE ENCOUNTER
Spoke to pt and due to transportation issues she states she can most likely have a ride on Mondays  and Wednesdays around 1. And if we reschedule for next week , I did let pt know we can do INR onwhichever day she comes in if ok with PCP.

## 2024-03-18 ENCOUNTER — OFFICE VISIT (OUTPATIENT)
Dept: INTERNAL MEDICINE | Age: 79
End: 2024-03-18
Payer: MEDICARE

## 2024-03-18 VITALS
BODY MASS INDEX: 41.48 KG/M2 | DIASTOLIC BLOOD PRESSURE: 66 MMHG | SYSTOLIC BLOOD PRESSURE: 120 MMHG | RESPIRATION RATE: 16 BRPM | WEIGHT: 243 LBS | HEIGHT: 64 IN | OXYGEN SATURATION: 97 % | TEMPERATURE: 97.2 F | HEART RATE: 60 BPM

## 2024-03-18 DIAGNOSIS — K21.9 GASTROESOPHAGEAL REFLUX DISEASE WITHOUT ESOPHAGITIS: ICD-10-CM

## 2024-03-18 DIAGNOSIS — Z01.818 PREOP EXAMINATION: Primary | ICD-10-CM

## 2024-03-18 DIAGNOSIS — Z79.01 CHRONIC ANTICOAGULATION: ICD-10-CM

## 2024-03-18 DIAGNOSIS — I48.20 CHRONIC ATRIAL FIBRILLATION (HCC): ICD-10-CM

## 2024-03-18 DIAGNOSIS — H26.9 CATARACT OF LEFT EYE, UNSPECIFIED CATARACT TYPE: ICD-10-CM

## 2024-03-18 LAB
INTERNATIONAL NORMALIZATION RATIO, POC: 3.9
PROTHROMBIN TIME, POC: 46.5

## 2024-03-18 PROCEDURE — 3074F SYST BP LT 130 MM HG: CPT | Performed by: INTERNAL MEDICINE

## 2024-03-18 PROCEDURE — 85610 PROTHROMBIN TIME: CPT | Performed by: INTERNAL MEDICINE

## 2024-03-18 PROCEDURE — 99212 OFFICE O/P EST SF 10 MIN: CPT | Performed by: INTERNAL MEDICINE

## 2024-03-18 PROCEDURE — 3078F DIAST BP <80 MM HG: CPT | Performed by: INTERNAL MEDICINE

## 2024-03-18 PROCEDURE — 1123F ACP DISCUSS/DSCN MKR DOCD: CPT | Performed by: INTERNAL MEDICINE

## 2024-03-18 PROCEDURE — 99213 OFFICE O/P EST LOW 20 MIN: CPT | Performed by: INTERNAL MEDICINE

## 2024-03-18 RX ORDER — WARFARIN SODIUM 3 MG/1
TABLET ORAL
Qty: 90 TABLET | Refills: 0 | Status: SHIPPED | OUTPATIENT
Start: 2024-03-18

## 2024-03-18 RX ORDER — PANTOPRAZOLE SODIUM 20 MG/1
20 TABLET, DELAYED RELEASE ORAL 2 TIMES DAILY
Qty: 90 TABLET | Refills: 1
Start: 2024-03-18 | End: 2024-09-14

## 2024-03-18 RX ORDER — WARFARIN SODIUM 1 MG/1
1 TABLET ORAL
COMMUNITY

## 2024-03-18 ASSESSMENT — ENCOUNTER SYMPTOMS
COLOR CHANGE: 0
ABDOMINAL PAIN: 0
VOMITING: 0
RHINORRHEA: 0
BACK PAIN: 0
CONSTIPATION: 0
SHORTNESS OF BREATH: 1
COUGH: 0

## 2024-03-18 NOTE — PATIENT INSTRUCTIONS
Dear Neida Wood,    Thank you for coming to your appointment today. I hope we have addressed all of your needs.     Please make sure to do the following:  - Continue your medications as listed.    - Continue     - Hold coumadin from 3/23 - 3/26 and resume the evening of 3/27 after cataract surgery.    - Hold Jardiance on the morning of 3/27.    - Get labs drawn before our next follow up.    - We will see each other again in June.    Call for a sooner appointment if you develop any new or worsening symptoms.    Have a great day!    Sincerely,  Jonnie Wilkerson, DO  3/18/2024  1:39 PM

## 2024-03-18 NOTE — PROGRESS NOTES
motion.      Cervical back: Normal range of motion.   Skin:     General: Skin is warm and dry.   Neurological:      Mental Status: She is alert and oriented to person, place, and time.   Psychiatric:         Behavior: Behavior normal.         Thought Content: Thought content normal.         Judgment: Judgment normal.                  An electronic signature was used to authenticate this note.    --Jonnie Wilkerson, DO

## 2024-03-22 NOTE — PROGRESS NOTES
[facility] PRE-ADMISSION TESTING INSTRUCTIONS    The Preadmission Testing patient is instructed accordingly using the following criteria (check applicable):    ARRIVAL INSTRUCTIONS:  [x] Parking the day of Surgery is located in the Main Entrance lot.  Upon entering the door, make an immediate right to the surgery reception desk    [x] Bring photo ID and insurance card    [] Bring in a copy of Living will or Durable Power of  papers.    [x] Please be sure to arrange for responsible adult to provide transportation to and from the hospital    [x] Please arrange for responsible adult to be with you for the 24 hour period post procedure due to having anesthesia    [x] If you awake am of surgery not feeling well or have temperature >100 please call [phone]    GENERAL INSTRUCTIONS:    [x] No solid after midnight. May have up to 8 ounces clear liquids until 4 hours prior to surgery. NPO time 0930         May have light meal 6 hours prior to surgery. Example include toast and clear liquids.          No gum, candy or mints.    [x] You may brush your teeth, but do not swallow any water    [x] Take medications as instructed with 1-2 oz of water    [x] Stop herbal supplements and vitamins 5 days prior to procedure    [x] Follow preop dosing of blood thinners per physician instructions    [] Take 1/2 dose of evening insulin, but no insulin after midnight    [x] No oral diabetic medications after midnight    [x] If diabetic and have low blood sugar or feel symptomatic, take 1-2oz apple juice only    [] Bring inhalers day of surgery    [] Bring C-PAP/ Bi-Pap day of surgery    [] Bring urine specimen day of surgery    [] Shower or bath with soap, lather and rinse well, AM of Surgery, no lotion, powders or creams to surgical site    [] Follow bowel prep as instructed per surgeon    [x] No tobacco products within 24 hours of surgery     [x] No alcohol or illegal drug use within 24 hours of

## 2024-03-27 ENCOUNTER — ANESTHESIA EVENT (OUTPATIENT)
Dept: OPERATING ROOM | Age: 79
End: 2024-03-27
Payer: MEDICARE

## 2024-03-27 ENCOUNTER — HOSPITAL ENCOUNTER (OUTPATIENT)
Age: 79
Setting detail: OUTPATIENT SURGERY
Discharge: HOME | End: 2024-03-27
Attending: OPHTHALMOLOGY | Admitting: OPHTHALMOLOGY
Payer: MEDICARE

## 2024-03-27 ENCOUNTER — ANESTHESIA (OUTPATIENT)
Dept: OPERATING ROOM | Age: 79
End: 2024-03-27
Payer: MEDICARE

## 2024-03-27 VITALS
HEART RATE: 67 BPM | DIASTOLIC BLOOD PRESSURE: 55 MMHG | SYSTOLIC BLOOD PRESSURE: 127 MMHG | BODY MASS INDEX: 41.83 KG/M2 | TEMPERATURE: 98.2 F | OXYGEN SATURATION: 93 % | HEIGHT: 64 IN | WEIGHT: 245 LBS | RESPIRATION RATE: 18 BRPM

## 2024-03-27 LAB
GLUCOSE BLD-MCNC: 144 MG/DL (ref 74–99)
INR PPP: 1.4
PARTIAL THROMBOPLASTIN TIME: 33.5 SEC (ref 24.5–35.1)
PROTHROMBIN TIME: 15.9 SEC (ref 9.3–12.4)

## 2024-03-27 PROCEDURE — V2632 POST CHMBR INTRAOCULAR LENS: HCPCS | Performed by: OPHTHALMOLOGY

## 2024-03-27 PROCEDURE — 3700000000 HC ANESTHESIA ATTENDED CARE: Performed by: OPHTHALMOLOGY

## 2024-03-27 PROCEDURE — 7100000010 HC PHASE II RECOVERY - FIRST 15 MIN: Performed by: OPHTHALMOLOGY

## 2024-03-27 PROCEDURE — 6360000002 HC RX W HCPCS: Performed by: NURSE ANESTHETIST, CERTIFIED REGISTERED

## 2024-03-27 PROCEDURE — 3600000002 HC SURGERY LEVEL 2 BASE: Performed by: OPHTHALMOLOGY

## 2024-03-27 PROCEDURE — 2580000003 HC RX 258: Performed by: OPHTHALMOLOGY

## 2024-03-27 PROCEDURE — 6370000000 HC RX 637 (ALT 250 FOR IP): Performed by: OPHTHALMOLOGY

## 2024-03-27 PROCEDURE — 82962 GLUCOSE BLOOD TEST: CPT

## 2024-03-27 PROCEDURE — 85730 THROMBOPLASTIN TIME PARTIAL: CPT

## 2024-03-27 PROCEDURE — 2500000003 HC RX 250 WO HCPCS: Performed by: OPHTHALMOLOGY

## 2024-03-27 PROCEDURE — 7100000011 HC PHASE II RECOVERY - ADDTL 15 MIN: Performed by: OPHTHALMOLOGY

## 2024-03-27 PROCEDURE — 6360000002 HC RX W HCPCS: Performed by: OPHTHALMOLOGY

## 2024-03-27 PROCEDURE — 2709999900 HC NON-CHARGEABLE SUPPLY: Performed by: OPHTHALMOLOGY

## 2024-03-27 PROCEDURE — 85610 PROTHROMBIN TIME: CPT

## 2024-03-27 PROCEDURE — 3600000012 HC SURGERY LEVEL 2 ADDTL 15MIN: Performed by: OPHTHALMOLOGY

## 2024-03-27 PROCEDURE — 3700000001 HC ADD 15 MINUTES (ANESTHESIA): Performed by: OPHTHALMOLOGY

## 2024-03-27 DEVICE — STERILE UV AND BLUE LIGHT FILTERING ACRYLIC FOLDABLE ASPHERIC POSTERIOR CHAMBER INTRAOCULAR LENS
Type: IMPLANTABLE DEVICE | Site: EYE | Status: FUNCTIONAL
Brand: CLAREON

## 2024-03-27 RX ORDER — CIPROFLOXACIN HYDROCHLORIDE 3.5 MG/ML
SOLUTION/ DROPS TOPICAL PRN
Status: DISCONTINUED | OUTPATIENT
Start: 2024-03-27 | End: 2024-03-27 | Stop reason: ALTCHOICE

## 2024-03-27 RX ORDER — TROPICAMIDE 10 MG/ML
1 SOLUTION/ DROPS OPHTHALMIC ONCE
Status: COMPLETED | OUTPATIENT
Start: 2024-03-27 | End: 2024-03-27

## 2024-03-27 RX ORDER — MIDAZOLAM HYDROCHLORIDE 1 MG/ML
INJECTION INTRAMUSCULAR; INTRAVENOUS PRN
Status: DISCONTINUED | OUTPATIENT
Start: 2024-03-27 | End: 2024-03-27 | Stop reason: SDUPTHER

## 2024-03-27 RX ORDER — TOBRAMYCIN 3 MG/ML
1 SOLUTION/ DROPS OPHTHALMIC
Status: DISCONTINUED | OUTPATIENT
Start: 2024-03-27 | End: 2024-03-27 | Stop reason: HOSPADM

## 2024-03-27 RX ORDER — PREDNISOLONE ACETATE 10 MG/ML
SUSPENSION/ DROPS OPHTHALMIC PRN
Status: DISCONTINUED | OUTPATIENT
Start: 2024-03-27 | End: 2024-03-27 | Stop reason: ALTCHOICE

## 2024-03-27 RX ORDER — DEXAMETHASONE SODIUM PHOSPHATE 10 MG/ML
INJECTION INTRAMUSCULAR; INTRAVENOUS PRN
Status: DISCONTINUED | OUTPATIENT
Start: 2024-03-27 | End: 2024-03-27 | Stop reason: ALTCHOICE

## 2024-03-27 RX ORDER — FENTANYL CITRATE 50 UG/ML
INJECTION, SOLUTION INTRAMUSCULAR; INTRAVENOUS PRN
Status: DISCONTINUED | OUTPATIENT
Start: 2024-03-27 | End: 2024-03-27 | Stop reason: SDUPTHER

## 2024-03-27 RX ORDER — PROPOFOL 10 MG/ML
INJECTION, EMULSION INTRAVENOUS PRN
Status: DISCONTINUED | OUTPATIENT
Start: 2024-03-27 | End: 2024-03-27 | Stop reason: SDUPTHER

## 2024-03-27 RX ORDER — TETRACAINE HYDROCHLORIDE 5 MG/ML
1 SOLUTION OPHTHALMIC SEE ADMIN INSTRUCTIONS
Status: DISCONTINUED | OUTPATIENT
Start: 2024-03-27 | End: 2024-03-27 | Stop reason: HOSPADM

## 2024-03-27 RX ORDER — PHENYLEPHRINE HYDROCHLORIDE 25 MG/ML
1 SOLUTION/ DROPS OPHTHALMIC ONCE
Status: COMPLETED | OUTPATIENT
Start: 2024-03-27 | End: 2024-03-27

## 2024-03-27 RX ORDER — CEFAZOLIN SODIUM 1 G/3ML
INJECTION, POWDER, FOR SOLUTION INTRAMUSCULAR; INTRAVENOUS PRN
Status: DISCONTINUED | OUTPATIENT
Start: 2024-03-27 | End: 2024-03-27 | Stop reason: ALTCHOICE

## 2024-03-27 RX ORDER — SODIUM CHLORIDE 9 MG/ML
INJECTION, SOLUTION INTRAVENOUS CONTINUOUS
Status: DISCONTINUED | OUTPATIENT
Start: 2024-03-27 | End: 2024-03-27 | Stop reason: HOSPADM

## 2024-03-27 RX ORDER — BALANCED SALT SOLUTION ENRICHED WITH BICARBONATE, DEXTROSE, AND GLUTATHIONE
KIT INTRAOCULAR PRN
Status: DISCONTINUED | OUTPATIENT
Start: 2024-03-27 | End: 2024-03-27 | Stop reason: ALTCHOICE

## 2024-03-27 RX ORDER — ONDANSETRON 2 MG/ML
INJECTION INTRAMUSCULAR; INTRAVENOUS PRN
Status: DISCONTINUED | OUTPATIENT
Start: 2024-03-27 | End: 2024-03-27 | Stop reason: SDUPTHER

## 2024-03-27 RX ADMIN — Medication 1 DROP: at 12:22

## 2024-03-27 RX ADMIN — TETRACAINE HYDROCHLORIDE 1 DROP: 5 SOLUTION OPHTHALMIC at 12:48

## 2024-03-27 RX ADMIN — ONDANSETRON 4 MG: 2 INJECTION INTRAMUSCULAR; INTRAVENOUS at 14:53

## 2024-03-27 RX ADMIN — SODIUM CHLORIDE: 9 INJECTION, SOLUTION INTRAVENOUS at 14:35

## 2024-03-27 RX ADMIN — MIDAZOLAM 0.5 MG: 1 INJECTION INTRAMUSCULAR; INTRAVENOUS at 15:44

## 2024-03-27 RX ADMIN — Medication 1 DROP: at 12:21

## 2024-03-27 RX ADMIN — Medication 1 DROP: at 13:07

## 2024-03-27 RX ADMIN — TETRACAINE HYDROCHLORIDE 1 DROP: 5 SOLUTION OPHTHALMIC at 13:08

## 2024-03-27 RX ADMIN — MIDAZOLAM 1 MG: 1 INJECTION INTRAMUSCULAR; INTRAVENOUS at 14:36

## 2024-03-27 RX ADMIN — PROPOFOL 50 MG: 10 INJECTION, EMULSION INTRAVENOUS at 14:45

## 2024-03-27 RX ADMIN — Medication 1 DROP: at 12:49

## 2024-03-27 RX ADMIN — FENTANYL CITRATE 25 MCG: 50 INJECTION, SOLUTION INTRAMUSCULAR; INTRAVENOUS at 14:38

## 2024-03-27 RX ADMIN — PROPOFOL 100 MG: 10 INJECTION, EMULSION INTRAVENOUS at 14:50

## 2024-03-27 RX ADMIN — TETRACAINE HYDROCHLORIDE 1 DROP: 5 SOLUTION OPHTHALMIC at 12:21

## 2024-03-27 RX ADMIN — PHENYLEPHRINE HYDROCHLORIDE 1 DROP: 25 SOLUTION/ DROPS OPHTHALMIC at 12:22

## 2024-03-27 RX ADMIN — MIDAZOLAM 0.5 MG: 1 INJECTION INTRAMUSCULAR; INTRAVENOUS at 15:19

## 2024-03-27 ASSESSMENT — LIFESTYLE VARIABLES: SMOKING_STATUS: 0

## 2024-03-27 ASSESSMENT — ENCOUNTER SYMPTOMS: SHORTNESS OF BREATH: 1

## 2024-03-27 ASSESSMENT — PAIN - FUNCTIONAL ASSESSMENT: PAIN_FUNCTIONAL_ASSESSMENT: 0-10

## 2024-03-27 NOTE — BRIEF OP NOTE
Brief Postoperative Note      Patient: Neida Wood  YOB: 1945  MRN: [id number]    Date of Procedure: 3/27/2024    Pre-Op Diagnosis Codes:     * Age-related nuclear cataract, left eye [H25.12]     * Other glaucoma of left eye [H40.89]    Post-Op Diagnosis: Same       Procedure(s):  LEFT EYE CATARACT EXTRACTION IOL IMPLANT TRABECULECTOMY WITH MITOMYCIN C    Surgeon(s):  Denton Garcia MD    Assistant:  * No surgical staff found *    Anesthesia: Monitor Anesthesia Care    Estimated Blood Loss (mL): Minimal    Complications: None    Specimens:   * No specimens in log *    Implants:  Implant Name Type Inv. Item Serial No.  Lot No. LRB No. Used Action   LENS CLAREON IOL 20.0D - [id number]  LENS CLAREON IOL 20.0D [id number] [employer]  Left 1 Implanted         Drains: * No LDAs found *    Findings: none        Electronically signed by Denton Garcia MD on 3/27/2024 at 4:02 PM

## 2024-03-27 NOTE — ANESTHESIA POSTPROCEDURE EVALUATION
Department of Anesthesiology  Postprocedure Note    Patient: Neida Wood  MRN: [id number]  YOB: 1945  Date of evaluation: 3/27/2024    Procedure Summary       Date: 03/27/24 Room / Location: [address]    Anesthesia Start: 1437 Anesthesia Stop: 1604    Procedure: LEFT EYE CATARACT EXTRACTION IOL IMPLANT TRABECULECTOMY WITH MITOMYCIN C (Left: Eye) Diagnosis:       Age-related nuclear cataract, left eye      Other glaucoma of left eye      (Age-related nuclear cataract, left eye [H25.12])      (Other glaucoma of left eye [H40.89])    Surgeons: Denton Garcia MD Responsible Provider: Otf Guo Jr., MD    Anesthesia Type: MAC ASA Status: 3            Anesthesia Type: No value filed.    Maribel Phase I: Maribel Score: 10    Maribel Phase II:      Anesthesia Post Evaluation    No notable events documented.

## 2024-03-27 NOTE — ANESTHESIA PRE PROCEDURE
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) I50.32   • S/P TAVR (transcatheter aortic valve replacement) Z95.2   • Vasomotor rhinitis J30.0   • Anxiety and depression F41.9, F32.A   • Class 3 obesity ([facility]) E66.01   • Type 2 diabetes mellitus with other specified complication, without long-term current use of insulin ([facility]) E11.69       Past Medical History:        Diagnosis Date   • Ambulates with cane    • Atrial fibrillation ([facility]) 11/22/2010    follows with Dr. Collazo   • CAD (coronary artery disease) 11/22/2010    follows with Dr. oCllazo   • CHF (congestive heart failure) ([facility]) 11/22/2010   • COVID-19    • Diverticular disease 11/22/2010   • Edentulous     does not wear dentures   • Glaucoma    • History of blood transfusion     once with CABG, once with a severe epistaxis.   • Hyperlipidemia    • Hypertension    • Left cataract    • Restless leg syndrome 11/22/2010   • Valvular heart disease 11/22/2010       Past Surgical History:        Procedure Laterality Date   • AORTIC VALVE REPLACEMENT N/A 07/14/2021    TRANSCATHETER AORTIC VALVE REPLACEMENT FEMORAL APPROACH performed by Davy Gonzalez MD at [facility]   • CARDIAC CATHETERIZATION  2007   • CARDIAC CATHETERIZATION Right 05/07/2021    Right Heart cath no stents Dr Gonzalez   • CATARACT REMOVAL WITH IMPLANT Right 08/24/2016    trabulectomy   • COLONOSCOPY     • MITRAL VALVE REPLACEMENT  2007    [address]   • TONSILLECTOMY     • TRANSESOPHAGEAL ECHOCARDIOGRAM  05/07/2021    Dr Chinchilla   • TUBAL LIGATION         Social History:    Social History     Tobacco Use   • Smoking status: Never   • Smokeless tobacco: Never   Substance Use Topics   • Alcohol use: No                                Counseling given: Not Answered      Vital

## 2024-03-28 NOTE — OP NOTE
[place] [address]                            OPERATIVE REPORT      PATIENT NAME: KARTHIKEYAN MARY             : 1945  MED REC NO: [id number]                        ROOM: [facility]  ACCOUNT NO: [id number]                       ADMIT DATE: 2024  PROVIDER: Denton Garcia MD      DATE OF PROCEDURE:  2024    SURGEON:  Denton Garcia MD    PREOPERATIVE DIAGNOSES:    1. Dense nuclear sclerotic cataract,  left eye.  2. Advanced primary open-angle glaucoma, left eye.    POSTOPERATIVE DIAGNOSES:    1. Dense nuclear sclerotic cataract,  left eye.  2. Advanced primary open-angle glaucoma, left eye.    PROCEDURES PERFORMED:    1. Trabeculectomy with mitomycin-C, left eye.  2. Phacoemulsification of lens, posterior chamber intraocular lens implant, left eye.    ANESTHESIA:  Local MAC.    COMPLICATIONS:  None.    INDICATIONS FOR PROCEDURE:  The patient is a very pleasant 78-year-old female who noted declining vision in her left eye and has previously lost almost all her vision in her right due to glaucoma.  The glaucoma has advanced to the left eye, and she wished to proceed with the above procedures in order to try to preserve her vision and improve her vision.    DESCRIPTION OF OPERATION:  The patient was brought to the operating room and placed in supine position on the operating table.  She was administered IV sedation, and while under it, was given 4 mL retrobulbar nerve block to the left eye consisting of 50:50 mixture of 0.75% Marcaine and 2% lidocaine with Wydase added.  She was prepped and draped in a sterile fashion for intraocular surgery.  Attention was directed to the left eye where a lid speculum was placed, and the operating microscope was brought into view.    A paracentesis was made at the 5 o'clock position.  The eye entered temporally with a 2.4 mm keratome.  The anterior chamber was filled with  Viscoat, and a 5 mm capsulorrhexis was fashioned in the usual manner.  The lens was hydrodissected and hydrodelineated and removed in a phaco-chop technique.  The remaining cortical material was removed with the I/A handpiece, and the undersurface of the anterior capsule was vacuumed of the lens epithelial cells, and the posterior capsule was polished with the Alissa scratcher.    The capsular bag was inflated and an Rk SY60WF model, 28.0 diopter, and serial #[id number] was injected in the capsular bag.  It centered well and remaining viscoelastic was removed with the I/A handpiece after hydration of the incisions. They appeared to seal well and the *** superiorly after injection of Miostat in the anterior chamber in order to proceed with the trabeculectomy.    A 6-0 Vicryl traction suture   peripheral superior clear cornea and secured, rotated the globe inferiorly.  Conjunctiva and Tenon's were opened up at 10 mm posterior to the limbus and dissected forward to the clear corneal area.  Hemostasis of the scleral bed was achieved with wet-field cautery, and a 3 mm rectangular flap was created with its base into the limbus.  It was dissected forward to the clear cornea without entering the anterior chamber.  A Weck-Fiona sponge which was soaked in a solution of 0.25 mg/mL of mitomycin-C was then placed underneath the conjunctiva and Tenon's flap for a time of 3 minutes.  It was removed, and the area was copiously irrigated with 2 bottles of balanced salt solution.    A paracentesis was made nasally, and Healon injected in the anterior chamber.    At the anterior most aspect of the previous scleral resection, the anterior chamber was entered with an Rk 15-degree blade.  A Shannon Descemet punch was used to remove an intrascleral block of tissue measuring 1.5 x 2.5 mm.  A peripheral iridectomy was cut.  The scleral flap was anchored at the corners using 10-0 nylon suture with knots buried.  Two more sutures were

## 2024-04-08 ENCOUNTER — NURSE ONLY (OUTPATIENT)
Dept: INTERNAL MEDICINE | Age: 79
End: 2024-04-08
Payer: MEDICARE

## 2024-04-08 DIAGNOSIS — I48.20 CHRONIC ATRIAL FIBRILLATION (HCC): ICD-10-CM

## 2024-04-08 DIAGNOSIS — Z79.01 CHRONIC ANTICOAGULATION: Primary | ICD-10-CM

## 2024-04-08 LAB
INTERNATIONAL NORMALIZATION RATIO, POC: 1.9
PROTHROMBIN TIME, POC: 22.7

## 2024-04-08 PROCEDURE — 85610 PROTHROMBIN TIME: CPT | Performed by: INTERNAL MEDICINE

## 2024-04-08 PROCEDURE — 93793 ANTICOAG MGMT PT WARFARIN: CPT | Performed by: INTERNAL MEDICINE

## 2024-04-08 NOTE — PROGRESS NOTES
INR results reviewed with Dr. Dailey.  Orders received.  Patient notified via voice mail of these orders.    AVS printed and mailed to patient with appointment card for next INR check.

## 2024-04-08 NOTE — PROGRESS NOTES
Lab Results       Component                Value               Date                       INR                      1.9                 04/08/2024              Patient Findings     Comments:  Coumadin was held for 5 days prior to eye surgery and was   restarted at 3 mgs 11 days ago        Plan: Continue 3 mg every day; return to clinic in 2 weeks for recheck    Return date  As of 4/8/2024    TTR:  60.2 % (11 y)   Next INR check:  4/22/2024

## 2024-04-22 ENCOUNTER — NURSE ONLY (OUTPATIENT)
Dept: INTERNAL MEDICINE | Age: 79
End: 2024-04-22
Payer: MEDICARE

## 2024-04-22 DIAGNOSIS — Z79.01 CHRONIC ANTICOAGULATION: Primary | ICD-10-CM

## 2024-04-22 DIAGNOSIS — I48.20 CHRONIC ATRIAL FIBRILLATION (HCC): ICD-10-CM

## 2024-04-22 DIAGNOSIS — E11.69 TYPE 2 DIABETES MELLITUS WITH OTHER SPECIFIED COMPLICATION, WITHOUT LONG-TERM CURRENT USE OF INSULIN (HCC): ICD-10-CM

## 2024-04-22 DIAGNOSIS — I38 VALVULAR HEART DISEASE: Chronic | ICD-10-CM

## 2024-04-22 LAB
INTERNATIONAL NORMALIZATION RATIO, POC: 2.5
PROTHROMBIN TIME, POC: 29.7

## 2024-04-22 PROCEDURE — 93793 ANTICOAG MGMT PT WARFARIN: CPT | Performed by: INTERNAL MEDICINE

## 2024-04-22 PROCEDURE — 85610 PROTHROMBIN TIME: CPT | Performed by: INTERNAL MEDICINE

## 2024-04-22 RX ORDER — WARFARIN SODIUM 3 MG/1
TABLET ORAL
Qty: 90 TABLET | Refills: 0 | Status: SHIPPED | OUTPATIENT
Start: 2024-04-22

## 2024-04-22 NOTE — PROGRESS NOTES
INR results reviewed with Dr. Wilkerson.  Orders received. Continue taking Coumadin 3 mg daily. Recheck in one month. Patient notified via phone of these orders.  Patient voices understanding of the information and follow-up.  AVS printed and mailed to patient with appointment card for next INR check.

## 2024-04-22 NOTE — PATIENT INSTRUCTIONS
There will be no change to your Coumadin dosage. You are to continue taking Coumadin 3 mg daily. Your next PT/INR lab will be in one month on 05/22/2024 at 1:00 pm. If you have any further questions or concerns, please call the office at [phone].      Thank you,     Kailey DICKERSON MA

## 2024-05-28 NOTE — PLAN OF CARE
[facility]  Internal Medicine Residency Clinic    Attending Physician Statement  I have discussed the case, including pertinent history and exam findings with the resident physician.  I agree with the assessment, plan and orders as documented by the resident. I have reviewed the relevant PMHx, PSHx, FamHx, SocialHx, medications, and allergies and updated history as appropriate.    Patient presents for routine follow up of medical problems.     Pt presents with 3-4 months of routine visit, for DM - taking Lantus 25 units BID and skippes evening dose if BG is less buddy 180 with controlled A1C 7.2.  Did not have BW and needs to do Fe studies and TSH in this visit.  Wellness visit in next visit needs to be scheduled.    Remainder of medical problems as per resident note.    David Kumar MD  5/28/2024 10:37 AM   
Patient's chart updated to reflect:      . - HF care plan, HF education points and HF discharge instructions.  -Orders: 2 gram sodium diet, daily weights, I/O.     Shalini Johnson RN, BSN  Heart Failure Navigator
Problem: Falls - Risk of:  Goal: Will remain free from falls  Description: Will remain free from falls  3/8/2021 1951 by Ann Mitchell RN  Outcome: Not Met This Shift  3/8/2021 0911 by Lukas Villegas RN  Outcome: Met This Shift  Goal: Absence of physical injury  Description: Absence of physical injury  3/8/2021 1951 by Ann Mitchell RN  Outcome: Not Met This Shift  3/8/2021 0911 by Lukas Villegas RN  Outcome: Met This Shift     Problem: Skin Integrity:  Goal: Will show no infection signs and symptoms  Description: Will show no infection signs and symptoms  Outcome: Not Met This Shift  Goal: Absence of new skin breakdown  Description: Absence of new skin breakdown  Outcome: Not Met This Shift
my findings and recommendations with Wanedr Rocha and Dr. Kumar.    Oly Crespo MD PGY1  5/28/2024 10:50 AM

## 2024-05-29 ENCOUNTER — NURSE ONLY (OUTPATIENT)
Dept: INTERNAL MEDICINE | Age: 79
End: 2024-05-29
Payer: MEDICARE

## 2024-05-29 DIAGNOSIS — Z79.01 LONG TERM (CURRENT) USE OF ANTICOAGULANTS: ICD-10-CM

## 2024-05-29 DIAGNOSIS — I48.20 CHRONIC ATRIAL FIBRILLATION (HCC): Primary | Chronic | ICD-10-CM

## 2024-05-29 LAB
INTERNATIONAL NORMALIZATION RATIO, POC: 2.2
PROTHROMBIN TIME, POC: 26.2

## 2024-05-29 PROCEDURE — 85610 PROTHROMBIN TIME: CPT | Performed by: INTERNAL MEDICINE

## 2024-05-29 NOTE — PATIENT INSTRUCTIONS
Continue taking 3 mg of Coumadin daily. Return on 6/25/24 for an INR check at 1 pm. Please call the office with any questions or concerns at [phone].

## 2024-06-12 ENCOUNTER — OFFICE VISIT (OUTPATIENT)
Dept: INTERNAL MEDICINE | Age: 79
End: 2024-06-12
Payer: MEDICARE

## 2024-06-12 VITALS
DIASTOLIC BLOOD PRESSURE: 60 MMHG | TEMPERATURE: 97 F | RESPIRATION RATE: 18 BRPM | WEIGHT: 244 LBS | BODY MASS INDEX: 41.66 KG/M2 | HEIGHT: 64 IN | SYSTOLIC BLOOD PRESSURE: 118 MMHG | HEART RATE: 64 BPM | OXYGEN SATURATION: 97 %

## 2024-06-12 DIAGNOSIS — I10 ESSENTIAL HYPERTENSION: Chronic | ICD-10-CM

## 2024-06-12 DIAGNOSIS — I50.22 CHRONIC SYSTOLIC CONGESTIVE HEART FAILURE (HCC): Chronic | ICD-10-CM

## 2024-06-12 DIAGNOSIS — K21.9 GASTROESOPHAGEAL REFLUX DISEASE WITHOUT ESOPHAGITIS: ICD-10-CM

## 2024-06-12 DIAGNOSIS — J30.0 VASOMOTOR RHINITIS: ICD-10-CM

## 2024-06-12 DIAGNOSIS — E55.9 VITAMIN D DEFICIENCY: Primary | ICD-10-CM

## 2024-06-12 DIAGNOSIS — I50.32 CHRONIC HEART FAILURE WITH PRESERVED EJECTION FRACTION (HCC): ICD-10-CM

## 2024-06-12 DIAGNOSIS — E78.2 MIXED HYPERLIPIDEMIA: ICD-10-CM

## 2024-06-12 DIAGNOSIS — I48.20 CHRONIC ATRIAL FIBRILLATION (HCC): Chronic | ICD-10-CM

## 2024-06-12 DIAGNOSIS — I25.10 CORONARY ARTERY DISEASE INVOLVING NATIVE CORONARY ARTERY OF NATIVE HEART WITHOUT ANGINA PECTORIS: Chronic | ICD-10-CM

## 2024-06-12 DIAGNOSIS — E11.69 TYPE 2 DIABETES MELLITUS WITH OTHER SPECIFIED COMPLICATION, WITHOUT LONG-TERM CURRENT USE OF INSULIN (HCC): ICD-10-CM

## 2024-06-12 LAB — HBA1C MFR BLD: 7.2 %

## 2024-06-12 PROCEDURE — 3078F DIAST BP <80 MM HG: CPT | Performed by: INTERNAL MEDICINE

## 2024-06-12 PROCEDURE — 83036 HEMOGLOBIN GLYCOSYLATED A1C: CPT | Performed by: INTERNAL MEDICINE

## 2024-06-12 PROCEDURE — 3051F HG A1C>EQUAL 7.0%<8.0%: CPT | Performed by: INTERNAL MEDICINE

## 2024-06-12 PROCEDURE — 99214 OFFICE O/P EST MOD 30 MIN: CPT | Performed by: INTERNAL MEDICINE

## 2024-06-12 PROCEDURE — 3074F SYST BP LT 130 MM HG: CPT | Performed by: INTERNAL MEDICINE

## 2024-06-12 PROCEDURE — 1123F ACP DISCUSS/DSCN MKR DOCD: CPT | Performed by: INTERNAL MEDICINE

## 2024-06-12 PROCEDURE — 99212 OFFICE O/P EST SF 10 MIN: CPT | Performed by: INTERNAL MEDICINE

## 2024-06-12 RX ORDER — METOPROLOL TARTRATE 50 MG/1
TABLET, FILM COATED ORAL
Qty: 180 TABLET | Refills: 3 | Status: SHIPPED | OUTPATIENT
Start: 2024-06-12

## 2024-06-12 RX ORDER — AMLODIPINE BESYLATE 10 MG/1
10 TABLET ORAL DAILY
Qty: 90 TABLET | Refills: 3 | Status: SHIPPED | OUTPATIENT
Start: 2024-06-12

## 2024-06-12 RX ORDER — EMPAGLIFLOZIN 10 MG/1
10 TABLET, FILM COATED ORAL DAILY
COMMUNITY
Start: 2024-04-22 | End: 2024-06-12 | Stop reason: SDUPTHER

## 2024-06-12 RX ORDER — FLUTICASONE PROPIONATE 50 MCG
SPRAY, SUSPENSION (ML) NASAL
Qty: 32 G | Refills: 3 | Status: SHIPPED | OUTPATIENT
Start: 2024-06-12

## 2024-06-12 RX ORDER — PANTOPRAZOLE SODIUM 20 MG/1
20 TABLET, DELAYED RELEASE ORAL 2 TIMES DAILY
Qty: 90 TABLET | Refills: 3 | Status: SHIPPED | OUTPATIENT
Start: 2024-06-12 | End: 2024-12-09

## 2024-06-12 RX ORDER — POTASSIUM CHLORIDE 750 MG/1
10 TABLET, EXTENDED RELEASE ORAL 2 TIMES DAILY
Qty: 180 TABLET | Refills: 3 | Status: SHIPPED | OUTPATIENT
Start: 2024-06-12 | End: 2025-06-12

## 2024-06-12 RX ORDER — ASPIRIN 81 MG/1
81 TABLET ORAL DAILY
Qty: 90 TABLET | Refills: 3 | Status: SHIPPED | OUTPATIENT
Start: 2024-06-12

## 2024-06-12 RX ORDER — EMPAGLIFLOZIN 10 MG/1
10 TABLET, FILM COATED ORAL DAILY
Qty: 90 TABLET | Refills: 3 | Status: SHIPPED | OUTPATIENT
Start: 2024-06-12

## 2024-06-12 RX ORDER — FUROSEMIDE 20 MG/1
20 TABLET ORAL 2 TIMES DAILY
Qty: 180 TABLET | Refills: 3 | Status: CANCELLED | OUTPATIENT
Start: 2024-06-12

## 2024-06-12 NOTE — PROGRESS NOTES
continues on Toprol, Jardiance + lasix 20 mg with K 10 mEq BID    Check routine labs at next INR check in 2 weeks.    Return in about 5 months (around 11/12/2024), or for AWV after 11/8/24.       Subjective   HPI  Patient here for routine follow-up, she denies any symptoms does report some continued debility but does live with her granddaughter who assists her around the home. She denies any recent falls.  She reports some lower extremity swelling but overall stability in this with her current medication regimen.      She did recently have cataract extraction with ophthalmology is continuing eyedrops postoperatively and has had some visual issues continuing after the surgery.  She continues close follow-up with her ophthalmologist.    She continues on Coumadin for her permanent atrial fibrillation and INR was therapeutic last visit, due for recheck in 2 weeks.    Review of Systems   Constitutional:  Negative for chills, fatigue, fever and unexpected weight change.   HENT:  Negative for congestion and rhinorrhea.    Respiratory:  Negative for cough and shortness of breath.    Cardiovascular:  Negative for chest pain, palpitations and leg swelling (improved).   Gastrointestinal:  Negative for abdominal pain, constipation, diarrhea and vomiting.   Genitourinary:  Negative for dysuria and frequency.   Musculoskeletal:  Negative for arthralgias and back pain.   Skin:  Negative for color change, pallor and wound.   Neurological:  Negative for syncope and light-headedness.   Psychiatric/Behavioral:  Negative for dysphoric mood. The patient is not nervous/anxious.           Objective   Physical Exam  Constitutional:       General: She is not in acute distress.     Appearance: She is well-developed. She is obese.      Comments: Uses cane for ambulation   HENT:      Head: Normocephalic and atraumatic.      Mouth/Throat:      Comments: edentulous  Eyes:      General: No scleral icterus.     Conjunctiva/sclera: Conjunctivae

## 2024-06-12 NOTE — PATIENT INSTRUCTIONS
Dear Neida Wood,    Thank you for coming to your appointment today. I hope we have addressed all of your needs.     Please make sure to do the following:    - Continue your medications as listed.    - Get labs drawn before our next follow up.    - We will see each other again in 5 - 6 months.    Call for a sooner appointment if you develop any new or worsening symptoms.    Have a great day!    Sincerely,  Jonnie Wilkerson, DO  6/12/2024  2:11 PM

## 2024-06-17 ASSESSMENT — ENCOUNTER SYMPTOMS
SHORTNESS OF BREATH: 0
COLOR CHANGE: 0
RHINORRHEA: 0
COUGH: 0
BACK PAIN: 0
CONSTIPATION: 0
DIARRHEA: 0
VOMITING: 0
ABDOMINAL PAIN: 0

## 2024-06-25 ENCOUNTER — NURSE ONLY (OUTPATIENT)
Dept: INTERNAL MEDICINE | Age: 79
End: 2024-06-25
Payer: MEDICARE

## 2024-06-25 VITALS — TEMPERATURE: 97 F

## 2024-06-25 DIAGNOSIS — I50.32 CHRONIC HEART FAILURE WITH PRESERVED EJECTION FRACTION (HCC): ICD-10-CM

## 2024-06-25 DIAGNOSIS — E78.2 MIXED HYPERLIPIDEMIA: ICD-10-CM

## 2024-06-25 DIAGNOSIS — Z79.01 CHRONIC ANTICOAGULATION: ICD-10-CM

## 2024-06-25 DIAGNOSIS — I48.20 CHRONIC ATRIAL FIBRILLATION (HCC): Primary | ICD-10-CM

## 2024-06-25 DIAGNOSIS — E55.9 VITAMIN D DEFICIENCY: ICD-10-CM

## 2024-06-25 LAB
ALBUMIN: 4.1 G/DL (ref 3.5–5.2)
ALP BLD-CCNC: 83 U/L (ref 35–104)
ALT SERPL-CCNC: 10 U/L (ref 0–32)
ANION GAP SERPL CALCULATED.3IONS-SCNC: 16 MMOL/L (ref 7–16)
AST SERPL-CCNC: 14 U/L (ref 0–31)
BASOPHILS ABSOLUTE: 0.03 K/UL (ref 0–0.2)
BASOPHILS RELATIVE PERCENT: 1 % (ref 0–2)
BILIRUB SERPL-MCNC: 0.9 MG/DL (ref 0–1.2)
BUN BLDV-MCNC: 14 MG/DL (ref 6–23)
CALCIUM SERPL-MCNC: 9.2 MG/DL (ref 8.6–10.2)
CHLORIDE BLD-SCNC: 103 MMOL/L (ref 98–107)
CHOLESTEROL, FASTING: 146 MG/DL
CO2: 22 MMOL/L (ref 22–29)
CREAT SERPL-MCNC: 1.1 MG/DL (ref 0.5–1)
EOSINOPHILS ABSOLUTE: 0.14 K/UL (ref 0.05–0.5)
EOSINOPHILS RELATIVE PERCENT: 2 % (ref 0–6)
GFR, ESTIMATED: 51 ML/MIN/1.73M2
GLUCOSE BLD-MCNC: 141 MG/DL (ref 74–99)
HCT VFR BLD CALC: 44.8 % (ref 34–48)
HDLC SERPL-MCNC: 65 MG/DL
HEMOGLOBIN: 14.4 G/DL (ref 11.5–15.5)
IMMATURE GRANULOCYTES %: 0 % (ref 0–5)
IMMATURE GRANULOCYTES ABSOLUTE: <0.03 K/UL (ref 0–0.58)
INTERNATIONAL NORMALIZATION RATIO, POC: 3.6
LDL CHOLESTEROL: 65 MG/DL
LYMPHOCYTES ABSOLUTE: 1.74 K/UL (ref 1.5–4)
LYMPHOCYTES RELATIVE PERCENT: 27 % (ref 20–42)
MCH RBC QN AUTO: 30.3 PG (ref 26–35)
MCHC RBC AUTO-ENTMCNC: 32.1 G/DL (ref 32–34.5)
MCV RBC AUTO: 94.1 FL (ref 80–99.9)
MONOCYTES ABSOLUTE: 0.44 K/UL (ref 0.1–0.95)
MONOCYTES RELATIVE PERCENT: 7 % (ref 2–12)
NEUTROPHILS ABSOLUTE: 3.99 K/UL (ref 1.8–7.3)
NEUTROPHILS RELATIVE PERCENT: 63 % (ref 43–80)
PDW BLD-RTO: 13.3 % (ref 11.5–15)
PLATELET # BLD: 279 K/UL (ref 130–450)
PMV BLD AUTO: 10.7 FL (ref 7–12)
POTASSIUM SERPL-SCNC: 4.1 MMOL/L (ref 3.5–5)
PRO-BNP: 1023 PG/ML (ref 0–450)
PROTHROMBIN TIME, POC: 42.7
RBC # BLD: 4.76 M/UL (ref 3.5–5.5)
SODIUM BLD-SCNC: 141 MMOL/L (ref 132–146)
TOTAL PROTEIN: 7.2 G/DL (ref 6.4–8.3)
TRIGLYCERIDE, FASTING: 81 MG/DL
VITAMIN D 25-HYDROXY: 33.7 NG/ML (ref 30–100)
VLDLC SERPL CALC-MCNC: 16 MG/DL
WBC # BLD: 6.4 K/UL (ref 4.5–11.5)

## 2024-06-25 PROCEDURE — 85610 PROTHROMBIN TIME: CPT

## 2024-06-25 PROCEDURE — 36415 COLL VENOUS BLD VENIPUNCTURE: CPT

## 2024-06-25 RX ORDER — WARFARIN SODIUM 3 MG/1
TABLET ORAL
Qty: 90 TABLET | Refills: 0 | Status: CANCELLED | OUTPATIENT
Start: 2024-06-25

## 2024-06-25 NOTE — PROGRESS NOTES
Bw drawn , one green and one purple sent to lab, pt requesting a handicap placard letter.  INR results reviewed with Dr. Lainez.  Orders received.  Patient notified in office of these orders.  Patient voices understanding of the information and follow-up.  AVS printed and given to patient with appointment card for next INR check.

## 2024-06-25 NOTE — PATIENT INSTRUCTIONS
Please hold todays dose and resume 3 mg daily tomorrow. Recheck IN?R in 2 weeks. 7/9/2024 at 1 pm.

## 2024-07-03 ENCOUNTER — TELEPHONE (OUTPATIENT)
Dept: PHARMACY | Facility: CLINIC | Age: 79
End: 2024-07-03

## 2024-07-03 NOTE — TELEPHONE ENCOUNTER
Patient called back regarding adherence.     Patient stated she is still taking both enalapril 10mg and simvastatin 20mg as prescribed. Stated she recently ran out of medications and has been trying to get a hold of her mail order pharmacy but has been given the \"run around\" every time she calls them. Gave patient new mail order pharmacy to try that is on file. Patient will try that number and request mail order to send her both refills of enalapril and simvastatin.       Gaudencio Yo CPhT  [facility]   Phone: [phone], Option #3       For Pharmacy Admin Tracking Only    Program: [employer]  CPA in place:  No  Recommendation Provided To: Patient/Caregiver: 2 via Telephone  Intervention Detail: Adherence Monitorin  Intervention Accepted By: Patient/Caregiver: 2  Gap Closed?: Yes   Time Spent (min): 10

## 2024-07-03 NOTE — TELEPHONE ENCOUNTER
[facility] [address] [place] [address] office: [phone] fax: [phone]     Physical Therapy Initial Evaluation Certification      Dear Jose Angel Arriaga DO,    We had the pleasure of evaluating the following patient for physical therapy services at [facility].  A summary of our findings can be found in the initial assessment below.  This includes our plan of care.  If you have any questions or concerns regarding these findings, please do not hesitate to contact me at the office phone number listed above.  Thank you for the referral.     Physician Signature:_______________________________Date:__________________  By signing above (or electronic signature), therapist’s plan is approved by physician       Physical Therapy: TREATMENT/PROGRESS NOTE   Patient: Navya Domínguez (69 y.o. female)   Examination Date: 2024   :  1954 MRN: [id number]   Visit #: 1   Insurance Allowable Auth Needed   [place] []Yes    [x]No    Insurance: Payor: AETNA MEDICARE / Plan: AETNA MEDICARE-ADVANTAGE PPO / Product Type: Medicare /   Insurance ID: [id number] - (Medicare Managed)  Secondary Insurance (if applicable):    Treatment Diagnosis:     ICD-10-CM    1. Left shoulder pain, unspecified chronicity  M25.512       2. Unspecified fracture of upper end of left humerus, initial encounter for closed fracture  S4          Medical Diagnosis:  Left shoulder pain [M25.512]   Referring Physician: Jose Angel Arriaga DO  PCP: Bird Joaquin PA       Plan of care signed (Y/N):     Date of Patient follow up with Physician:      Progress Report/POC: EVAL today  POC update due: (10 visits /OR AUTH LIMITS, whichever is less)  3/27/2024                                             Precautions/ Contra-indications:           Latex allergy:  NO  Pacemaker:    NO  Contraindications for Manipulation: None  Date of Surgery: NA  Other:    Red  [facility] CLINICAL PHARMACY: ADHERENCE REVIEW  Identified care gap per [place] ACE/ARB and Statin adherence      ASSESSMENT    ACE/ARB ADHERENCE    Insurance Records claims through 24 (Prior Year PDC = not reported; YTD PDC = FIRST FILL; Potential Fail Date: 24):   Enalapril 10mg Next refill due: 24    Prescribed si tablet/capsule daily    Per Insurer Portal: last filled on 24 for 90 day supply. 3RF    Mail order not contacted    BP Readings from Last 3 Encounters:   24 118/60   24 (!) 127/55   24 120/66     Estimated Creatinine Clearance: 52 mL/min (A) (based on SCr of 1.1 mg/dL (H)).  Lab Results   Component Value Date    CREATININE 1.1 (H) 2024     Lab Results   Component Value Date    K 4.1 2024     STATIN ADHERENCE  Insurance Records claims through 24 (Prior Year PDC = not reported; YTD PDC = FIRST FILL; Potential Fail Date: 24):   Simvastatin 20mg Next refill due: 24    Prescribed si tablet/capsule daily    Per Insurer Portal: last filled on 24 for 90 day supply. 3RF    Mail order not contacted    Lab Results   Component Value Date    CHOL 143 2022    TRIG 77 2022    HDL 65 2024     Lab Results   Component Value Date    LDL 65 2024      ALT   Date Value Ref Range Status   2024 10 0 - 32 U/L Final     AST   Date Value Ref Range Status   2024 14 0 - 31 U/L Final     The 10-year ASCVD risk score (Angelo CALLOWAY, et al., 2019) is: 39%    Values used to calculate the score:      Age: 78 years      Sex: Female      Is Non- : No      Diabetic: Yes      Tobacco smoker: No      Systolic Blood Pressure: 118 mmHg      Is BP treated: Yes      HDL Cholesterol: 65 mg/dL      Total Cholesterol: 146 mg/dL       The following are interventions that have been identified:   Patient OVERDUE refilling both and active on home medication list.     Attempting to reach patient

## 2024-07-09 ENCOUNTER — NURSE ONLY (OUTPATIENT)
Dept: INTERNAL MEDICINE | Age: 79
End: 2024-07-09
Payer: MEDICARE

## 2024-07-09 DIAGNOSIS — I48.20 CHRONIC ATRIAL FIBRILLATION (HCC): Primary | ICD-10-CM

## 2024-07-09 LAB
INTERNATIONAL NORMALIZATION RATIO, POC: 2.3
PROTHROMBIN TIME, POC: 27.6

## 2024-07-09 PROCEDURE — 85610 PROTHROMBIN TIME: CPT | Performed by: INTERNAL MEDICINE

## 2024-07-09 PROCEDURE — 93793 ANTICOAG MGMT PT WARFARIN: CPT | Performed by: INTERNAL MEDICINE

## 2024-07-09 NOTE — PROGRESS NOTES
INR results reviewed with Dr. Dailey and orders received. Pt notified via phone  of same. AVS mailed to patient.

## 2024-07-09 NOTE — PROGRESS NOTES
Lab Results       Component                Value               Date                       INR                      2.3                 07/09/2024              Patient Findings     Negatives:  Signs/symptoms of thrombosis, Signs/symptoms of bleeding,   Laboratory test error suspected, Change in health, Change in alcohol use,   Change in activity, Upcoming invasive procedure, Emergency department   visit, Upcoming dental procedure, Missed doses, Extra doses, Change in   medications, Change in diet/appetite, Hospital admission, Bruising, Other   complaints    Comments:  Currently taking 3mg of Coumadin daily        Plan: Continue 3 mg every day and return in 1 month     Return date  As of 7/9/2024    TTR:  60.6 % (11.2 y)   Next INR check:  8/6/2024          Electronically signed by Dann Dailey Jr, DO on 7/9/2024 at 4:50 PM

## 2024-08-21 ENCOUNTER — TELEPHONE (OUTPATIENT)
Dept: INTERNAL MEDICINE | Age: 79
End: 2024-08-21

## 2024-08-21 ENCOUNTER — ANTI-COAG VISIT (OUTPATIENT)
Dept: INTERNAL MEDICINE | Age: 79
End: 2024-08-21

## 2024-08-22 DIAGNOSIS — I50.22 CHRONIC SYSTOLIC CONGESTIVE HEART FAILURE (HCC): Chronic | ICD-10-CM

## 2024-08-22 RX ORDER — POTASSIUM CHLORIDE 750 MG/1
10 TABLET, EXTENDED RELEASE ORAL 2 TIMES DAILY
Qty: 30 TABLET | Refills: 0 | Status: SHIPPED | OUTPATIENT
Start: 2024-08-22 | End: 2025-08-22

## 2024-08-22 NOTE — TELEPHONE ENCOUNTER
[facility] phoned in states pt needs an emergency  refill on her Klor -con 10 meq has been out 4 days now and it will take at least another week for medication can we give her at least a 72 Hr supply til her medication arrives,she is requesting it be [facility] [place] [address]. And if so can we call pt and let her know

## 2024-08-26 ENCOUNTER — NURSE ONLY (OUTPATIENT)
Dept: INTERNAL MEDICINE | Age: 79
End: 2024-08-26
Payer: MEDICARE

## 2024-08-26 DIAGNOSIS — Z79.01 CHRONIC ANTICOAGULATION: Primary | ICD-10-CM

## 2024-08-26 DIAGNOSIS — I48.20 CHRONIC ATRIAL FIBRILLATION (HCC): ICD-10-CM

## 2024-08-26 LAB
INTERNATIONAL NORMALIZATION RATIO, POC: 3.8
PROTHROMBIN TIME, POC: 46.1

## 2024-08-26 PROCEDURE — 85610 PROTHROMBIN TIME: CPT | Performed by: INTERNAL MEDICINE

## 2024-08-26 PROCEDURE — 93793 ANTICOAG MGMT PT WARFARIN: CPT | Performed by: INTERNAL MEDICINE

## 2024-08-26 RX ORDER — WARFARIN SODIUM 3 MG/1
TABLET ORAL
Qty: 90 TABLET | Refills: 0 | Status: SHIPPED | OUTPATIENT
Start: 2024-08-26

## 2024-08-26 NOTE — PATIENT INSTRUCTIONS
Hold dose today ,8/26/2024 , Start same dose Tuesday 8/27/24. Recheck INR in 2 weeks scheduled 9/9/24

## 2024-09-12 ENCOUNTER — TELEPHONE (OUTPATIENT)
Dept: INTERNAL MEDICINE | Age: 79
End: 2024-09-12

## 2024-10-01 DIAGNOSIS — I50.32 CHRONIC HEART FAILURE WITH PRESERVED EJECTION FRACTION (HCC): ICD-10-CM

## 2024-10-01 RX ORDER — FUROSEMIDE 20 MG
20 TABLET ORAL 2 TIMES DAILY
Qty: 180 TABLET | Refills: 1 | Status: SHIPPED | OUTPATIENT
Start: 2024-10-01

## 2024-10-09 NOTE — PATIENT INSTRUCTIONS
Continue all medications as instructed  Follow up as scheduled with   Continue coumadin 3 mg daily  Repeat INR in 1 month  Bleeding precautions as reviewed  Please call if any questions or concerns  Patient Education        Taking Warfarin Safely: Care Instructions  Your Care Instructions     Warfarin is a medicine that you take to prevent blood clots. It is often called a blood thinner. Doctors give warfarin (such as Coumadin) to reduce the risk of blood clots. You may be at risk for blood clots if you have atrial fibrillation or deep vein thrombosis. Some other health problems may also put you at risk. Warfarin slows the amount of time it takes for your blood to clot. It can cause bleeding problems. Even if you've been taking warfarin for a while, it's important to know how to take it safely. Foods and other medicines can affect the way warfarin works. Some can make warfarin work too well. This can cause bleeding problems. And some can make it work poorly, so that it does not prevent blood clots very well. You will need regular blood tests to check how long it takes for your blood to form a clot. This test is called a PT or prothrombin time test. The result of the test is called an INR level. Depending on the test results, your doctor or anticoagulation clinic may adjust your dose of warfarin. Follow-up care is a key part of your treatment and safety. Be sure to make and go to all appointments, and call your doctor if you are having problems. It's also a good idea to know your test results and keep a list of the medicines you take. How can you care for yourself at home? Take warfarin safely  · Take your warfarin at the same time each day. · If you miss a dose of warfarin, don't take an extra dose to make up for it. Your doctor can tell you exactly what to do so you don't take too much or too little. · Wear medical alert jewelry that lets others know that you take warfarin.  You can buy this at most Restart cipro twice daily  Stay on yogurt daily  Get labs and xray today  To to [facility] er if having abdominal pain or nausea vomiting or fevers  See me 2 mos   drugstores. · Don't take warfarin if you are pregnant or planning to get pregnant. Talk to your doctor about how you can prevent getting pregnant while you are taking it. · Don't change your dose or stop taking warfarin unless your doctor tells you to. Effects of medicines and food on warfarin  · Don't start or stop taking any medicines, vitamins, or natural remedies unless you first talk to your doctor. Many medicines can affect how warfarin works. These include aspirin and other pain relievers, over-the-counter medicines, multivitamins, dietary supplements, and herbal products. · Tell all of your doctors and pharmacists that you take warfarin. Some prescription medicines can affect how warfarin works. · Keep the amount of vitamin K in your diet about the same from day to day. Do not suddenly eat a lot more or a lot less food that is rich in vitamin K than you usually do. Vitamin K affects how warfarin works and how your blood clots. Talk with your doctor before making big changes in your diet. Foods that have a lot of vitamin K include cooked green vegetables, such as:  ? Kale, spinach, turnip greens, katina greens, Swiss chard, and mustard greens. ? [place] sprouts, broccoli, and cabbage. · Limit your use of alcohol. Avoid bleeding by preventing falls and injuries  · Wear slippers or shoes with nonskid soles. · Remove throw rugs and clutter. · Rearrange furniture and electrical cords to keep them out of walking paths. · Keep stairways, porches, and outside walkways well lit. Use night-lights in hallways and bathrooms. · Be extra careful when you work with sharp tools or knives. When should you call for help? Call 911 anytime you think you may need emergency care. For example, call if:    · You have a sudden, severe headache that is different from past headaches. Call your doctor now or seek immediate medical care if:    · You have any abnormal bleeding, such as:  ? Nosebleeds.   ? Vaginal bleeding that is different (heavier, more frequent, at a different time of the month) than what you are used to.  ? Bloody or black stools, or rectal bleeding. ? Bloody or pink urine. Watch closely for changes in your health, and be sure to contact your doctor if you have any problems. Where can you learn more? Go to [email or web address] and sign in to your [employer] account. Enter K470 in the Webvanta box to learn more about \"Taking Warfarin Safely: Care Instructions. \"     If you do not have an account, please click on the \"Sign Up Now\" link. Current as of: April 29, 2021               Content Version: 13.0  © 2006-2021 Healthwise, Incorporated. Care instructions adapted under license by [facility] ([facility]). If you have questions about a medical condition or this instruction, always ask your healthcare professional. [address] any warranty or liability for your use of this information. <-------- Click here to INCLUDE CoVID-19 Discharge Instructions

## 2024-11-04 ENCOUNTER — NURSE ONLY (OUTPATIENT)
Dept: INTERNAL MEDICINE | Age: 79
End: 2024-11-04
Payer: MEDICARE

## 2024-11-04 DIAGNOSIS — Z79.01 CHRONIC ANTICOAGULATION: Primary | ICD-10-CM

## 2024-11-04 DIAGNOSIS — I48.20 CHRONIC ATRIAL FIBRILLATION (HCC): ICD-10-CM

## 2024-11-04 LAB
INTERNATIONAL NORMALIZATION RATIO, POC: 4.1
PROTHROMBIN TIME, POC: 49

## 2024-11-04 PROCEDURE — 85610 PROTHROMBIN TIME: CPT

## 2024-11-04 RX ORDER — WARFARIN SODIUM 3 MG/1
TABLET ORAL
Qty: 90 TABLET | Refills: 0 | Status: SHIPPED | OUTPATIENT
Start: 2024-11-04

## 2024-11-04 RX ORDER — WARFARIN SODIUM 2 MG/1
TABLET ORAL
Qty: 90 TABLET | Refills: 0 | Status: SHIPPED | OUTPATIENT
Start: 2024-11-04

## 2024-11-04 NOTE — PROGRESS NOTES
INR results reviewed with Dr. Waite.  Orders received.  Patient notified via phone of these orders.  Patient voices understanding of the information and follow-up.  AVS printed and mailed to patient with appointment card for next INR check.

## 2024-11-06 DIAGNOSIS — K21.9 GASTROESOPHAGEAL REFLUX DISEASE WITHOUT ESOPHAGITIS: ICD-10-CM

## 2024-11-06 RX ORDER — PANTOPRAZOLE SODIUM 20 MG/1
20 TABLET, DELAYED RELEASE ORAL 2 TIMES DAILY
Qty: 90 TABLET | Refills: 3 | Status: SHIPPED | OUTPATIENT
Start: 2024-11-06 | End: 2025-05-05

## 2024-11-13 ENCOUNTER — OFFICE VISIT (OUTPATIENT)
Dept: INTERNAL MEDICINE | Age: 79
End: 2024-11-13
Payer: MEDICARE

## 2024-11-13 VITALS
OXYGEN SATURATION: 97 % | WEIGHT: 242 LBS | DIASTOLIC BLOOD PRESSURE: 56 MMHG | BODY MASS INDEX: 41.32 KG/M2 | TEMPERATURE: 97 F | HEART RATE: 62 BPM | HEIGHT: 64 IN | SYSTOLIC BLOOD PRESSURE: 128 MMHG | RESPIRATION RATE: 18 BRPM

## 2024-11-13 DIAGNOSIS — I10 ESSENTIAL HYPERTENSION: Chronic | ICD-10-CM

## 2024-11-13 DIAGNOSIS — I48.20 CHRONIC ATRIAL FIBRILLATION (HCC): Primary | Chronic | ICD-10-CM

## 2024-11-13 DIAGNOSIS — I25.10 CORONARY ARTERY DISEASE INVOLVING NATIVE CORONARY ARTERY OF NATIVE HEART WITHOUT ANGINA PECTORIS: Chronic | ICD-10-CM

## 2024-11-13 DIAGNOSIS — I50.22 CHRONIC SYSTOLIC CONGESTIVE HEART FAILURE (HCC): Chronic | ICD-10-CM

## 2024-11-13 DIAGNOSIS — Z00.00 MEDICARE ANNUAL WELLNESS VISIT, SUBSEQUENT: ICD-10-CM

## 2024-11-13 DIAGNOSIS — E78.2 MIXED HYPERLIPIDEMIA: ICD-10-CM

## 2024-11-13 LAB
INTERNATIONAL NORMALIZATION RATIO, POC: 2.3
PROTHROMBIN TIME, POC: 27.8

## 2024-11-13 PROCEDURE — 99212 OFFICE O/P EST SF 10 MIN: CPT | Performed by: INTERNAL MEDICINE

## 2024-11-13 PROCEDURE — 85610 PROTHROMBIN TIME: CPT | Performed by: INTERNAL MEDICINE

## 2024-11-13 RX ORDER — METOPROLOL TARTRATE 50 MG
TABLET ORAL
Qty: 180 TABLET | Refills: 3 | Status: SHIPPED | OUTPATIENT
Start: 2024-11-13

## 2024-11-13 RX ORDER — AMLODIPINE BESYLATE 10 MG/1
10 TABLET ORAL DAILY
Qty: 90 TABLET | Refills: 3 | Status: SHIPPED | OUTPATIENT
Start: 2024-11-13

## 2024-11-13 RX ORDER — SIMVASTATIN 20 MG
20 TABLET ORAL NIGHTLY
Qty: 90 TABLET | Refills: 3 | Status: SHIPPED | OUTPATIENT
Start: 2024-11-13

## 2024-11-13 RX ORDER — ENALAPRIL MALEATE 10 MG/1
TABLET ORAL
Qty: 135 TABLET | Refills: 3 | Status: SHIPPED | OUTPATIENT
Start: 2024-11-13

## 2024-11-13 RX ORDER — POTASSIUM CHLORIDE 750 MG/1
10 TABLET, EXTENDED RELEASE ORAL 2 TIMES DAILY
Qty: 180 TABLET | Refills: 1 | Status: SHIPPED | OUTPATIENT
Start: 2024-11-13 | End: 2025-05-12

## 2024-11-13 RX ORDER — ASPIRIN 81 MG/1
81 TABLET ORAL DAILY
Qty: 90 TABLET | Refills: 3 | Status: SHIPPED | OUTPATIENT
Start: 2024-11-13

## 2024-11-13 SDOH — ECONOMIC STABILITY: FOOD INSECURITY: WITHIN THE PAST 12 MONTHS, YOU WORRIED THAT YOUR FOOD WOULD RUN OUT BEFORE YOU GOT MONEY TO BUY MORE.: NEVER TRUE

## 2024-11-13 SDOH — ECONOMIC STABILITY: FOOD INSECURITY: WITHIN THE PAST 12 MONTHS, THE FOOD YOU BOUGHT JUST DIDN'T LAST AND YOU DIDN'T HAVE MONEY TO GET MORE.: NEVER TRUE

## 2024-11-13 SDOH — ECONOMIC STABILITY: INCOME INSECURITY: HOW HARD IS IT FOR YOU TO PAY FOR THE VERY BASICS LIKE FOOD, HOUSING, MEDICAL CARE, AND HEATING?: NOT HARD AT ALL

## 2024-11-13 ASSESSMENT — PATIENT HEALTH QUESTIONNAIRE - PHQ9
4. FEELING TIRED OR HAVING LITTLE ENERGY: SEVERAL DAYS
9. THOUGHTS THAT YOU WOULD BE BETTER OFF DEAD, OR OF HURTING YOURSELF: NOT AT ALL
2. FEELING DOWN, DEPRESSED OR HOPELESS: NOT AT ALL
6. FEELING BAD ABOUT YOURSELF - OR THAT YOU ARE A FAILURE OR HAVE LET YOURSELF OR YOUR FAMILY DOWN: NOT AT ALL
SUM OF ALL RESPONSES TO PHQ QUESTIONS 1-9: 2
SUM OF ALL RESPONSES TO PHQ9 QUESTIONS 1 & 2: 0
SUM OF ALL RESPONSES TO PHQ QUESTIONS 1-9: 2
8. MOVING OR SPEAKING SO SLOWLY THAT OTHER PEOPLE COULD HAVE NOTICED. OR THE OPPOSITE, BEING SO FIGETY OR RESTLESS THAT YOU HAVE BEEN MOVING AROUND A LOT MORE THAN USUAL: NOT AT ALL
3. TROUBLE FALLING OR STAYING ASLEEP: NOT AT ALL
7. TROUBLE CONCENTRATING ON THINGS, SUCH AS READING THE NEWSPAPER OR WATCHING TELEVISION: NOT AT ALL
10. IF YOU CHECKED OFF ANY PROBLEMS, HOW DIFFICULT HAVE THESE PROBLEMS MADE IT FOR YOU TO DO YOUR WORK, TAKE CARE OF THINGS AT HOME, OR GET ALONG WITH OTHER PEOPLE: NOT DIFFICULT AT ALL
5. POOR APPETITE OR OVEREATING: SEVERAL DAYS
1. LITTLE INTEREST OR PLEASURE IN DOING THINGS: NOT AT ALL

## 2024-11-13 ASSESSMENT — LIFESTYLE VARIABLES: HOW OFTEN DO YOU HAVE A DRINK CONTAINING ALCOHOL: NEVER

## 2024-11-13 NOTE — PROGRESS NOTES
Medicare Annual Wellness Visit    Neida Wood is here for Medicare AWV and Office Visit for Anticoagulation Management    Assessment & Plan   Chronic atrial fibrillation (HCC)  -     POCT INR  The following orders have not been finalized:  -     metoprolol tartrate (LOPRESSOR) 50 MG tablet  Medicare annual wellness visit, subsequent  Mixed hyperlipidemia  The following orders have not been finalized:  -     simvastatin (ZOCOR) 20 MG tablet  Chronic systolic congestive heart failure (HCC)  The following orders have not been finalized:  -     enalapril (VASOTEC) 10 MG tablet  -     potassium chloride (KLOR-CON M10) 10 MEQ extended release tablet  Essential hypertension  The following orders have not been finalized:  -     amLODIPine (NORVASC) 10 MG tablet  -     enalapril (VASOTEC) 10 MG tablet  Coronary artery disease involving native coronary artery of native heart without angina pectoris  The following orders have not been finalized:  -     aspirin 81 MG EC tablet     Continue Protonix 20 mg BID -- symptoms c/w GERD     Continue amlodipine, enalapril for BP, well controlled.     Permanent AF --  continue BB and coumadin as directed.     HFpEF with VHD s/p bioprosthetic AVR -- continues on Toprol, Jardiance + lasix 20 mg with K 10 mEq BID --if increased swelling she may take an additional dose of Lasix.    DM 2 --discontinued Jardiance due to cost concerns.  Last A1c 7.2% in June and counseled lifestyle modifications.  Will recheck next visit in 3 to 4 months.     Next INR check in 4 weeks.    She defers additional vaccinations like pneumococcal and shingles.    Recommendations for Preventive Services Due: see orders and patient instructions/AVS.    Recommended screening schedule for the next 5-10 years is provided to the patient in written form: see Patient Instructions/AVS.       Return in 4 months (on 3/13/2025).     Subjective     AWV:    She reports continued issues with her eyesight with progressive

## 2024-11-13 NOTE — PATIENT INSTRUCTIONS
appropriate.    After reviewing your medical record and screening and assessments performed today your provider may have ordered immunizations, labs, imaging, and/or referrals for you.  A list of these orders (if applicable) as well as your Preventive Care list are included within your After Visit Summary for your review.    Other Preventive Recommendations:    A preventive eye exam performed by an eye specialist is recommended every 1-2 years to screen for glaucoma; cataracts, macular degeneration, and other eye disorders.  A preventive dental visit is recommended every 6 months.  Try to get at least 150 minutes of exercise per week or 10,000 steps per day on a pedometer .  Order or download the FREE \"Exercise & Physical Activity: Your Everyday Guide\" from [employer]. Call 1-789.273.9004 or search [employer] online.  You need 2040-6864 mg of calcium and 2870-5488 IU of vitamin D per day. It is possible to meet your calcium requirement with diet alone, but a vitamin D supplement is usually necessary to meet this goal.  When exposed to the sun, use a sunscreen that protects against both UVA and UVB radiation with an SPF of 30 or greater. Reapply every 2 to 3 hours or after sweating, drying off with a towel, or swimming.  Always wear a seat belt when traveling in a car. Always wear a helmet when riding a bicycle or motorcycle.

## 2024-11-21 DIAGNOSIS — I48.20 CHRONIC ATRIAL FIBRILLATION (HCC): ICD-10-CM

## 2024-11-21 DIAGNOSIS — Z79.01 CHRONIC ANTICOAGULATION: ICD-10-CM

## 2024-11-25 RX ORDER — WARFARIN SODIUM 3 MG/1
TABLET ORAL
Qty: 90 TABLET | Refills: 0 | Status: SHIPPED | OUTPATIENT
Start: 2024-11-25

## 2024-11-25 RX ORDER — WARFARIN SODIUM 3 MG/1
TABLET ORAL
Qty: 90 TABLET | Refills: 0 | Status: SHIPPED | OUTPATIENT
Start: 2024-11-25 | End: 2024-11-25

## 2024-11-25 RX ORDER — WARFARIN SODIUM 2 MG/1
TABLET ORAL
Qty: 90 TABLET | Refills: 0 | Status: SHIPPED | OUTPATIENT
Start: 2024-11-25

## 2025-01-09 ENCOUNTER — TELEPHONE (OUTPATIENT)
Dept: INTERNAL MEDICINE | Age: 80
End: 2025-01-09

## 2025-01-09 NOTE — TELEPHONE ENCOUNTER
Attempted a few over last few weeks to schedule pt for an appt to complete INR, pt has answered a few times but states it is difficult for her to get transportation for her appt. Could we see if pt may qualify for at home INR testing. Pt's last INR was 11/13/24. Due pt unable to make it to scheduled appts.

## 2025-01-16 NOTE — TELEPHONE ENCOUNTER
Called [employer] for home monitoring device.     They are sending a representative tomorrow for completion of her enrollment in the process.    Electronically signed by Jonnie Wilkerson DO on 1/16/2025 at 2:32 PM

## 2025-01-21 NOTE — TELEPHONE ENCOUNTER
Spoke with pt , she states Juan is checking with her INS to see if approved, and if not she will call next week to schedule an INR in office.

## 2025-02-10 ENCOUNTER — ANTI-COAG VISIT (OUTPATIENT)
Dept: INTERNAL MEDICINE | Age: 80
End: 2025-02-10
Payer: MEDICARE

## 2025-02-10 DIAGNOSIS — I48.20 CHRONIC ATRIAL FIBRILLATION (HCC): Primary | Chronic | ICD-10-CM

## 2025-02-10 DIAGNOSIS — Z79.01 CHRONIC ANTICOAGULATION: ICD-10-CM

## 2025-02-10 LAB — INR BLD: 2.1

## 2025-02-10 PROCEDURE — 93793 ANTICOAG MGMT PT WARFARIN: CPT | Performed by: INTERNAL MEDICINE

## 2025-02-11 NOTE — PATIENT INSTRUCTIONS
Home monitoring INR:     Continue same dose, recheck weekly per home monitoring requirements and call office if < 2.0 or > 3.0.

## 2025-02-11 NOTE — PROGRESS NOTES
Home monitoring INR:    Continue same dose, recheck weekly per home monitoring requirements and call office if < 2.0 or > 3.0.    Electronically signed by Jonnie Wilkerson DO on 2/11/2025 at 9:24 AM

## 2025-02-26 ENCOUNTER — ANTI-COAG VISIT (OUTPATIENT)
Dept: INTERNAL MEDICINE | Age: 80
End: 2025-02-26
Payer: MEDICARE

## 2025-02-26 DIAGNOSIS — Z79.01 CHRONIC ANTICOAGULATION: Primary | ICD-10-CM

## 2025-02-26 LAB — INR BLD: 1.9

## 2025-02-26 PROCEDURE — 93793 ANTICOAG MGMT PT WARFARIN: CPT | Performed by: INTERNAL MEDICINE

## 2025-02-27 NOTE — PATIENT INSTRUCTIONS
Continue the same dosing coumadin 2mg tues and thurs and 3 mg the rest of the week. Recheck INR in one week.

## 2025-03-05 ENCOUNTER — ANTI-COAG VISIT (OUTPATIENT)
Dept: INTERNAL MEDICINE | Age: 80
End: 2025-03-05
Payer: MEDICARE

## 2025-03-05 DIAGNOSIS — Z79.01 CHRONIC ANTICOAGULATION: Primary | ICD-10-CM

## 2025-03-05 LAB — INR BLD: 1.9

## 2025-03-05 PROCEDURE — 93793 ANTICOAG MGMT PT WARFARIN: CPT | Performed by: INTERNAL MEDICINE

## 2025-03-12 ENCOUNTER — TELEPHONE (OUTPATIENT)
Dept: INTERNAL MEDICINE | Age: 80
End: 2025-03-12
Payer: MEDICARE

## 2025-03-12 DIAGNOSIS — I48.20 CHRONIC ATRIAL FIBRILLATION (HCC): Primary | Chronic | ICD-10-CM

## 2025-03-12 DIAGNOSIS — Z79.01 CHRONIC ANTICOAGULATION: ICD-10-CM

## 2025-03-12 LAB — INR BLD: 1.6

## 2025-03-12 PROCEDURE — 93793 ANTICOAG MGMT PT WARFARIN: CPT | Performed by: INTERNAL MEDICINE

## 2025-03-12 NOTE — TELEPHONE ENCOUNTER
[facility] Hospitalist Progress Note      Subjective  Seen and examined at bedside earlier in the day  Loose BM,  no vomiting, no abd pain   No other complaints       Physical Exam    Vitals with min/max:    Vital Last Value 24 Hour Range   Temperature 98.2 °F (36.8 °C) (08/21/23 1622) Temp  Min: 97.9 °F (36.6 °C)  Max: 98.6 °F (37 °C)   Pulse (!) 107 (08/21/23 1622) Pulse  Min: 77  Max: 107   Respiratory 16 (08/21/23 1622) Resp  Min: 14  Max: 20   Non-Invasive  Blood Pressure 122/75 (08/21/23 1622) BP  Min: 109/71  Max: 122/75   Pulse Oximetry 94 % (08/21/23 1622) SpO2  Min: 94 %  Max: 97 %   Arterial   Blood Pressure   No data recorded      Body mass index is 26.18 kg/m².      I/O's:    Intake/Output Summary (Last 24 hours) at 8/21/2023 1628  Last data filed at 8/21/2023 1330  Gross per 24 hour   Intake 999 ml   Output 600 ml   Net 399 ml       General: patient not in acute distress.  HEENT: Normocephalic. Normal conjunctiva.   Respiratory: chest expansion wnl. Lung clear to auscultation bilaterally.  Cardiovascular: Regular rate and rhythm.  No peripheral edema.  Gastrointestinal: Abdomen soft, non tender, non distended. Bowel sound present in all 4 quadrants.  Genitourinary: No suprapubic tenderness. No costovertebra angle tenderness.  Musculoskeletal: Moves all 4 extremities  Neurology: alert and oriented to place, time, person.  No focal defect  Skin: Warm. No concerning rash.  Psychiatry: Cooperative      Current Medications:  Current Facility-Administered Medications   Medication Dose Route Frequency Provider Last Rate Last Admin   • [Held by provider] filgrastim-sndz (G-CSF) (ZARXIO) injection 480 mcg  480 mcg Subcutaneous Daily at noon Phill Guzmna MD   480 mcg at 08/20/23 2213   • PARENTERAL NUTRITION - DIETITIAN/PHARMACIST MANAGED   Does not apply See Admin Instructions Winifred Quintero MD       • sodium chloride (PF) 0.9 % injection 2 mL  2 mL Intracatheter 2 times per day Kary Pandey MD   2 mL at  INR as of 3-12-25 is 1.6. Patient currently taking 3 mg everday, and 2mg on Thursday. Patient can be reached at [phone]   08/21/23 0942   • enoxaparin (LOVENOX) injection 40 mg  40 mg Subcutaneous Daily Kary Pandey MD   40 mg at 08/21/23 0942   • Fat Emulsion Plant Based (Soy) 20 % injection 250 mL  250 mL Intravenous Once per day on Tue Thu Sat Winifred Quintero MD 20.8 mL/hr at 08/19/23 2206 250 mL at 08/19/23 2206   • allopurinol (ZYLOPRIM) tablet 300 mg  300 mg Oral Daily Kary Pandey MD   300 mg at 08/21/23 0942   • metoPROLOL tartrate (LOPRESSOR) tablet 12.5 mg  12.5 mg Oral 2 times per day Kary Pandey MD   12.5 mg at 08/21/23 0942   • mirtazapine (REMERON) tablet 15 mg  15 mg Oral Nightly Kary Pandey MD   15 mg at 08/20/23 2212   • pantoprazole (PROTONIX) EC tablet 40 mg  40 mg Oral QAM AC Kary Pandey MD   40 mg at 08/21/23 0604      Current Facility-Administered Medications   Medication Dose Route Frequency Provider Last Rate Last Admin   • parenteral nutrition adult custom cyclic central   Intravenous Continuous PN Winifred Quintero MD       • sodium chloride 0.9% infusion   Intravenous Continuous PRN Phill Guzman MD       • dextrose 10 % infusion  1,000 mL Intravenous Continuous PRN Winifred Quintero MD          Current Facility-Administered Medications   Medication Dose Route Frequency Provider Last Rate Last Admin   • loperamide (IMODIUM) capsule 2 mg  2 mg Oral PRN Kary Pandey MD       • sodium chloride 0.9% infusion   Intravenous Continuous PRN Phill Guzman MD       • sodium chloride 0.9 % flush bag 250 mL  250 mL Intravenous Once PRN Winifred Quintero MD       • dextrose 10 % infusion  1,000 mL Intravenous Continuous JOSEPHN Winifred Quintero MD       • sodium chloride 0.9 % flush bag 25 mL  25 mL Intravenous PRN Kary Pandey MD       • sodium chloride (NORMAL SALINE) 0.9 % bolus 1,000 mL  1,000 mL Intravenous Once PRN Winifred Quintero MD       • sodium chloride (NORMAL SALINE) 0.9 % bolus 1,000 mL  1,000 mL Intravenous Once PRN Winifred Quintero MD       • EPINEPHrine (ADRENALIN) injection 0.3 mg  0.3 mg Intramuscular Q5 Min PRN  Winifred Quintero MD       • diphenhydrAMINE (BENADRYL) injection 50 mg  50 mg Intravenous Once PRN Winifred Quintero MD       • famotidine (PEPCID) injection 20 mg  20 mg Intravenous Once PRN Winifred Quintero MD       • methylPREDNISolone (SOLU-Medrol) injection 125 mg  125 mg Intravenous Once PRN Winifred Quintero MD       • albuterol inhaler 2 puff  2 puff Inhalation Q20 Min PRN Winifred Quintero MD       • albuterol (VENTOLIN) nebulizer 5 mg  5 mg Nebulization Q20 Min PRN Winifred Quintero MD              Labs     Recent Labs     08/19/23  0436 08/20/23  0455 08/21/23  0617   WBC 5.3 3.3* 35.9*  35.9*   RBC 2.54* 2.45* 2.51*  2.51*   HGB 7.4* 7.1* 7.4*  7.4*   HCT 22.9* 21.9* 21.4*  21.4*    361 331  331   MCV 90.2 89.4 85.3  85.3   MCH 29.1 29.0 29.5  29.5   MCHC 32.3 32.4 34.6  34.6   NRBCRE 0 0 0  0         Recent Labs     08/19/23  0436 08/20/23  0455 08/21/23  0617   SODIUM 134* 134* 134*   POTASSIUM 4.3 4.2 3.8   MG 2.1 2.2 2.0   PHOS 3.7 3.4 4.1   CO2 24 26 25   ANIONGAP 7 7 9   GLUCOSE 172* 194* 119*   BUN 22* 25* 23*   CREATININE 0.45* 0.49* 0.52*   CALCIUM 8.3* 8.2* 7.9*   BILIRUBIN 0.3  --  0.3   AST 22  --  39*   GPT 28  --  46   ALKPT 741*  --  740*   GLOB 4.1*  --  3.7   AGR 0.6*  --  0.6*        No results found     No results for input(s): \"INR\", \"PT\", \"PTT\" in the last 72 hours.    Recent Labs   Lab 08/20/23  1848 08/21/23  1225   GLUCOSE BEDSIDE 99 95       Imaging    LAST X-RAY:    No orders to display          LAST U/S:  === 07/13/23 ===    US LIVER BIOPSY    - Narrative -  EXAM: US LIVER BIOPSY    CLINICAL INDICATION: Multiple liver masses. Sigmoid colon malignancy.    PROCEDURE PERFORMED: Ultrasound guided liver mass biopsy    INTERVENTIONAL RADIOLOGIST:  Dr. Rell Frederick    CONSENT:  After the risks, benefits, and alternatives to the procedure were explained  to the patient/patient's decision maker formal informed written consent was  documented in the chart.  The risk/benefit discussion was understood  and  all questions were answered.  It is understood that this procedure may not  provide all the information that their physician needs, and thus the  possibility for further testing may be needed.  Specific risks including  but not limited to life-threatening bleeding, infection, damage to the  liver and damage to adjacent organs were discussed in detail and accepted.    Patient's identification, correct procedure and position were verified.  The appropriate site was verified and marked as appropriate. Immediately  prior to the start of the procedure, a \"time-out\" was taken to reconfirm  this information with acknowledgement of the staff present in the exam  room.    SEDATION:  Versed and fentanyl were titrated for conscious sedation which was  administered under the direct supervision of Dr. Frederick with continuous  physiologic monitoring. I was present with the patient for the duration of  moderate sedation. An independent trained observer who had no other duties,  the interventional radiology nurse, continuously monitored the patient's  level of consciousness and physiologic status throughout the procedure.  Details of monitoring are stored in epic. Intraservice time was 60 minutes.      Please refer to nurses documentation for medications given.    SPECIMEN: Four 18-gauge core liver mass biopsies obtained.    COMPLICATIONS: No acute complications    PROCEDURE AND FINDINGS:  All elements of maximal sterile barrier technique were followed including  cap, mask, sterile gown, large sterile sheet, hand hygiene, and 2%  chlorhexidine for cutaneous antisepsis (or acceptable alternative  antiseptic such as an iodophor, tincture or iodine, or 70% alcohol). All  elements of Sterile Ultrasound Technique have been met.    The patient was placed in supine position and the area of interest was  localized using ultrasound guidance. The right upper quadrant was prepped  and draped in sterile fashion and lidocaine was used for  local anesthesia.    Sonographic evaluation demonstrated innumerable hepatic masses. A  hypoechoic mass in the right lobe of the liver was localized. A 17-gauge  introducer needle was advanced into the right hepatic lobe mass under  real-time ultrasound guidance. 4 coaxial passes were performed under  real-time ultrasound guidance using a 18-gauge BioPince core biopsy needle.  Samples were given to the pathology service which was present during the  examination. Samples were deemed adequate.    Subsequently introducer needle was removed with embolization of the tract  using Gelfoam.    Pressure was held at the puncture site with subsequent placement of a  sterile bandage. Postprocedural ultrasound scan showed no evidence of  hemorrhage or fluid collection.    Patient tolerated the procedure well without immediate complications.  Specimens from the core biopsy of the right lobe liver mass were taken by  pathology for further analysis.    The procedure was performed with ultrasound guidance.    - Impression -  Technically successful ultrasound guided biopsy of right lobe liver mass as  described above.    Electronically Signed by: FLOYD ALVARADO M.D.  Signed on: 7/17/2023 4:49 PM  Workstation ID: [id number]      Cardiac studies:     Encounter Date: 07/13/23   Electrocardiogram 12-Lead   Result Value    Ventricular Rate EKG/Min (BPM) 110    Atrial Rate (BPM) 110    MA-Interval (MSEC) 136    QRS-Interval (MSEC) 100    QT-Interval (MSEC) 346    QTc 468    P Axis (Degrees) 40    R Axis (Degrees) -17    T Axis (Degrees) 30    REPORT TEXT      Sinus tachycardia  Cannot rule out  Anterior infarct  , age undetermined  Abnormal ECG  No previous ECGs available  Confirmed by RADHA BONNER MD ([id number]) on 8/9/2023 3:13:50 PM         LAST ECHO/ECHO STRESS:  No valid procedures specified.        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 , liver Mets, peritoneal carcinomatosis  -s/p  chemo  -Neupogen today  -oncology following     Partial small obstruction due to above  -continue with clear liquid diet  -resume TPN in-house, dietary following     Severe Chronic Malnutrition   AEB: Energy intake of </ 75% estimated energy requirement for >/equal 1 month, Severe depletion of body fat, Severe depletion of muscle mass, Weight loss of > 5%/1 month  -on TPN   -Dietary following         Anemia, chronic disease likely due to above  -shows no signs of bleeding  -monitor CBC  -will transfuse for hemoglobin less than 7     Elevated Alk Phose   -monitor      Comorbidities  CAD-status post PCI 2007  Ulcerative colitis-in remission  Depression-c/w home meds     DVT prophylaxis:SCD, Lovenox     Code status:  Full resuscitation     Disposition: home pending clinical course  PCP: Edgard Huff DO Sindhu Kurup, MD

## 2025-03-19 ENCOUNTER — ANTI-COAG VISIT (OUTPATIENT)
Dept: INTERNAL MEDICINE | Age: 80
End: 2025-03-19
Payer: MEDICARE

## 2025-03-19 DIAGNOSIS — I48.20 CHRONIC ATRIAL FIBRILLATION (HCC): Primary | Chronic | ICD-10-CM

## 2025-03-19 DIAGNOSIS — Z79.01 CHRONIC ANTICOAGULATION: ICD-10-CM

## 2025-03-19 LAB — INR BLD: 2.3

## 2025-03-19 PROCEDURE — 93793 ANTICOAG MGMT PT WARFARIN: CPT | Performed by: INTERNAL MEDICINE

## 2025-03-20 DIAGNOSIS — I50.32 CHRONIC HEART FAILURE WITH PRESERVED EJECTION FRACTION (HCC): ICD-10-CM

## 2025-03-20 RX ORDER — FUROSEMIDE 20 MG/1
20 TABLET ORAL 2 TIMES DAILY
Qty: 180 TABLET | Refills: 1 | Status: SHIPPED | OUTPATIENT
Start: 2025-03-20

## 2025-03-20 NOTE — TELEPHONE ENCOUNTER
Name of Medication(s) Requested:  Requested Prescriptions     Pending Prescriptions Disp Refills    furosemide (LASIX) 20 MG tablet [Pharmacy Med Name: FUROSEMIDE 20MG TABS] 180 tablet 1     Sig: TAKE ONE TABLET BY MOUTH TWICE A DAY       Medication is on current medication list Yes    Dosage and directions were verified? Yes    Quantity verified: 90 day supply     Pharmacy Verified?  Yes    Last Appointment:  11/13/2024    Future appts:  Future Appointments   Date Time Provider Department Center   4/30/2025  2:15 PM Jonnie Wilkerson,  ACC IM [facility] DEP        (If no appt send self scheduling link. .REFILLAPPT)  Scheduling request sent?     [] Yes  [x] No    Does patient need updated?  [] Yes  [x] No

## 2025-04-01 LAB — INR BLD: 1.9

## 2025-04-02 ENCOUNTER — ANTI-COAG VISIT (OUTPATIENT)
Dept: INTERNAL MEDICINE | Age: 80
End: 2025-04-02
Payer: MEDICARE

## 2025-04-02 DIAGNOSIS — Z79.01 LONG TERM (CURRENT) USE OF ANTICOAGULANTS: Primary | ICD-10-CM

## 2025-04-02 PROCEDURE — 93793 ANTICOAG MGMT PT WARFARIN: CPT | Performed by: INTERNAL MEDICINE

## 2025-04-02 NOTE — PROGRESS NOTES
Received fax from [facility], patient had INR does at home yesterday at 4:29 pm, result 1.9.     Spoke to patient 10:41 am, Continue the 3mg Coumadin every day, repeat INR in one week (send results) Did request her to do it earlier in the day, stated she can't see so has someone come help her at that time. Verbalized understanding of dose.

## 2025-04-08 ENCOUNTER — ANTI-COAG VISIT (OUTPATIENT)
Dept: INTERNAL MEDICINE | Age: 80
End: 2025-04-08
Payer: MEDICARE

## 2025-04-08 DIAGNOSIS — Z79.01 LONG TERM (CURRENT) USE OF ANTICOAGULANTS: ICD-10-CM

## 2025-04-08 DIAGNOSIS — I48.20 CHRONIC ATRIAL FIBRILLATION (HCC): Primary | Chronic | ICD-10-CM

## 2025-04-08 LAB — INR BLD: 2.4

## 2025-04-08 PROCEDURE — 93793 ANTICOAG MGMT PT WARFARIN: CPT | Performed by: INTERNAL MEDICINE

## 2025-04-12 DIAGNOSIS — J30.0 VASOMOTOR RHINITIS: ICD-10-CM

## 2025-04-14 RX ORDER — FLUTICASONE PROPIONATE 50 MCG
SPRAY, SUSPENSION (ML) NASAL
Qty: 32 G | Refills: 3 | Status: SHIPPED | OUTPATIENT
Start: 2025-04-14

## 2025-04-14 NOTE — TELEPHONE ENCOUNTER
Name of Medication(s) Requested:  Requested Prescriptions     Pending Prescriptions Disp Refills    fluticasone (FLONASE) 50 MCG/ACT nasal spray [Pharmacy Med Name: FLUTICASONE PROP SPRAY 50MCG] 32 g 3     Sig: INSTILL 1 SPRAY IN EACH NOSTRIL DAILY       Medication is on current medication list Yes    Dosage and directions were verified? Yes    Quantity verified: 90 day supply     Pharmacy Verified?  Yes    Last Appointment:  11/13/2024    Future appts:  Future Appointments   Date Time Provider Department Center   4/30/2025  2:15 PM Jonnie Wilkerson,  ACC IM [facility] E[facility] DEP        (If no appt send self scheduling link. .REFILLAPPT)  Scheduling request sent?     [] Yes  [x] No    Does patient need updated?  [] Yes  [x] No

## 2025-04-23 DIAGNOSIS — K21.9 GASTROESOPHAGEAL REFLUX DISEASE WITHOUT ESOPHAGITIS: ICD-10-CM

## 2025-04-23 RX ORDER — PANTOPRAZOLE SODIUM 20 MG/1
20 TABLET, DELAYED RELEASE ORAL 2 TIMES DAILY
Qty: 90 TABLET | Refills: 3 | OUTPATIENT
Start: 2025-04-23 | End: 2025-10-20

## 2025-04-30 ENCOUNTER — OFFICE VISIT (OUTPATIENT)
Dept: INTERNAL MEDICINE | Age: 80
End: 2025-04-30
Payer: MEDICARE

## 2025-04-30 VITALS
BODY MASS INDEX: 40.46 KG/M2 | HEIGHT: 64 IN | RESPIRATION RATE: 18 BRPM | DIASTOLIC BLOOD PRESSURE: 58 MMHG | HEART RATE: 69 BPM | TEMPERATURE: 97 F | SYSTOLIC BLOOD PRESSURE: 124 MMHG | WEIGHT: 237 LBS | OXYGEN SATURATION: 96 %

## 2025-04-30 DIAGNOSIS — I50.32 CHRONIC HEART FAILURE WITH PRESERVED EJECTION FRACTION (HCC): ICD-10-CM

## 2025-04-30 DIAGNOSIS — E78.2 MIXED HYPERLIPIDEMIA: ICD-10-CM

## 2025-04-30 DIAGNOSIS — Z79.01 CHRONIC ANTICOAGULATION: ICD-10-CM

## 2025-04-30 DIAGNOSIS — E66.813 OBESITY, CLASS 3 (HCC): ICD-10-CM

## 2025-04-30 DIAGNOSIS — K21.9 GASTROESOPHAGEAL REFLUX DISEASE WITHOUT ESOPHAGITIS: ICD-10-CM

## 2025-04-30 DIAGNOSIS — E11.69 TYPE 2 DIABETES MELLITUS WITH OTHER SPECIFIED COMPLICATION, WITHOUT LONG-TERM CURRENT USE OF INSULIN (HCC): Primary | ICD-10-CM

## 2025-04-30 DIAGNOSIS — I48.20 CHRONIC ATRIAL FIBRILLATION (HCC): Chronic | ICD-10-CM

## 2025-04-30 DIAGNOSIS — I10 ESSENTIAL HYPERTENSION: Chronic | ICD-10-CM

## 2025-04-30 PROBLEM — I50.33 ACUTE ON CHRONIC DIASTOLIC CONGESTIVE HEART FAILURE (HCC): Status: ACTIVE | Noted: 2021-03-08

## 2025-04-30 LAB
HBA1C MFR BLD: 7 %
INTERNATIONAL NORMALIZATION RATIO, POC: 1.9
INTERNATIONAL NORMALIZATION RATIO, POC: 1.9
PROTHROMBIN TIME, POC: 23.3

## 2025-04-30 PROCEDURE — 99212 OFFICE O/P EST SF 10 MIN: CPT | Performed by: INTERNAL MEDICINE

## 2025-04-30 PROCEDURE — 85610 PROTHROMBIN TIME: CPT | Performed by: INTERNAL MEDICINE

## 2025-04-30 PROCEDURE — 3051F HG A1C>EQUAL 7.0%<8.0%: CPT | Performed by: INTERNAL MEDICINE

## 2025-04-30 PROCEDURE — 83036 HEMOGLOBIN GLYCOSYLATED A1C: CPT | Performed by: INTERNAL MEDICINE

## 2025-04-30 PROCEDURE — 99214 OFFICE O/P EST MOD 30 MIN: CPT | Performed by: INTERNAL MEDICINE

## 2025-04-30 PROCEDURE — 3078F DIAST BP <80 MM HG: CPT | Performed by: INTERNAL MEDICINE

## 2025-04-30 PROCEDURE — 1123F ACP DISCUSS/DSCN MKR DOCD: CPT | Performed by: INTERNAL MEDICINE

## 2025-04-30 PROCEDURE — 1159F MED LIST DOCD IN RCRD: CPT | Performed by: INTERNAL MEDICINE

## 2025-04-30 PROCEDURE — 3074F SYST BP LT 130 MM HG: CPT | Performed by: INTERNAL MEDICINE

## 2025-04-30 RX ORDER — WARFARIN SODIUM 3 MG/1
TABLET ORAL
Qty: 90 TABLET | Refills: 0 | Status: CANCELLED | OUTPATIENT
Start: 2025-04-30

## 2025-04-30 RX ORDER — SIMVASTATIN 20 MG
20 TABLET ORAL NIGHTLY
Qty: 90 TABLET | Refills: 3 | Status: SHIPPED | OUTPATIENT
Start: 2025-04-30

## 2025-04-30 RX ORDER — ENALAPRIL MALEATE 10 MG/1
TABLET ORAL
Qty: 135 TABLET | Refills: 3 | Status: SHIPPED | OUTPATIENT
Start: 2025-04-30

## 2025-04-30 RX ORDER — METOPROLOL TARTRATE 50 MG
TABLET ORAL
Qty: 180 TABLET | Refills: 3 | Status: SHIPPED | OUTPATIENT
Start: 2025-04-30

## 2025-04-30 RX ORDER — AMLODIPINE BESYLATE 5 MG/1
5 TABLET ORAL DAILY
Qty: 90 TABLET | Refills: 1 | Status: SHIPPED | OUTPATIENT
Start: 2025-04-30

## 2025-04-30 RX ORDER — POTASSIUM CHLORIDE 750 MG/1
10 TABLET, EXTENDED RELEASE ORAL 2 TIMES DAILY
Qty: 180 TABLET | Refills: 1 | Status: SHIPPED | OUTPATIENT
Start: 2025-04-30 | End: 2025-10-27

## 2025-04-30 SDOH — ECONOMIC STABILITY: FOOD INSECURITY: WITHIN THE PAST 12 MONTHS, YOU WORRIED THAT YOUR FOOD WOULD RUN OUT BEFORE YOU GOT MONEY TO BUY MORE.: NEVER TRUE

## 2025-04-30 ASSESSMENT — PATIENT HEALTH QUESTIONNAIRE - PHQ9
2. FEELING DOWN, DEPRESSED OR HOPELESS: NOT AT ALL
SUM OF ALL RESPONSES TO PHQ QUESTIONS 1-9: 0
1. LITTLE INTEREST OR PLEASURE IN DOING THINGS: NOT AT ALL
SUM OF ALL RESPONSES TO PHQ QUESTIONS 1-9: 0

## 2025-04-30 ASSESSMENT — LIFESTYLE VARIABLES
HOW OFTEN DO YOU HAVE A DRINK CONTAINING ALCOHOL: NEVER
HOW MANY STANDARD DRINKS CONTAINING ALCOHOL DO YOU HAVE ON A TYPICAL DAY: PATIENT DOES NOT DRINK

## 2025-04-30 NOTE — PATIENT INSTRUCTIONS
Dear Neida Wood,    Thank you for coming to your appointment at [facility].    Please make sure to do the following:    - Continue medications as listed and contact our office if medications are unavailable at the pharmacy.    - Complete any ordered lab work as instructed.    - If a referral was placed to another doctor's office, please contact our office in 5 business days if you have not heard from that office regarding the referral.    - If any imaging test was ordered at today's visit (ultrasound, CT scan, or MRI), expect a phone call to schedule in the next 5 business days. You may also call [facility] Imaging Scheduling department at [phone] to schedule.    Contact our office if you develop any new or worsening symptoms or if you have any questions regarding today's visit.    We aim to address your healthcare needs to your satisfaction!    Sincerely,  Jonnie Wilkerson, DO  4/30/2025  3:17 PM

## 2025-04-30 NOTE — PROGRESS NOTES
Neida Wood (:  1945) is a 79 y.o. female,Established patient, here for evaluation of the following chief complaint(s):  Office Visit for Anticoagulation Management, Medication Refill, Fatigue (X 1mon per pt she has been feeling real tired), Hypertension (Wants to discuss medication), and Diabetes         Assessment & Plan  Essential hypertension   Chronic, at goal (stable), continue current treatment plan    Orders:    enalapril (VASOTEC) 10 MG tablet; TAKE 1 TABLET IN MORNING and TAKE 1/2 TABLET IN EVENING    amLODIPine (NORVASC) 5 MG tablet; Take 1 tablet by mouth daily    POCT INR    Gastroesophageal reflux disease without esophagitis   Chronic, at goal (stable), continue current treatment plan    Orders:    POCT INR    Mixed hyperlipidemia         Orders:    simvastatin (ZOCOR) 20 MG tablet; Take 1 tablet by mouth nightly    POCT INR    Chronic atrial fibrillation (HCC)   Chronic, at goal (stable), continue current treatment plan    Orders:    metoprolol tartrate (LOPRESSOR) 50 MG tablet; TAKE ONE TABLET BY MOUTH TWO TIMES A DAY    POCT INR    Chronic anticoagulation   Chronic, at goal (stable), continue current treatment plan    Orders:    POCT INR    Type 2 diabetes mellitus with other specified complication, without long-term current use of insulin (HCC)   Chronic, at goal (stable), continue current treatment plan    Orders:    POCT glycosylated hemoglobin (Hb A1C)    Obesity, class 3 (E66.813)    Counseled weight loss, discussed diet mods and reduction of sweets.         Chronic heart failure with preserved ejection fraction (HCC)    Continue current meds, consideration for increased diuretic dose pending clinical reassessment after amlodipine dose reduced. Previously trialed Jardiance, cost limiting in past so it was stopped.         Reduced amlodipine this visit  Reassess clinical volume status and CHF labs next visit.  Consider retrial jardiance (previously tried but co-pay

## 2025-05-07 LAB — INR BLD: 2.4

## 2025-05-12 ENCOUNTER — ANTI-COAG VISIT (OUTPATIENT)
Dept: INTERNAL MEDICINE | Age: 80
End: 2025-05-12
Payer: MEDICARE

## 2025-05-12 DIAGNOSIS — I48.20 CHRONIC ATRIAL FIBRILLATION (HCC): Primary | Chronic | ICD-10-CM

## 2025-05-12 DIAGNOSIS — Z79.01 CHRONIC ANTICOAGULATION: ICD-10-CM

## 2025-05-12 PROCEDURE — 93793 ANTICOAG MGMT PT WARFARIN: CPT | Performed by: INTERNAL MEDICINE

## 2025-05-12 NOTE — PROGRESS NOTES
Called pt to confirm results of INR on 5/7/2025 and pt states it was 2.4, at 3 mg daily. She states due to using home machine and ins purposes, she will recheck it tomorrow 5/13.

## 2025-05-14 NOTE — PROGRESS NOTES
Continue same dose.  Recheck 4 weeks -- she does check weekly with home device and she will notify if out of range.    Electronically signed by Jonnie Wilkerson, DO on 5/14/2025 at 11:30 AM

## 2025-05-20 ENCOUNTER — ANTI-COAG VISIT (OUTPATIENT)
Age: 80
End: 2025-05-20

## 2025-05-20 DIAGNOSIS — Z79.01 CHRONIC ANTICOAGULATION: ICD-10-CM

## 2025-05-20 DIAGNOSIS — I48.20 CHRONIC ATRIAL FIBRILLATION (HCC): ICD-10-CM

## 2025-05-20 PROBLEM — I50.33 ACUTE ON CHRONIC DIASTOLIC CONGESTIVE HEART FAILURE (HCC): Status: RESOLVED | Noted: 2021-03-08 | Resolved: 2025-05-20

## 2025-05-20 LAB — INR BLD: 2.9

## 2025-05-20 RX ORDER — WARFARIN SODIUM 3 MG/1
TABLET ORAL
Qty: 90 TABLET | Refills: 0 | Status: SHIPPED | OUTPATIENT
Start: 2025-05-20

## 2025-05-20 ASSESSMENT — ENCOUNTER SYMPTOMS
BACK PAIN: 0
COLOR CHANGE: 0
ABDOMINAL PAIN: 0
CONSTIPATION: 0
DIARRHEA: 0
VOMITING: 0
COUGH: 0
SHORTNESS OF BREATH: 0
RHINORRHEA: 0

## 2025-05-20 NOTE — PROGRESS NOTES
INR results reviewed with Dr. Wilkerson.  Orders received.  Patient instructed to continue taking the same dose of Coumadin 3 mg daily. Next home PT/INR will be done on 6/11/25 and phoned in by the patient. Patient notified via phone of these orders.  Patient voices understanding of the information and follow-up.  AVS printed and mailed to patient with appointment card for next INR check.

## 2025-05-20 NOTE — PATIENT INSTRUCTIONS
Continue taking Coumadin 3 mg daily. Your need PT/INR will need to be check weekly as instructed by Dr Wilkerson. Your Coumadin 3 mg tablets were called into your local pharmacy. Please call the office with any problems or concerns at [phone].    Thank you    Sarbjit CADE LPN

## 2025-05-27 LAB — INR BLD: 1.9

## 2025-05-28 ENCOUNTER — ANTI-COAG VISIT (OUTPATIENT)
Age: 80
End: 2025-05-28
Payer: MEDICARE

## 2025-05-28 ENCOUNTER — TELEPHONE (OUTPATIENT)
Age: 80
End: 2025-05-28

## 2025-05-28 DIAGNOSIS — Z79.01 CHRONIC ANTICOAGULATION: Primary | ICD-10-CM

## 2025-05-28 PROCEDURE — 93793 ANTICOAG MGMT PT WARFARIN: CPT | Performed by: INTERNAL MEDICINE

## 2025-05-28 NOTE — TELEPHONE ENCOUNTER
INR results received via fax. Called the patient to ask what is her Coumadin dose. Left a voicemail message for her to call.

## 2025-05-28 NOTE — PATIENT INSTRUCTIONS
Timothy Carrasquillo  Continue the same dose of Coumadin. Take 3 mg every day and repeat INR in 1 week. (June 4th)    Take Care  Vianca DEVI LPN

## 2025-06-03 ENCOUNTER — ANTI-COAG VISIT (OUTPATIENT)
Age: 80
End: 2025-06-03
Payer: MEDICARE

## 2025-06-03 DIAGNOSIS — Z79.01 CHRONIC ANTICOAGULATION: Primary | ICD-10-CM

## 2025-06-03 LAB — INR BLD: 2.5

## 2025-06-03 PROCEDURE — 93793 ANTICOAG MGMT PT WARFARIN: CPT | Performed by: INTERNAL MEDICINE

## 2025-07-08 LAB — INR BLD: 2

## 2025-07-11 ENCOUNTER — ANTI-COAG VISIT (OUTPATIENT)
Age: 80
End: 2025-07-11
Payer: MEDICARE

## 2025-07-11 DIAGNOSIS — I48.20 CHRONIC ATRIAL FIBRILLATION (HCC): ICD-10-CM

## 2025-07-11 DIAGNOSIS — Z79.01 CHRONIC ANTICOAGULATION: ICD-10-CM

## 2025-07-11 PROCEDURE — 93793 ANTICOAG MGMT PT WARFARIN: CPT | Performed by: INTERNAL MEDICINE

## 2025-07-11 RX ORDER — WARFARIN SODIUM 3 MG/1
TABLET ORAL
Qty: 90 TABLET | Refills: 0 | Status: SHIPPED | OUTPATIENT
Start: 2025-07-11

## 2025-07-23 ENCOUNTER — OFFICE VISIT (OUTPATIENT)
Age: 80
End: 2025-07-23

## 2025-07-23 VITALS
SYSTOLIC BLOOD PRESSURE: 126 MMHG | BODY MASS INDEX: 41.11 KG/M2 | HEART RATE: 60 BPM | TEMPERATURE: 97.2 F | HEIGHT: 63 IN | RESPIRATION RATE: 14 BRPM | OXYGEN SATURATION: 98 % | WEIGHT: 232 LBS | DIASTOLIC BLOOD PRESSURE: 52 MMHG

## 2025-07-23 DIAGNOSIS — I50.32 CHRONIC HEART FAILURE WITH PRESERVED EJECTION FRACTION (HCC): ICD-10-CM

## 2025-07-23 DIAGNOSIS — I48.20 CHRONIC ATRIAL FIBRILLATION (HCC): Chronic | ICD-10-CM

## 2025-07-23 DIAGNOSIS — E11.69 TYPE 2 DIABETES MELLITUS WITH OTHER SPECIFIED COMPLICATION, WITHOUT LONG-TERM CURRENT USE OF INSULIN (HCC): ICD-10-CM

## 2025-07-23 DIAGNOSIS — I10 ESSENTIAL HYPERTENSION: Chronic | ICD-10-CM

## 2025-07-23 DIAGNOSIS — Z79.01 CHRONIC ANTICOAGULATION: Primary | ICD-10-CM

## 2025-07-23 DIAGNOSIS — E78.2 MIXED HYPERLIPIDEMIA: ICD-10-CM

## 2025-07-23 LAB
ALBUMIN: 4.1 G/DL (ref 3.5–5.2)
ALP BLD-CCNC: 109 U/L (ref 35–104)
ALT SERPL-CCNC: 10 U/L (ref 0–35)
ANION GAP SERPL CALCULATED.3IONS-SCNC: 16 MMOL/L (ref 7–16)
AST SERPL-CCNC: 20 U/L (ref 0–35)
BASOPHILS ABSOLUTE: 0.03 K/UL (ref 0–0.2)
BASOPHILS RELATIVE PERCENT: 0 % (ref 0–2)
BILIRUB SERPL-MCNC: 0.6 MG/DL (ref 0–1.2)
BUN BLDV-MCNC: 17 MG/DL (ref 8–23)
CALCIUM SERPL-MCNC: 9.4 MG/DL (ref 8.8–10.2)
CHLORIDE BLD-SCNC: 105 MMOL/L (ref 98–107)
CHOLESTEROL, FASTING: 161 MG/DL
CO2: 21 MMOL/L (ref 22–29)
CREAT SERPL-MCNC: 1.1 MG/DL (ref 0.5–1)
CREATININE URINE: 44.2 MG/DL (ref 29–226)
EOSINOPHILS ABSOLUTE: 0.09 K/UL (ref 0.05–0.5)
EOSINOPHILS RELATIVE PERCENT: 1 % (ref 0–6)
GFR, ESTIMATED: 50 ML/MIN/1.73M2
GLUCOSE BLD-MCNC: 210 MG/DL (ref 74–99)
HBA1C MFR BLD: 7.4 % (ref 4–5.6)
HCT VFR BLD CALC: 42.5 % (ref 34–48)
HDLC SERPL-MCNC: 58 MG/DL
HEMOGLOBIN: 14 G/DL (ref 11.5–15.5)
IMMATURE GRANULOCYTES %: 0 % (ref 0–5)
IMMATURE GRANULOCYTES ABSOLUTE: <0.03 K/UL (ref 0–0.58)
INTERNATIONAL NORMALIZATION RATIO, POC: 2.3
LDL CHOLESTEROL: 83 MG/DL
LYMPHOCYTES ABSOLUTE: 1.7 K/UL (ref 1.5–4)
LYMPHOCYTES RELATIVE PERCENT: 25 % (ref 20–42)
MCH RBC QN AUTO: 29.7 PG (ref 26–35)
MCHC RBC AUTO-ENTMCNC: 32.9 G/DL (ref 32–34.5)
MCV RBC AUTO: 90.2 FL (ref 80–99.9)
MICROALBUMIN/CREAT 24H UR: 36 MG/L (ref 0–20)
MICROALBUMIN/CREAT UR-RTO: 81 MCG/MG CREAT (ref 0–30)
MONOCYTES ABSOLUTE: 0.52 K/UL (ref 0.1–0.95)
MONOCYTES RELATIVE PERCENT: 8 % (ref 2–12)
NEUTROPHILS ABSOLUTE: 4.56 K/UL (ref 1.8–7.3)
NEUTROPHILS RELATIVE PERCENT: 66 % (ref 43–80)
PDW BLD-RTO: 13.5 % (ref 11.5–15)
PLATELET # BLD: 278 K/UL (ref 130–450)
PMV BLD AUTO: 10.7 FL (ref 7–12)
POTASSIUM SERPL-SCNC: 3.9 MMOL/L (ref 3.5–5.1)
PROTHROMBIN TIME, POC: NORMAL
RBC # BLD: 4.71 M/UL (ref 3.5–5.5)
SODIUM BLD-SCNC: 142 MMOL/L (ref 136–145)
TOTAL PROTEIN: 7.4 G/DL (ref 6.4–8.3)
TRIGLYCERIDE, FASTING: 96 MG/DL
VLDLC SERPL CALC-MCNC: 19 MG/DL
WBC # BLD: 6.9 K/UL (ref 4.5–11.5)

## 2025-07-23 NOTE — ASSESSMENT & PLAN NOTE
Chronic, at goal (stable), discontinue amlodipine as her blood pressure is normotensive and diastolic is low.  Recommend continuing Lasix 20 mg twice daily, enalapril 10 mg

## 2025-07-23 NOTE — PROGRESS NOTES
Neida Wood (:  1945) is a 79 y.o. female,Established patient, here for evaluation of the following chief complaint(s):  Follow-up (Here for routine visit, states she is doing.)      Assessment & Plan  Essential hypertension   Chronic, at goal (stable), discontinue amlodipine as her blood pressure is normotensive and diastolic is low.  Recommend continuing Lasix 20 mg twice daily, enalapril 10 mg         Type 2 diabetes mellitus with other specified complication, without long-term current use of insulin (HCC)   Chronic, at goal (stable), continue current treatment plan; intolerant to SGLT2 which we previously tried.  She defers GLP-1 therapy.    Orders:    Comprehensive Metabolic Panel    CBC with Auto Differential    Albumin/Creatinine Ratio, Urine; Future    Hemoglobin A1C    Mixed hyperlipidemia   Continue statin    Orders:    Lipid, Fasting    Chronic heart failure with preserved ejection fraction (HCC)   Chronic, at goal (stable), continue current treatment plan; patient defers sleep apnea testing, she defers SGLT2 therapy as she was previously intolerant to this and she defers GLP-1 therapy.          Chronic atrial fibrillation (HCC)   Continues on metoprolol and coumadin, home weekly INR monitoring    Orders:    POCT INR      Return in about 6 months (around 2026).    Subjective       HPI    Patient is here with her son.  She reports feeling well and does not have any particular questions at this point in time.  We did discuss her blood pressure and she is open to reducing medication as it is under good control.  She reports good ambulation with a cane.  Denies any overt lower extremity swelling.  Reports monitoring INR at home weekly with machine and this is working well for her due to some limitations on transport.    Review of Systems   Constitutional:  Positive for fatigue. Negative for chills, fever and unexpected weight change.   HENT:  Negative for congestion and rhinorrhea.

## 2025-07-23 NOTE — PATIENT INSTRUCTIONS
Dear Neida Wood,  Next INR check:  8/20/2025  Thank you for coming to your appointment at [facility].    Please make sure to do the following:      - Continue medications as listed and contact our office if medications are unavailable at the pharmacy.    WE HAVE DISCONTINUED AMLODIPINE    - If lab work was ordered please complete as instructed.    - If a referral was placed to another doctor's office, please contact our office in 5 business days if you have not heard from that office regarding the referral.    - If any imaging test was ordered at today's visit (ultrasound, CT scan, or MRI), expect a phone call to schedule in the next 5 business days. You may also call [facility] Scheduling department at [phone] to schedule.    Contact our office if you develop any new or worsening symptoms or if you have any questions regarding today's visit.    We aim to address your healthcare needs to your satisfaction!    Sincerely,  Jonnie Wilkerson, DO  7/23/2025  2:34 PM

## 2025-07-23 NOTE — ASSESSMENT & PLAN NOTE
Chronic, at goal (stable), continue current treatment plan; intolerant to SGLT2 which we previously tried.  She defers GLP-1 therapy.    Orders:    Comprehensive Metabolic Panel    CBC with Auto Differential    Albumin/Creatinine Ratio, Urine; Future    Hemoglobin A1C

## 2025-07-23 NOTE — ASSESSMENT & PLAN NOTE
Chronic, at goal (stable), continue current treatment plan; patient defers sleep apnea testing, she defers SGLT2 therapy as she was previously intolerant to this and she defers GLP-1 therapy.

## 2025-07-25 ASSESSMENT — ENCOUNTER SYMPTOMS
SHORTNESS OF BREATH: 0
BACK PAIN: 0
ABDOMINAL PAIN: 0
RHINORRHEA: 0
CONSTIPATION: 0
COLOR CHANGE: 0
COUGH: 0
VOMITING: 0

## 2025-08-19 LAB — INR BLD: 1.9

## 2025-08-20 ENCOUNTER — ANTI-COAG VISIT (OUTPATIENT)
Age: 80
End: 2025-08-20
Payer: MEDICARE

## 2025-08-20 DIAGNOSIS — I48.20 CHRONIC ATRIAL FIBRILLATION (HCC): ICD-10-CM

## 2025-08-20 DIAGNOSIS — Z79.01 CHRONIC ANTICOAGULATION: Primary | ICD-10-CM

## 2025-08-20 PROCEDURE — 93793 ANTICOAG MGMT PT WARFARIN: CPT | Performed by: INTERNAL MEDICINE

## 2025-08-20 RX ORDER — WARFARIN SODIUM 3 MG/1
TABLET ORAL
Qty: 90 TABLET | Refills: 0 | Status: SHIPPED | OUTPATIENT
Start: 2025-08-20

## 2025-08-28 DIAGNOSIS — I50.32 CHRONIC HEART FAILURE WITH PRESERVED EJECTION FRACTION (HCC): ICD-10-CM

## 2025-08-28 RX ORDER — FUROSEMIDE 20 MG/1
20 TABLET ORAL 2 TIMES DAILY
Qty: 180 TABLET | Refills: 1 | Status: SHIPPED | OUTPATIENT
Start: 2025-08-28

## (undated) DEVICE — 40436 HEAD REST OCULAR: Brand: 40436 HEAD REST OCULAR

## (undated) DEVICE — DRESSING TRNSPAR W4XL55IN ACRYL SUP FLM W ADH WTRPRF OPSITE

## (undated) DEVICE — GLOVE SURG SZ 65 CRM LTX FREE POLYISOPRENE POLYMER BEAD ANTI

## (undated) DEVICE — SYRINGE, LUER LOCK, 5ML: Brand: MEDLINE

## (undated) DEVICE — SET CARDIAC II

## (undated) DEVICE — NEEDLE,22GX1.5",REG,BEVEL: Brand: MEDLINE

## (undated) DEVICE — ALCOHOL 70% RUBBING 16OZ

## (undated) DEVICE — HOLDER NDL WEBST SNAG FREE

## (undated) DEVICE — CATHETER ANGIO PIG 0.045 IN 5 FRX110 CM VENTRICULAR EXPO

## (undated) DEVICE — GAUZE,SPONGE,4"X4",16PLY,XRAY,STRL,LF: Brand: MEDLINE

## (undated) DEVICE — GUIDEWIRE VASC L260CM 0.035IN J TIP L3MM PTFE FIX COR NAMIC

## (undated) DEVICE — ADHESIVE SKIN CLOSURE TOP 36 CC HI VISC DERMBND MINI

## (undated) DEVICE — TOWEL,OR,DSP,ST,BLUE,STD,6/PK,12PK/CS: Brand: MEDLINE

## (undated) DEVICE — DRAPE SHEET: Brand: UNBRANDED

## (undated) DEVICE — GLOVE ORANGE PI 7 1/2   MSG9075

## (undated) DEVICE — STERILE POLYISOPRENE POWDER-FREE SURGICAL GLOVES: Brand: PROTEXIS

## (undated) DEVICE — CLOTH SURG PREP PREOPERATIVE CHLORHEXIDINE GLUC 2% READYPREP

## (undated) DEVICE — SOLUTION IV 1000ML 0.9% SOD CHL PH 5 INJ USP VIAFLX PLAS

## (undated) DEVICE — KIT HND CTRL 3 W STPCOCK ROT END 54IN PREM HI PRSS TBNG AT

## (undated) DEVICE — GLOVE SURG SZ 6 THK91MIL LTX FREE SYN POLYISOPRENE ANTI

## (undated) DEVICE — GOWN,SIRUS,FABRNF,L,20/CS: Brand: MEDLINE

## (undated) DEVICE — SET INTRO SHTH 5FR L45CM GRY INTEGR SIDE PRT HAEMOSTASIS

## (undated) DEVICE — SYRINGE 20ML LL S/C 50

## (undated) DEVICE — GLOVE ORANGE PI 8   MSG9080

## (undated) DEVICE — SURGICAL PROCEDURE PACK BASIC

## (undated) DEVICE — PICO 7 10CM X 20CM: Brand: PICO™ 7

## (undated) DEVICE — DELIV SYS D-EVPROP2329US: Brand: EVOLUT™ PRO+

## (undated) DEVICE — NEEDLE HYPO 30GA L0.5IN BGE POLYPR HUB S STL REG BVL STR

## (undated) DEVICE — GLOVE SURG SZ 65 THK91MIL LTX FREE SYN POLYISOPRENE

## (undated) DEVICE — COVER MICROSCOPE KNOB SM

## (undated) DEVICE — GLOVE SURG SZ 8 CRM LTX FREE POLYISOPRENE POLYMER BEAD ANTI

## (undated) DEVICE — CATHETER DIAG AD 5FR L100CM COR GRY HYDRPHLC NYL MPA 2 W/ 2

## (undated) DEVICE — APPLICATOR MEDICATED 26 CC SOLUTION HI LT ORNG CHLORAPREP

## (undated) DEVICE — MEDI-TRACE CADENCE ADULT, PRE-CONNECT  DEFIBRILLATION ELECTRODE (10 PR/PK) - PHYSIO-CONTROL: Brand: MEDI-TRACE CADENCE

## (undated) DEVICE — NEEDLE FLTR 18GA L1.5IN MEM THK5UM BLNT DISP

## (undated) DEVICE — THE MONARCH® "D" CARTRIDGE IS A SINGLE-USE POLYPROPYLENE CARTRIDGE FOR POSTERIOR CHAMBER IOL DELIVERY: Brand: MONARCH® III

## (undated) DEVICE — CATHETER DIAG 5FR L110CM ID0.045IN VENT VASC PGTL 145 EXPO

## (undated) DEVICE — GUIDEWIRE ANGIO L150CM OD0.035IN STR FIX PTFE SLD SUPP

## (undated) DEVICE — INTRODUCER SHTH 6FR L12CM DIA0.038IN HEMSTAS CLOSE TOL

## (undated) DEVICE — STAPLER SKIN L39MM DIA0.53MM CRWN 5.7MM S STL FIX HD PROX

## (undated) DEVICE — GUIDEWIRE VASC L260CM DIA0.035IN TAPR L11CM FLPY TIP L4CM

## (undated) DEVICE — BOWL MED L 32OZ PLAS W/ MOLD GRAD EZ OPN PEEL PCH

## (undated) DEVICE — BLADE CLIPPER GEN PURP NS

## (undated) DEVICE — SYRINGE MED 10ML LUERLOCK TIP W/O SFTY DISP

## (undated) DEVICE — DRAPE, MULTI FENESTRATED, HYBRID OR, STE: Brand: MEDLINE

## (undated) DEVICE — LARGE BORE STOPCOCK WITH ROTATING MALE LUER LOCK

## (undated) DEVICE — SCALPEL 15 DEG NO 715

## (undated) DEVICE — COVER,TABLE,60X90,STERILE: Brand: MEDLINE

## (undated) DEVICE — ANGIOPLASTY ADD-ON PACK: Brand: MEDLINE INDUSTRIES, INC.

## (undated) DEVICE — TUBING PRSS MON L12IN PVC RIG NONEXPANDING M TO FEM CONN

## (undated) DEVICE — SHIELD EYE W3XL2.5IN UNIV CLR PLAS LTWT

## (undated) DEVICE — PAD PREP L 2 PLY 70% ISO ALC NONWOVEN SFT ABSRB TOP ANTISEP

## (undated) DEVICE — PERCUTANEOUS ENTRY THINWALL NEEDLE  ONE-PART: Brand: COOK

## (undated) DEVICE — AMPLATZ EXTRA STIFF WIRE GUIDE: Brand: AMPLATZ

## (undated) DEVICE — DRAPE EQUIP BANDED BG 36X28 IN W/ROUNDED CORNER SNAPKOVER

## (undated) DEVICE — NEEDLE RETROBLB 25GA L38MM STR SHFT DISP ATKNSN

## (undated) DEVICE — CATH ANGIO 5FR .045IN AL1 100CM EXPO

## (undated) DEVICE — PACK SURG CARDIAC CATH DYNJ38860] MEDLINE INDUSTRIES INC]

## (undated) DEVICE — INTENDED FOR TISSUE SEPARATION, AND OTHER PROCEDURES THAT REQUIRE A SHARP SURGICAL BLADE TO PUNCTURE OR CUT.: Brand: BARD-PARKER ® STAINLESS STEEL BLADES

## (undated) DEVICE — GLOVE ORANGE PI 7   MSG9070

## (undated) DEVICE — KIT MED IMAG CNTRST AGNT W/ IOPAMIDOL REUSE

## (undated) DEVICE — CLIP LIG M BLU TI HRT SHP WIRE HORZ 600 PER BX

## (undated) DEVICE — INCISE SURGICAL DRAPE: Brand: OPSITE INCISE 28X30CM CTN 10

## (undated) DEVICE — GUIDEWIRE VASC L145CM 0.035IN J TIP L3MM PTFE FIX COR NAMIC

## (undated) DEVICE — SPEAR SURG TRIANG SHP HNDL PCH WECK-CEL

## (undated) DEVICE — PACK PROC FLD MGMT SYS CENTURION CUST

## (undated) DEVICE — ANGIO-SEAL VIP VASCULAR CLOSURE DEVICE: Brand: ANGIO-SEAL

## (undated) DEVICE — GOWN,SIRUS,FABRNF,XL,20/CS: Brand: MEDLINE

## (undated) DEVICE — DRAPE,REIN 53X77,STERILE: Brand: MEDLINE

## (undated) DEVICE — SOLUTION IRRIG 1000ML STRL H2O USP PLAS POUR BTL

## (undated) DEVICE — SOLUTION IV IRRIG WATER 1000ML POUR BRL 2F7114

## (undated) DEVICE — LABEL MED CARD SURG 4 IN PANEL STRL

## (undated) DEVICE — DRESSING FOAM W22XL25CM FILVE LAYR FOAM DP DEF SAFETAC

## (undated) DEVICE — MARKER,SKIN,WI/RULER AND LABELS: Brand: MEDLINE

## (undated) DEVICE — PADDLE INTERN DEFIB CHILD

## (undated) DEVICE — SET CARDIAC I

## (undated) DEVICE — LOADING SYS L-EVPROP2329US: Brand: EVOLUT™ PRO+

## (undated) DEVICE — 3M™ IOBAN™ 2 ANTIMICROBIAL INCISE DRAPE 6650EZ: Brand: IOBAN™ 2

## (undated) DEVICE — PACK PROCEDURE SURG SURG CATARACT CUSTOM

## (undated) DEVICE — PATIENT RETURN ELECTRODE, SINGLE-USE, CONTACT QUALITY MONITORING, ADULT, WITH 9FT CORD, FOR PATIENTS WEIGING OVER 33LBS. (15KG): Brand: MEGADYNE

## (undated) DEVICE — INTRO SHEATH W/6.0FRX10CMX.0385IN

## (undated) DEVICE — 1 ML TUBERCULIN SYRINGE,DETACHABLE NEEDLE: Brand: MONOJECT

## (undated) DEVICE — SET TRNQT W/ STD 7IN 12FR 5 TB 1 SNR DLP

## (undated) DEVICE — SOLUTION IRRIGATION BAL SALT SOLUTION 15 ML STRL BSS

## (undated) DEVICE — GLOVE ORTHO 8   MSG9480

## (undated) DEVICE — CANNULA OPHTH 25GA 7 8IN ORNG 45DEG ANG 4MM FR END DOME SHP

## (undated) DEVICE — AGENT HEMSTAT W4XL8IN OXIDIZED REGENERATED CELOS ABSRB

## (undated) DEVICE — PAD,EYE,1-5/8X2 5/8,STERILE,LF,1/PK: Brand: MEDLINE

## (undated) DEVICE — COVER PRB W14XL147CM TELESCOPICALLY FLD EXT LEN CIV-FLEX

## (undated) DEVICE — MEDIA CONTRAST RX ISOVUE-300 61% 30ML VIALS

## (undated) DEVICE — GLASSES SAFETY PROTCT GRN

## (undated) DEVICE — GARMENT,MEDLINE,DVT,INT,CALF,MED, GEN2: Brand: MEDLINE

## (undated) DEVICE — ALCOHOL RUBBING ISO 16OZ 70%

## (undated) DEVICE — PROBE HEMSTAT 18GA ERAS BVL TIP STR BPLR WET-FIELD

## (undated) DEVICE — LOOP VES W25MM THK1MM MAXI RED SIL FLD REPELLENT 100 PER

## (undated) DEVICE — SYRINGE, LUER LOCK, 10ML: Brand: MEDLINE

## (undated) DEVICE — SYRINGE MED 50ML LUERLOCK TIP

## (undated) DEVICE — TAPE ADH W2INXL10YD PLAS TRNSPAR H2O RESIST HYPOALRG CURAD

## (undated) DEVICE — KIT MFLD ISOLATN NACL CNTRST PRT TBNG SPIK W/ PRSS TRNSDUC

## (undated) DEVICE — ALCON INTREPID CURVED 0.3MM POLYMER I/A TIP: Brand: ALCON, INTREPID

## (undated) DEVICE — COLLECTOR SHRP 3GAL YEL CHEMO

## (undated) DEVICE — NEEDLE SPNL 20GA L3.5IN YEL HUB S STL REG WALL FIT STYL W/